# Patient Record
Sex: MALE | Race: WHITE | NOT HISPANIC OR LATINO
[De-identification: names, ages, dates, MRNs, and addresses within clinical notes are randomized per-mention and may not be internally consistent; named-entity substitution may affect disease eponyms.]

---

## 2017-01-31 ENCOUNTER — APPOINTMENT (OUTPATIENT)
Dept: PULMONOLOGY | Facility: CLINIC | Age: 57
End: 2017-01-31

## 2017-01-31 VITALS — BODY MASS INDEX: 53.24 KG/M2 | WEIGHT: 315 LBS

## 2017-01-31 VITALS
RESPIRATION RATE: 16 BRPM | SYSTOLIC BLOOD PRESSURE: 132 MMHG | DIASTOLIC BLOOD PRESSURE: 80 MMHG | OXYGEN SATURATION: 95 % | HEART RATE: 92 BPM

## 2017-03-22 ENCOUNTER — APPOINTMENT (OUTPATIENT)
Dept: ORTHOPEDIC SURGERY | Facility: CLINIC | Age: 57
End: 2017-03-22

## 2017-03-22 VITALS
BODY MASS INDEX: 49.44 KG/M2 | HEART RATE: 70 BPM | HEIGHT: 67 IN | TEMPERATURE: 97.9 F | WEIGHT: 315 LBS | SYSTOLIC BLOOD PRESSURE: 125 MMHG | DIASTOLIC BLOOD PRESSURE: 79 MMHG

## 2017-03-31 ENCOUNTER — APPOINTMENT (OUTPATIENT)
Dept: PULMONOLOGY | Facility: CLINIC | Age: 57
End: 2017-03-31

## 2017-04-25 ENCOUNTER — APPOINTMENT (OUTPATIENT)
Dept: PULMONOLOGY | Facility: CLINIC | Age: 57
End: 2017-04-25

## 2017-04-25 VITALS
OXYGEN SATURATION: 98 % | WEIGHT: 315 LBS | HEART RATE: 90 BPM | DIASTOLIC BLOOD PRESSURE: 70 MMHG | BODY MASS INDEX: 54.01 KG/M2 | SYSTOLIC BLOOD PRESSURE: 110 MMHG

## 2017-04-25 VITALS — RESPIRATION RATE: 16 BRPM

## 2017-04-25 RX ORDER — AZITHROMYCIN 500 MG/1
500 TABLET, FILM COATED ORAL
Qty: 5 | Refills: 0 | Status: DISCONTINUED | COMMUNITY
Start: 2017-02-13 | End: 2017-04-25

## 2017-06-21 ENCOUNTER — RX RENEWAL (OUTPATIENT)
Age: 57
End: 2017-06-21

## 2017-06-29 ENCOUNTER — APPOINTMENT (OUTPATIENT)
Dept: PULMONOLOGY | Facility: CLINIC | Age: 57
End: 2017-06-29

## 2017-06-29 VITALS — SYSTOLIC BLOOD PRESSURE: 120 MMHG | HEART RATE: 84 BPM | DIASTOLIC BLOOD PRESSURE: 90 MMHG | OXYGEN SATURATION: 95 %

## 2017-06-29 VITALS — RESPIRATION RATE: 16 BRPM

## 2017-06-29 RX ORDER — LEVOFLOXACIN 500 MG/1
500 TABLET, FILM COATED ORAL
Qty: 10 | Refills: 0 | Status: DISCONTINUED | COMMUNITY
Start: 2017-01-25

## 2017-07-28 ENCOUNTER — OUTPATIENT (OUTPATIENT)
Dept: OUTPATIENT SERVICES | Facility: HOSPITAL | Age: 57
LOS: 1 days | End: 2017-07-28

## 2017-07-28 DIAGNOSIS — G47.33 OBSTRUCTIVE SLEEP APNEA (ADULT) (PEDIATRIC): ICD-10-CM

## 2017-08-20 ENCOUNTER — OUTPATIENT (OUTPATIENT)
Dept: OUTPATIENT SERVICES | Facility: HOSPITAL | Age: 57
LOS: 1 days | End: 2017-08-20

## 2017-08-20 DIAGNOSIS — G47.33 OBSTRUCTIVE SLEEP APNEA (ADULT) (PEDIATRIC): ICD-10-CM

## 2017-08-30 ENCOUNTER — APPOINTMENT (OUTPATIENT)
Dept: PULMONOLOGY | Facility: CLINIC | Age: 57
End: 2017-08-30
Payer: COMMERCIAL

## 2017-08-30 VITALS — DIASTOLIC BLOOD PRESSURE: 60 MMHG | OXYGEN SATURATION: 97 % | SYSTOLIC BLOOD PRESSURE: 118 MMHG | HEART RATE: 81 BPM

## 2017-08-30 VITALS — RESPIRATION RATE: 16 BRPM

## 2017-08-30 VITALS — BODY MASS INDEX: 57.95 KG/M2 | WEIGHT: 315 LBS

## 2017-08-30 PROCEDURE — 94010 BREATHING CAPACITY TEST: CPT

## 2017-08-30 PROCEDURE — 99214 OFFICE O/P EST MOD 30 MIN: CPT | Mod: 25

## 2017-08-30 RX ORDER — AZITHROMYCIN 500 MG/1
500 TABLET, FILM COATED ORAL
Qty: 5 | Refills: 0 | Status: DISCONTINUED | COMMUNITY
Start: 2017-02-13 | End: 2017-08-30

## 2017-08-30 RX ORDER — FLUTICASONE FUROATE AND VILANTEROL TRIFENATATE 100; 25 UG/1; UG/1
100-25 POWDER RESPIRATORY (INHALATION)
Qty: 60 | Refills: 0 | Status: DISCONTINUED | COMMUNITY
Start: 2016-12-19 | End: 2017-08-30

## 2017-11-07 ENCOUNTER — OUTPATIENT (OUTPATIENT)
Dept: OUTPATIENT SERVICES | Facility: HOSPITAL | Age: 57
LOS: 1 days | End: 2017-11-07
Payer: COMMERCIAL

## 2017-11-07 DIAGNOSIS — G47.33 OBSTRUCTIVE SLEEP APNEA (ADULT) (PEDIATRIC): ICD-10-CM

## 2017-11-07 PROCEDURE — 95810 POLYSOM 6/> YRS 4/> PARAM: CPT

## 2017-11-07 PROCEDURE — 95810 POLYSOM 6/> YRS 4/> PARAM: CPT | Mod: 26

## 2017-12-21 ENCOUNTER — APPOINTMENT (OUTPATIENT)
Dept: PULMONOLOGY | Facility: CLINIC | Age: 57
End: 2017-12-21
Payer: COMMERCIAL

## 2017-12-21 VITALS
RESPIRATION RATE: 18 BRPM | SYSTOLIC BLOOD PRESSURE: 118 MMHG | OXYGEN SATURATION: 96 % | DIASTOLIC BLOOD PRESSURE: 78 MMHG | WEIGHT: 315 LBS | HEART RATE: 79 BPM | BODY MASS INDEX: 59.83 KG/M2

## 2017-12-21 PROCEDURE — 99214 OFFICE O/P EST MOD 30 MIN: CPT

## 2017-12-28 RX ADMIN — OXYCODONE AND ACETAMINOPHEN 2 TABLET(S): 5; 325 TABLET ORAL at 23:14

## 2018-01-11 ENCOUNTER — RX RENEWAL (OUTPATIENT)
Age: 58
End: 2018-01-11

## 2018-01-11 RX ORDER — FLUTICASONE FUROATE, UMECLIDINIUM BROMIDE AND VILANTEROL TRIFENATATE 100; 62.5; 25 UG/1; UG/1; UG/1
100-62.5-25 POWDER RESPIRATORY (INHALATION)
Qty: 1 | Refills: 5 | Status: DISCONTINUED | COMMUNITY
Start: 2017-12-21 | End: 2018-01-11

## 2018-01-26 ENCOUNTER — OTHER (OUTPATIENT)
Age: 58
End: 2018-01-26

## 2018-03-21 ENCOUNTER — APPOINTMENT (OUTPATIENT)
Dept: PULMONOLOGY | Facility: CLINIC | Age: 58
End: 2018-03-21
Payer: COMMERCIAL

## 2018-03-21 VITALS — SYSTOLIC BLOOD PRESSURE: 115 MMHG | DIASTOLIC BLOOD PRESSURE: 60 MMHG

## 2018-03-21 VITALS — WEIGHT: 315 LBS | BODY MASS INDEX: 60.46 KG/M2

## 2018-03-21 VITALS — RESPIRATION RATE: 18 BRPM

## 2018-03-21 VITALS — HEART RATE: 73 BPM | OXYGEN SATURATION: 96 %

## 2018-03-21 PROCEDURE — 94010 BREATHING CAPACITY TEST: CPT

## 2018-03-21 PROCEDURE — 99214 OFFICE O/P EST MOD 30 MIN: CPT | Mod: 25

## 2018-03-23 RX ORDER — UMECLIDINIUM 62.5 UG/1
62.5 AEROSOL, POWDER ORAL DAILY
Qty: 1 | Refills: 5 | Status: DISCONTINUED | COMMUNITY
Start: 2016-12-19 | End: 2018-03-23

## 2018-06-04 ENCOUNTER — APPOINTMENT (OUTPATIENT)
Dept: ORTHOPEDIC SURGERY | Facility: CLINIC | Age: 58
End: 2018-06-04
Payer: COMMERCIAL

## 2018-06-04 VITALS
SYSTOLIC BLOOD PRESSURE: 112 MMHG | WEIGHT: 315 LBS | HEIGHT: 67 IN | DIASTOLIC BLOOD PRESSURE: 76 MMHG | BODY MASS INDEX: 49.44 KG/M2 | HEART RATE: 53 BPM

## 2018-06-04 PROCEDURE — 20610 DRAIN/INJ JOINT/BURSA W/O US: CPT | Mod: LT

## 2018-06-04 PROCEDURE — 99214 OFFICE O/P EST MOD 30 MIN: CPT | Mod: 25

## 2018-08-06 ENCOUNTER — APPOINTMENT (OUTPATIENT)
Dept: ORTHOPEDIC SURGERY | Facility: CLINIC | Age: 58
End: 2018-08-06
Payer: COMMERCIAL

## 2018-08-06 VITALS
DIASTOLIC BLOOD PRESSURE: 70 MMHG | SYSTOLIC BLOOD PRESSURE: 118 MMHG | TEMPERATURE: 98.8 F | BODY MASS INDEX: 49.44 KG/M2 | WEIGHT: 315 LBS | HEART RATE: 78 BPM | HEIGHT: 67 IN

## 2018-08-06 PROCEDURE — 20610 DRAIN/INJ JOINT/BURSA W/O US: CPT | Mod: 50

## 2018-08-06 PROCEDURE — 73562 X-RAY EXAM OF KNEE 3: CPT | Mod: LT

## 2018-08-06 PROCEDURE — 99214 OFFICE O/P EST MOD 30 MIN: CPT | Mod: 25

## 2018-09-13 ENCOUNTER — APPOINTMENT (OUTPATIENT)
Dept: PULMONOLOGY | Facility: CLINIC | Age: 58
End: 2018-09-13
Payer: COMMERCIAL

## 2018-09-13 VITALS — WEIGHT: 315 LBS | BODY MASS INDEX: 58.89 KG/M2

## 2018-09-13 VITALS — HEART RATE: 85 BPM | DIASTOLIC BLOOD PRESSURE: 80 MMHG | SYSTOLIC BLOOD PRESSURE: 128 MMHG | OXYGEN SATURATION: 95 %

## 2018-09-13 VITALS — RESPIRATION RATE: 16 BRPM

## 2018-09-13 PROCEDURE — 99214 OFFICE O/P EST MOD 30 MIN: CPT | Mod: 25

## 2018-09-13 PROCEDURE — 94010 BREATHING CAPACITY TEST: CPT

## 2018-10-08 ENCOUNTER — RX RENEWAL (OUTPATIENT)
Age: 58
End: 2018-10-08

## 2018-11-12 ENCOUNTER — APPOINTMENT (OUTPATIENT)
Dept: ORTHOPEDIC SURGERY | Facility: CLINIC | Age: 58
End: 2018-11-12
Payer: COMMERCIAL

## 2018-11-12 VITALS
SYSTOLIC BLOOD PRESSURE: 112 MMHG | WEIGHT: 315 LBS | BODY MASS INDEX: 49.44 KG/M2 | HEART RATE: 46 BPM | TEMPERATURE: 98.1 F | DIASTOLIC BLOOD PRESSURE: 70 MMHG | HEIGHT: 67 IN

## 2018-11-12 DIAGNOSIS — M25.562 PAIN IN RIGHT KNEE: ICD-10-CM

## 2018-11-12 DIAGNOSIS — M25.561 PAIN IN RIGHT KNEE: ICD-10-CM

## 2018-11-12 PROCEDURE — 99214 OFFICE O/P EST MOD 30 MIN: CPT | Mod: 25

## 2018-11-12 PROCEDURE — 20610 DRAIN/INJ JOINT/BURSA W/O US: CPT | Mod: 50

## 2018-12-27 ENCOUNTER — INPATIENT (INPATIENT)
Facility: HOSPITAL | Age: 58
LOS: 5 days | Discharge: HOME | DRG: 291 | End: 2019-01-02
Attending: INTERNAL MEDICINE | Admitting: STUDENT IN AN ORGANIZED HEALTH CARE EDUCATION/TRAINING PROGRAM
Payer: COMMERCIAL

## 2018-12-27 VITALS
HEART RATE: 105 BPM | TEMPERATURE: 99 F | RESPIRATION RATE: 22 BRPM | WEIGHT: 315 LBS | OXYGEN SATURATION: 96 % | HEIGHT: 67 IN | DIASTOLIC BLOOD PRESSURE: 77 MMHG | SYSTOLIC BLOOD PRESSURE: 132 MMHG

## 2018-12-27 LAB
ALBUMIN SERPL ELPH-MCNC: 4.3 G/DL — SIGNIFICANT CHANGE UP (ref 3.3–5.2)
ALP SERPL-CCNC: 41 U/L — SIGNIFICANT CHANGE UP (ref 40–120)
ALT FLD-CCNC: 13 U/L — SIGNIFICANT CHANGE UP
ANION GAP SERPL CALC-SCNC: 14 MMOL/L — SIGNIFICANT CHANGE UP (ref 5–17)
APTT BLD: 33.9 SEC — SIGNIFICANT CHANGE UP (ref 27.5–36.3)
AST SERPL-CCNC: 17 U/L — SIGNIFICANT CHANGE UP
BASOPHILS # BLD AUTO: 0 K/UL — SIGNIFICANT CHANGE UP (ref 0–0.2)
BASOPHILS NFR BLD AUTO: 0.2 % — SIGNIFICANT CHANGE UP (ref 0–2)
BILIRUB SERPL-MCNC: 0.8 MG/DL — SIGNIFICANT CHANGE UP (ref 0.4–2)
BUN SERPL-MCNC: 17 MG/DL — SIGNIFICANT CHANGE UP (ref 8–20)
CALCIUM SERPL-MCNC: 8.6 MG/DL — SIGNIFICANT CHANGE UP (ref 8.6–10.2)
CHLORIDE SERPL-SCNC: 102 MMOL/L — SIGNIFICANT CHANGE UP (ref 98–107)
CO2 SERPL-SCNC: 24 MMOL/L — SIGNIFICANT CHANGE UP (ref 22–29)
CREAT SERPL-MCNC: 0.93 MG/DL — SIGNIFICANT CHANGE UP (ref 0.5–1.3)
EOSINOPHIL # BLD AUTO: 0.2 K/UL — SIGNIFICANT CHANGE UP (ref 0–0.5)
EOSINOPHIL NFR BLD AUTO: 1 % — SIGNIFICANT CHANGE UP (ref 0–5)
GLUCOSE SERPL-MCNC: 86 MG/DL — SIGNIFICANT CHANGE UP (ref 70–115)
HCT VFR BLD CALC: 45 % — SIGNIFICANT CHANGE UP (ref 42–52)
HGB BLD-MCNC: 15 G/DL — SIGNIFICANT CHANGE UP (ref 14–18)
INR BLD: 1.54 RATIO — HIGH (ref 0.88–1.16)
LYMPHOCYTES # BLD AUTO: 21.9 % — SIGNIFICANT CHANGE UP (ref 20–55)
LYMPHOCYTES # BLD AUTO: 3.5 K/UL — SIGNIFICANT CHANGE UP (ref 1–4.8)
MCHC RBC-ENTMCNC: 31.1 PG — HIGH (ref 27–31)
MCHC RBC-ENTMCNC: 33.3 G/DL — SIGNIFICANT CHANGE UP (ref 32–36)
MCV RBC AUTO: 93.4 FL — SIGNIFICANT CHANGE UP (ref 80–94)
MONOCYTES # BLD AUTO: 1.4 K/UL — HIGH (ref 0–0.8)
MONOCYTES NFR BLD AUTO: 8.5 % — SIGNIFICANT CHANGE UP (ref 3–10)
NEUTROPHILS # BLD AUTO: 10.8 K/UL — HIGH (ref 1.8–8)
NEUTROPHILS NFR BLD AUTO: 68 % — SIGNIFICANT CHANGE UP (ref 37–73)
NT-PROBNP SERPL-SCNC: 1863 PG/ML — HIGH (ref 0–300)
PLATELET # BLD AUTO: 209 K/UL — SIGNIFICANT CHANGE UP (ref 150–400)
POTASSIUM SERPL-MCNC: 4.2 MMOL/L — SIGNIFICANT CHANGE UP (ref 3.5–5.3)
POTASSIUM SERPL-SCNC: 4.2 MMOL/L — SIGNIFICANT CHANGE UP (ref 3.5–5.3)
PROT SERPL-MCNC: 7.4 G/DL — SIGNIFICANT CHANGE UP (ref 6.6–8.7)
PROTHROM AB SERPL-ACNC: 18 SEC — HIGH (ref 10–12.9)
RAPID RVP RESULT: SIGNIFICANT CHANGE UP
RBC # BLD: 4.82 M/UL — SIGNIFICANT CHANGE UP (ref 4.6–6.2)
RBC # FLD: 14.1 % — SIGNIFICANT CHANGE UP (ref 11–15.6)
SODIUM SERPL-SCNC: 140 MMOL/L — SIGNIFICANT CHANGE UP (ref 135–145)
TROPONIN T SERPL-MCNC: <0.01 NG/ML — SIGNIFICANT CHANGE UP (ref 0–0.06)
WBC # BLD: 15.8 K/UL — HIGH (ref 4.8–10.8)
WBC # FLD AUTO: 15.8 K/UL — HIGH (ref 4.8–10.8)

## 2018-12-27 PROCEDURE — 71046 X-RAY EXAM CHEST 2 VIEWS: CPT | Mod: 26

## 2018-12-27 PROCEDURE — 93010 ELECTROCARDIOGRAM REPORT: CPT

## 2018-12-27 PROCEDURE — 99406 BEHAV CHNG SMOKING 3-10 MIN: CPT

## 2018-12-27 PROCEDURE — 99223 1ST HOSP IP/OBS HIGH 75: CPT | Mod: 25

## 2018-12-27 PROCEDURE — 99285 EMERGENCY DEPT VISIT HI MDM: CPT

## 2018-12-27 RX ORDER — IPRATROPIUM/ALBUTEROL SULFATE 18-103MCG
3 AEROSOL WITH ADAPTER (GRAM) INHALATION ONCE
Qty: 0 | Refills: 0 | Status: COMPLETED | OUTPATIENT
Start: 2018-12-27 | End: 2018-12-27

## 2018-12-27 RX ORDER — MAGNESIUM SULFATE 500 MG/ML
2 VIAL (ML) INJECTION ONCE
Qty: 0 | Refills: 0 | Status: COMPLETED | OUTPATIENT
Start: 2018-12-27 | End: 2018-12-27

## 2018-12-27 RX ORDER — FUROSEMIDE 40 MG
40 TABLET ORAL EVERY 4 HOURS
Qty: 0 | Refills: 0 | Status: DISCONTINUED | OUTPATIENT
Start: 2018-12-27 | End: 2018-12-28

## 2018-12-27 RX ORDER — LEVALBUTEROL 1.25 MG/.5ML
1.25 SOLUTION, CONCENTRATE RESPIRATORY (INHALATION) THREE TIMES A DAY
Qty: 0 | Refills: 0 | Status: DISCONTINUED | OUTPATIENT
Start: 2018-12-27 | End: 2018-12-29

## 2018-12-27 RX ORDER — RIVAROXABAN 15 MG-20MG
20 KIT ORAL ONCE
Qty: 0 | Refills: 0 | Status: COMPLETED | OUTPATIENT
Start: 2018-12-27 | End: 2018-12-27

## 2018-12-27 RX ORDER — FUROSEMIDE 40 MG
40 TABLET ORAL ONCE
Qty: 0 | Refills: 0 | Status: COMPLETED | OUTPATIENT
Start: 2018-12-27 | End: 2018-12-27

## 2018-12-27 RX ORDER — CARVEDILOL PHOSPHATE 80 MG/1
12.5 CAPSULE, EXTENDED RELEASE ORAL ONCE
Qty: 0 | Refills: 0 | Status: COMPLETED | OUTPATIENT
Start: 2018-12-27 | End: 2018-12-27

## 2018-12-27 RX ORDER — RIVAROXABAN 15 MG-20MG
20 KIT ORAL EVERY 24 HOURS
Qty: 0 | Refills: 0 | Status: DISCONTINUED | OUTPATIENT
Start: 2018-12-27 | End: 2019-01-02

## 2018-12-27 RX ORDER — CARVEDILOL PHOSPHATE 80 MG/1
6.25 CAPSULE, EXTENDED RELEASE ORAL
Qty: 0 | Refills: 0 | Status: DISCONTINUED | OUTPATIENT
Start: 2018-12-27 | End: 2018-12-29

## 2018-12-27 RX ORDER — IPRATROPIUM/ALBUTEROL SULFATE 18-103MCG
3 AEROSOL WITH ADAPTER (GRAM) INHALATION EVERY 6 HOURS
Qty: 0 | Refills: 0 | Status: DISCONTINUED | OUTPATIENT
Start: 2018-12-27 | End: 2018-12-27

## 2018-12-27 RX ADMIN — Medication 125 MILLIGRAM(S): at 18:55

## 2018-12-27 RX ADMIN — Medication 40 MILLIGRAM(S): at 23:00

## 2018-12-27 RX ADMIN — Medication 3 MILLILITER(S): at 20:19

## 2018-12-27 RX ADMIN — Medication 50 GRAM(S): at 20:18

## 2018-12-27 RX ADMIN — CARVEDILOL PHOSPHATE 12.5 MILLIGRAM(S): 80 CAPSULE, EXTENDED RELEASE ORAL at 22:04

## 2018-12-27 RX ADMIN — RIVAROXABAN 20 MILLIGRAM(S): KIT at 22:04

## 2018-12-27 NOTE — ED STATDOCS - OBJECTIVE STATEMENT
58 year old male with pertinent hx of COPD and sleep apnea(on CPAP) presents with progressively worsening shortness of breath that is at its worst at night. Pt reports having a hard time catching his breath last night. Endorses some daytime MALIK and non-productive  cough, chills but denies any chest pain, diaphoresis, leg swelling or recent weight gain

## 2018-12-27 NOTE — H&P ADULT - ASSESSMENT
Pt is a 59 yo M presenting from home for 1 month hx of progressive SOB likely due to new onset CHF exac. PMH A fib on Xarelto, HTN, Obesity, ROCK on CPAP, COPD, tobacco dependence.

## 2018-12-27 NOTE — ED ADULT NURSE REASSESSMENT NOTE - NS ED NURSE REASSESS COMMENT FT1
assumed care of patient from ongoing RN, alert and oriented x4, charting as noted. pt sitting on stretcher, no s/s of respiratory distress noted at this time. sp02 97% on room air. Lung sounds diminished bilaterally, respirations even non labored. Awaiting orders and dispo, patient offers no complaints at this time. Saftey maintained. pt educated on plan of care, pt able to successfully teach back plan of care to RN, RN will continue to reeducate pt during hospital stay.

## 2018-12-27 NOTE — ED PROVIDER NOTE - OBJECTIVE STATEMENT
57 y/o M, with hx of Afib, ROCK, and COPD, presents to the ED c/o worsening SOB, onset last night.  Pt states that he has had intermittent SOB and difficulty breathing, onset 2 months ago, but notes that last night sx became more severe.  Pt states that he was seen by his PCP this morning and referred to the ED for further evaluation and a CXR.  Currently taking 10mg Prednisone due to persistent SOB.  Associated sx include productive cough, producing clear sputum.  Also currently on Carvedilol and Xarelto, however states that he has not yet taken his night time dose.  Denies fever, chills, chest pain, LE edema, N/V/D, abd pain, back pain, or HA.    Cardiologist: Dr. Deng  Pulmonologist: Dr. Tamayo  PCP: Dr. Melraa

## 2018-12-27 NOTE — ED PROVIDER NOTE - MUSCULOSKELETAL, MLM
Spine appears normal, range of motion is not limited, no muscle or joint tenderness, 1+ nonpitting edema to bilateral lower extremities

## 2018-12-27 NOTE — ED ADULT NURSE REASSESSMENT NOTE - NS ED NURSE REASSESS COMMENT FT1
pt comfortable. NO distress, medications given, awaiting disposition from MD. pt educated on plan of care, pt able to successfully teach back plan of care to RN, RN will continue to reeducate pt during hospital stay.

## 2018-12-27 NOTE — ED STATDOCS - MEDICAL DECISION MAKING DETAILS
58 year old male pt COPD here with MALIK- will send pt to the main for closer monitoring and evaluation

## 2018-12-27 NOTE — H&P ADULT - HISTORY OF PRESENT ILLNESS
Pt is a 57 yo M presenting from home for 1 month hx of progressive SOB. PMH A fib on Xarelto, HTN, Obesity, ROCK on CPAP, COPD, tobacco dependence.   Patient states that he has SOB w/ exertion at baseline due to his large body habitus, but over the last month it has been getting worse, he gets dyspnea on minimal exertion and had PND last night awaking him from sleep. He states he also had a 15 lb weight gain over the last 2 months, has minimal LE swelling he states, does not follow any particular diet. No fam hx of CHF in mother and father. He has seen his PMD who placed him on PO steroids for approximately 1 month now. Denies headaches, nausea, vomiting, chest pain, palpitations, abdominal pain, constipation, diarrhea, melena, hematochezia, dysuria.   He last saw his cardiologist Dr. Deng approx 3 months ago, and states he had an Echo does over 6 months ago and has no known hx of CHF. He has no other complaints today. Denies any pain anywhere.   In ED CXR shows some vasc congestion, he received IV lasix. Pt seen on 12/27/18 at 11:25 PM.   Pt is a 57 yo M presenting from home for 1 month hx of progressive SOB. PMH A fib on Xarelto, HTN, Obesity, ROCK on CPAP, COPD, tobacco dependence.   Patient states that he has SOB w/ exertion at baseline due to his large body habitus, but over the last month it has been getting worse, he gets dyspnea on minimal exertion and had PND last night awaking him from sleep. He states he also had a 15 lb weight gain over the last 2 months, has minimal LE swelling he states, does not follow any particular diet. No fam hx of CHF in mother and father. He has seen his PMD who placed him on PO steroids for approximately 1 month now. Denies headaches, nausea, vomiting, chest pain, palpitations, abdominal pain, constipation, diarrhea, melena, hematochezia, dysuria.   He last saw his cardiologist Dr. Deng approx 3 months ago, and states he had an Echo does over 6 months ago and has no known hx of CHF. He has no other complaints today. Denies any pain anywhere.   In ED CXR shows some vasc congestion, he received IV lasix.

## 2018-12-27 NOTE — H&P ADULT - PROBLEM SELECTOR PLAN 5
Patient was counselled on tobacco cessation. He was informed of the increased risk of lung cancer and cardiovascular disease, COPD among other health risks associated with smoking and tobacco use. Cessation was advised. 3 mins on counselling spent

## 2018-12-27 NOTE — ED STATDOCS - ATTENDING CONTRIBUTION TO CARE
59 yo male pmh copd , sleep apnea comes to ed progressively worsening sob; pe  chest diminished bs b/l; pt to be transferred to main for further evaluation of sob

## 2018-12-27 NOTE — H&P ADULT - NSHPPHYSICALEXAM_GEN_ALL_CORE
T(C): 37 (12-27-18 @ 19:22), Max: 37.2 (12-27-18 @ 16:09)  HR: 93 (12-27-18 @ 22:00) (93 - 105)  BP: 110/71 (12-27-18 @ 22:00) (110/71 - 160/73)  RR: 22 (12-27-18 @ 22:00) (20 - 22)  SpO2: 94% (12-27-18 @ 22:00) (94% - 97%)  Wt(kg): --    Physical Exam:   GENERAL: well-groomed, well-developed, NAD, obese  HEENT: head NC/AT; EOM intact, conjunctiva & sclera clear; hearing grossly intact, moist mucous membranes  NECK: supple, no JVD  RESPIRATORY: CTA B/L, no wheezing, rales, rhonchi or rubs  CARDIOVASCULAR: S1&S2, irreg irreg, no murmurs or gallops  ABDOMEN: soft, non-tender, non-distended, + Bowel sounds x4 quadrants, no guarding, rebound or rigidity  MUSCULOSKELETAL:  no clubbing, cyanosis of all 4 extremities, bn/l LE pitting edema 1+  LYMPH: no cervical lymphadenopathy  VASCULAR: Radial pulses 2+ bilaterally, no varicose veins   SKIN: warm and dry, color normal  NEUROLOGIC: AA&O X3, CN2-12 intact w/ no focal deficits, no sensory loss, motor Strength 5/5 in UE & LE B/L  Psych: Normal mood and affect, normal behavior

## 2018-12-27 NOTE — H&P ADULT - PROBLEM/PLAN-1
The patient's records were received in our office. Health Maintenance was updated today.  
DISPLAY PLAN FREE TEXT

## 2018-12-27 NOTE — ED ADULT TRIAGE NOTE - CHIEF COMPLAINT QUOTE
COPD, still smoke. Saw Primary Monday, received steroids, all for a couple of weeks. Has loop recorder, sleep apnea, CPAP nocturnal.

## 2018-12-27 NOTE — H&P ADULT - PROBLEM SELECTOR PLAN 3
Continue neb treatments, currently on 10mg steroids which is likely also causing water retention, consider tapering steroids if CHF confirmed.

## 2018-12-28 DIAGNOSIS — I50.9 HEART FAILURE, UNSPECIFIED: ICD-10-CM

## 2018-12-28 DIAGNOSIS — I48.91 UNSPECIFIED ATRIAL FIBRILLATION: ICD-10-CM

## 2018-12-28 DIAGNOSIS — Z29.9 ENCOUNTER FOR PROPHYLACTIC MEASURES, UNSPECIFIED: ICD-10-CM

## 2018-12-28 DIAGNOSIS — G47.33 OBSTRUCTIVE SLEEP APNEA (ADULT) (PEDIATRIC): ICD-10-CM

## 2018-12-28 DIAGNOSIS — J44.9 CHRONIC OBSTRUCTIVE PULMONARY DISEASE, UNSPECIFIED: ICD-10-CM

## 2018-12-28 DIAGNOSIS — F17.210 NICOTINE DEPENDENCE, CIGARETTES, UNCOMPLICATED: ICD-10-CM

## 2018-12-28 DIAGNOSIS — D72.829 ELEVATED WHITE BLOOD CELL COUNT, UNSPECIFIED: ICD-10-CM

## 2018-12-28 LAB
ANION GAP SERPL CALC-SCNC: 15 MMOL/L — SIGNIFICANT CHANGE UP (ref 5–17)
ANISOCYTOSIS BLD QL: SLIGHT — SIGNIFICANT CHANGE UP
BUN SERPL-MCNC: 18 MG/DL — SIGNIFICANT CHANGE UP (ref 8–20)
CALCIUM SERPL-MCNC: 8.5 MG/DL — LOW (ref 8.6–10.2)
CHLORIDE SERPL-SCNC: 99 MMOL/L — SIGNIFICANT CHANGE UP (ref 98–107)
CO2 SERPL-SCNC: 22 MMOL/L — SIGNIFICANT CHANGE UP (ref 22–29)
CREAT SERPL-MCNC: 0.85 MG/DL — SIGNIFICANT CHANGE UP (ref 0.5–1.3)
ELLIPTOCYTES BLD QL SMEAR: SLIGHT — SIGNIFICANT CHANGE UP
GLUCOSE SERPL-MCNC: 176 MG/DL — HIGH (ref 70–115)
HCT VFR BLD CALC: 43.6 % — SIGNIFICANT CHANGE UP (ref 42–52)
HGB BLD-MCNC: 14.8 G/DL — SIGNIFICANT CHANGE UP (ref 14–18)
HYPOCHROMIA BLD QL: SLIGHT — SIGNIFICANT CHANGE UP
LYMPHOCYTES # BLD AUTO: 9 % — LOW (ref 20–55)
MACROCYTES BLD QL: SLIGHT — SIGNIFICANT CHANGE UP
MCHC RBC-ENTMCNC: 31.2 PG — HIGH (ref 27–31)
MCHC RBC-ENTMCNC: 33.9 G/DL — SIGNIFICANT CHANGE UP (ref 32–36)
MCV RBC AUTO: 92 FL — SIGNIFICANT CHANGE UP (ref 80–94)
MICROCYTES BLD QL: SLIGHT — SIGNIFICANT CHANGE UP
MONOCYTES NFR BLD AUTO: 1 % — LOW (ref 3–10)
NEUTROPHILS NFR BLD AUTO: 90 % — HIGH (ref 37–73)
PLAT MORPH BLD: NORMAL — SIGNIFICANT CHANGE UP
PLATELET # BLD AUTO: 207 K/UL — SIGNIFICANT CHANGE UP (ref 150–400)
POIKILOCYTOSIS BLD QL AUTO: SLIGHT — SIGNIFICANT CHANGE UP
POTASSIUM SERPL-MCNC: 4.3 MMOL/L — SIGNIFICANT CHANGE UP (ref 3.5–5.3)
POTASSIUM SERPL-SCNC: 4.3 MMOL/L — SIGNIFICANT CHANGE UP (ref 3.5–5.3)
RBC # BLD: 4.74 M/UL — SIGNIFICANT CHANGE UP (ref 4.6–6.2)
RBC # FLD: 13.8 % — SIGNIFICANT CHANGE UP (ref 11–15.6)
RBC BLD AUTO: ABNORMAL
SODIUM SERPL-SCNC: 136 MMOL/L — SIGNIFICANT CHANGE UP (ref 135–145)
WBC # BLD: 14.2 K/UL — HIGH (ref 4.8–10.8)
WBC # FLD AUTO: 14.2 K/UL — HIGH (ref 4.8–10.8)

## 2018-12-28 PROCEDURE — 93306 TTE W/DOPPLER COMPLETE: CPT | Mod: 26

## 2018-12-28 PROCEDURE — 99232 SBSQ HOSP IP/OBS MODERATE 35: CPT

## 2018-12-28 PROCEDURE — 99231 SBSQ HOSP IP/OBS SF/LOW 25: CPT

## 2018-12-28 RX ORDER — LEVALBUTEROL 1.25 MG/.5ML
1.25 SOLUTION, CONCENTRATE RESPIRATORY (INHALATION) ONCE
Qty: 0 | Refills: 0 | Status: COMPLETED | OUTPATIENT
Start: 2018-12-28 | End: 2018-12-28

## 2018-12-28 RX ORDER — OXYCODONE AND ACETAMINOPHEN 5; 325 MG/1; MG/1
2 TABLET ORAL EVERY 4 HOURS
Qty: 0 | Refills: 0 | Status: DISCONTINUED | OUTPATIENT
Start: 2018-12-28 | End: 2018-12-29

## 2018-12-28 RX ORDER — FUROSEMIDE 40 MG
40 TABLET ORAL EVERY 12 HOURS
Qty: 0 | Refills: 0 | Status: DISCONTINUED | OUTPATIENT
Start: 2018-12-28 | End: 2018-12-31

## 2018-12-28 RX ORDER — SIMETHICONE 80 MG/1
80 TABLET, CHEWABLE ORAL
Qty: 0 | Refills: 0 | Status: DISCONTINUED | OUTPATIENT
Start: 2018-12-28 | End: 2018-12-28

## 2018-12-28 RX ORDER — OXYCODONE AND ACETAMINOPHEN 5; 325 MG/1; MG/1
1 TABLET ORAL EVERY 4 HOURS
Qty: 0 | Refills: 0 | Status: DISCONTINUED | OUTPATIENT
Start: 2018-12-28 | End: 2018-12-29

## 2018-12-28 RX ORDER — INFLUENZA VIRUS VACCINE 15; 15; 15; 15 UG/.5ML; UG/.5ML; UG/.5ML; UG/.5ML
0.5 SUSPENSION INTRAMUSCULAR ONCE
Qty: 0 | Refills: 0 | Status: COMPLETED | OUTPATIENT
Start: 2018-12-28 | End: 2018-12-28

## 2018-12-28 RX ADMIN — Medication 40 MILLIGRAM(S): at 06:24

## 2018-12-28 RX ADMIN — LEVALBUTEROL 1.25 MILLIGRAM(S): 1.25 SOLUTION, CONCENTRATE RESPIRATORY (INHALATION) at 20:47

## 2018-12-28 RX ADMIN — SIMETHICONE 80 MILLIGRAM(S): 80 TABLET, CHEWABLE ORAL at 20:15

## 2018-12-28 RX ADMIN — CARVEDILOL PHOSPHATE 6.25 MILLIGRAM(S): 80 CAPSULE, EXTENDED RELEASE ORAL at 06:24

## 2018-12-28 RX ADMIN — OXYCODONE AND ACETAMINOPHEN 2 TABLET(S): 5; 325 TABLET ORAL at 22:20

## 2018-12-28 RX ADMIN — Medication 1 TABLET(S): at 18:50

## 2018-12-28 RX ADMIN — Medication 40 MILLIGRAM(S): at 10:21

## 2018-12-28 RX ADMIN — LEVALBUTEROL 1.25 MILLIGRAM(S): 1.25 SOLUTION, CONCENTRATE RESPIRATORY (INHALATION) at 05:23

## 2018-12-28 RX ADMIN — Medication 10 MILLIGRAM(S): at 06:23

## 2018-12-28 RX ADMIN — Medication 40 MILLIGRAM(S): at 19:23

## 2018-12-28 RX ADMIN — LEVALBUTEROL 1.25 MILLIGRAM(S): 1.25 SOLUTION, CONCENTRATE RESPIRATORY (INHALATION) at 10:27

## 2018-12-28 RX ADMIN — RIVAROXABAN 20 MILLIGRAM(S): KIT at 18:50

## 2018-12-28 RX ADMIN — CARVEDILOL PHOSPHATE 6.25 MILLIGRAM(S): 80 CAPSULE, EXTENDED RELEASE ORAL at 19:23

## 2018-12-28 RX ADMIN — Medication 1 TABLET(S): at 06:24

## 2018-12-28 NOTE — PROGRESS NOTE ADULT - PROBLEM SELECTOR PLAN 3
Continue neb treatments, currently on 10mg steroids which is likely also causing water retention, consider tapering steroids if CHF confirmed. Continue neb treatments, currently on 10mg steroids which is likely also causing water retention, will leonardo down to 5mg, might need slow long leonardo, and need to have out patient pulmonary follow up.

## 2018-12-28 NOTE — CHART NOTE - NSCHARTNOTEFT_GEN_A_CORE
Called to see pt who is c/o intermittent cramping/spasm like flank pain.  Pain is 7/10 when occurs and provoked by movement with a twisting fashion.  It has occurred twice today, once while in bathroom and cleaning self after BM and a second episode during TTE when he was moving into requested position.  Pt states this has been recurrent in the past with no associated symptoms of N/V/diaphoresis/SOB.  Alleviated when he resumes neutral body posture.      Pt is admitted to facility for CHF  Pt is a 59 yo M presenting from home for 1 month hx of progressive SOB. PMH A fib on Xarelto, HTN, Obesity, ROCK on CPAP, COPD, tobacco dependence.   Patient states that he has SOB w/ exertion at baseline due to his large body habitus, but over the last month it has been getting worse, he gets dyspnea on minimal exertion and had PND last night awaking him from sleep. He states he also had a 15 lb weight gain over the last 2 months, has minimal LE swelling he states, does not follow any particular diet. No fam hx of CHF in mother and father. He has seen his PMD who placed him on PO steroids for approximately 1 month now. Denies headaches, nausea, vomiting, chest pain, palpitations, abdominal pain, constipation, diarrhea, melena, hematochezia, dysuria.   He last saw his cardiologist Dr. Deng approx 3 months ago, and states he had an Echo does over 6 months ago and has no known hx of CHF. He has no other complaints today. Denies any pain anywhere.   In ED CXR shows some vasc congestion, he received IV lasix. (27 Dec 2018 23:58)    ROS: except for noted in complaint and HPI; review of systems is negative  Social hx: +smoking at present; denies ETOH or illicit drugs  Family hx: denied knowledge of CAD, HTN, obesity  58yMaleREVIEW OF SYSTEMS:    PAST MEDICAL & SURGICAL HISTORY:  Afib  ROCK (obstructive sleep apnea)  COPD (chronic obstructive pulmonary disease)  No significant past surgical history    Current medications:   carvedilol Oral Tab/Cap - Peds 6.25 milliGRAM(s) Oral two times a day  furosemide   Injectable 40 milliGRAM(s) IV Push every 12 hours  influenza   Vaccine 0.5 milliLiter(s) IntraMuscular once  levalbuterol for Nebulization - Peds 1.25 milliGRAM(s) Nebulizer three times a day PRN  predniSONE   Tablet 5 milliGRAM(s) Oral daily  rivaroxaban 20 milliGRAM(s) Oral every 24 hours  simethicone 80 milliGRAM(s) Chew once  trimethoprim  160 mG/sulfamethoxazole 800 mG 1 Tablet(s) Oral two times a day    Allergies:  penicillins (Hives)  shellfish (Pruritus; Rash)    Intolerances none known    Vital Signs Last 24 Hrs  T(C): 36.6 (28 Dec 2018 18:58), Max: 36.8 (27 Dec 2018 22:00)  T(F): 97.9 (28 Dec 2018 18:58), Max: 98.2 (27 Dec 2018 22:00)  HR: 101 (28 Dec 2018 18:58) (79 - 112)  BP: 105/68 (28 Dec 2018 18:58) (91/55 - 119/71)  BP(mean): 74 (28 Dec 2018 01:36) (74 - 74)  RR: 18 (28 Dec 2018 18:58) (18 - 22)  SpO2: 94% (28 Dec 2018 18:58) (93% - 99%)    Height 5'7"  Weight 380 lbs  BMI 59.51  classifies as Obese class3 : Morbid obesity    On examination: Pt is A&Ox3 in NAD sitting comfortably in chair besides bed supplemental oxygen in use  Head: NCAT  EENT: Pharynx moist without lesions: Nares patent without discharge  Neck- no JVD  Lungs- clear breath sounds all fields without rales/rhonchi or wheezes  Thorax- without deformity; no tenderness on palpation or movement  Heart- s1s2 heard, irregularly irregular with no murmur  Abdomen- obese, protuberant pannus ,  nontender to palpation; no organomegaly appreciated; +bowel sounds all quadrants  Extremities- no cyanosis or edema noted    TTE 8/17 per cardiology consultation shows LVEF 40-45%  Impression:  1- muscular skeletal pain with motion, intermittent chronic in nature                     2- CHF  Acute decompensating systolic per cardiology consultation                     3- COPD exacerbation per documentation of hospitalist and cardiologist                     4- A.fib                     3- morbid obsesity Class 3  Plan: 1- no current therapy indicated for muscular skeletal pain          2-  therapy for CHF  including i.v. lasix, low sodium diet, fluid restrictions and  continue home medications and               repeat echo.          3- continue Xarelto and Coreg for treatment of a.fib          4- COPD treatment includes nebulizer treatments and taper of prednisone          5- d/w patient plan of care and all questions answered          6- d/w RN plan of care; all questions answered

## 2018-12-28 NOTE — ED ADULT NURSE REASSESSMENT NOTE - NS ED NURSE REASSESS COMMENT FT1
report given to accepting RN in CDU. Patient placed on CM and transferred to CDU bed 15. PT stable, hospital bed provided for comfort. Respiratory therapist at bedside for cpap placement.

## 2018-12-28 NOTE — PROGRESS NOTE ADULT - PROBLEM SELECTOR PLAN 2
Continue Xarelto and Coreg.   _EKG a fib rate controlled, no acute ST-t wave changes Continue Xarelto and Coreg, EKG a fib rate controlled, no acute ST-t wave changes

## 2018-12-28 NOTE — PROGRESS NOTE ADULT - PROBLEM SELECTOR PLAN 1
Suspect new onset CHF exac in setting of weight gain, LE edema, progresive SOB and vasc congestion seen on CXR, being diuresed w/ Lasix 40mg Q4h, will change to 40mg Q12, fluid restrict, low sodium diet, consult Bradly Echevarria (called by ED)  TTE pending, will follow, encouraged weight loss, Suspect new onset CHF exac in setting of weight gain, LE edema, progresive SOB and vasc congestion seen on CXR, being diuresed w/ Lasix 40mg Q4h IV, will change to 40mg Q12, fluid restrict, low sodium diet, consult Bradly Echevarria (called by ED), TTE pending, will follow, encouraged weight loss

## 2018-12-28 NOTE — CONSULT NOTE ADULT - SUBJECTIVE AND OBJECTIVE BOX
[place] [facility], THE HEART CENTER                                   [address] [place] [address]                                                      PHONE: [phone]                                                         FAX: [phone]  [email or web address]  ---------------------------------------------------------------------------------------------------------------------------------    HPI:  MAGGIE ELLIOTT is an 58y Male HTN, HLD, NICM (EF ~ 40-45%), AF On Xarelto, ROCK,  smoker,  COPD, who presented to [facility] ED complaining of sob.  Pt has had progressively worsening, sob/li over the past few days, associate with a mildly productive cough, mild fevers, no chills, sweats, chest pain, or palpitations.          PAST MEDICAL & SURGICAL HISTORY:  Afib  ROCK (obstructive sleep apnea)  COPD (chronic obstructive pulmonary disease)  No significant past surgical history      penicillins (Hives)      MEDICATIONS  (STANDING):  carvedilol Oral Tab/Cap - Peds 6.25 milliGRAM(s) Oral two times a day  furosemide   Injectable 40 milliGRAM(s) IV Push every 4 hours  influenza   Vaccine 0.5 milliLiter(s) IntraMuscular once  predniSONE   Tablet 10 milliGRAM(s) Oral daily  rivaroxaban 20 milliGRAM(s) Oral every 24 hours  trimethoprim  160 mG/sulfamethoxazole 800 mG 1 Tablet(s) Oral two times a day    MEDICATIONS  (PRN):  levalbuterol for Nebulization - Peds 1.25 milliGRAM(s) Nebulizer three times a day PRN SOB      Family History: Pt denies hx of early cad, SCD, or congenital heart disease.      Social History:  Cigarettes:   active                 Alchohol:  no               Illicit Drug Abuse:  no    ROS:  Constitutional: No fever, weight loss or fatigue  Eyes: No eye pain, visual disturbances, or discharge  ENMT:  No difficulty hearing, tinnitus, vertigo; No sinus or throat pain  Neck: No pain or stiffness  Respiratory: No cough, wheezing, chills or hemoptysis  Cardiovascular: No chest pain, palpitations, , dizziness or leg swelling  +shortness of breath  Gastrointestinal: No abdominal or epigastric pain. No nausea, vomiting or hematemesis; No diarrhea or constipation. No melena or hematochezia.  Genitourinary: No dysuria, frequency, hematuria or incontinence  Rectal: No pain, hemorrhoids or incontinence  Neurological: No headaches, memory loss, loss of strength, numbness or tremors  Skin: No itching, burning, rashes or lesions   Lymph Nodes: No enlarged glands  Endocrine: No heat or cold intolerance; No hair loss  Musculoskeletal: No joint pain or swelling; No muscle, back or extremity pain  Psychiatric: No depression, anxiety, mood swings or difficulty sleeping  Heme/Lymph: No easy bruising or bleeding gums  Allergy and Immunologic: No hives or eczema    Vital Signs Last 24 Hrs  T(C): 36.6 (28 Dec 2018 07:46), Max: 37.2 (27 Dec 2018 16:09)  T(F): 97.8 (28 Dec 2018 07:46), Max: 99 (27 Dec 2018 16:09)  HR: 79 (28 Dec 2018 07:46) (79 - 105)  BP: 94/57 (28 Dec 2018 07:46) (91/55 - 160/73)  BP(mean): 74 (28 Dec 2018 01:36) (74 - 74)  RR: 18 (28 Dec 2018 07:46) (18 - 22)  SpO2: 99% (28 Dec 2018 07:46) (94% - 99%)  ICU Vital Signs Last 24 Hrs  MAGGIE DONALDSONMEENA  I&O's Detail    I&O's Summary    Drug Dosing Weight  MAGGIE KESSLERELIANA      PHYSICAL EXAM:  General: Appears well developed, well nourished alert and cooperative.  HEENT: Head; normocephalic, atraumatic.  Eyes: Pupils reactive, cornea wnl.  Neck: Supple, no nodes adenopathy, no NVD or carotid bruit or thyromegaly.  CARDIOVASCULAR: Normal S1 and S2, No murmur, rub, gallop or lift.   LUNGS: No rales, rhonchi or wheeze. Normal breath sounds bilaterally.  ABDOMEN: Soft, nontender without mass or organomegaly. bowel sounds normoactive.  EXTREMITIES: No clubbing, cyanosis or edema. Distal pulses wnl.   SKIN: warm and dry with normal turgor.  NEURO: Alert/oriented x 3/normal motor exam. No pathologic reflexes.    PSYCH: normal affect.        LABS:                        15.0   15.8  )-----------( 209      ( 27 Dec 2018 18:50 )             45.0     12-27    140  |  102  |  17.0  ----------------------------<  86  4.2   |  24.0  |  0.93    Ca    8.6      27 Dec 2018 18:50    TPro  7.4  /  Alb  4.3  /  TBili  0.8  /  DBili  x   /  AST  17  /  ALT  13  /  AlkPhos  41  12-27    MAGGIE ELLIOTT  CARDIAC MARKERS ( 27 Dec 2018 18:50 )  x     / <0.01 ng/mL / x     / x     / x          PT/INR - ( 27 Dec 2018 18:50 )   PT: 18.0 sec;   INR: 1.54 ratio         PTT - ( 27 Dec 2018 18:50 )  PTT:33.9 sec      RADIOLOGY & ADDITIONAL STUDIES:    INTERPRETATION OF TELEMETRY (personally reviewed):    ECG:  AF, left axis, no st elevations/depressions     ECHO:  8/2017  EF 40-45%     CARDIAC CATHETERIZATION: 2015 -- Nml coronary arteries     Assessment and Plan:  In summary, MAGGIE ELLIOTT is an 58y Male with past medical history significant for       SOB:  mutlifactorial -- Acute Decompensated Systolic Heart Failure and COPD exacerbation  -continue lasix 40 mg IV BID  -continue pt home meds/dosages  -repeat echo ordered  -copd as per primary team              Thank you for allowing [facility] to participate in the care of this patient.  Please feel free to call with any questions or concerns. [place] [facility] - [facility], THE HEART CENTER                                   [address] [place] [address]                                                      PHONE: [phone]                                                         FAX: [phone]  [email or web address]  ---------------------------------------------------------------------------------------------------------------------------------    HPI:  MAGGIE ELLIOTT is an 58y Male HTN, HLD, NICM (EF ~ 40-45%), AF On Xarelto, ROCK, active smoker,  COPD, who presented to [facility] ED complaining of sob.  Pt has had progressively worsening, sob/li over the past few days, associate with a mildly productive cough, mild fevers, no chills, sweats, chest pain, or palpitations.  HE does mention dietary indiscretions and LE swelling.        PAST MEDICAL & SURGICAL HISTORY:  Afib  ROCK (obstructive sleep apnea)  COPD (chronic obstructive pulmonary disease)  No significant past surgical history      penicillins (Hives)      MEDICATIONS  (STANDING):  carvedilol Oral Tab/Cap - Peds 6.25 milliGRAM(s) Oral two times a day  furosemide   Injectable 40 milliGRAM(s) IV Push every 4 hours  influenza   Vaccine 0.5 milliLiter(s) IntraMuscular once  predniSONE   Tablet 10 milliGRAM(s) Oral daily  rivaroxaban 20 milliGRAM(s) Oral every 24 hours  trimethoprim  160 mG/sulfamethoxazole 800 mG 1 Tablet(s) Oral two times a day    MEDICATIONS  (PRN):  levalbuterol for Nebulization - Peds 1.25 milliGRAM(s) Nebulizer three times a day PRN SOB      Family History: Pt denies hx of early cad, SCD, or congenital heart disease.      Social History:  Cigarettes:   active                 Alchohol:  no               Illicit Drug Abuse:  no    ROS:  Constitutional: No fever, weight loss or fatigue  Eyes: No eye pain, visual disturbances, or discharge  ENMT:  No difficulty hearing, tinnitus, vertigo; No sinus or throat pain  Neck: No pain or stiffness  Respiratory: No cough, wheezing, chills or hemoptysis  Cardiovascular: No chest pain, palpitations, , dizziness or leg swelling  +shortness of breath  Gastrointestinal: No abdominal or epigastric pain. No nausea, vomiting or hematemesis; No diarrhea or constipation. No melena or hematochezia.  Genitourinary: No dysuria, frequency, hematuria or incontinence  Rectal: No pain, hemorrhoids or incontinence  Neurological: No headaches, memory loss, loss of strength, numbness or tremors  Skin: No itching, burning, rashes or lesions   Lymph Nodes: No enlarged glands  Endocrine: No heat or cold intolerance; No hair loss  Musculoskeletal: No joint pain or swelling; No muscle, back or extremity pain  Psychiatric: No depression, anxiety, mood swings  + difficulty sleeping  Heme/Lymph: No easy bruising or bleeding gums  Allergy and Immunologic: No hives or eczema    Vital Signs Last 24 Hrs  T(C): 36.6 (28 Dec 2018 07:46), Max: 37.2 (27 Dec 2018 16:09)  T(F): 97.8 (28 Dec 2018 07:46), Max: 99 (27 Dec 2018 16:09)  HR: 79 (28 Dec 2018 07:46) (79 - 105)  BP: 94/57 (28 Dec 2018 07:46) (91/55 - 160/73)  BP(mean): 74 (28 Dec 2018 01:36) (74 - 74)  RR: 18 (28 Dec 2018 07:46) (18 - 22)  SpO2: 99% (28 Dec 2018 07:46) (94% - 99%)  ICU Vital Signs Last 24 Hrs  MAGGIE ELLIOTT  I&O's Detail    I&O's Summary    Drug Dosing Weight  MAGGIE MENDOZAMEENA      PHYSICAL EXAM:  General: Appears well developed, well nourished alert and cooperative.  HEENT: Head; normocephalic, atraumatic.  Eyes: Pupils reactive, cornea wnl.  Neck: Supple, no nodes adenopathy, no NVD or carotid bruit or thyromegaly.  CARDIOVASCULAR: Normal S1 and S2, No murmur, rub, gallop or lift.   LUNGS: No rales, rhonchi or wheeze. Normal breath sounds bilaterally.  ABDOMEN: Soft, nontender without mass or organomegaly. bowel sounds normoactive.  EXTREMITIES: No clubbing, cyanosis or edema. Distal pulses wnl.   SKIN: warm and dry with normal turgor.  NEURO: Alert/oriented x 3/normal motor exam. No pathologic reflexes.    PSYCH: normal affect.        LABS:                        15.0   15.8  )-----------( 209      ( 27 Dec 2018 18:50 )             45.0     12-27    140  |  102  |  17.0  ----------------------------<  86  4.2   |  24.0  |  0.93    Ca    8.6      27 Dec 2018 18:50    TPro  7.4  /  Alb  4.3  /  TBili  0.8  /  DBili  x   /  AST  17  /  ALT  13  /  AlkPhos  41  12-27    MAGGIE ELLIOTT  CARDIAC MARKERS ( 27 Dec 2018 18:50 )  x     / <0.01 ng/mL / x     / x     / x          PT/INR - ( 27 Dec 2018 18:50 )   PT: 18.0 sec;   INR: 1.54 ratio         PTT - ( 27 Dec 2018 18:50 )  PTT:33.9 sec      RADIOLOGY & ADDITIONAL STUDIES:    INTERPRETATION OF TELEMETRY (personally reviewed):    ECG:  AF, left axis, no st elevations/depressions     ECHO:  8/2017  EF 40-45%     CARDIAC CATHETERIZATION: 2015 -- Nml coronary arteries     Assessment and Plan:  In summary, MAGGIE ELLIOTT is an 58y Male HTN, HLD, NICM (EF ~ 40-45%), AF On Xarelto, ROCK, active smoker,  COPD, who presented to [facility] ED complaining of sob.  Pt has had progressively worsening, sob/li over the past few days, associate with a mildly productive cough, mild fevers, no chills, sweats, chest pain, or palpitations.  HE does mention dietary indiscretions and LE swelling.    SOB:  mutlifactorial -- Acute Decompensated Systolic Heart Failure and COPD exacerbation  -continue lasix 40 mg IV BID  -continue pt home meds/dosages  -repeat echo ordered  -copd as per primary team  -will arrange for outpt 2nd opinion re AF ablation with Dr Ramos.     Thank you for allowing [facility] to participate in the care of this patient.  Please feel free to call with any questions or concerns.

## 2018-12-28 NOTE — PROGRESS NOTE ADULT - ASSESSMENT
Pt is a 57 yo M presenting from home for 1 month hx of progressive SOB likely due to new onset CHF exac. PMH A fib on Xarelto, HTN, Obesity, ROCK on CPAP, COPD, tobacco dependence.

## 2018-12-29 LAB
ANION GAP SERPL CALC-SCNC: 18 MMOL/L — HIGH (ref 5–17)
BUN SERPL-MCNC: 36 MG/DL — HIGH (ref 8–20)
CALCIUM SERPL-MCNC: 8.2 MG/DL — LOW (ref 8.6–10.2)
CHLORIDE SERPL-SCNC: 90 MMOL/L — LOW (ref 98–107)
CO2 SERPL-SCNC: 22 MMOL/L — SIGNIFICANT CHANGE UP (ref 22–29)
CREAT SERPL-MCNC: 1.52 MG/DL — HIGH (ref 0.5–1.3)
GLUCOSE SERPL-MCNC: 140 MG/DL — HIGH (ref 70–115)
HCT VFR BLD CALC: 43.7 % — SIGNIFICANT CHANGE UP (ref 42–52)
HGB BLD-MCNC: 15 G/DL — SIGNIFICANT CHANGE UP (ref 14–18)
MCHC RBC-ENTMCNC: 31.3 PG — HIGH (ref 27–31)
MCHC RBC-ENTMCNC: 34.3 G/DL — SIGNIFICANT CHANGE UP (ref 32–36)
MCV RBC AUTO: 91 FL — SIGNIFICANT CHANGE UP (ref 80–94)
PLATELET # BLD AUTO: 199 K/UL — SIGNIFICANT CHANGE UP (ref 150–400)
POTASSIUM SERPL-MCNC: 4 MMOL/L — SIGNIFICANT CHANGE UP (ref 3.5–5.3)
POTASSIUM SERPL-SCNC: 4 MMOL/L — SIGNIFICANT CHANGE UP (ref 3.5–5.3)
PROCALCITONIN SERPL-MCNC: 0.55 NG/ML — HIGH (ref 0–0.04)
RBC # BLD: 4.8 M/UL — SIGNIFICANT CHANGE UP (ref 4.6–6.2)
RBC # FLD: 13.9 % — SIGNIFICANT CHANGE UP (ref 11–15.6)
SODIUM SERPL-SCNC: 130 MMOL/L — LOW (ref 135–145)
WBC # BLD: 28.3 K/UL — HIGH (ref 4.8–10.8)
WBC # FLD AUTO: 28.3 K/UL — HIGH (ref 4.8–10.8)

## 2018-12-29 PROCEDURE — 99233 SBSQ HOSP IP/OBS HIGH 50: CPT

## 2018-12-29 RX ORDER — CELECOXIB 200 MG/1
100 CAPSULE ORAL EVERY 12 HOURS
Qty: 0 | Refills: 0 | Status: DISCONTINUED | OUTPATIENT
Start: 2018-12-29 | End: 2019-01-02

## 2018-12-29 RX ORDER — CARVEDILOL PHOSPHATE 80 MG/1
6.25 CAPSULE, EXTENDED RELEASE ORAL EVERY 12 HOURS
Qty: 0 | Refills: 0 | Status: DISCONTINUED | OUTPATIENT
Start: 2018-12-29 | End: 2019-01-02

## 2018-12-29 RX ORDER — NICOTINE POLACRILEX 2 MG
4 GUM BUCCAL
Qty: 0 | Refills: 0 | Status: DISCONTINUED | OUTPATIENT
Start: 2018-12-29 | End: 2019-01-02

## 2018-12-29 RX ORDER — BENZOCAINE AND MENTHOL 5; 1 G/100ML; G/100ML
1 LIQUID ORAL EVERY 8 HOURS
Qty: 0 | Refills: 0 | Status: DISCONTINUED | OUTPATIENT
Start: 2018-12-29 | End: 2019-01-02

## 2018-12-29 RX ORDER — ALPRAZOLAM 0.25 MG
1 TABLET ORAL ONCE
Qty: 0 | Refills: 0 | Status: DISCONTINUED | OUTPATIENT
Start: 2018-12-29 | End: 2018-12-29

## 2018-12-29 RX ORDER — ONDANSETRON 8 MG/1
4 TABLET, FILM COATED ORAL EVERY 8 HOURS
Qty: 0 | Refills: 0 | Status: DISCONTINUED | OUTPATIENT
Start: 2018-12-29 | End: 2019-01-02

## 2018-12-29 RX ORDER — IPRATROPIUM/ALBUTEROL SULFATE 18-103MCG
3 AEROSOL WITH ADAPTER (GRAM) INHALATION EVERY 6 HOURS
Qty: 0 | Refills: 0 | Status: DISCONTINUED | OUTPATIENT
Start: 2018-12-29 | End: 2019-01-02

## 2018-12-29 RX ORDER — ACETAMINOPHEN 500 MG
650 TABLET ORAL EVERY 6 HOURS
Qty: 0 | Refills: 0 | Status: DISCONTINUED | OUTPATIENT
Start: 2018-12-29 | End: 2019-01-02

## 2018-12-29 RX ORDER — VANCOMYCIN HCL 1 G
1000 VIAL (EA) INTRAVENOUS EVERY 8 HOURS
Qty: 0 | Refills: 0 | Status: DISCONTINUED | OUTPATIENT
Start: 2018-12-29 | End: 2019-01-01

## 2018-12-29 RX ADMIN — Medication 250 MILLIGRAM(S): at 21:20

## 2018-12-29 RX ADMIN — CELECOXIB 100 MILLIGRAM(S): 200 CAPSULE ORAL at 18:15

## 2018-12-29 RX ADMIN — Medication 250 MILLIGRAM(S): at 11:00

## 2018-12-29 RX ADMIN — Medication 650 MILLIGRAM(S): at 15:02

## 2018-12-29 RX ADMIN — Medication 3 MILLILITER(S): at 15:13

## 2018-12-29 RX ADMIN — OXYCODONE AND ACETAMINOPHEN 2 TABLET(S): 5; 325 TABLET ORAL at 09:05

## 2018-12-29 RX ADMIN — CARVEDILOL PHOSPHATE 6.25 MILLIGRAM(S): 80 CAPSULE, EXTENDED RELEASE ORAL at 17:48

## 2018-12-29 RX ADMIN — OXYCODONE AND ACETAMINOPHEN 2 TABLET(S): 5; 325 TABLET ORAL at 03:00

## 2018-12-29 RX ADMIN — Medication 3 MILLILITER(S): at 21:03

## 2018-12-29 RX ADMIN — RIVAROXABAN 20 MILLIGRAM(S): KIT at 17:40

## 2018-12-29 RX ADMIN — ONDANSETRON 4 MILLIGRAM(S): 8 TABLET, FILM COATED ORAL at 10:53

## 2018-12-29 RX ADMIN — Medication 1 TABLET(S): at 06:29

## 2018-12-29 RX ADMIN — Medication 1 MILLIGRAM(S): at 22:39

## 2018-12-29 RX ADMIN — Medication 40 MILLIGRAM(S): at 11:00

## 2018-12-29 RX ADMIN — Medication 40 MILLIGRAM(S): at 21:19

## 2018-12-29 RX ADMIN — Medication 5 MILLIGRAM(S): at 06:29

## 2018-12-29 RX ADMIN — Medication 40 MILLIGRAM(S): at 17:40

## 2018-12-29 RX ADMIN — CELECOXIB 100 MILLIGRAM(S): 200 CAPSULE ORAL at 17:40

## 2018-12-29 RX ADMIN — Medication 650 MILLIGRAM(S): at 15:23

## 2018-12-29 RX ADMIN — CARVEDILOL PHOSPHATE 6.25 MILLIGRAM(S): 80 CAPSULE, EXTENDED RELEASE ORAL at 06:29

## 2018-12-29 RX ADMIN — OXYCODONE AND ACETAMINOPHEN 2 TABLET(S): 5; 325 TABLET ORAL at 10:53

## 2018-12-29 RX ADMIN — OXYCODONE AND ACETAMINOPHEN 2 TABLET(S): 5; 325 TABLET ORAL at 04:11

## 2018-12-29 RX ADMIN — Medication 40 MILLIGRAM(S): at 06:29

## 2018-12-29 NOTE — PROGRESS NOTE ADULT - ASSESSMENT
57 yo M hx of A fib on Xarelto, HTN, Obesity, ROCK on CPAP, COPD on 2l nc qhs, tobacco dependence and NICM chronic systolic HFrEF:45%, being tx outpt recently for ongoing URI/ allergies intermittently on abx therapy/ steroids as well as recent dx of R groin cellulitis on bactrim, pt admitted with worsening sob/ noted exp wheezing on exam, elevated pro bnp, cxr with vascular congestion and admitted with acute on chronic systolic HF exacerbation with concomitant copd exacerbation. Slow clinical improvement.     SOB likely multifactorial 2/2 acute on chronic systolic HF exacerbation with concomitant copd exacerbation, underlying hx of ROCK/obesity   - See below for management of both     Acute on chronic systolic HFrEF exacerbation/ NICM   - pt remains hypervolemic  - elevated pro bnp, peripheral edema, bibasilar crackles   - cxr with congestion   - c/w lasix 40mg IV BID   - c/w strict I/O  -daily weight   - fluid restriction  - TTE noted and difficulty study   - cardio f/u noted and appreciated     COPD exacerbation   - pt with exp wheezing on exam, cough with whitish phlegm and at times clear  - c/w solumedrol 40mg IV q8hrs x 24 hours then will reassess  - PPI while on steroids  and if fs elevated will add HSS   - c/w duoneb q6hrs   - c/w robitussin prn  - c/w cepacol prn   - ROCK to c/w CPAP qhs and prn     R groin cellulitis  - pt was being tx outpt with bactrim with minimal response to therapy  - started vancomycin IVPB - vanco trough and dosing per pharmacy   - will monitor for improvement  - c/w warm compresses to the area     Acute on chronic lower back pain   - d/c percocet caused pt to feel nauseous and vomit   - c/w celbrex as needed     A fib- rate controlled  - c/w xarelto po qd   - c/w coreg po bid     Tobacco dependence  - refused nicotine patch   - pt counseled on smoking cessation     DVT ppx:  - Covered on xarelto     Dispo: Pt when medically stable to be discharged to home. 57 yo M hx of A fib on Xarelto, HTN, Obesity, ROCK on CPAP, COPD on 2l nc qhs, tobacco dependence and NICM chronic systolic HFrEF:45%, being tx outpt recently for ongoing URI/ allergies intermittently on abx therapy/ steroids as well as recent dx of R groin cellulitis on bactrim, pt admitted with worsening sob/ noted exp wheezing on exam, elevated pro bnp, cxr with vascular congestion and admitted with acute on chronic systolic HF exacerbation with concomitant copd exacerbation. Slow clinical improvement.     SOB likely multifactorial 2/2 acute on chronic systolic HF exacerbation with concomitant copd exacerbation, underlying hx of ROCK/obesity   - See below for management of both     Acute on chronic systolic HFrEF exacerbation/ NICM   - pt remains hypervolemic  - elevated pro bnp, peripheral edema, bibasilar crackles   - cxr with congestion   - c/w lasix 40mg IV BID   - will monitor renal fxn closely today with EVARISTO, but will allow degree azotemia to aide in improvement in SOB   - c/w strict I/O  -daily weight   - fluid restriction  - TTE noted and difficulty study   - f/u repeat cxr in the am   - cardio f/u noted and appreciated     COPD exacerbation   - pt with exp wheezing on exam, cough with whitish phlegm and at times clear  - c/w solumedrol 40mg IV q8hrs x 24 hours then will reassess  - PPI while on steroids  and if fs elevated will add HSS   - c/w duoneb q6hrs   - c/w robitussin prn  - c/w cepacol prn   - ROCK to c/w CPAP qhs and prn     EVARISTO likely ATN from medication induced  - will allow degree azotemia to aide in sob/ HF exac  - c/w monitoring renal fxn closely  - If continues to trend up will hold off on further diuretic therapy.     R groin cellulitis  - pt was being tx outpt with bactrim with minimal response to therapy  - started vancomycin IVPB - vanco trough and dosing per pharmacy   - will monitor for improvement  - c/w warm compresses to the area     Acute on chronic lower back pain   - d/c percocet caused pt to feel nauseous and vomit   - c/w celbrex as needed     A fib- rate controlled  - c/w xarelto po qd   - c/w coreg po bid     Tobacco dependence  - refused nicotine patch   - pt counseled on smoking cessation     DVT ppx:  - Covered on xarelto     Dispo: Pt when medically stable to be discharged to home.

## 2018-12-30 LAB
ANION GAP SERPL CALC-SCNC: 15 MMOL/L — SIGNIFICANT CHANGE UP (ref 5–17)
BASOPHILS # BLD AUTO: 0 K/UL — SIGNIFICANT CHANGE UP (ref 0–0.2)
BUN SERPL-MCNC: 35 MG/DL — HIGH (ref 8–20)
CALCIUM SERPL-MCNC: 8.6 MG/DL — SIGNIFICANT CHANGE UP (ref 8.6–10.2)
CHLORIDE SERPL-SCNC: 90 MMOL/L — LOW (ref 98–107)
CO2 SERPL-SCNC: 27 MMOL/L — SIGNIFICANT CHANGE UP (ref 22–29)
CREAT SERPL-MCNC: 1.16 MG/DL — SIGNIFICANT CHANGE UP (ref 0.5–1.3)
EOSINOPHIL # BLD AUTO: 0 K/UL — SIGNIFICANT CHANGE UP (ref 0–0.5)
EOSINOPHIL NFR BLD AUTO: 0 % — SIGNIFICANT CHANGE UP (ref 0–5)
GLUCOSE SERPL-MCNC: 152 MG/DL — HIGH (ref 70–115)
HCT VFR BLD CALC: 44.1 % — SIGNIFICANT CHANGE UP (ref 42–52)
HGB BLD-MCNC: 15.1 G/DL — SIGNIFICANT CHANGE UP (ref 14–18)
LYMPHOCYTES # BLD AUTO: 0.9 K/UL — LOW (ref 1–4.8)
LYMPHOCYTES # BLD AUTO: 4.5 % — LOW (ref 20–55)
MCHC RBC-ENTMCNC: 31.2 PG — HIGH (ref 27–31)
MCHC RBC-ENTMCNC: 34.2 G/DL — SIGNIFICANT CHANGE UP (ref 32–36)
MCV RBC AUTO: 91.1 FL — SIGNIFICANT CHANGE UP (ref 80–94)
MONOCYTES # BLD AUTO: 1.2 K/UL — HIGH (ref 0–0.8)
MONOCYTES NFR BLD AUTO: 5.6 % — SIGNIFICANT CHANGE UP (ref 3–10)
NEUTROPHILS # BLD AUTO: 18.2 K/UL — HIGH (ref 1.8–8)
NEUTROPHILS NFR BLD AUTO: 89.6 % — HIGH (ref 37–73)
PLATELET # BLD AUTO: 182 K/UL — SIGNIFICANT CHANGE UP (ref 150–400)
POTASSIUM SERPL-MCNC: 4.1 MMOL/L — SIGNIFICANT CHANGE UP (ref 3.5–5.3)
POTASSIUM SERPL-SCNC: 4.1 MMOL/L — SIGNIFICANT CHANGE UP (ref 3.5–5.3)
RBC # BLD: 4.84 M/UL — SIGNIFICANT CHANGE UP (ref 4.6–6.2)
RBC # FLD: 13.7 % — SIGNIFICANT CHANGE UP (ref 11–15.6)
SODIUM SERPL-SCNC: 132 MMOL/L — LOW (ref 135–145)
VANCOMYCIN TROUGH SERPL-MCNC: 12.7 UG/ML — SIGNIFICANT CHANGE UP (ref 10–20)
WBC # BLD: 20.4 K/UL — HIGH (ref 4.8–10.8)
WBC # FLD AUTO: 20.4 K/UL — HIGH (ref 4.8–10.8)

## 2018-12-30 PROCEDURE — 71045 X-RAY EXAM CHEST 1 VIEW: CPT | Mod: 26

## 2018-12-30 PROCEDURE — 76882 US LMTD JT/FCL EVL NVASC XTR: CPT | Mod: 26,RT,59

## 2018-12-30 PROCEDURE — 93970 EXTREMITY STUDY: CPT | Mod: 26

## 2018-12-30 PROCEDURE — 99233 SBSQ HOSP IP/OBS HIGH 50: CPT

## 2018-12-30 RX ORDER — ALPRAZOLAM 0.25 MG
0.25 TABLET ORAL EVERY 12 HOURS
Qty: 0 | Refills: 0 | Status: DISCONTINUED | OUTPATIENT
Start: 2018-12-30 | End: 2019-01-02

## 2018-12-30 RX ORDER — ERTAPENEM SODIUM 1 G/1
1000 INJECTION, POWDER, LYOPHILIZED, FOR SOLUTION INTRAMUSCULAR; INTRAVENOUS ONCE
Qty: 0 | Refills: 0 | Status: COMPLETED | OUTPATIENT
Start: 2018-12-30 | End: 2018-12-30

## 2018-12-30 RX ORDER — ALPRAZOLAM 0.25 MG
1 TABLET ORAL ONCE
Qty: 0 | Refills: 0 | Status: DISCONTINUED | OUTPATIENT
Start: 2018-12-30 | End: 2018-12-30

## 2018-12-30 RX ORDER — LIDOCAINE 4 G/100G
1 CREAM TOPICAL DAILY
Qty: 0 | Refills: 0 | Status: DISCONTINUED | OUTPATIENT
Start: 2018-12-30 | End: 2019-01-02

## 2018-12-30 RX ORDER — ERTAPENEM SODIUM 1 G/1
1000 INJECTION, POWDER, LYOPHILIZED, FOR SOLUTION INTRAMUSCULAR; INTRAVENOUS EVERY 24 HOURS
Qty: 0 | Refills: 0 | Status: DISCONTINUED | OUTPATIENT
Start: 2018-12-31 | End: 2019-01-01

## 2018-12-30 RX ORDER — DIPHENHYDRAMINE HCL 50 MG
25 CAPSULE ORAL ONCE
Qty: 0 | Refills: 0 | Status: COMPLETED | OUTPATIENT
Start: 2018-12-30 | End: 2018-12-30

## 2018-12-30 RX ORDER — ERTAPENEM SODIUM 1 G/1
INJECTION, POWDER, LYOPHILIZED, FOR SOLUTION INTRAMUSCULAR; INTRAVENOUS
Qty: 0 | Refills: 0 | Status: DISCONTINUED | OUTPATIENT
Start: 2018-12-30 | End: 2019-01-01

## 2018-12-30 RX ADMIN — CARVEDILOL PHOSPHATE 6.25 MILLIGRAM(S): 80 CAPSULE, EXTENDED RELEASE ORAL at 17:46

## 2018-12-30 RX ADMIN — Medication 1 MILLIGRAM(S): at 22:16

## 2018-12-30 RX ADMIN — Medication 40 MILLIGRAM(S): at 17:46

## 2018-12-30 RX ADMIN — Medication 3 MILLILITER(S): at 15:18

## 2018-12-30 RX ADMIN — Medication 4 MILLIGRAM(S): at 14:47

## 2018-12-30 RX ADMIN — Medication 250 MILLIGRAM(S): at 13:29

## 2018-12-30 RX ADMIN — ERTAPENEM SODIUM 120 MILLIGRAM(S): 1 INJECTION, POWDER, LYOPHILIZED, FOR SOLUTION INTRAMUSCULAR; INTRAVENOUS at 16:07

## 2018-12-30 RX ADMIN — Medication 40 MILLIGRAM(S): at 05:39

## 2018-12-30 RX ADMIN — Medication 250 MILLIGRAM(S): at 22:16

## 2018-12-30 RX ADMIN — CELECOXIB 100 MILLIGRAM(S): 200 CAPSULE ORAL at 15:04

## 2018-12-30 RX ADMIN — Medication 0.25 MILLIGRAM(S): at 13:29

## 2018-12-30 RX ADMIN — LIDOCAINE 1 PATCH: 4 CREAM TOPICAL at 16:07

## 2018-12-30 RX ADMIN — CELECOXIB 100 MILLIGRAM(S): 200 CAPSULE ORAL at 15:34

## 2018-12-30 RX ADMIN — Medication 40 MILLIGRAM(S): at 05:38

## 2018-12-30 RX ADMIN — CARVEDILOL PHOSPHATE 6.25 MILLIGRAM(S): 80 CAPSULE, EXTENDED RELEASE ORAL at 05:39

## 2018-12-30 RX ADMIN — Medication 3 MILLILITER(S): at 04:12

## 2018-12-30 RX ADMIN — Medication 250 MILLIGRAM(S): at 05:40

## 2018-12-30 RX ADMIN — RIVAROXABAN 20 MILLIGRAM(S): KIT at 17:46

## 2018-12-30 RX ADMIN — Medication 3 MILLILITER(S): at 20:23

## 2018-12-30 RX ADMIN — Medication 25 MILLIGRAM(S): at 16:06

## 2018-12-30 NOTE — CONSULT NOTE ADULT - ASSESSMENT
58 year old patient currently on IV ABx (vanc and invanz) with right groin swelling consistent with abscess with imaging findings demonstrating a mildly complex fluid collection measuring 5.3x1.1x3cm christin 2-3cm deep to the skin requiring drainage. Patient's medical comorbidities pulmonary status) may be prohibitive for operative debridement (intubation concerns) Plan for ACS/Trauma team to perform bedside I&D today (12/30) obtain Cxs and tightly pack the wound. ACS/Trauma following.

## 2018-12-30 NOTE — CONSULT NOTE ADULT - SUBJECTIVE AND OBJECTIVE BOX
58 year old male presenting with SOB and admitted to medicine service for COPD vs CHF exacerbation. ACS/Trauma consulted to evaluate patient's right groin swelling. Patient seen and examined at bedside with on call surgery attending. Reports that he has been having continued right groin pain x 1 week and went to see his PCP who started him on a course of Bactrim. Was unable to complete entire course as he was hospitalized in the interim. States that he has never had similar symptoms before. Swelling started as what he thought to be a small pimple that got progressively worse. ROS negative for fever and chills at this time.     PMHx: A fib, HTN, COPD, ROCK on home CPAP  PSHx: None   Allergies: PCN (hives)   Meds: xarelto, coreg, albuterol, prednisone   FamHx: Non contributory   SocHx: Daily smoker, social EtOH use     Vital Signs Last 24 Hrs  T(C): 36.7 (30 Dec 2018 16:35), Max: 36.8 (30 Dec 2018 11:03)  T(F): 98 (30 Dec 2018 16:35), Max: 98.2 (30 Dec 2018 11:03)  HR: 74 (30 Dec 2018 20:25) (70 - 117)  BP: 94/57 (30 Dec 2018 16:35) (94/57 - 118/84)  BP(mean): --  RR: 18 (30 Dec 2018 11:03) (18 - 18)  SpO2: 94% (30 Dec 2018 20:25) (94% - 99%)    I&O's Detail    29 Dec 2018 07:01  -  30 Dec 2018 07:00  --------------------------------------------------------  IN:    IV PiggyBack: 250 mL  Total IN: 250 mL    OUT:    Voided: 1000 mL  Total OUT: 1000 mL    Total NET: -750 mL      30 Dec 2018 07:01  -  30 Dec 2018 21:14  --------------------------------------------------------  IN:    Oral Fluid: 704 mL    Solution: 150 mL  Total IN: 854 mL    OUT:    Voided: 472 mL  Total OUT: 472 mL    Total NET: 382 mL    Constitutional: Morbidly obese patient (BMI 59.5) resting comfortably in bed, in no acute distress  HEENT: EOMI / PERRL b/l   Neck: No JVD, full ROM without pain  Respiratory: CTAB with unlabored respirations with no accessory muscle use and no conversational dyspnea  Cardiovascular: Normal S1 and S2   Gastrointestinal: Abdomen soft, non-tender, non-distended, no rebound tenderness / guarding   Neurological: GCS: 15 (4/5/6). A&O x 3; no gross sensory / motor / coordination deficits  Psychiatric: Normal mood, normal affect  Musculoskeletal: Right groin with overlying skin changes/cellulitis present 10cm x 9cm area of involvement (erythema and edema) Area warm to touch with no drainage present    LABS:                        15.1   20.4  )-----------( 182      ( 30 Dec 2018 08:28 )             44.1     12-30    132<L>  |  90<L>  |  35.0<H>  ----------------------------<  152<H>  4.1   |  27.0  |  1.16    Ca    8.6      30 Dec 2018 08:28    MEDICATIONS  (STANDING):  ALBUTerol/ipratropium for Nebulization 3 milliLiter(s) Nebulizer every 6 hours  ALPRAZolam 1 milliGRAM(s) Oral once  carvedilol 6.25 milliGRAM(s) Oral every 12 hours  ertapenem  IVPB      furosemide   Injectable 40 milliGRAM(s) IV Push every 12 hours  influenza   Vaccine 0.5 milliLiter(s) IntraMuscular once  lidocaine   Patch 1 Patch Transdermal daily  methylPREDNISolone sodium succinate Injectable 40 milliGRAM(s) IV Push every 12 hours  rivaroxaban 20 milliGRAM(s) Oral every 24 hours  vancomycin  IVPB 1000 milliGRAM(s) IV Intermittent every 8 hours    MEDICATIONS  (PRN):  acetaminophen   Tablet .. 650 milliGRAM(s) Oral every 6 hours PRN Temp greater or equal to 38C (100.4F), Mild Pain (1 - 3)  ALPRAZolam 0.25 milliGRAM(s) Oral every 12 hours PRN anxiety  benzocaine 15 mG/menthol 3.6 mG Lozenge 1 Lozenge Oral every 8 hours PRN Sore Throat  celecoxib 100 milliGRAM(s) Oral every 12 hours PRN Moderate Pain (4 - 6)  guaiFENesin    Syrup 100 milliGRAM(s) Oral every 6 hours PRN Cough  nicotine  Polacrilex Gum 4 milliGRAM(s) Oral every 3 hours PRN smoking cessation  ondansetron Injectable 4 milliGRAM(s) IV Push every 8 hours PRN Nausea and/or Vomiting    MICRO:     STUDIES:

## 2018-12-30 NOTE — PROGRESS NOTE ADULT - ASSESSMENT
57 yo M hx of A fib on Xarelto, HTN, Obesity, ROCK on CPAP, COPD on 2l nc qhs, tobacco dependence and NICM chronic systolic HFrEF:45%, being tx outpt recently for ongoing URI/ allergies intermittently on abx therapy/ steroids as well as recent dx of R groin cellulitis on bactrim, pt admitted with worsening sob/ noted exp wheezing on exam, elevated pro bnp, cxr with vascular congestion and admitted with acute on chronic systolic HF exacerbation with concomitant copd exacerbation. Pt actively being diuresed with IV lasix as well as tx for COPD with IV steroids (being de escalated)/nebz. Hospital course further complicated by failed outpt R groing cellulitis, empirically started on vanco; clinical concern for R groin abscess confirmed on US today. Surgery consulted. Slow clinical improvement.     SOB likely multifactorial 2/2 acute on chronic systolic HF exacerbation with concomitant copd exacerbation, underlying hx of ROCK/obesity   - See below for management of both     Acute on chronic systolic HFrEF exacerbation/ NICM   - pt remains hypervolemic, slow improvement   - elevated pro bnp, peripheral edema, bibasilar crackles   - cxr with congestion   - c/w lasix 40mg IV BID   - will monitor renal fxn closely today with EVARISTO, but will allow degree azotemia to aide in improvement in SOB   - c/w strict I/O  -daily weight   - fluid restriction  - TTE noted and difficulty study   - f/u repeat cxr in the am   - cardio f/u noted and appreciated     COPD exacerbation   - pt with exp wheezing on exam, cough with whitish phlegm and at times clear  - c/w solumedrol 40mg IV q8hrs will de escalate to q12hrs   - PPI while on steroids  and if fs elevated will add HSS   - c/w duoneb q6hrs   - c/w robitussin prn  - c/w cepacol prn   - ROCK to c/w CPAP qhs and prn     EVARISTO likely ATN from medication induced - resolved   - will allow degree azotemia to aide in sob/ HF exac  - c/w monitoring renal fxn closely  - If continues to trend up will hold off on further diuretic therapy. For now will continue with aggressive tx.     R groin abscess with cellulitis   - pt was being tx outpt with bactrim with minimal response to therapy  - pt afebrile - leukocytosis reactive to steroids   - c/w vancomycin IVPB - vanco trough and dosing per pharmacy   - started on aztreonam   - US shows 5.3 cm mildly complex fluid collection in the subcutaneous fat. Infection/abscess is not excluded.   - will monitor for improvement  - c/w warm compresses to the area   - Surgery consulted for possible I&D     Acute on chronic lower back pain   - c/w celbrex as needed   - c/w lidocaine patch     A fib- rate controlled  - c/w xarelto po qd   - c/w coreg po bid     Tobacco dependence  - refused nicotine patch   - pt counseled on smoking cessation     DVT ppx:  - Covered on xarelto     Dispo: Pt when medically stable to be discharged to home. 57 yo M hx of A fib on Xarelto, HTN, Obesity, ROCK on CPAP, COPD on 2l nc qhs, tobacco dependence and NICM chronic systolic HFrEF:45%, being tx outpt recently for ongoing URI/ allergies intermittently on abx therapy/ steroids as well as recent dx of R groin cellulitis on bactrim, pt admitted with worsening sob/ noted exp wheezing on exam, elevated pro bnp, cxr with vascular congestion and admitted with acute on chronic systolic HF exacerbation with concomitant copd exacerbation. Pt actively being diuresed with IV lasix as well as tx for COPD with IV steroids (being de escalated)/nebz. Hospital course further complicated by failed outpt R groing cellulitis, empirically started on vanco; clinical concern for R groin abscess confirmed on US today. Surgery consulted. Slow clinical improvement.     SOB likely multifactorial 2/2 acute on chronic systolic HF exacerbation with concomitant copd exacerbation, underlying hx of ROCK/obesity   - See below for management of both     Acute on chronic systolic HFrEF exacerbation/ NICM   - pt remains hypervolemic, slow improvement   - elevated pro bnp, peripheral edema, bibasilar crackles   - cxr with congestion   - c/w lasix 40mg IV BID   - will monitor renal fxn closely today with EVARISTO, but will allow degree azotemia to aide in improvement in SOB   - c/w strict I/O  -daily weight   - fluid restriction  - TTE noted and difficulty study   - f/u repeat cxr in the am   - cardio f/u noted and appreciated     COPD exacerbation   - pt with exp wheezing on exam, cough with whitish phlegm and at times clear  - c/w solumedrol 40mg IV q8hrs will de escalate to q12hrs   - PPI while on steroids  and if fs elevated will add HSS   - c/w duoneb q6hrs   - c/w robitussin prn  - c/w cepacol prn   - ROCK to c/w CPAP qhs and prn     EVARISTO likely ATN from medication induced - resolved   - will allow degree azotemia to aide in sob/ HF exac  - c/w monitoring renal fxn closely  - If continues to trend up will hold off on further diuretic therapy. For now will continue with aggressive tx.     R groin abscess with cellulitis   - pt was being tx outpt with bactrim with minimal response to therapy  - pt afebrile - leukocytosis reactive to steroids   - c/w vancomycin IVPB - vanco trough and dosing per pharmacy   - started on Invanz - benadryl prior to infusion - pt has allergy of hives to PCN   - US shows 5.3 cm mildly complex fluid collection in the subcutaneous fat. Infection/abscess is not excluded.   - will monitor for improvement  - c/w warm compresses to the area   - Surgery consulted for possible I&D     Acute on chronic lower back pain   - c/w celbrex as needed   - c/w lidocaine patch     A fib- rate controlled  - c/w xarelto po qd   - c/w coreg po bid     Tobacco dependence  - refused nicotine patch   - pt counseled on smoking cessation     DVT ppx:  - Covered on xarelto     Dispo: Pt when medically stable to be discharged to home.

## 2018-12-30 NOTE — CHART NOTE - NSCHARTNOTEFT_GEN_A_CORE
Procedure Note    Bedside I&D of a complex infected collection (U/S) of the right groin without areas of drainable purulence on US or clinical exam.   5 cm long, 6 cm deep incision made. No purulence expressed only copious amounts of blood. Patient anticoagulated. Pressure held for thirty minutes. EBL 60 mL. Wound tightly packed. Large pressure dressing applied with a three additional person assist. Total time spent 1.5 hours.

## 2018-12-31 LAB
ANION GAP SERPL CALC-SCNC: 14 MMOL/L — SIGNIFICANT CHANGE UP (ref 5–17)
BASOPHILS # BLD AUTO: 0 K/UL — SIGNIFICANT CHANGE UP (ref 0–0.2)
BUN SERPL-MCNC: 33 MG/DL — HIGH (ref 8–20)
CALCIUM SERPL-MCNC: 8 MG/DL — LOW (ref 8.6–10.2)
CHLORIDE SERPL-SCNC: 91 MMOL/L — LOW (ref 98–107)
CO2 SERPL-SCNC: 24 MMOL/L — SIGNIFICANT CHANGE UP (ref 22–29)
CREAT SERPL-MCNC: 1.06 MG/DL — SIGNIFICANT CHANGE UP (ref 0.5–1.3)
EOSINOPHIL # BLD AUTO: 0 K/UL — SIGNIFICANT CHANGE UP (ref 0–0.5)
EOSINOPHIL NFR BLD AUTO: 0 % — SIGNIFICANT CHANGE UP (ref 0–5)
GLUCOSE SERPL-MCNC: 172 MG/DL — HIGH (ref 70–115)
HCT VFR BLD CALC: 41.4 % — LOW (ref 42–52)
HGB BLD-MCNC: 14.1 G/DL — SIGNIFICANT CHANGE UP (ref 14–18)
LYMPHOCYTES # BLD AUTO: 1 K/UL — SIGNIFICANT CHANGE UP (ref 1–4.8)
LYMPHOCYTES # BLD AUTO: 4.4 % — LOW (ref 20–55)
MCHC RBC-ENTMCNC: 30.7 PG — SIGNIFICANT CHANGE UP (ref 27–31)
MCHC RBC-ENTMCNC: 34.1 G/DL — SIGNIFICANT CHANGE UP (ref 32–36)
MCV RBC AUTO: 90.2 FL — SIGNIFICANT CHANGE UP (ref 80–94)
MONOCYTES # BLD AUTO: 1.7 K/UL — HIGH (ref 0–0.8)
MONOCYTES NFR BLD AUTO: 7.3 % — SIGNIFICANT CHANGE UP (ref 3–10)
NEUTROPHILS # BLD AUTO: 20 K/UL — HIGH (ref 1.8–8)
NEUTROPHILS NFR BLD AUTO: 87.9 % — HIGH (ref 37–73)
PLATELET # BLD AUTO: 181 K/UL — SIGNIFICANT CHANGE UP (ref 150–400)
POTASSIUM SERPL-MCNC: 4 MMOL/L — SIGNIFICANT CHANGE UP (ref 3.5–5.3)
POTASSIUM SERPL-SCNC: 4 MMOL/L — SIGNIFICANT CHANGE UP (ref 3.5–5.3)
RBC # BLD: 4.59 M/UL — LOW (ref 4.6–6.2)
RBC # FLD: 13.5 % — SIGNIFICANT CHANGE UP (ref 11–15.6)
SODIUM SERPL-SCNC: 129 MMOL/L — LOW (ref 135–145)
VANCOMYCIN TROUGH SERPL-MCNC: 12.5 UG/ML — SIGNIFICANT CHANGE UP (ref 10–20)
WBC # BLD: 22.7 K/UL — HIGH (ref 4.8–10.8)
WBC # FLD AUTO: 22.7 K/UL — HIGH (ref 4.8–10.8)

## 2018-12-31 PROCEDURE — 99223 1ST HOSP IP/OBS HIGH 75: CPT

## 2018-12-31 PROCEDURE — 99232 SBSQ HOSP IP/OBS MODERATE 35: CPT

## 2018-12-31 RX ORDER — FUROSEMIDE 40 MG
40 TABLET ORAL DAILY
Qty: 0 | Refills: 0 | Status: DISCONTINUED | OUTPATIENT
Start: 2019-01-01 | End: 2019-01-02

## 2018-12-31 RX ADMIN — Medication 40 MILLIGRAM(S): at 17:04

## 2018-12-31 RX ADMIN — LIDOCAINE 1 PATCH: 4 CREAM TOPICAL at 05:53

## 2018-12-31 RX ADMIN — Medication 3 MILLILITER(S): at 03:51

## 2018-12-31 RX ADMIN — Medication 650 MILLIGRAM(S): at 13:27

## 2018-12-31 RX ADMIN — Medication 250 MILLIGRAM(S): at 05:50

## 2018-12-31 RX ADMIN — Medication 650 MILLIGRAM(S): at 14:10

## 2018-12-31 RX ADMIN — Medication 250 MILLIGRAM(S): at 22:04

## 2018-12-31 RX ADMIN — Medication 4 MILLIGRAM(S): at 12:34

## 2018-12-31 RX ADMIN — Medication 3 MILLILITER(S): at 21:00

## 2018-12-31 RX ADMIN — Medication 250 MILLIGRAM(S): at 15:22

## 2018-12-31 RX ADMIN — Medication 650 MILLIGRAM(S): at 06:22

## 2018-12-31 RX ADMIN — ERTAPENEM SODIUM 120 MILLIGRAM(S): 1 INJECTION, POWDER, LYOPHILIZED, FOR SOLUTION INTRAMUSCULAR; INTRAVENOUS at 12:27

## 2018-12-31 RX ADMIN — Medication 3 MILLILITER(S): at 08:42

## 2018-12-31 RX ADMIN — Medication 40 MILLIGRAM(S): at 05:50

## 2018-12-31 RX ADMIN — CARVEDILOL PHOSPHATE 6.25 MILLIGRAM(S): 80 CAPSULE, EXTENDED RELEASE ORAL at 05:50

## 2018-12-31 RX ADMIN — Medication 650 MILLIGRAM(S): at 05:52

## 2018-12-31 RX ADMIN — Medication 3 MILLILITER(S): at 14:56

## 2018-12-31 RX ADMIN — CARVEDILOL PHOSPHATE 6.25 MILLIGRAM(S): 80 CAPSULE, EXTENDED RELEASE ORAL at 17:04

## 2018-12-31 RX ADMIN — RIVAROXABAN 20 MILLIGRAM(S): KIT at 17:03

## 2018-12-31 RX ADMIN — Medication 0.25 MILLIGRAM(S): at 22:03

## 2018-12-31 NOTE — PROGRESS NOTE ADULT - ASSESSMENT
58 year old patient currently on IV ABx (vanc and invanz) with right groin area of warmth, swelling, and erythema w imaging findings demonstrating a mildly complex fluid collection measuring 5.3x1.1x3cm christin 2-3cm deep to the skin now s/p I&D p/o blood.     Patient's medical comorbidities (pulmonary status) may be prohibitive for operative debridement (intubation concerns)  Plan for ACS/Trauma team to change dressing today w/ tight packing and pressure dressing overlying  Will f/u Cxs  ACS/Trauma following; will discuss plans for possible operative drainage if needed

## 2018-12-31 NOTE — CONSULT NOTE ADULT - SUBJECTIVE AND OBJECTIVE BOX
[facility]  INFECTIOUS DISEASES AND INTERNAL MEDICINE at [place]  =======================================================  Bello García MD  Diplomates American Board of Internal Medicine and Infectious Diseases  =======================================================    MRN-[id number]  MAGGIE ELLIOTT     CC:  Dyspnea     HPI:  57y/o man with HTN, Afib, ROCK, COPD and morbid obesity was admitted on 12/27 with dyspnea for COPD exacerbation. Also had swelling and pain in right upper thigh for one week for which was taking Abx po with no improvement.    USG showed 5cm complex collection in the area so surgery I&D'd yesterday with no purulent discharge (just blood came out). He has been started on vancomycin and later ertapenem was added due to PCN allergy and now ID was called for recommendation.   he has no complaint except for mild pain in area, Dyspnea is significantly better.       PAST MEDICAL & SURGICAL HISTORY:  Afib  ROCK (obstructive sleep apnea)  COPD (chronic obstructive pulmonary disease)  No significant past surgical history    Allergies  penicillins (Hives)  shellfish (Pruritus; Rash)    Antibiotics:  ertapenem  IVPB 1000 milliGRAM(s) IV Intermittent every 24 hours  vancomycin  IVPB 1000 milliGRAM(s) IV Intermittent every 8 hours    REVIEW OF SYSTEMS:  CONSTITUTIONAL:  No Fever or chills  HEENT:  No diplopia or blurred vision.  No sore throat or runny nose.  CARDIOVASCULAR:  No chest pain or SOB.  RESPIRATORY:  No cough, shortness of breath, PND or orthopnea.  GASTROINTESTINAL:  No nausea, vomiting or diarrhea.  GENITOURINARY:  No dysuria, frequency or urgency. No Blood in urine  MUSCULOSKELETAL:  no joint aches, no muscle pain  SKIN:  No change in skin, hair or nails.  NEUROLOGIC:  No paresthesias, fasciculations, seizures or weakness.  PSYCHIATRIC:  No disorder of thought or mood.  ENDOCRINE:  No heat or cold intolerance, polyuria or polydipsia.  HEMATOLOGICAL:  No easy bruising or bleeding.     Physical Exam:  Vital Signs Last 24 Hrs  T(C): 36.8 (31 Dec 2018 12:08), Max: 37.3 (31 Dec 2018 04:40)  T(F): 98.2 (31 Dec 2018 12:08), Max: 99.1 (31 Dec 2018 04:40)  HR: 102 (31 Dec 2018 12:08) (68 - 114)  BP: 106/61 (31 Dec 2018 12:08) (90/57 - 108/68)  RR: 18 (31 Dec 2018 12:08) (18 - 18)  SpO2: 96% (31 Dec 2018 12:08) (94% - 99%)  GEN: on O2 with mild respiratory distress , morbidly obese   HEENT: normocephalic and atraumatic. EOMI. PERRL.    NECK: Supple.  No lymphadenopathy   LUNGS: poor air movement. no wheezing or crackles   HEART: Regular rate and rhythm   ABDOMEN: Soft, nontender, and nondistended.  Positive bowel sounds.    : No CVA tenderness  EXTREMITIES: Right upper thigh with an open wound 5cm with bloody gauze on it, no active discharge, packed with gauze. 5cm induration around the wound   NEUROLOGIC: grossly intact.  PSYCHIATRIC: Appropriate affect .  SKIN: No ulceration or induration present.    Labs:  12-31    129<L>  |  91<L>  |  33.0<H>  ----------------------------<  172<H>  4.0   |  24.0  |  1.06    Ca    8.0<L>      31 Dec 2018 08:15                     14.1   22.7  )-----------( 181      ( 31 Dec 2018 08:15 )             41.4     RECENT CULTURES:  12-27 @ 18:50      NotDetec    All imaging and other data have been reviewed.  FINDINGS:    There is normal compressibility of the bilateral common femoral, femoral   and popliteal veins. No calf vein thrombosis is detected.    Doppler examination shows normal spontaneous and phasic flow.     Additional sonography in the area of concern in the right groin   demonstrates subcutaneous edema and heterogeneity of the subcutaneous fat   . Approximately 2-3 cm deep to the skin there is a mildly complex fluid   collection measuring 5.3 x 1.1 x 3.0 cm in this region.    IMPRESSION:     No evidence of bilateral lower extremity deep venous thrombosis.    In the area of concern in the right groin, there is a 5.3 cm mildly   complex fluid collection in the subcutaneous fat. Infection/abscess is   not excluded.

## 2018-12-31 NOTE — PROGRESS NOTE ADULT - ASSESSMENT
59 yo M hx of A fib on Xarelto, HTN, Obesity, ROCK on CPAP, COPD on 2l nc qhs, tobacco dependence and NICM chronic systolic HFrEF:45%, being tx outpt recently for ongoing URI/ allergies intermittently on abx therapy/ steroids as well as recent dx of R groin cellulitis on bactrim, pt admitted with worsening sob/ noted exp wheezing on exam, elevated pro bnp, cxr with vascular congestion and admitted with acute on chronic systolic HF exacerbation with concomitant copd exacerbation. Pt actively being diuresed with IV lasix as well as tx for COPD with IV steroids (being de escalated)/nebz. De escalated therapy to po steroids/ lasix. Clinically SOB has improved. Hospital course further complicated by failed outpt R groin cellulitis, empirically started on vanco/Invanz; clinical concern for R groin abscess confirmed on US s/p I&D by surgery 12/30/18, ID consulted today for further recommendations. Slow clinical improvement.       R groin abscess with cellulitis   - s/p I & D 12/30/18 no purulent drainage just blood   - clinically with slight improvement in induration/ warmth   - pt afebrile - leukocytosis reactive to steroids   - c/w vancomycin IVPB - vanco trough and dosing per pharmacy   - c/w Invanz 1G IVPB QD   - US shows 5.3 cm mildly complex fluid collection in the subcutaneous fat. Infection/abscess is not excluded.   - will monitor for improvement  - c/w warm compresses to the area   - Surgery f/u noted and appreciated  - ID consult noted and appreciated and likely be able to de escalate to po abx soon     SOB likely multifactorial 2/2 acute on chronic systolic HF exacerbation with concomitant copd exacerbation, underlying hx of ROCK/obesity - resolved   - See below for management of both     Acute on chronic systolic HFrEF exacerbation/ NICM - improved   - pt with improvement, sob at baseline   - elevated pro bnp, peripheral edema, bibasilar crackles   - cxr with congestion   -D/c Lasix IV, transitioned to lasix 40mg po qd   - will monitor renal fxn closely/ will monitor sodium closely slightly hyponatremia maybe due to over diuresis   - c/w strict I/O  -daily weight   - fluid restriction  - TTE noted and difficulty study   - repeat cxr with improvement in congestion   - cardio f/u noted and appreciated and d/w Dr. Baldwin current plan of care and lasix dosing.     COPD exacerbation - improved   - pt with sob at baseline, dry cough no wheezing on exam   - d/c solumedrol and started prednisone 40mg po qd will do long taper   - PPI while on steroids  and if fs elevated will add HSS   - c/w duoneb q6hrs   - c/w robitussin prn  - c/w cepacol prn   - ROCK to c/w CPAP qhs and prn     EVARISTO likely ATN from medication induced - resolved   - will allow degree azotemia to aide in sob/ HF exac  - c/w monitoring renal fxn closely    Acute on chronic lower back pain   - c/w celbrex as needed   - c/w lidocaine patch     A fib- rate controlled  - c/w xarelto po qd   - c/w coreg po bid     Tobacco dependence  - refused nicotine patch   - pt counseled on smoking cessation     DVT ppx:  - Covered on xarelto     Dispo: Pt when medically stable to be discharged to home. Anticipated in 24-48 hours.

## 2018-12-31 NOTE — CONSULT NOTE ADULT - ASSESSMENT
57y/o man with HTN, Afib, ROCK, COPD and morbid obesity was admitted on 12/27 with dyspnea for COPD exacerbation. Also had swelling and pain in right upper thigh for one week for which was taking Abx po with no improvement.    USG showed 5cm complex collection in the area so surgery I&D'd yesterday with no purulent discharge (just blood came out). He has been started on vancomycin and later ertapenem was added due to PCN allergy.    R upper thigh cellulitis  COPD  PCN allergy     - Blood culture pending from 12/30  - No wound culture available   - WBC is most likely secondary to steroid use.   - Afebrile   - Continue vancomycin 1gm q8h   - Continue Ertapenem 1gm daily  - These 2 ABx cover broadly   - Looks like responding can be switched to po ABx soon.     Will follow.

## 2019-01-01 LAB
ANION GAP SERPL CALC-SCNC: 13 MMOL/L — SIGNIFICANT CHANGE UP (ref 5–17)
BLD GP AB SCN SERPL QL: SIGNIFICANT CHANGE UP
BUN SERPL-MCNC: 24 MG/DL — HIGH (ref 8–20)
CALCIUM SERPL-MCNC: 8.2 MG/DL — LOW (ref 8.6–10.2)
CHLORIDE SERPL-SCNC: 93 MMOL/L — LOW (ref 98–107)
CO2 SERPL-SCNC: 26 MMOL/L — SIGNIFICANT CHANGE UP (ref 22–29)
CREAT SERPL-MCNC: 0.77 MG/DL — SIGNIFICANT CHANGE UP (ref 0.5–1.3)
GLUCOSE SERPL-MCNC: 148 MG/DL — HIGH (ref 70–115)
HCT VFR BLD CALC: 41.8 % — LOW (ref 42–52)
HGB BLD-MCNC: 14 G/DL — SIGNIFICANT CHANGE UP (ref 14–18)
MCHC RBC-ENTMCNC: 30.4 PG — SIGNIFICANT CHANGE UP (ref 27–31)
MCHC RBC-ENTMCNC: 33.5 G/DL — SIGNIFICANT CHANGE UP (ref 32–36)
MCV RBC AUTO: 90.9 FL — SIGNIFICANT CHANGE UP (ref 80–94)
PLATELET # BLD AUTO: 197 K/UL — SIGNIFICANT CHANGE UP (ref 150–400)
POTASSIUM SERPL-MCNC: 4.3 MMOL/L — SIGNIFICANT CHANGE UP (ref 3.5–5.3)
POTASSIUM SERPL-SCNC: 4.3 MMOL/L — SIGNIFICANT CHANGE UP (ref 3.5–5.3)
RBC # BLD: 4.6 M/UL — SIGNIFICANT CHANGE UP (ref 4.6–6.2)
RBC # FLD: 13.5 % — SIGNIFICANT CHANGE UP (ref 11–15.6)
SODIUM SERPL-SCNC: 132 MMOL/L — LOW (ref 135–145)
TYPE + AB SCN PNL BLD: SIGNIFICANT CHANGE UP
WBC # BLD: 20.7 K/UL — HIGH (ref 4.8–10.8)
WBC # FLD AUTO: 20.7 K/UL — HIGH (ref 4.8–10.8)

## 2019-01-01 PROCEDURE — 99232 SBSQ HOSP IP/OBS MODERATE 35: CPT

## 2019-01-01 RX ORDER — VANCOMYCIN HCL 1 G
VIAL (EA) INTRAVENOUS
Qty: 0 | Refills: 0 | Status: DISCONTINUED | OUTPATIENT
Start: 2019-01-01 | End: 2019-01-02

## 2019-01-01 RX ORDER — VANCOMYCIN HCL 1 G
1250 VIAL (EA) INTRAVENOUS EVERY 8 HOURS
Qty: 0 | Refills: 0 | Status: DISCONTINUED | OUTPATIENT
Start: 2019-01-01 | End: 2019-01-01

## 2019-01-01 RX ORDER — VANCOMYCIN HCL 1 G
1250 VIAL (EA) INTRAVENOUS EVERY 8 HOURS
Qty: 0 | Refills: 0 | Status: DISCONTINUED | OUTPATIENT
Start: 2019-01-02 | End: 2019-01-02

## 2019-01-01 RX ORDER — VANCOMYCIN HCL 1 G
1250 VIAL (EA) INTRAVENOUS ONCE
Qty: 0 | Refills: 0 | Status: COMPLETED | OUTPATIENT
Start: 2019-01-01 | End: 2019-01-01

## 2019-01-01 RX ORDER — ERTAPENEM SODIUM 1 G/1
1000 INJECTION, POWDER, LYOPHILIZED, FOR SOLUTION INTRAMUSCULAR; INTRAVENOUS EVERY 24 HOURS
Qty: 0 | Refills: 0 | Status: DISCONTINUED | OUTPATIENT
Start: 2019-01-01 | End: 2019-01-02

## 2019-01-01 RX ADMIN — Medication 3 MILLILITER(S): at 21:10

## 2019-01-01 RX ADMIN — ERTAPENEM SODIUM 120 MILLIGRAM(S): 1 INJECTION, POWDER, LYOPHILIZED, FOR SOLUTION INTRAMUSCULAR; INTRAVENOUS at 18:47

## 2019-01-01 RX ADMIN — Medication 250 MILLIGRAM(S): at 05:24

## 2019-01-01 RX ADMIN — LIDOCAINE 1 PATCH: 4 CREAM TOPICAL at 22:13

## 2019-01-01 RX ADMIN — LIDOCAINE 1 PATCH: 4 CREAM TOPICAL at 12:50

## 2019-01-01 RX ADMIN — Medication 166.67 MILLIGRAM(S): at 22:23

## 2019-01-01 RX ADMIN — Medication 3 MILLILITER(S): at 09:28

## 2019-01-01 RX ADMIN — Medication 3 MILLILITER(S): at 15:21

## 2019-01-01 RX ADMIN — Medication 0.25 MILLIGRAM(S): at 20:04

## 2019-01-01 RX ADMIN — CARVEDILOL PHOSPHATE 6.25 MILLIGRAM(S): 80 CAPSULE, EXTENDED RELEASE ORAL at 17:35

## 2019-01-01 RX ADMIN — Medication 40 MILLIGRAM(S): at 06:34

## 2019-01-01 RX ADMIN — Medication 40 MILLIGRAM(S): at 14:54

## 2019-01-01 RX ADMIN — CARVEDILOL PHOSPHATE 6.25 MILLIGRAM(S): 80 CAPSULE, EXTENDED RELEASE ORAL at 05:23

## 2019-01-01 RX ADMIN — Medication 3 MILLILITER(S): at 01:45

## 2019-01-01 RX ADMIN — Medication 40 MILLIGRAM(S): at 05:24

## 2019-01-01 RX ADMIN — Medication 1 TABLET(S): at 17:34

## 2019-01-01 RX ADMIN — Medication 4 MILLIGRAM(S): at 08:54

## 2019-01-01 NOTE — PHARMACOTHERAPY INTERVENTION NOTE - COMMENTS
Continue vanco 1g every 8 hours for now. Pt not yet at steady state while renal function trending down.  Next trough level ordered for tonight 12/31 @9pm (draw 1 hour before giving 10pm dose). If level is <15, will adjust dose for goal trough: 15-20
trough ordered
dose adjusted based on trough

## 2019-01-01 NOTE — PHARMACOTHERAPY INTERVENTION NOTE - NSPHARMCOMMASP
ASP - Lab/ test recommended
ASP - Lab/ test recommended
ASP - Dose optimization/Non-Renal dose adjustment

## 2019-01-01 NOTE — CHART NOTE - NSCHARTNOTEFT_GEN_A_CORE
Right thigh packing and dressing were changed this morning and seen to continue bleeding when removed. Patient will be prepared preoperatively for OR tomorrow 1/2/19 if during the AM dressing changes there continues to be active bleeding.

## 2019-01-01 NOTE — PROGRESS NOTE ADULT - ASSESSMENT
57 yo M hx of A fib on Xarelto, HTN, Obesity, ROCK on CPAP, COPD on 2l nc qhs, tobacco dependence and NICM chronic systolic HFrEF:45%, being tx outpt recently for ongoing URI/ allergies intermittently on abx therapy/ steroids as well as recent dx of R groin cellulitis on bactrim, pt admitted with worsening sob/ noted exp wheezing on exam, elevated pro bnp, cxr with vascular congestion and admitted with acute on chronic systolic HF exacerbation with concomitant copd exacerbation. Pt actively being diuresed with IV lasix as well as tx for COPD with IV steroids (being de escalated)/nebz. De escalated therapy to po steroids/ lasix. Clinically SOB has improved. Hospital course further complicated by failed outpt R groin cellulitis, empirically started on vanco/Invanz; clinical concern for R groin abscess confirmed on US s/p I&D by surgery 12/30/18, Sx and ID continues to follow the pt. Patient with excess bleeding from site this am. Slow clinical improvement.     R groin abscess with cellulitis   - s/p I & D 12/30/18 no purulent drainage just blood   - clinically with slight improvement in induration/ warmth but had excess bleeding from site this am.   - pt afebrile - leukocytosis reactive to steroids    - h/h stable   - d/c vanco/ invanz as per ID and pt started on bactrim/ levaquin   - US shows 5.3 cm mildly complex fluid collection in the subcutaneous fat. Infection/abscess is not excluded.   - will monitor for improvement/ bleeding   - c/w warm compresses to the area   - Surgery f/u noted and appreciated and d/w surgical resident will monitor pt additional 24 hours due to bleeding this am. If bleeding continues then may need to take the pt to the OR in the am. NPO at midnight   - Cardio clearance requested   - Patient is medically optimized for planned procedure   - ID f/u noted and appreciated and recommended on discharge levaquin/ bactrim x 2 weeks.     SOB likely multifactorial 2/2 acute on chronic systolic HF exacerbation with concomitant copd exacerbation, underlying hx of ROCK/obesity - resolved   - See below for management of both     Acute on chronic systolic HFrEF exacerbation/ NICM - improved   - pt with improvement, sob at baseline   - elevated pro bnp, peripheral edema, bibasilar crackles   - cxr with congestion   -c/w lasix 40mg po qd   - will monitor renal fxn closely/ will monitor sodium closely slightly hyponatremia maybe due to over diuresis   - c/w strict I/O  -daily weight   - fluid restriction  - TTE noted and difficulty study   - repeat cxr with improvement in congestion   - cardio f/u noted and appreciated and d/w Dr. Baldwin current plan of care and lasix dosing.     COPD exacerbation - improved   - pt with sob at baseline, dry cough no wheezing on exam   - c/w prednisone 40mg po qd will do long taper   - PPI while on steroids  and if fs elevated will add HSS   - c/w duoneb q6hrs   - c/w robitussin prn  - c/w cepacol prn   - ROCK to c/w CPAP qhs and prn     EVARISTO likely ATN from medication induced - resolved   - will allow degree azotemia to aide in sob/ HF exac  - c/w monitoring renal fxn closely    Acute on chronic lower back pain   - c/w celbrex as needed   - c/w lidocaine patch     A fib- rate controlled  - c/w xarelto po qd   - c/w coreg po bid     Tobacco dependence  - refused nicotine patch   - pt counseled on smoking cessation     DVT ppx:  - Covered on xarelto     Dispo: Pt when medically stable to be discharged to home. Anticipated in 24-48 hours.

## 2019-01-01 NOTE — PROGRESS NOTE ADULT - ASSESSMENT
58 year old patient currently on IV ABx (vanc and invanz) with right groin area of warmth, swelling, and erythema w imaging findings demonstrating a mildly complex fluid collection measuring 5.3x1.1x3cm christin 2-3cm deep to the skin now s/p I&D p/o blood. Patient's medical comorbidities (pulmonary status) may be prohibitive for operative debridement (intubation concerns)    -Plan for ACS/Trauma team to change dressing today

## 2019-01-01 NOTE — PROGRESS NOTE ADULT - ASSESSMENT
59y/o man with HTN, Afib, ROCK, COPD and morbid obesity was admitted on 12/27 with dyspnea for COPD exacerbation. Also had swelling and pain in right upper thigh for one week for which was taking Abx po with no improvement.    USG showed 5cm complex collection in the area so surgery I&D'd yesterday with no purulent discharge (just blood came out). He has been started on vancomycin and later ertapenem was added due to PCN allergy.    R upper thigh cellulitis  COPD  PCN allergy   Obesity     - Blood culture pending from 12/30  - No wound culture available   - WBC is most likely secondary to steroid use.   - Afebrile   - Can stop IV antibiotics and start po  - Bactrim DS 1tab BID and Levaquin 500mg daily, due to PCN allergy. Total 2 weeks.   - Advised to come back if any fever or worsening of swelling or discharge.   - High risk for cellulitis due to morbid obesity.      If stays inpt will follow up otherwise can follow in ID office in one week.

## 2019-01-02 ENCOUNTER — TRANSCRIPTION ENCOUNTER (OUTPATIENT)
Age: 59
End: 2019-01-02

## 2019-01-02 ENCOUNTER — INBOUND DOCUMENT (OUTPATIENT)
Age: 59
End: 2019-01-02

## 2019-01-02 VITALS
TEMPERATURE: 98 F | RESPIRATION RATE: 20 BRPM | HEART RATE: 111 BPM | SYSTOLIC BLOOD PRESSURE: 113 MMHG | DIASTOLIC BLOOD PRESSURE: 74 MMHG

## 2019-01-02 PROBLEM — G47.33 OBSTRUCTIVE SLEEP APNEA (ADULT) (PEDIATRIC): Chronic | Status: ACTIVE | Noted: 2018-12-27

## 2019-01-02 PROBLEM — J44.9 CHRONIC OBSTRUCTIVE PULMONARY DISEASE, UNSPECIFIED: Chronic | Status: ACTIVE | Noted: 2018-12-27

## 2019-01-02 PROBLEM — I48.91 UNSPECIFIED ATRIAL FIBRILLATION: Chronic | Status: ACTIVE | Noted: 2018-12-27

## 2019-01-02 LAB
ANION GAP SERPL CALC-SCNC: 12 MMOL/L — SIGNIFICANT CHANGE UP (ref 5–17)
BASOPHILS # BLD AUTO: 0 K/UL — SIGNIFICANT CHANGE UP (ref 0–0.2)
BUN SERPL-MCNC: 26 MG/DL — HIGH (ref 8–20)
CALCIUM SERPL-MCNC: 8.2 MG/DL — LOW (ref 8.6–10.2)
CHLORIDE SERPL-SCNC: 96 MMOL/L — LOW (ref 98–107)
CO2 SERPL-SCNC: 26 MMOL/L — SIGNIFICANT CHANGE UP (ref 22–29)
CREAT SERPL-MCNC: 0.83 MG/DL — SIGNIFICANT CHANGE UP (ref 0.5–1.3)
EOSINOPHIL # BLD AUTO: 0 K/UL — SIGNIFICANT CHANGE UP (ref 0–0.5)
EOSINOPHIL NFR BLD AUTO: 0 % — SIGNIFICANT CHANGE UP (ref 0–5)
GLUCOSE SERPL-MCNC: 149 MG/DL — HIGH (ref 70–115)
HCT VFR BLD CALC: 41.8 % — LOW (ref 42–52)
HGB BLD-MCNC: 13.9 G/DL — LOW (ref 14–18)
LYMPHOCYTES # BLD AUTO: 1.7 K/UL — SIGNIFICANT CHANGE UP (ref 1–4.8)
LYMPHOCYTES # BLD AUTO: 7.7 % — LOW (ref 20–55)
MAGNESIUM SERPL-MCNC: 2.5 MG/DL — SIGNIFICANT CHANGE UP (ref 1.6–2.6)
MCHC RBC-ENTMCNC: 30.4 PG — SIGNIFICANT CHANGE UP (ref 27–31)
MCHC RBC-ENTMCNC: 33.3 G/DL — SIGNIFICANT CHANGE UP (ref 32–36)
MCV RBC AUTO: 91.5 FL — SIGNIFICANT CHANGE UP (ref 80–94)
MONOCYTES # BLD AUTO: 1.8 K/UL — HIGH (ref 0–0.8)
MONOCYTES NFR BLD AUTO: 8.4 % — SIGNIFICANT CHANGE UP (ref 3–10)
NEUTROPHILS # BLD AUTO: 18 K/UL — HIGH (ref 1.8–8)
NEUTROPHILS NFR BLD AUTO: 82.8 % — HIGH (ref 37–73)
PLATELET # BLD AUTO: 206 K/UL — SIGNIFICANT CHANGE UP (ref 150–400)
POTASSIUM SERPL-MCNC: 4.2 MMOL/L — SIGNIFICANT CHANGE UP (ref 3.5–5.3)
POTASSIUM SERPL-SCNC: 4.2 MMOL/L — SIGNIFICANT CHANGE UP (ref 3.5–5.3)
RBC # BLD: 4.57 M/UL — LOW (ref 4.6–6.2)
RBC # FLD: 13.6 % — SIGNIFICANT CHANGE UP (ref 11–15.6)
SODIUM SERPL-SCNC: 134 MMOL/L — LOW (ref 135–145)
WBC # BLD: 21.8 K/UL — HIGH (ref 4.8–10.8)
WBC # FLD AUTO: 21.8 K/UL — HIGH (ref 4.8–10.8)

## 2019-01-02 PROCEDURE — 94640 AIRWAY INHALATION TREATMENT: CPT

## 2019-01-02 PROCEDURE — 96375 TX/PRO/DX INJ NEW DRUG ADDON: CPT

## 2019-01-02 PROCEDURE — 85730 THROMBOPLASTIN TIME PARTIAL: CPT

## 2019-01-02 PROCEDURE — 83735 ASSAY OF MAGNESIUM: CPT

## 2019-01-02 PROCEDURE — 71045 X-RAY EXAM CHEST 1 VIEW: CPT

## 2019-01-02 PROCEDURE — 84145 PROCALCITONIN (PCT): CPT

## 2019-01-02 PROCEDURE — 85610 PROTHROMBIN TIME: CPT

## 2019-01-02 PROCEDURE — 93970 EXTREMITY STUDY: CPT

## 2019-01-02 PROCEDURE — 83880 ASSAY OF NATRIURETIC PEPTIDE: CPT

## 2019-01-02 PROCEDURE — 36415 COLL VENOUS BLD VENIPUNCTURE: CPT

## 2019-01-02 PROCEDURE — 87486 CHLMYD PNEUM DNA AMP PROBE: CPT

## 2019-01-02 PROCEDURE — 99285 EMERGENCY DEPT VISIT HI MDM: CPT | Mod: 25

## 2019-01-02 PROCEDURE — 99232 SBSQ HOSP IP/OBS MODERATE 35: CPT

## 2019-01-02 PROCEDURE — 93005 ELECTROCARDIOGRAM TRACING: CPT

## 2019-01-02 PROCEDURE — 80053 COMPREHEN METABOLIC PANEL: CPT

## 2019-01-02 PROCEDURE — 96374 THER/PROPH/DIAG INJ IV PUSH: CPT

## 2019-01-02 PROCEDURE — 76882 US LMTD JT/FCL EVL NVASC XTR: CPT

## 2019-01-02 PROCEDURE — 80202 ASSAY OF VANCOMYCIN: CPT

## 2019-01-02 PROCEDURE — 71046 X-RAY EXAM CHEST 2 VIEWS: CPT

## 2019-01-02 PROCEDURE — 86901 BLOOD TYPING SEROLOGIC RH(D): CPT

## 2019-01-02 PROCEDURE — 80048 BASIC METABOLIC PNL TOTAL CA: CPT

## 2019-01-02 PROCEDURE — 85027 COMPLETE CBC AUTOMATED: CPT

## 2019-01-02 PROCEDURE — 87040 BLOOD CULTURE FOR BACTERIA: CPT

## 2019-01-02 PROCEDURE — 87798 DETECT AGENT NOS DNA AMP: CPT

## 2019-01-02 PROCEDURE — 86850 RBC ANTIBODY SCREEN: CPT

## 2019-01-02 PROCEDURE — 87581 M.PNEUMON DNA AMP PROBE: CPT

## 2019-01-02 PROCEDURE — 99239 HOSP IP/OBS DSCHRG MGMT >30: CPT

## 2019-01-02 PROCEDURE — 87633 RESP VIRUS 12-25 TARGETS: CPT

## 2019-01-02 PROCEDURE — 93306 TTE W/DOPPLER COMPLETE: CPT

## 2019-01-02 PROCEDURE — 86900 BLOOD TYPING SEROLOGIC ABO: CPT

## 2019-01-02 PROCEDURE — 94660 CPAP INITIATION&MGMT: CPT

## 2019-01-02 PROCEDURE — 84484 ASSAY OF TROPONIN QUANT: CPT

## 2019-01-02 RX ORDER — CIPROFLOXACIN LACTATE 400MG/40ML
1 VIAL (ML) INTRAVENOUS
Qty: 14 | Refills: 0 | OUTPATIENT
Start: 2019-01-02 | End: 2019-01-15

## 2019-01-02 RX ORDER — CARVEDILOL PHOSPHATE 80 MG/1
1 CAPSULE, EXTENDED RELEASE ORAL
Qty: 0 | Refills: 0 | COMMUNITY
Start: 2019-01-02

## 2019-01-02 RX ORDER — FUROSEMIDE 40 MG
1 TABLET ORAL
Qty: 0 | Refills: 0 | DISCHARGE
Start: 2019-01-02 | End: 2019-01-31

## 2019-01-02 RX ORDER — ACETAMINOPHEN 500 MG
2 TABLET ORAL
Qty: 0 | Refills: 0 | COMMUNITY
Start: 2019-01-02

## 2019-01-02 RX ORDER — IPRATROPIUM/ALBUTEROL SULFATE 18-103MCG
3 AEROSOL WITH ADAPTER (GRAM) INHALATION
Qty: 320 | Refills: 0
Start: 2019-01-02 | End: 2019-01-31

## 2019-01-02 RX ORDER — NICOTINE POLACRILEX 2 MG
1 GUM BUCCAL
Qty: 90 | Refills: 0 | OUTPATIENT
Start: 2019-01-02 | End: 2019-01-16

## 2019-01-02 RX ORDER — AZTREONAM 2 G
1 VIAL (EA) INJECTION
Qty: 0 | Refills: 0 | COMMUNITY
End: 2018-12-30

## 2019-01-02 RX ORDER — AZTREONAM 2 G
1 VIAL (EA) INJECTION
Qty: 28 | Refills: 0 | OUTPATIENT
Start: 2019-01-02 | End: 2019-01-15

## 2019-01-02 RX ORDER — FUROSEMIDE 40 MG
1 TABLET ORAL
Qty: 30 | Refills: 0 | OUTPATIENT
Start: 2019-01-02 | End: 2019-01-31

## 2019-01-02 RX ORDER — LIDOCAINE 4 G/100G
1 CREAM TOPICAL
Qty: 10 | Refills: 0 | OUTPATIENT
Start: 2019-01-02 | End: 2019-01-11

## 2019-01-02 RX ORDER — RIVAROXABAN 15 MG-20MG
1 KIT ORAL
Qty: 0 | Refills: 0 | COMMUNITY

## 2019-01-02 RX ORDER — ALPRAZOLAM 0.25 MG
1 TABLET ORAL
Qty: 0 | Refills: 0 | COMMUNITY
Start: 2019-01-02

## 2019-01-02 RX ORDER — ALPRAZOLAM 0.25 MG
1 TABLET ORAL
Qty: 15 | Refills: 0 | OUTPATIENT
Start: 2019-01-02 | End: 2019-01-06

## 2019-01-02 RX ORDER — CELECOXIB 200 MG/1
1 CAPSULE ORAL
Qty: 0 | Refills: 0 | COMMUNITY
Start: 2019-01-02

## 2019-01-02 RX ORDER — CARVEDILOL PHOSPHATE 80 MG/1
1 CAPSULE, EXTENDED RELEASE ORAL
Qty: 0 | Refills: 0 | COMMUNITY

## 2019-01-02 RX ADMIN — Medication 4 MILLIGRAM(S): at 11:02

## 2019-01-02 RX ADMIN — CARVEDILOL PHOSPHATE 6.25 MILLIGRAM(S): 80 CAPSULE, EXTENDED RELEASE ORAL at 05:49

## 2019-01-02 RX ADMIN — Medication 40 MILLIGRAM(S): at 05:49

## 2019-01-02 RX ADMIN — Medication 4 MILLIGRAM(S): at 07:21

## 2019-01-02 RX ADMIN — Medication 40 MILLIGRAM(S): at 11:15

## 2019-01-02 RX ADMIN — LIDOCAINE 1 PATCH: 4 CREAM TOPICAL at 02:01

## 2019-01-02 RX ADMIN — Medication 3 MILLILITER(S): at 03:15

## 2019-01-02 RX ADMIN — Medication 3 MILLILITER(S): at 08:50

## 2019-01-02 RX ADMIN — Medication 3 MILLILITER(S): at 15:10

## 2019-01-02 RX ADMIN — Medication 166.67 MILLIGRAM(S): at 05:49

## 2019-01-02 NOTE — DISCHARGE NOTE ADULT - CARE PROVIDERS DIRECT ADDRESSES
,DirectAddress_Unknown,[email or web address] ,DirectAddress_Unknown,[email or web address],DirectAddress_Unknown

## 2019-01-02 NOTE — DIETITIAN INITIAL EVALUATION ADULT. - NS AS NUTRI INTERV ED CONTENT
Priority modifications/Survival information/Low Na+/Weight management/Nutrition relationship to health/disease/Recommended modifications/Purpose of the nutrition education

## 2019-01-02 NOTE — DISCHARGE NOTE ADULT - PLAN OF CARE
prevent future episodes Please continue with medications as prescribed. Continue with fluid restriction. Continue with Continue with antibiotics as prescribed. Please follow up with Dr. García infectious disease in 1 week. Continue with medications as prescribed. Please hold xarelto until 1/4/18 restart at that date. Continue with CPAP at night Continue with nicotine gum.

## 2019-01-02 NOTE — DIETITIAN INITIAL EVALUATION ADULT. - PROBLEM SELECTOR PLAN 1
Suspect new onset CHF exac in setting of weight gain, LE edema, progresive SOB and vasc congestion seen on CXR  -diurese w/ Lasix  -fluid restrict, low sodium diet, [facility]  -check TTE  -encouraged weight loss.

## 2019-01-02 NOTE — DISCHARGE NOTE ADULT - MEDICATION SUMMARY - MEDICATIONS TO TAKE
I will START or STAY ON the medications listed below when I get home from the hospital:    predniSONE 10 mg oral tablet  -- 3 tab(s) by mouth once a day  x 3 days   2 tabs by mouth daily x 3 days   1 tab by mouth daily x 3 days     -- It is very important that you take or use this exactly as directed.  Do not skip doses or discontinue unless directed by your doctor.  Obtain medical advice before taking any non-prescription drugs as some may affect the action of this medication.  Take with food or milk.    -- Indication: For COPD (chronic obstructive pulmonary disease)    acetaminophen 325 mg oral tablet  -- 2 tab(s) by mouth every 6 hours, As needed, Temp greater or equal to 38C (100.4F), Mild Pain (1 - 3)  -- Indication: For Pain     celecoxib 100 mg oral capsule  -- 1 cap(s) by mouth every 12 hours, As needed, Moderate Pain (4 - 6)  -- Indication: For mod pain     Xarelto 20 mg oral tablet  -- 1 tab(s) by mouth once a day (in the evening)  please hold today, tomorrow and restart on 1/4/18   -- Indication: For Atrial fibrillation - hold for two days restart 1/4/18     ALPRAZolam 0.25 mg oral tablet  -- 1 tab(s) by mouth every 12 hours, As needed, anxiety  -- Indication: For Anxiety     carvedilol 6.25 mg oral tablet  -- 1 tab(s) by mouth every 12 hours  -- Indication: For Atrial fibrillation     ipratropium-albuterol 0.5 mg-2.5 mg/3 mLinhalation solution  -- 3 milliliter(s) inhaled every 6 hours  -- Indication: For COPD (chronic obstructive pulmonary disease)    levalbuterol 1.25 mg/0.5 mL inhalation solution  -- 0.5 milliliter(s) inhaled 3 times a day, As Needed  -- Indication: For COPD (chronic obstructive pulmonary disease)    Trelegy Ellipta inhalation powder  -- 1 puff(s) inhaled once a day  -- Indication: For COPD (chronic obstructive pulmonary disease)    lidocaine 5% topical film  -- Apply on skin to affected area once a day   -- Indication: For back pain     furosemide 40 mg oral tablet  -- 1 tab(s) by mouth once a day  -- Indication: For Heart failure    Levaquin 500 mg oral tablet  -- 1 tab(s) by mouth once a day   -- Avoid prolonged or excessive exposure to direct and/or artificial sunlight while taking this medication.  Do not take dairy products, antacids, or iron preparations within one hour of this medication.  Finish all this medication unless otherwise directed by prescriber.  May cause drowsiness or dizziness.  Medication should be taken with plenty of water.    -- Indication: For right groin abscess     nicotine 4 mg oral transmucosal gum  -- 1 gum chewed every 4 hours   -- Do not take this drug if you are pregnant.  It is very important that you take or use this exactly as directed.  Do not skip doses or discontinue unless directed by your doctor.    -- Indication: For smoker     Bactrim  mg-160 mg oral tablet  -- 1 tab(s) by mouth 2 times a day   -- Avoid prolonged or excessive exposure to direct and/or artificial sunlight while taking this medication.  Finish all this medication unless otherwise directed by prescriber.  Medication should be taken with plenty of water.    -- Indication: For groin abscess I will START or STAY ON the medications listed below when I get home from the hospital:    predniSONE 10 mg oral tablet  -- 3 tab(s) by mouth once a day  x 3 days   2 tabs by mouth daily x 3 days   1 tab by mouth daily x 3 days     -- It is very important that you take or use this exactly as directed.  Do not skip doses or discontinue unless directed by your doctor.  Obtain medical advice before taking any non-prescription drugs as some may affect the action of this medication.  Take with food or milk.    -- Indication: For COPD (chronic obstructive pulmonary disease)    acetaminophen 325 mg oral tablet  -- 2 tab(s) by mouth every 6 hours, As needed, Temp greater or equal to 38C (100.4F), Mild Pain (1 - 3)  -- Indication: For Pain     celecoxib 100 mg oral capsule  -- 1 cap(s) by mouth every 12 hours, As needed, Moderate Pain (4 - 6)  -- Indication: For mod pain     Xarelto 20 mg oral tablet  -- 1 tab(s) by mouth once a day (in the evening)  please hold today, tomorrow and restart on 1/4/18   -- Indication: For Atrial fibrillation - hold for two days restart 1/4/18     ALPRAZolam 0.25 mg oral tablet  -- 1 tab(s) by mouth every 12 hours, As needed, anxiety  -- Indication: For Anxiety     carvedilol 6.25 mg oral tablet  -- 1 tab(s) by mouth every 12 hours  -- Indication: For Atrial fibrillation     ipratropium-albuterol 0.5 mg-2.5 mg/3 mLinhalation solution  -- 3 milliliter(s) inhaled every 6 hours  -- Indication: For COPD (chronic obstructive pulmonary disease)    levalbuterol 1.25 mg/0.5 mL inhalation solution  -- 0.5 milliliter(s) inhaled 3 times a day, As Needed  -- Indication: For COPD (chronic obstructive pulmonary disease)    Trelegy Ellipta inhalation powder  -- 1 puff(s) inhaled once a day  -- Indication: For COPD (chronic obstructive pulmonary disease)    furosemide 40 mg oral tablet  -- 1 tab(s) by mouth once a day  -- Indication: For Heart failure    Levaquin 500 mg oral tablet  -- 1 tab(s) by mouth once a day   -- Avoid prolonged or excessive exposure to direct and/or artificial sunlight while taking this medication.  Do not take dairy products, antacids, or iron preparations within one hour of this medication.  Finish all this medication unless otherwise directed by prescriber.  May cause drowsiness or dizziness.  Medication should be taken with plenty of water.    -- Indication: For right groin abscess     nicotine 4 mg oral transmucosal gum  -- 1 gum chewed every 4 hours   -- Do not take this drug if you are pregnant.  It is very important that you take or use this exactly as directed.  Do not skip doses or discontinue unless directed by your doctor.    -- Indication: For smoker     Bactrim  mg-160 mg oral tablet  -- 1 tab(s) by mouth 2 times a day   -- Avoid prolonged or excessive exposure to direct and/or artificial sunlight while taking this medication.  Finish all this medication unless otherwise directed by prescriber.  Medication should be taken with plenty of water.    -- Indication: For groin abscess

## 2019-01-02 NOTE — PROGRESS NOTE ADULT - ASSESSMENT
58 year old patient currently on oral abx per ID with right groin area of warmth, swelling, and erythema w imaging findings demonstrating a mildly complex fluid collection measuring 5.3x1.1x3cm christin 2-3cm deep to the skin now s/p I&D p/o blood.     -Plan for ACS/Trauma team to change dressing in AM, if continues to bleed, plan for OR today  -OR procedure will consist of a wound exploration  -Cardiology on board - cleared for surgery today

## 2019-01-02 NOTE — DISCHARGE NOTE ADULT - HOSPITAL COURSE
59 yo M hx of A fib on Xarelto, HTN, Obesity, ROCK on CPAP, COPD on 2l nc qhs, tobacco dependence and NICM chronic systolic HFrEF:45%, being tx outpt recently for ongoing URI/ allergies intermittently on abx therapy/ steroids as well as recent dx of R groin cellulitis on bactrim, pt admitted with worsening sob/ noted exp wheezing on exam, elevated pro bnp, cxr with vascular congestion and admitted with acute on chronic systolic HF exacerbation with concomitant copd exacerbation. Pt actively being diuresed with IV lasix as well as tx for COPD with IV steroids (being de escalated)/nebz. De escalated therapy to po steroids/ lasix. Clinically SOB has improved. Hospital course further complicated by failed outpt R groin cellulitis, empirically started on vanco/Invanz; clinical concern for R groin abscess confirmed on US s/p I&D by surgery 12/30/18, Sx and ID continues to follow the pt. Patient with excess bleeding from site this am. Slow clinical improvement. 59 yo M hx of A fib on Xarelto, HTN, Obesity, ROCK on CPAP, COPD on 2l nc qhs, tobacco dependence and NICM chronic systolic HFrEF:45%, being tx outpt recently for ongoing URI/ allergies intermittently on abx therapy/ steroids as well as recent dx of R groin cellulitis on bactrim, pt admitted with worsening sob/ noted exp wheezing on exam, elevated pro bnp, cxr with vascular congestion and admitted with acute on chronic systolic HF exacerbation with concomitant copd exacerbation. Pt was actively being diuresed with IV lasix as well as tx for COPD with IV steroids/ nebz. De escalated therapy to po steroids/ lasix. Clinically SOB has improved. Hospital course further complicated by failed outpt R groin cellulitis, empirically started on vanco/Invanz; clinical concern for R groin abscess confirmed on US s/p I&D by surgery 12/30/18, Sx and ID continued to follow the pt. Patient with excess bleeding from site and was packed multiple times by Sx. Xarelto on hold until 1/4/18 and pt to c/w 2 weeks bactrim/ levaquin as an outpt. Pt cleared by ID, sx, cardio for disposition. If continues to bleed pt to f/u with sx as an outpt, also pt to f/u with ID/ Pulm and Cardio within one week. Pt given steroid taper on discharge.       Discharge planning took 45 minutes       Progress Note   Patient seen and examined at bedside. No acute events overnight. Patient states he is feeling well, wants to go home. Understands the plan of care. Requested o2 at home, d/w him that he does not qualify. Pt understands and will take meds as prescribed and f/u outpt with pulm/ cardio/ ID. Patient denies chest pain, SOB, abd pain, N/V, fever, chills, dysuria or any other complaints. All remainder ROS negative.         Vital Signs Last 24 Hrs  T(C): 36.6 (02 Jan 2019 11:14), Max: 36.7 (01 Jan 2019 20:14)  T(F): 97.8 (02 Jan 2019 11:14), Max: 98 (01 Jan 2019 20:14)  HR: 120 (02 Jan 2019 11:14) (97 - 120)  BP: 112/60 (02 Jan 2019 11:14) (104/59 - 129/89)  BP(mean): --  RR: 18 (02 Jan 2019 11:14) (18 - 18)  SpO2: 94% (02 Jan 2019 08:52) (94% - 99%)      CONSTITUTIONAL: Obese well appearing, well nourished, awake, alert and in no distress   CARDIAC: Normal rate, regular rhythm.  Heart sounds S1, S2.  No murmurs, rubs or gallops   RESPIRATORY: Fair air entry, no WRR   GASTROENTEROLOGY: Soft nt nd bs + normoactive   EXTREMITIES: No edema, cyanosis or deformity   NEUROLOGICAL: Alert and oriented, no focal deficits, no motor or sensory deficits.  SKIN: R groin with erythema/ warmth and induration around dressing C/D/I minimal improvement, skin turgor wnl       A/p: As above

## 2019-01-02 NOTE — DISCHARGE NOTE ADULT - ADDITIONAL INSTRUCTIONS
Please follow up with primary care physician in 3-5 days, please follow up with cardiolgoist and pulmonologist in 1-2 weeks. Please follow up with surgeon Dr. Thakur Please follow up with primary care physician in 3-5 days, please follow up with cardiolgoist and pulmonologist in 1-2 weeks. Please follow up with surgeon Dr. Thakur if continues to bleed. Follow up with infectious disease in one week.

## 2019-01-02 NOTE — PROGRESS NOTE ADULT - REASON FOR ADMISSION
Dyspnea

## 2019-01-02 NOTE — DISCHARGE NOTE ADULT - MEDICATION SUMMARY - MEDICATIONS TO CHANGE
I will SWITCH the dose or number of times a day I take the medications listed below when I get home from the hospital:    Coreg 6.25 mg oral tablet  -- 1 tab(s) by mouth 2 times a day    predniSONE 10 mg oral tablet  -- 1 tab(s) by mouth once a day    Xarelto 20 mg oral tablet  -- 1 tab(s) by mouth once a day (in the evening)    Bactrim  mg-160 mg oral tablet  -- 1 tab(s) by mouth 2 times a day

## 2019-01-02 NOTE — DISCHARGE NOTE ADULT - CARE PLAN
Principal Discharge DX:	Acute diastolic heart failure  Goal:	prevent future episodes  Assessment and plan of treatment:	Please continue with medications as prescribed. Continue with fluid restriction.  Secondary Diagnosis:	COPD (chronic obstructive pulmonary disease)  Assessment and plan of treatment:	Continue with  Secondary Diagnosis:	Groin abscess  Secondary Diagnosis:	Afib  Secondary Diagnosis:	ROCK (obstructive sleep apnea)  Secondary Diagnosis:	Tobacco dependence due to cigarettes Principal Discharge DX:	Acute diastolic heart failure  Goal:	prevent future episodes  Assessment and plan of treatment:	Please continue with medications as prescribed. Continue with fluid restriction.  Secondary Diagnosis:	COPD (chronic obstructive pulmonary disease)  Assessment and plan of treatment:	Continue with  Secondary Diagnosis:	Groin abscess  Assessment and plan of treatment:	Continue with antibiotics as prescribed. Please follow up with Dr. García infectious disease in 1 week.  Secondary Diagnosis:	Afib  Assessment and plan of treatment:	Continue with medications as prescribed. Please hold xarelto until 1/4/18 restart at that date.  Secondary Diagnosis:	ROCK (obstructive sleep apnea)  Assessment and plan of treatment:	Continue with CPAP at night  Secondary Diagnosis:	Tobacco dependence due to cigarettes  Assessment and plan of treatment:	Continue with nicotine gum.

## 2019-01-02 NOTE — PROGRESS NOTE ADULT - ASSESSMENT
57y/o man with HTN, Afib, ROCK, COPD and morbid obesity was admitted on 12/27 with dyspnea for COPD exacerbation. Also had swelling and pain in right upper thigh for one week for which was taking Abx po with no improvement.    USG showed 5cm complex collection in the area so surgery I&D'd yesterday with no purulent discharge (just blood came out). He has been started on vancomycin and later ertapenem was added due to PCN allergy.     R upper thigh cellulitis  COPD  PCN allergy   Obesity     - Blood culture x2 negative from 12/30  - Leukocytosis is most likely secondary to steroid use.   - Afebrile   - Surgery follow up for wound check, if no bleeding will be discharged.   - Last night restarted on IV Abx since he was kept in the hospital.   - If stays will continue IV otherwise can be discharged on:     Bactrim DS 1tab BID and Levaquin 500mg daily, due to PCN allergy. Total 2 weeks.   - If any fever or worsening of swelling or discharge.   - High risk for cellulitis due to morbid obesity.    - Surgery and ID/PCP follow up after discharge for wound check.

## 2019-01-02 NOTE — DISCHARGE NOTE ADULT - CARE PROVIDER_API CALL
Mckay Berman (), Cardiovascular Disease; Internal Medicine  [address] [place] [address]  Phone: [phone]  Fax: [phone]    Willow Tamayo), Internal Medicine; Pulmonary Disease; Sleep Medicine  [address] [facility] [address]  [place] [address]  Phone: [phone]  Fax: [phone]    Bassem Thakur), Surgery; Surgical Critical Care  [address] [place] [address]  Phone: [phone]  Fax: [phone] Mckay Berman (), Cardiovascular Disease; Internal Medicine  [address] [place] [address]  Phone: [phone]  Fax: [phone]    Willow Tamayo), Internal Medicine; Pulmonary Disease; Sleep Medicine  [address] [facility] [address]  [place] [address]  Phone: [phone]  Fax: [phone]    Bassem Thakur), Surgery; Surgical Critical Care  [address] [facility]  [address] [place] [address]  Phone: [phone]  Fax: [phone]    Prabhu García), Infectious Disease; Internal Medicine  [address] [facility]  [address]  [place] [address]  Phone: [phone]  Fax: [phone]

## 2019-01-02 NOTE — DISCHARGE NOTE ADULT - SECONDARY DIAGNOSIS.
COPD (chronic obstructive pulmonary disease) Groin abscess Afib ROCK (obstructive sleep apnea) Tobacco dependence due to cigarettes

## 2019-01-02 NOTE — DIETITIAN INITIAL EVALUATION ADULT. - OTHER INFO
Pt admitted with SOB/Chronic sytolic HF/COPD exacerbation, +R thigh/groin cellulitis, possible wound exploration pending noted. Pt reports not following any diets at home but knows he needs to now. provided LOW Na+ and weight management diet education.

## 2019-01-02 NOTE — DIETITIAN INITIAL EVALUATION ADULT. - PERTINENT LABORATORY DATA
01-02 Na134 mmol/L<L> Glu 149 mg/dL<H> K+ 4.2 mmol/L Cr  0.83 mg/dL BUN 26.0 mg/dL<H> Phos n/a   Alb n/a   PAB n/a

## 2019-01-02 NOTE — PROGRESS NOTE ADULT - PROVIDER SPECIALTY LIST ADULT
Cardiology
Hospitalist
Infectious Disease
Infectious Disease
Internal Medicine
Surgery
Surgery
Cardiology
Surgery

## 2019-01-02 NOTE — PROGRESS NOTE ADULT - SUBJECTIVE AND OBJECTIVE BOX
[facility], THE [facility]ART [facility]ENTER                                   [address] [place] [address]                                                      PHONE: [phone]                                                         FAX: [phone]  ----------------------------------------------------------------------------------------------------  Pt seen and examined. FU for  shortness of breath    Overnight events/Complaints: Pt without complains. s/p bedside I&D of right groin    Vital Signs Last 24 Hrs  T(C): 37.3 (31 Dec 2018 04:40), Max: 37.3 (31 Dec 2018 04:40)  T(F): 99.1 (31 Dec 2018 04:40), Max: 99.1 (31 Dec 2018 04:40)  HR: 100 (31 Dec 2018 04:40) (68 - 117)  BP: 90/57 (31 Dec 2018 04:40) (90/57 - 114/69)  BP(mean): --  RR: 18 (31 Dec 2018 04:40) (18 - 18)  SpO2: 96% (31 Dec 2018 07:49) (94% - 99%)  I&O's Summary    30 Dec 2018 07:01  -  31 Dec 2018 07:00  --------------------------------------------------------  IN: 854 mL / OUT: 472 mL / NET: 382 mL        PHYSICAL EXAM:  Constitutional: Obese male in bed in no distress; alert and co-operative  HEENT:     Head: Normocephalic and atraumatic  Eyes: Conjunctiva normal, No scleral icterus  Neck: supple. No JVD  Mouth/Throat: Mucous membranes are normal. Mucosa are not pale or dry.  Cardiovascular: Normal S1 S2, No murmurs  Respiratory: Lungs clear to auscultation; no crepitations, no wheeze  Gastrointestinal:  Soft, Non-tender, + BS	  Musculoskeletal: Normal range of motion. No edema  Skin: Warm and dry. No cyanosis . No diaphoresis.  Neurologic: Alert oriented to time place and person. Normal strength and no tremor.  Psychiatric: Normal mood and affect, Speech is normal and behavior is normal.        LABS:                        14.1   22.7  )-----------( 181      ( 31 Dec 2018 08:15 )             41.4     12-31    129<L>  |  91<L>  |  33.0<H>  ----------------------------<  172<H>  4.0   |  24.0  |  1.06    Ca    8.0<L>      31 Dec 2018 08:15    RADIOLOGY & ADDITIONAL STUDIES: (reviewed)    CARDIOLOGY TESTING:(reviewed)     ECHOCARDIOGRAM:  < from: TTE Echo Complete w/Doppler (12.28.18 @ 17:40) >  Summary:   1. Technically very difficult study.   2. LV was not well seen despite use of contrast. LV function appears to   be grossly moderate-severely reduced.   3. The mitral in-flow pattern reveals no discernable A-wave, therefore   no comment on diastolic function can be made.   4. Mitral valve not well seen. No evidence of significant regurgitation.   5. Aortic valve was not well seen. No evidence of aortic stenosis on   doppler analysis.   6. There is no evidence of pericardial effusion.   7. Endocardial visualization was enhanced with intravenous echo contrast.    [id number] Stevie Portillo MD, Electronically signed on 12/29/2018 at   11:46:29 AM    < end of copied text >    MEDICATIONS:(reviewed)  MEDICATIONS  (STANDING):  ALBUTerol/ipratropium for Nebulization 3 milliLiter(s) Nebulizer every 6 hours  carvedilol 6.25 milliGRAM(s) Oral every 12 hours  ertapenem  IVPB 1000 milliGRAM(s) IV Intermittent every 24 hours  ertapenem  IVPB      furosemide   Injectable 40 milliGRAM(s) IV Push every 12 hours  influenza   Vaccine 0.5 milliLiter(s) IntraMuscular once  lidocaine   Patch 1 Patch Transdermal daily  methylPREDNISolone sodium succinate Injectable 40 milliGRAM(s) IV Push every 12 hours  rivaroxaban 20 milliGRAM(s) Oral every 24 hours  vancomycin  IVPB 1000 milliGRAM(s) IV Intermittent every 8 hours    MEDICATIONS  (PRN):  acetaminophen   Tablet .. 650 milliGRAM(s) Oral every 6 hours PRN Temp greater or equal to 38C (100.4F), Mild Pain (1 - 3)  ALPRAZolam 0.25 milliGRAM(s) Oral every 12 hours PRN anxiety  benzocaine 15 mG/menthol 3.6 mG Lozenge 1 Lozenge Oral every 8 hours PRN Sore Throat  celecoxib 100 milliGRAM(s) Oral every 12 hours PRN Moderate Pain (4 - 6)  guaiFENesin    Syrup 100 milliGRAM(s) Oral every 6 hours PRN Cough  nicotine  Polacrilex Gum 4 milliGRAM(s) Oral every 3 hours PRN smoking cessation  ondansetron Injectable 4 milliGRAM(s) IV Push every 8 hours PRN Nausea and/or Vomiting    ASSESSMENT AND PLAN:    58y Male HTN, HLD, NICM (EF ~ 40-45%), AF On Xarelto, ROCK, active smoker,  COPD, who presented to [facility] ED complaining of sob.     - Change to po lasix today  - Continue pt home meds/dosages  - copd as per primary team  - Will arrange for outpt 2nd opinion re AF ablation with Dr Ramos.
[place] [facility] - [facility], THE [facility]                                   [address] [place] [address]                                                      PHONE: [phone]                                                         FAX: [phone]  [email or web address]  ---------------------------------------------------------------------------------------------------------------------------------    Overnight events/patient complaints:    Breathing better.  No cp.  PAST MEDICAL & SURGICAL HISTORY:  Afib  ROCK (obstructive sleep apnea)  COPD (chronic obstructive pulmonary disease)  No significant past surgical history      penicillins (Hives)  shellfish (Pruritus; Rash)    MEDICATIONS  (STANDING):  ALBUTerol/ipratropium for Nebulization 3 milliLiter(s) Nebulizer every 6 hours  carvedilol 6.25 milliGRAM(s) Oral every 12 hours  furosemide   Injectable 40 milliGRAM(s) IV Push every 12 hours  influenza   Vaccine 0.5 milliLiter(s) IntraMuscular once  methylPREDNISolone sodium succinate Injectable 40 milliGRAM(s) IV Push every 8 hours  rivaroxaban 20 milliGRAM(s) Oral every 24 hours  vancomycin  IVPB 1000 milliGRAM(s) IV Intermittent every 8 hours    MEDICATIONS  (PRN):  acetaminophen   Tablet .. 650 milliGRAM(s) Oral every 6 hours PRN Temp greater or equal to 38C (100.4F), Mild Pain (1 - 3)  benzocaine 15 mG/menthol 3.6 mG Lozenge 1 Lozenge Oral every 8 hours PRN Sore Throat  celecoxib 100 milliGRAM(s) Oral every 12 hours PRN Moderate Pain (4 - 6)  guaiFENesin    Syrup 100 milliGRAM(s) Oral every 6 hours PRN Cough  nicotine  Polacrilex Gum 4 milliGRAM(s) Oral every 3 hours PRN smoking cessation  ondansetron Injectable 4 milliGRAM(s) IV Push every 8 hours PRN Nausea and/or Vomiting      Vital Signs Last 24 Hrs  T(C): 36.7 (30 Dec 2018 05:06), Max: 37.1 (29 Dec 2018 11:15)  T(F): 98 (30 Dec 2018 05:06), Max: 98.8 (29 Dec 2018 11:15)  HR: 93 (30 Dec 2018 05:42) (70 - 123)  BP: 118/84 (30 Dec 2018 05:06) (109/73 - 118/84)  BP(mean): --  RR: 18 (29 Dec 2018 20:19) (18 - 84)  SpO2: 97% (30 Dec 2018 05:42) (97% - 99%)  ICU Vital Signs Last 24 Hrs  MAGGIE ELLIOTT  I&O's Detail    29 Dec 2018 07:01  -  30 Dec 2018 07:00  --------------------------------------------------------  IN:    IV PiggyBack: 250 mL  Total IN: 250 mL    OUT:    Voided: 1000 mL  Total OUT: 1000 mL    Total NET: -750 mL        I&O's Summary    29 Dec 2018 07:01  -  30 Dec 2018 07:00  --------------------------------------------------------  IN: 250 mL / OUT: 1000 mL / NET: -750 mL      Drug Dosing Weight  MAGGIE DONALDSONMEENA      PHYSICAL EXAM:  General: Appears well developed, well nourished alert and cooperative.  HEENT: Head; normocephalic, atraumatic.  Eyes: Pupils reactive, cornea wnl.  Neck: Supple, no nodes adenopathy, no NVD or carotid bruit or thyromegaly.  CARDIOVASCULAR: Normal S1 and S2, No murmur, rub, gallop or lift.   LUNGS: No rales, rhonchi or wheeze. Normal breath sounds bilaterally.  ABDOMEN: Soft, nontender without mass or organomegaly. bowel sounds normoactive.  EXTREMITIES: No clubbing, cyanosis or edema. Distal pulses wnl.   SKIN: warm and dry with normal turgor.  NEURO: Alert/oriented x 3/normal motor exam. No pathologic reflexes.    PSYCH: normal affect.        LABS:                        15.1   20.4  )-----------( 182      ( 30 Dec 2018 08:28 )             44.1     12-30    132<L>  |  90<L>  |  35.0<H>  ----------------------------<  152<H>  4.1   |  27.0  |  1.16    Ca    8.6      30 Dec 2018 08:28      MAGGIE ELLIOTT            RADIOLOGY & ADDITIONAL STUDIES:    INTERPRETATION OF TELEMETRY (personally reviewed):    ECG:    ECHO:    STRESS TEST:    CARDIAC CATHETERIZATION:    ASSESSMENT AND PLAN:  In summary, MAGGIE ELLIOTT is an 58y Male with past medical history significant for AF on a/c w/ COPD/smoker and worsening LV fxn adm w sob and improving w diuretic/steroid tx.  No MI/VT.  WBC coming down and renal fxn stable.      Thank you for allowing [facility] to participate in the care of this patient.  Please feel free to call with any questions or concerns.
[place] [facility] - [facility], [facility]                                   [address] [place] [address]                                                      PHONE: [phone]                                                         FAX: [phone]  [email or web address]  ---------------------------------------------------------------------------------------------------------------------------------    Overnight events/patient complaints:    Less sob  PAST MEDICAL & SURGICAL HISTORY:  Afib  ROCK (obstructive sleep apnea)  COPD (chronic obstructive pulmonary disease)  No significant past surgical history      penicillins (Hives)  shellfish (Pruritus; Rash)    MEDICATIONS  (STANDING):  ALBUTerol/ipratropium for Nebulization 3 milliLiter(s) Nebulizer every 6 hours  carvedilol 6.25 milliGRAM(s) Oral every 12 hours  furosemide   Injectable 40 milliGRAM(s) IV Push every 12 hours  influenza   Vaccine 0.5 milliLiter(s) IntraMuscular once  methylPREDNISolone sodium succinate Injectable 40 milliGRAM(s) IV Push every 8 hours  rivaroxaban 20 milliGRAM(s) Oral every 24 hours  vancomycin  IVPB 1000 milliGRAM(s) IV Intermittent every 8 hours    MEDICATIONS  (PRN):  acetaminophen   Tablet .. 650 milliGRAM(s) Oral every 6 hours PRN Temp greater or equal to 38C (100.4F), Mild Pain (1 - 3)  benzocaine 15 mG/menthol 3.6 mG Lozenge 1 Lozenge Oral every 8 hours PRN Sore Throat  celecoxib 100 milliGRAM(s) Oral every 12 hours PRN Moderate Pain (4 - 6)  guaiFENesin    Syrup 100 milliGRAM(s) Oral every 6 hours PRN Cough  ondansetron Injectable 4 milliGRAM(s) IV Push every 8 hours PRN Nausea and/or Vomiting      Vital Signs Last 24 Hrs  T(C): 37.1 (29 Dec 2018 20:19), Max: 37.6 (29 Dec 2018 06:03)  T(F): 98.8 (29 Dec 2018 20:19), Max: 99.6 (29 Dec 2018 06:03)  HR: 110 (29 Dec 2018 20:19) (64 - 123)  BP: 118/74 (29 Dec 2018 20:19) (109/73 - 122/80)  BP(mean): --  RR: 18 (29 Dec 2018 20:19) (18 - 84)  SpO2: 97% (29 Dec 2018 20:19) (93% - 98%)  ICU Vital Signs Last 24 Hrs  MAGGIE ELLIOTT  I&O's Detail    28 Dec 2018 07:01  -  29 Dec 2018 07:00  --------------------------------------------------------  IN:  Total IN: 0 mL    OUT:    Voided: 625 mL  Total OUT: 625 mL    Total NET: -625 mL      29 Dec 2018 07:01  -  29 Dec 2018 20:21  --------------------------------------------------------  IN:    IV PiggyBack: 250 mL  Total IN: 250 mL    OUT:  Total OUT: 0 mL    Total NET: 250 mL        I&O's Summary    28 Dec 2018 07:01  -  29 Dec 2018 07:00  --------------------------------------------------------  IN: 0 mL / OUT: 625 mL / NET: -625 mL    29 Dec 2018 07:01  -  29 Dec 2018 20:21  --------------------------------------------------------  IN: 250 mL / OUT: 0 mL / NET: 250 mL      Drug Dosing Weight  MAGGIE ELLIOTT      PHYSICAL EXAM:  General: Appears well developed, well nourished alert and cooperative.  HEENT: Head; normocephalic, atraumatic.  Eyes: Pupils reactive, cornea wnl.  Neck: Supple, no nodes adenopathy, no NVD or carotid bruit or thyromegaly.  CARDIOVASCULAR: Normal S1 and S2, No murmur, rub, gallop or lift.   LUNGS: No rales, rhonchi or wheeze. Normal breath sounds bilaterally.  ABDOMEN: Soft, nontender without mass or organomegaly. bowel sounds normoactive.  EXTREMITIES: No clubbing, cyanosis or edema. Distal pulses wnl.   SKIN: warm and dry with normal turgor.  NEURO: Alert/oriented x 3/normal motor exam. No pathologic reflexes.    PSYCH: normal affect.        LABS:                        15.0   28.3  )-----------( 199      ( 29 Dec 2018 14:21 )             43.7     12-29    130<L>  |  90<L>  |  36.0<H>  ----------------------------<  140<H>  4.0   |  22.0  |  1.52<H>    Ca    8.2<L>      29 Dec 2018 14:21      MAGGIE ELLIOTT            RADIOLOGY & ADDITIONAL STUDIES:    INTERPRETATION OF TELEMETRY (personally reviewed):    ECG:    ECHO:    STRESS TEST:    CARDIAC CATHETERIZATION:    ASSESSMENT AND PLAN:  In summary, MAGGIE ELLIOTT is an 58y Male with past medical history significant for AF/COPD/smoker presents w sob-trop neg and high BNP, re-echo reveals worsening LV fxn.  Cont diuretics/follow BUN/Cr.  On steroids/WBC inc.    Thank you for allowing [facility] to participate in the care of this patient.  Please feel free to call with any questions or concerns.
[place] [facility] - [facility], THE [facility]                                   [address] [place] [address]                                                      PHONE: [phone]                                                         FAX: [phone]  [email or web address]  ---------------------------------------------------------------------------------------------------------------------------------    Overnight events/patient complaints:  Pt feels well and wants to go home    penicillins (Hives)  shellfish (Pruritus; Rash)    MEDICATIONS  (STANDING):  ALBUTerol/ipratropium for Nebulization 3 milliLiter(s) Nebulizer every 6 hours  carvedilol 6.25 milliGRAM(s) Oral every 12 hours  ertapenem  IVPB 1000 milliGRAM(s) IV Intermittent every 24 hours  ertapenem  IVPB      furosemide    Tablet 40 milliGRAM(s) Oral daily  influenza   Vaccine 0.5 milliLiter(s) IntraMuscular once  lidocaine   Patch 1 Patch Transdermal daily  rivaroxaban 20 milliGRAM(s) Oral every 24 hours  vancomycin  IVPB 1000 milliGRAM(s) IV Intermittent every 8 hours    MEDICATIONS  (PRN):  acetaminophen   Tablet .. 650 milliGRAM(s) Oral every 6 hours PRN Temp greater or equal to 38C (100.4F), Mild Pain (1 - 3)  ALPRAZolam 0.25 milliGRAM(s) Oral every 12 hours PRN anxiety  benzocaine 15 mG/menthol 3.6 mG Lozenge 1 Lozenge Oral every 8 hours PRN Sore Throat  celecoxib 100 milliGRAM(s) Oral every 12 hours PRN Moderate Pain (4 - 6)  guaiFENesin    Syrup 100 milliGRAM(s) Oral every 6 hours PRN Cough  nicotine  Polacrilex Gum 4 milliGRAM(s) Oral every 3 hours PRN smoking cessation  ondansetron Injectable 4 milliGRAM(s) IV Push every 8 hours PRN Nausea and/or Vomiting      Vital Signs Last 24 Hrs  T(C): 36.7 (01 Jan 2019 05:00), Max: 36.8 (31 Dec 2018 12:08)  T(F): 98 (01 Jan 2019 05:00), Max: 98.3 (31 Dec 2018 19:46)  HR: 73 (01 Jan 2019 05:00) (54 - 105)  BP: 120/64 (01 Jan 2019 05:00) (100/59 - 121/75)  BP(mean): --  RR: 18 (31 Dec 2018 19:46) (18 - 18)  SpO2: 94% (01 Jan 2019 05:00) (94% - 100%)  ICU Vital Signs Last 24 Hrs  MAGGIE ELLIOTT  I&O's Detail    31 Dec 2018 07:01  -  01 Jan 2019 07:00  --------------------------------------------------------  IN:    Oral Fluid: 480 mL  Total IN: 480 mL    OUT:  Total OUT: 0 mL    Total NET: 480 mL        Drug Dosing Weight  MAGGIE ELLIOTT      PHYSICAL EXAM:  General: Appears well developed, well nourished alert and cooperative.  HEENT: Head; normocephalic, atraumatic.  Eyes: Pupils reactive, cornea wnl.  Neck: Supple, no nodes adenopathy, no NVD or carotid bruit or thyromegaly.  CARDIOVASCULAR: Normal S1 and S2, No murmur, rub, gallop or lift.   LUNGS: No rales, rhonchi or wheeze. Normal breath sounds bilaterally.  ABDOMEN: Soft, nontender without mass or organomegaly. bowel sounds normoactive.  EXTREMITIES: No clubbing, cyanosis or edema. Distal pulses wnl.   SKIN: warm and dry with normal turgor.  NEURO: Alert/oriented x 3/normal motor exam. No pathologic reflexes.    PSYCH: normal affect.        LABS:                        14.1   22.7  )-----------( 181      ( 31 Dec 2018 08:15 )             41.4     12-31    129<L>  |  91<L>  |  33.0<H>  ----------------------------<  172<H>  4.0   |  24.0  |  1.06    Ca    8.0<L>      31 Dec 2018 08:15      MAGGIE ELLIOTT            RADIOLOGY & ADDITIONAL STUDIES:    INTERPRETATION OF TELEMETRY (personally reviewed): no events       ECHO: ECHOCARDIOGRAM:  < from: TTE Echo Complete w/Doppler (12.28.18 @ 17:40) >  Summary:   1. Technically very difficult study.   2. LV was not well seen despite use of contrast. LV function appears to   be grossly moderate-severely reduced.   3. The mitral in-flow pattern reveals no discernable A-wave, therefore   no comment on diastolic function can be made.   4. Mitral valve not well seen. No evidence of significant regurgitation.   5. Aortic valve was not well seen. No evidence of aortic stenosis on   doppler analysis.   6. There is no evidence of pericardial effusion.   7. Endocardial visualization was enhanced with intravenous echo contrast.    [id number] Stevie Portillo MD, Electronically signed on 12/29/2018 at   11:46:29 AM         ASSESSMENT AND PLAN:  In summary, MAGGIE ELLIOTT is an 58y Male with past medical history significant for HTN, HLD, NICM (EF ~ 40-45%), AF On Xarelto, ROCK, active smoker,  COPD, who presented to [facility] ED complaining of sob.     1) Cw coreg  2) start altace 1.25mg daily  3) Outpatient EP eval for afib ablation
Cardiac optimization if needed for groin surgery.    1) Preoperative Optimization       -No Cardiovascular Contraindication to surgery       -Patient is elevated risk for an elevated risk surgery       -Continue cardiac medications as scheduled can hold Xarelto if needed 2 days prior and restart the following day       -Please recall with any questions or concerns
INTERVAL HPI/OVERNIGHT EVENTS: Patient's dressing was changed during AM rounds, still consistent oozing. Overnight surgical team also called to bedside to evaluate saturated pressure dressing, where dressing was taken down, packing was not removed and new pressure dressing placed. Surgicell is at bedside.     SUBJECTIVE: Feeling well this AM. No fevers/chills, no N/V. States feeling better       MEDICATIONS  (STANDING):  ALBUTerol/ipratropium for Nebulization 3 milliLiter(s) Nebulizer every 6 hours  carvedilol 6.25 milliGRAM(s) Oral every 12 hours  ertapenem  IVPB 1000 milliGRAM(s) IV Intermittent every 24 hours  ertapenem  IVPB      furosemide    Tablet 40 milliGRAM(s) Oral daily  influenza   Vaccine 0.5 milliLiter(s) IntraMuscular once  lidocaine   Patch 1 Patch Transdermal daily  rivaroxaban 20 milliGRAM(s) Oral every 24 hours  vancomycin  IVPB 1000 milliGRAM(s) IV Intermittent every 8 hours    MEDICATIONS  (PRN):  acetaminophen   Tablet .. 650 milliGRAM(s) Oral every 6 hours PRN Temp greater or equal to 38C (100.4F), Mild Pain (1 - 3)  ALPRAZolam 0.25 milliGRAM(s) Oral every 12 hours PRN anxiety  benzocaine 15 mG/menthol 3.6 mG Lozenge 1 Lozenge Oral every 8 hours PRN Sore Throat  celecoxib 100 milliGRAM(s) Oral every 12 hours PRN Moderate Pain (4 - 6)  guaiFENesin    Syrup 100 milliGRAM(s) Oral every 6 hours PRN Cough  nicotine  Polacrilex Gum 4 milliGRAM(s) Oral every 3 hours PRN smoking cessation  ondansetron Injectable 4 milliGRAM(s) IV Push every 8 hours PRN Nausea and/or Vomiting      Vital Signs Last 24 Hrs  T(C): 36.7 (01 Jan 2019 05:00), Max: 36.8 (31 Dec 2018 12:08)  T(F): 98 (01 Jan 2019 05:00), Max: 98.3 (31 Dec 2018 19:46)  HR: 73 (01 Jan 2019 05:00) (54 - 110)  BP: 120/64 (01 Jan 2019 05:00) (100/59 - 121/75)  BP(mean): --  RR: 18 (31 Dec 2018 19:46) (18 - 18)  SpO2: 94% (01 Jan 2019 05:00) (94% - 100%)    PE  Constitutional: Morbidly obese patient (BMI 59.5) resting comfortably in bed, in no acute distress  HEENT: EOMI / PERRL b/l   Neck: No JVD, full ROM without pain  Respiratory: CTAB with unlabored respirations with no accessory muscle use and no conversational dyspnea  Cardiovascular: Normal S1 and S2   Gastrointestinal: Abdomen soft, non-tender, non-distended, no rebound tenderness / guarding   Neurological: GCS: 15 (4/5/6). A&O x 3; no gross sensory / motor / coordination deficits  Psychiatric: Normal mood, normal affect  Musculoskeletal: Right groin with warmth and erythema but no fluctuance - covered now w c/d/i pressure dressing      I&O's Detail    30 Dec 2018 07:01  -  31 Dec 2018 07:00  --------------------------------------------------------  IN:    Oral Fluid: 704 mL    Solution: 150 mL  Total IN: 854 mL    OUT:    Voided: 472 mL  Total OUT: 472 mL    Total NET: 382 mL      31 Dec 2018 07:01  -  01 Jan 2019 05:40  --------------------------------------------------------  IN:    Oral Fluid: 480 mL  Total IN: 480 mL    OUT:  Total OUT: 0 mL    Total NET: 480 mL          LABS:                        14.1   22.7  )-----------( 181      ( 31 Dec 2018 08:15 )             41.4     12-31    129<L>  |  91<L>  |  33.0<H>  ----------------------------<  172<H>  4.0   |  24.0  |  1.06    Ca    8.0<L>      31 Dec 2018 08:15            RADIOLOGY & ADDITIONAL STUDIES:
MAGGIE KESSLERELIANA    [id number]    58y      Male    Patient is a 58y old  Male who presents with a chief complaint of Dyspnea (28 Dec 2018 08:13)      INTERVAL HPI/OVERNIGHT EVENTS:    Patient is feeling better compare to yesterday, he has been urinating alot, he has no chest pain, dizziness, fever, chills, chest pain, nausea, vomiting.     REVIEW OF SYSTEMS:    CONSTITUTIONAL: No fever, some fatigue  RESPIRATORY: No cough, improving shortness of breath  CARDIOVASCULAR: No chest pain, palpitations  GASTROINTESTINAL: No abdominal, No nausea, vomiting  NEUROLOGICAL: No headaches,  loss of strength.  MISCELLANEOUS: No joint swelling or pain       Vital Signs Last 24 Hrs  T(C): 36.6 (28 Dec 2018 07:46), Max: 37.2 (27 Dec 2018 16:09)  T(F): 97.8 (28 Dec 2018 07:46), Max: 99 (27 Dec 2018 16:09)  HR: 100 (28 Dec 2018 11:05) (79 - 109)  BP: 119/71 (28 Dec 2018 11:05) (91/55 - 160/73)  BP(mean): 74 (28 Dec 2018 01:36) (74 - 74)  RR: 18 (28 Dec 2018 07:46) (18 - 22)  SpO2: 95% (28 Dec 2018 10:28) (94% - 99%)    PHYSICAL EXAM:    GENERAL: Middle age morbidly Obese male looking comfortable   HEENT: PERRL, +EOMI  NECK: soft, Supple, No JVD,   CHEST/LUNG: Decrease air entry bilaterally; No wheezing  HEART: S1S2+, Regular rate and rhythm; No murmurs  ABDOMEN: Soft, Nontender, Nondistended; Bowel sounds present  EXTREMITIES:  2+ Peripheral Pulses, some edema  SKIN: No rashes or lesions  NEURO: AAOX3, no focal deficits, no motor r sensory loss  PSYCH: normal mood      LABS:                        14.8   14.2  )-----------( 207      ( 28 Dec 2018 07:58 )             43.6     12-28    136  |  99  |  18.0  ----------------------------<  176<H>  4.3   |  22.0  |  0.85    Ca    8.5<L>      28 Dec 2018 07:58    TPro  7.4  /  Alb  4.3  /  TBili  0.8  /  DBili  x   /  AST  17  /  ALT  13  /  AlkPhos  41  12-27    PT/INR - ( 27 Dec 2018 18:50 )   PT: 18.0 sec;   INR: 1.54 ratio         PTT - ( 27 Dec 2018 18:50 )  PTT:33.9 sec        I&O's Summary      MEDICATIONS  (STANDING):  carvedilol Oral Tab/Cap - Peds 6.25 milliGRAM(s) Oral two times a day  furosemide   Injectable 40 milliGRAM(s) IV Push every 12 hours  influenza   Vaccine 0.5 milliLiter(s) IntraMuscular once  predniSONE   Tablet 5 milliGRAM(s) Oral daily  rivaroxaban 20 milliGRAM(s) Oral every 24 hours  trimethoprim  160 mG/sulfamethoxazole 800 mG 1 Tablet(s) Oral two times a day    MEDICATIONS  (PRN):  levalbuterol for Nebulization - Peds 1.25 milliGRAM(s) Nebulizer three times a day PRN SOB
Patient is a 58y old  Male who presents with a chief complaint of Dyspnea (01 Jan 2019 12:44) sob improved but R groin continues to bleed       HEALTH ISSUES - PROBLEM Dx:  Prophylactic measure: Prophylactic measure  Leukocytosis: Leukocytosis  Tobacco dependence due to cigarettes: Tobacco dependence due to cigarettes  ROCK (obstructive sleep apnea): ROCK (obstructive sleep apnea)  COPD (chronic obstructive pulmonary disease): COPD (chronic obstructive pulmonary disease)  Afib: Afib  Acute congestive heart failure, unspecified heart failure type: Acute congestive heart failure, unspecified heart failure type      INTERVAL HPI/OVERNIGHT EVENTS:  Patient seen and examined at bedside. No acute events overnight. Patient states he is feeling much better, sob at baseline for the patient. Patient also reports he would like to go home. D/w him that will d/w ID and SX bc of the groin abscess that was I& D would need clearance from both for discharge. After d/w SX team would prefer to monitor patient for additional 24 hours bc the patient was bleeding from the site this am. Possible pt may have to go to the or. Patient understands and agrees with plan of care. Patient denies chest pain, SOB, abd pain, N/V, fever, chills, dysuria or any other complaints. All remainder ROS negative.       Vital Signs Last 24 Hrs  T(C): 36.7 (01 Jan 2019 05:00), Max: 36.8 (31 Dec 2018 16:09)  T(F): 98 (01 Jan 2019 05:00), Max: 98.3 (31 Dec 2018 19:46)  HR: 90 (01 Jan 2019 09:46) (54 - 105)  BP: 120/64 (01 Jan 2019 05:00) (100/59 - 121/75)  BP(mean): --  RR: 18 (31 Dec 2018 19:46) (18 - 18)  SpO2: 94% (01 Jan 2019 09:46) (94% - 100%)      I&O's Summary    31 Dec 2018 07:01  -  01 Jan 2019 07:00  --------------------------------------------------------  IN: 480 mL / OUT: 0 mL / NET: 480 mL      CONSTITUTIONAL: Obese well appearing, well nourished, awake, alert and in no distress   CARDIAC: Normal rate, regular rhythm.  Heart sounds S1, S2.  No murmurs, rubs or gallops   RESPIRATORY: Fair air entry, no WRR   GASTROENTEROLOGY: Soft nt nd bs + normoactive   EXTREMITIES: No edema, cyanosis or deformity   NEUROLOGICAL: Alert and oriented, no focal deficits, no motor or sensory deficits.  SKIN: R groin with erythema/ warmth and induration around dressing C/D/I minimal improvement, skin turgor wnl       MEDICATIONS  (STANDING):  ALBUTerol/ipratropium for Nebulization 3 milliLiter(s) Nebulizer every 6 hours  carvedilol 6.25 milliGRAM(s) Oral every 12 hours  furosemide    Tablet 40 milliGRAM(s) Oral daily  levoFLOXacin  Tablet 500 milliGRAM(s) Oral every 24 hours  lidocaine   Patch 1 Patch Transdermal daily  rivaroxaban 20 milliGRAM(s) Oral every 24 hours  trimethoprim  160 mG/sulfamethoxazole 800 mG 1 Tablet(s) Oral two times a day    MEDICATIONS  (PRN):  acetaminophen   Tablet .. 650 milliGRAM(s) Oral every 6 hours PRN Temp greater or equal to 38C (100.4F), Mild Pain (1 - 3)  ALPRAZolam 0.25 milliGRAM(s) Oral every 12 hours PRN anxiety  benzocaine 15 mG/menthol 3.6 mG Lozenge 1 Lozenge Oral every 8 hours PRN Sore Throat  celecoxib 100 milliGRAM(s) Oral every 12 hours PRN Moderate Pain (4 - 6)  guaiFENesin    Syrup 100 milliGRAM(s) Oral every 6 hours PRN Cough  nicotine  Polacrilex Gum 4 milliGRAM(s) Oral every 3 hours PRN smoking cessation  ondansetron Injectable 4 milliGRAM(s) IV Push every 8 hours PRN Nausea and/or Vomiting      LABS:                        14.1   22.7  )-----------( 181      ( 31 Dec 2018 08:15 )             41.4     01-01    132<L>  |  93<L>  |  24.0<H>  ----------------------------<  148<H>  4.3   |  26.0  |  0.77    Ca    8.2<L>      01 Jan 2019 09:11        RADIOLOGY & ADDITIONAL TESTS:  No new imaging studies
Patient is a 58y old  Male who presents with a chief complaint of Dyspnea (29 Dec 2018 20:21) continues with worsening sob       HEALTH ISSUES - PROBLEM Dx:  Prophylactic measure: Prophylactic measure  Leukocytosis: Leukocytosis  Tobacco dependence due to cigarettes: Tobacco dependence due to cigarettes  ROCK (obstructive sleep apnea): ROCK (obstructive sleep apnea)  COPD (chronic obstructive pulmonary disease): COPD (chronic obstructive pulmonary disease)  Afib: Afib  Acute congestive heart failure, unspecified heart failure type: Acute congestive heart failure, unspecified heart failure type      INTERVAL HPI/OVERNIGHT EVENTS:  Patient seen and examined at bedside. No acute events overnight. Patient states his sob progressed today, he also feels acute on chronic worsening of lower back pain, for which he was given percocet yest and now he vomitted x 2 NB NB. D/w him to avoid percocet since he is having an adverse reaction to it. Understands this and is in agreement with taking celebrex as he does at home. Pt has been having cough with whitish phlegm. Wife at bedside and reports she feels her  continues to not do well. also concerned about R groin swelling which has persisted and not gotten better on po abx. D.w them both will adjust medications to IV abx for infection. Also discussed that pt need IV steroids to help with his breathing. Both in agreement with the plan of care. Patient denies chest pain, abd pain, N/V, fever, chills, dysuria or any other complaints. All remainder ROS negative.     Vital Signs Last 24 Hrs  T(C): 37.1 (29 Dec 2018 20:19), Max: 37.6 (29 Dec 2018 06:03)  T(F): 98.8 (29 Dec 2018 20:19), Max: 99.6 (29 Dec 2018 06:03)  HR: 110 (29 Dec 2018 20:19) (66 - 123)  BP: 118/74 (29 Dec 2018 20:19) (109/73 - 122/80)  BP(mean): --  RR: 18 (29 Dec 2018 20:19) (18 - 84)  SpO2: 97% (29 Dec 2018 20:19) (93% - 98%)      I&O's Summary    28 Dec 2018 07:01  -  29 Dec 2018 07:00  --------------------------------------------------------  IN: 0 mL / OUT: 625 mL / NET: -625 mL    29 Dec 2018 07:01  -  29 Dec 2018 21:31  --------------------------------------------------------  IN: 250 mL / OUT: 0 mL / NET: 250 mL        CONSTITUTIONAL: Obese ill appearing, well nourished, awake, alert and in mild resp distress   CARDIAC: Normal rate, regular rhythm.  Heart sounds S1, S2.  No murmurs, rubs or gallops   RESPIRATORY: Coarse bs b/l exp wheezing   GASTROENTEROLOGY: Soft nt nd bs + normoactive   EXTREMITIES: No edema, cyanosis or deformity   NEUROLOGICAL: Alert and oriented, no focal deficits, no motor or sensory deficits.  SKIN: R groin with erythema/ warmth and induration, skin turgor wnl     MEDICATIONS  (STANDING):  ALBUTerol/ipratropium for Nebulization 3 milliLiter(s) Nebulizer every 6 hours  carvedilol 6.25 milliGRAM(s) Oral every 12 hours  furosemide   Injectable 40 milliGRAM(s) IV Push every 12 hours  influenza   Vaccine 0.5 milliLiter(s) IntraMuscular once  methylPREDNISolone sodium succinate Injectable 40 milliGRAM(s) IV Push every 8 hours  rivaroxaban 20 milliGRAM(s) Oral every 24 hours  vancomycin  IVPB 1000 milliGRAM(s) IV Intermittent every 8 hours    MEDICATIONS  (PRN):  acetaminophen   Tablet .. 650 milliGRAM(s) Oral every 6 hours PRN Temp greater or equal to 38C (100.4F), Mild Pain (1 - 3)  benzocaine 15 mG/menthol 3.6 mG Lozenge 1 Lozenge Oral every 8 hours PRN Sore Throat  celecoxib 100 milliGRAM(s) Oral every 12 hours PRN Moderate Pain (4 - 6)  guaiFENesin    Syrup 100 milliGRAM(s) Oral every 6 hours PRN Cough  ondansetron Injectable 4 milliGRAM(s) IV Push every 8 hours PRN Nausea and/or Vomiting      LABS:                        15.0   28.3  )-----------( 199      ( 29 Dec 2018 14:21 )             43.7     12-29    130<L>  |  90<L>  |  36.0<H>  ----------------------------<  140<H>  4.0   |  22.0  |  1.52<H>    Ca    8.2<L>      29 Dec 2018 14:21        RADIOLOGY & ADDITIONAL TESTS:  No new imaging studies
Patient is a 58y old  Male who presents with a chief complaint of Dyspnea (30 Dec 2018 09:46) sob improving/ cellulitis remains unchanged       HEALTH ISSUES - PROBLEM Dx:  Prophylactic measure: Prophylactic measure  Leukocytosis: Leukocytosis  Tobacco dependence due to cigarettes: Tobacco dependence due to cigarettes  ROCK (obstructive sleep apnea): ROCK (obstructive sleep apnea)  COPD (chronic obstructive pulmonary disease): COPD (chronic obstructive pulmonary disease)  Afib: Afib  Acute congestive heart failure, unspecified heart failure type: Acute congestive heart failure, unspecified heart failure type      INTERVAL HPI/OVERNIGHT EVENTS:  Patient seen and examined at bedside. No acute events overnight. Patient states he is feeling better, sob has signficantly improved. States he has been urinating a lot. Wife at bedside and reports pt has been doing much better since yest. Still continues to report that R thigh cellulitis remains unchanged. Reports sgnficant pain due to swelling. D/w them both will perform US to ensure no collection or abscess present. They both understand the plan of care. Patient denies chest pain, SOB, abd pain, N/V, fever, chills, dysuria or any other complaints. All remainder ROS negative.     Vital Signs Last 24 Hrs  T(C): 36.7 (30 Dec 2018 05:06), Max: 37.1 (29 Dec 2018 20:19)  T(F): 98 (30 Dec 2018 05:06), Max: 98.8 (29 Dec 2018 20:19)  HR: 93 (30 Dec 2018 05:42) (70 - 123)  BP: 118/84 (30 Dec 2018 05:06) (109/73 - 118/84)  BP(mean): --  RR: 18 (29 Dec 2018 20:19) (18 - 18)  SpO2: 97% (30 Dec 2018 05:42) (97% - 99%)    I&O's Summary    29 Dec 2018 07:01  -  30 Dec 2018 07:00  --------------------------------------------------------  IN: 250 mL / OUT: 1000 mL / NET: -750 mL      CONSTITUTIONAL: Obese well appearing, well nourished, awake, alert and in no distress   CARDIAC: Normal rate, regular rhythm.  Heart sounds S1, S2.  No murmurs, rubs or gallops   RESPIRATORY: Coarse bs b/l exp wheezing   GASTROENTEROLOGY: Soft nt nd bs + normoactive   EXTREMITIES: No edema, cyanosis or deformity   NEUROLOGICAL: Alert and oriented, no focal deficits, no motor or sensory deficits.  SKIN: R groin with erythema/ warmth and induration minimal improvement, skin turgor wnl     MEDICATIONS  (STANDING):  ALBUTerol/ipratropium for Nebulization 3 milliLiter(s) Nebulizer every 6 hours  carvedilol 6.25 milliGRAM(s) Oral every 12 hours  furosemide   Injectable 40 milliGRAM(s) IV Push every 12 hours  influenza   Vaccine 0.5 milliLiter(s) IntraMuscular once  methylPREDNISolone sodium succinate Injectable 40 milliGRAM(s) IV Push every 8 hours  rivaroxaban 20 milliGRAM(s) Oral every 24 hours  vancomycin  IVPB 1000 milliGRAM(s) IV Intermittent every 8 hours    MEDICATIONS  (PRN):  acetaminophen   Tablet .. 650 milliGRAM(s) Oral every 6 hours PRN Temp greater or equal to 38C (100.4F), Mild Pain (1 - 3)  ALPRAZolam 0.25 milliGRAM(s) Oral every 12 hours PRN anxiety  benzocaine 15 mG/menthol 3.6 mG Lozenge 1 Lozenge Oral every 8 hours PRN Sore Throat  celecoxib 100 milliGRAM(s) Oral every 12 hours PRN Moderate Pain (4 - 6)  guaiFENesin    Syrup 100 milliGRAM(s) Oral every 6 hours PRN Cough  nicotine  Polacrilex Gum 4 milliGRAM(s) Oral every 3 hours PRN smoking cessation  ondansetron Injectable 4 milliGRAM(s) IV Push every 8 hours PRN Nausea and/or Vomiting      LABS:                        15.1   20.4  )-----------( 182      ( 30 Dec 2018 08:28 )             44.1     12-30    132<L>  |  90<L>  |  35.0<H>  ----------------------------<  152<H>  4.1   |  27.0  |  1.16    Ca    8.6      30 Dec 2018 08:28        RADIOLOGY & ADDITIONAL TESTS:  No new imaging studies
Patient is a 58y old  Male who presents with a chief complaint of Dyspnea (31 Dec 2018 15:30)      HEALTH ISSUES - PROBLEM Dx:  Prophylactic measure: Prophylactic measure  Leukocytosis: Leukocytosis  Tobacco dependence due to cigarettes: Tobacco dependence due to cigarettes  ROCK (obstructive sleep apnea): ROCK (obstructive sleep apnea)  COPD (chronic obstructive pulmonary disease): COPD (chronic obstructive pulmonary disease)  Afib: Afib  Acute congestive heart failure, unspecified heart failure type: Acute congestive heart failure, unspecified heart failure type      INTERVAL HPI/OVERNIGHT EVENTS:  Patient seen and examined at bedside. No acute events overnight. Patient states he is feeling much better, sob and breathing is at baseline. Pt wants to go home but d/w him that his cellulitis is mildly improving would want to see further progress prior to discharge. He understands this and is in agreement with the plan of care. Patient denies chest pain, SOB, abd pain, N/V, fever, chills, dysuria or any other complaints. All remainder ROS negative.     Vital Signs Last 24 Hrs  T(C): 36.8 (31 Dec 2018 16:09), Max: 37.3 (31 Dec 2018 04:40)  T(F): 98.2 (31 Dec 2018 16:09), Max: 99.1 (31 Dec 2018 04:40)  HR: 105 (31 Dec 2018 16:09) (68 - 110)  BP: 121/75 (31 Dec 2018 16:09) (90/57 - 121/75)  BP(mean): --  RR: 18 (31 Dec 2018 12:08) (18 - 18)  SpO2: 96% (31 Dec 2018 12:08) (94% - 99%)      I&O's Summary    30 Dec 2018 07:01  -  31 Dec 2018 07:00  --------------------------------------------------------  IN: 854 mL / OUT: 472 mL / NET: 382 mL    31 Dec 2018 07:01  -  31 Dec 2018 16:39  --------------------------------------------------------  IN: 480 mL / OUT: 0 mL / NET: 480 mL        CONSTITUTIONAL: Obese well appearing, well nourished, awake, alert and in no distress   CARDIAC: Normal rate, regular rhythm.  Heart sounds S1, S2.  No murmurs, rubs or gallops   RESPIRATORY: Fair air entry, no WRR   GASTROENTEROLOGY: Soft nt nd bs + normoactive   EXTREMITIES: No edema, cyanosis or deformity   NEUROLOGICAL: Alert and oriented, no focal deficits, no motor or sensory deficits.  SKIN: R groin with erythema/ warmth and induration around dressing C/D/I minimal improvement, skin turgor wnl     MEDICATIONS  (STANDING):  ALBUTerol/ipratropium for Nebulization 3 milliLiter(s) Nebulizer every 6 hours  carvedilol 6.25 milliGRAM(s) Oral every 12 hours  ertapenem  IVPB 1000 milliGRAM(s) IV Intermittent every 24 hours  ertapenem  IVPB      influenza   Vaccine 0.5 milliLiter(s) IntraMuscular once  lidocaine   Patch 1 Patch Transdermal daily  methylPREDNISolone sodium succinate Injectable 40 milliGRAM(s) IV Push every 12 hours  rivaroxaban 20 milliGRAM(s) Oral every 24 hours  vancomycin  IVPB 1000 milliGRAM(s) IV Intermittent every 8 hours    MEDICATIONS  (PRN):  acetaminophen   Tablet .. 650 milliGRAM(s) Oral every 6 hours PRN Temp greater or equal to 38C (100.4F), Mild Pain (1 - 3)  ALPRAZolam 0.25 milliGRAM(s) Oral every 12 hours PRN anxiety  benzocaine 15 mG/menthol 3.6 mG Lozenge 1 Lozenge Oral every 8 hours PRN Sore Throat  celecoxib 100 milliGRAM(s) Oral every 12 hours PRN Moderate Pain (4 - 6)  guaiFENesin    Syrup 100 milliGRAM(s) Oral every 6 hours PRN Cough  nicotine  Polacrilex Gum 4 milliGRAM(s) Oral every 3 hours PRN smoking cessation  ondansetron Injectable 4 milliGRAM(s) IV Push every 8 hours PRN Nausea and/or Vomiting      LABS:                        14.1   22.7  )-----------( 181      ( 31 Dec 2018 08:15 )             41.4     12-31    129<L>  |  91<L>  |  33.0<H>  ----------------------------<  172<H>  4.0   |  24.0  |  1.06    Ca    8.0<L>      31 Dec 2018 08:15        RADIOLOGY & ADDITIONAL TESTS:  No new imaging studies
SUBJECTIVE: MALIKA overnight. Currently NPO, denies nausea/vomiting. Pain is well controlled. States he didn't bleed from his groin wound overnight.       MEDICATIONS  (STANDING):  ALBUTerol/ipratropium for Nebulization 3 milliLiter(s) Nebulizer every 6 hours  carvedilol 6.25 milliGRAM(s) Oral every 12 hours  ertapenem  IVPB 1000 milliGRAM(s) IV Intermittent every 24 hours  furosemide    Tablet 40 milliGRAM(s) Oral daily  lidocaine   Patch 1 Patch Transdermal daily  predniSONE   Tablet 40 milliGRAM(s) Oral daily  rivaroxaban 20 milliGRAM(s) Oral every 24 hours  vancomycin  IVPB      vancomycin  IVPB 1250 milliGRAM(s) IV Intermittent every 8 hours    MEDICATIONS  (PRN):  acetaminophen   Tablet .. 650 milliGRAM(s) Oral every 6 hours PRN Temp greater or equal to 38C (100.4F), Mild Pain (1 - 3)  ALPRAZolam 0.25 milliGRAM(s) Oral every 12 hours PRN anxiety  benzocaine 15 mG/menthol 3.6 mG Lozenge 1 Lozenge Oral every 8 hours PRN Sore Throat  celecoxib 100 milliGRAM(s) Oral every 12 hours PRN Moderate Pain (4 - 6)  guaiFENesin    Syrup 100 milliGRAM(s) Oral every 6 hours PRN Cough  nicotine  Polacrilex Gum 4 milliGRAM(s) Oral every 3 hours PRN smoking cessation  ondansetron Injectable 4 milliGRAM(s) IV Push every 8 hours PRN Nausea and/or Vomiting      Vital Signs Last 24 Hrs  T(C): 36.7 (01 Jan 2019 20:14), Max: 36.9 (01 Jan 2019 12:02)  T(F): 98 (01 Jan 2019 20:14), Max: 98.5 (01 Jan 2019 12:02)  HR: 112 (01 Jan 2019 21:13) (71 - 113)  BP: 123/76 (01 Jan 2019 20:14) (123/76 - 124/63)  BP(mean): --  RR: 18 (01 Jan 2019 20:14) (18 - 18)  SpO2: 97% (02 Jan 2019 04:46) (94% - 99%)    PE  Constitutional: Morbidly obese patient (BMI 59.5) resting comfortably in bed, in no acute distress  HEENT: EOMI / PERRL b/l   Neck: No JVD, full ROM without pain  Respiratory: CTAB with unlabored respirations with no accessory muscle use and no conversational dyspnea  Cardiovascular: Normal S1 and S2   Gastrointestinal: Abdomen soft, non-tender, non-distended, no rebound tenderness / guarding   Neurological: GCS: 15 (4/5/6). A&O x 3; no gross sensory / motor / coordination deficits  Psychiatric: Normal mood, normal affect  Musculoskeletal: Right groin with warmth and erythema but no fluctuance - covered now w minimally saturated pressure dressing      I&O's Detail    31 Dec 2018 07:01  -  01 Jan 2019 07:00  --------------------------------------------------------  IN:    Oral Fluid: 480 mL  Total IN: 480 mL    OUT:  Total OUT: 0 mL    Total NET: 480 mL          LABS:                        14.0   20.7  )-----------( 197      ( 01 Jan 2019 14:53 )             41.8     01-01    132<L>  |  93<L>  |  24.0<H>  ----------------------------<  148<H>  4.3   |  26.0  |  0.77    Ca    8.2<L>      01 Jan 2019 09:11            RADIOLOGY & ADDITIONAL STUDIES:
[facility]  INFECTIOUS DISEASES AND INTERNAL MEDICINE at [place]  =======================================================  Bello García MD  Diplomates American Board of Internal Medicine and Infectious Diseases  =======================================================    [id number]  MAGGIE ELLIOTT     Follow up:   Right upper thigh cellulitis, s/o I&D   No complaint today, afebrile and nontoxic. Breathing has improved significantly.     PAST MEDICAL & SURGICAL HISTORY:  Afib  ROCK (obstructive sleep apnea)  COPD (chronic obstructive pulmonary disease)  No significant past surgical history    Allergies  penicillins (Hives)  shellfish (Pruritus; Rash)    Antibiotics:  ertapenem  IVPB 1000 milliGRAM(s) IV Intermittent every 24 hours  vancomycin  IVPB 1000 milliGRAM(s) IV Intermittent every 8 hours    REVIEW OF SYSTEMS:  CONSTITUTIONAL:  No Fever or chills  HEENT:  No diplopia or blurred vision.  No sore throat or runny nose.  CARDIOVASCULAR:  No chest pain or SOB.  RESPIRATORY:  No cough, shortness of breath, PND or orthopnea.  GASTROINTESTINAL:  No nausea, vomiting or diarrhea.  GENITOURINARY:  No dysuria, frequency or urgency. No Blood in urine  MUSCULOSKELETAL:  no joint aches, no muscle pain  SKIN:  No change in skin, hair or nails.  NEUROLOGIC:  No paresthesias, fasciculations, seizures or weakness.  PSYCHIATRIC:  No disorder of thought or mood.  ENDOCRINE:  No heat or cold intolerance, polyuria or polydipsia.  HEMATOLOGICAL:  No easy bruising or bleeding.     Physical Exam:  Vital Signs Last 24 Hrs  T(C): 36.7 (01 Jan 2019 05:00), Max: 36.8 (31 Dec 2018 16:09)  T(F): 98 (01 Jan 2019 05:00), Max: 98.3 (31 Dec 2018 19:46)  HR: 90 (01 Jan 2019 09:46) (54 - 105)  BP: 120/64 (01 Jan 2019 05:00) (100/59 - 121/75)  RR: 18 (31 Dec 2018 19:46) (18 - 18)  SpO2: 94% (01 Jan 2019 09:46) (94% - 100%)  GEN: on O2 with mild respiratory distress , morbidly obese   HEENT: normocephalic and atraumatic. EOMI. PERRL.    NECK: Supple.  No lymphadenopathy   LUNGS: poor air movement. no wheezing or crackles   HEART: Regular rate and rhythm   ABDOMEN: Soft, nontender, and nondistended.  Positive bowel sounds.    : No CVA tenderness  EXTREMITIES: Right upper thigh with an open wound 5cm with bloody gauze on it, no active discharge, packed with gauze. 5cm induration around the wound   NEUROLOGIC: grossly intact.  PSYCHIATRIC: Appropriate affect .  SKIN: No ulceration or induration present.    Labs:  01-01    132<L>  |  93<L>  |  24.0<H>  ----------------------------<  148<H>  4.3   |  26.0  |  0.77    Ca    8.2<L>      01 Jan 2019 09:11                        14.1   22.7  )-----------( 181      ( 31 Dec 2018 08:15 )             41.4     RECENT CULTURES:  12-27 @ 18:50        NotDetec    All imaging and other data have been reviewed.  FINDINGS:  There is normal compressibility of the bilateral common femoral, femoral   and popliteal veins. No calf vein thrombosis is detected.  Doppler examination shows normal spontaneous and phasic flow.   Additional sonography in the area of concern in the right groin   demonstrates subcutaneous edema and heterogeneity of the subcutaneous fat   . Approximately 2-3 cm deep to the skin there is a mildly complex fluid   collection measuring 5.3 x 1.1 x 3.0 cm in this region.  IMPRESSION:   No evidence of bilateral lower extremity deep venous thrombosis.  In the area of concern in the right groin, there is a 5.3 cm mildly   complex fluid collection in the subcutaneous fat. Infection/abscess is   not excluded.
[facility]  INFECTIOUS DISEASES AND INTERNAL MEDICINE at [place]  =======================================================  Bello García MD  Diplomates American Board of Internal Medicine and Infectious Diseases  =======================================================    [id number]  MAGGIE ELLIOTT     Follow up:   Right upper thigh cellulitis, s/o I&D   No complaint today, afebrile and nontoxic.   Due to bleeding from wound, he was kept in the hospital. Will be checked today again, if no bleeding then will be discharged.     PAST MEDICAL & SURGICAL HISTORY:  Afib  ROCK (obstructive sleep apnea)  COPD (chronic obstructive pulmonary disease)  No significant past surgical history    Allergies  penicillins (Hives)  shellfish (Pruritus; Rash)    Antibiotics:  ertapenem  IVPB 1000 milliGRAM(s) IV Intermittent every 24 hours  vancomycin  IVPB 1000 milliGRAM(s) IV Intermittent every 8 hours    REVIEW OF SYSTEMS:  CONSTITUTIONAL:  No Fever or chills  HEENT:  No diplopia or blurred vision.  No sore throat or runny nose.  CARDIOVASCULAR:  No chest pain or SOB.  RESPIRATORY:  No cough, shortness of breath, PND or orthopnea.  GASTROINTESTINAL:  No nausea, vomiting or diarrhea.  GENITOURINARY:  No dysuria, frequency or urgency. No Blood in urine  MUSCULOSKELETAL:  no joint aches, no muscle pain  SKIN:  No change in skin, hair or nails.  NEUROLOGIC:  No paresthesias, fasciculations, seizures or weakness.  PSYCHIATRIC:  No disorder of thought or mood.  ENDOCRINE:  No heat or cold intolerance, polyuria or polydipsia.  HEMATOLOGICAL:  No easy bruising or bleeding.     Physical Exam:  Vital Signs Last 24 Hrs  T(C): 36.4 (02 Jan 2019 08:30), Max: 36.9 (01 Jan 2019 12:02)  T(F): 97.6 (02 Jan 2019 08:30), Max: 98.5 (01 Jan 2019 12:02)  HR: 100 (02 Jan 2019 08:52) (71 - 115)  BP: 104/59 (02 Jan 2019 08:30) (104/59 - 129/89)  RR: 18 (02 Jan 2019 08:30) (18 - 18)  SpO2: 94% (02 Jan 2019 08:52) (94% - 99%)  GEN: on O2 with mild respiratory distress , morbidly obese   HEENT: normocephalic and atraumatic. EOMI. PERRL.    NECK: Supple.  No lymphadenopathy   LUNGS: poor air movement. no wheezing or crackles   HEART: Regular rate and rhythm   ABDOMEN: Soft, nontender, and nondistended.  Positive bowel sounds.    : No CVA tenderness  EXTREMITIES: Right upper thigh with an open wound 5cm with bloody gauze on it, no active discharge, packed with gauze. 5cm induration around the wound   NEUROLOGIC: grossly intact.  PSYCHIATRIC: Appropriate affect .  SKIN: No ulceration or induration present.    Labs:  01-02    134<L>  |  96<L>  |  26.0<H>  ----------------------------<  149<H>  4.2   |  26.0  |  0.83    Ca    8.2<L>      02 Jan 2019 08:05  Mg     2.5     01-02                        13.9   21.8  )-----------( 206      ( 02 Jan 2019 08:05 )             41.8     RECENT CULTURES:  12-30 @ 15:17 .Blood     No growth at 48 hours      12-30 @ 15:15 .Blood     No growth at 48 hours    12-27 @ 18:50      NotDete    All imaging and other data have been reviewed.  FINDINGS:  There is normal compressibility of the bilateral common femoral, femoral   and popliteal veins. No calf vein thrombosis is detected.  Doppler examination shows normal spontaneous and phasic flow.   Additional sonography in the area of concern in the right groin   demonstrates subcutaneous edema and heterogeneity of the subcutaneous fat   . Approximately 2-3 cm deep to the skin there is a mildly complex fluid   collection measuring 5.3 x 1.1 x 3.0 cm in this region.  IMPRESSION:   No evidence of bilateral lower extremity deep venous thrombosis.  In the area of concern in the right groin, there is a 5.3 cm mildly   complex fluid collection in the subcutaneous fat. Infection/abscess is   not excluded.
INTERVAL HPI/OVERNIGHT EVENTS:    Surgery performed bedside I&D of right groin - no pus expressed, cultures taken, bled heavily. Did not bleed further after wound tightly packed and heavy pressure dressing applied.    SUBJECTIVE:    Not in any pain. Afebrile.    MEDICATIONS  (STANDING):  ALBUTerol/ipratropium for Nebulization 3 milliLiter(s) Nebulizer every 6 hours  carvedilol 6.25 milliGRAM(s) Oral every 12 hours  ertapenem  IVPB 1000 milliGRAM(s) IV Intermittent every 24 hours  ertapenem  IVPB      furosemide   Injectable 40 milliGRAM(s) IV Push every 12 hours  influenza   Vaccine 0.5 milliLiter(s) IntraMuscular once  lidocaine   Patch 1 Patch Transdermal daily  methylPREDNISolone sodium succinate Injectable 40 milliGRAM(s) IV Push every 12 hours  rivaroxaban 20 milliGRAM(s) Oral every 24 hours  vancomycin  IVPB 1000 milliGRAM(s) IV Intermittent every 8 hours    MEDICATIONS  (PRN):  acetaminophen   Tablet .. 650 milliGRAM(s) Oral every 6 hours PRN Temp greater or equal to 38C (100.4F), Mild Pain (1 - 3)  ALPRAZolam 0.25 milliGRAM(s) Oral every 12 hours PRN anxiety  benzocaine 15 mG/menthol 3.6 mG Lozenge 1 Lozenge Oral every 8 hours PRN Sore Throat  celecoxib 100 milliGRAM(s) Oral every 12 hours PRN Moderate Pain (4 - 6)  guaiFENesin    Syrup 100 milliGRAM(s) Oral every 6 hours PRN Cough  nicotine  Polacrilex Gum 4 milliGRAM(s) Oral every 3 hours PRN smoking cessation  ondansetron Injectable 4 milliGRAM(s) IV Push every 8 hours PRN Nausea and/or Vomiting      Vital Signs Last 24 Hrs  T(C): 37.3 (31 Dec 2018 04:40), Max: 37.3 (31 Dec 2018 04:40)  T(F): 99.1 (31 Dec 2018 04:40), Max: 99.1 (31 Dec 2018 04:40)  HR: 100 (31 Dec 2018 04:40) (68 - 117)  BP: 90/57 (31 Dec 2018 04:40) (90/57 - 114/69)  BP(mean): --  RR: 18 (31 Dec 2018 04:40) (18 - 18)  SpO2: 95% (31 Dec 2018 04:40) (94% - 99%)    PE  Constitutional: Morbidly obese patient (BMI 59.5) resting comfortably in bed, in no acute distress  HEENT: EOMI / PERRL b/l   Neck: No JVD, full ROM without pain  Respiratory: CTAB with unlabored respirations with no accessory muscle use and no conversational dyspnea  Cardiovascular: Normal S1 and S2   Gastrointestinal: Abdomen soft, non-tender, non-distended, no rebound tenderness / guarding   Neurological: GCS: 15 (4/5/6). A&O x 3; no gross sensory / motor / coordination deficits  Psychiatric: Normal mood, normal affect  Musculoskeletal: Right groin with warmth and erythema but no fluctuance - covered now w c/d/i pressure dressing    I&O's Detail    30 Dec 2018 07:01  -  31 Dec 2018 07:00  --------------------------------------------------------  IN:    Oral Fluid: 704 mL    Solution: 150 mL  Total IN: 854 mL    OUT:    Voided: 472 mL  Total OUT: 472 mL    Total NET: 382 mL          LABS:                        15.1   20.4  )-----------( 182      ( 30 Dec 2018 08:28 )             44.1     12-30    132<L>  |  90<L>  |  35.0<H>  ----------------------------<  152<H>  4.1   |  27.0  |  1.16    Ca    8.6      30 Dec 2018 08:28

## 2019-01-02 NOTE — DISCHARGE NOTE ADULT - PROVIDER TOKENS
TOKEN:'[id number]:'[id number]:'[id number]' TOKEN:'[id number]:'[id number]'

## 2019-01-02 NOTE — DISCHARGE NOTE ADULT - PATIENT PORTAL LINK FT
You can access the [facility] Patient Portal, offered by [facility], by registering with the following website: [email or web address]

## 2019-01-04 LAB
CULTURE RESULTS: SIGNIFICANT CHANGE UP
CULTURE RESULTS: SIGNIFICANT CHANGE UP
SPECIMEN SOURCE: SIGNIFICANT CHANGE UP
SPECIMEN SOURCE: SIGNIFICANT CHANGE UP

## 2019-01-07 ENCOUNTER — INPATIENT (INPATIENT)
Facility: HOSPITAL | Age: 59
LOS: 7 days | Discharge: HOME | DRG: 603 | End: 2019-01-15
Attending: HOSPITALIST | Admitting: STUDENT IN AN ORGANIZED HEALTH CARE EDUCATION/TRAINING PROGRAM
Payer: COMMERCIAL

## 2019-01-07 VITALS
SYSTOLIC BLOOD PRESSURE: 129 MMHG | WEIGHT: 315 LBS | DIASTOLIC BLOOD PRESSURE: 83 MMHG | OXYGEN SATURATION: 96 % | HEIGHT: 66 IN | HEART RATE: 120 BPM | RESPIRATION RATE: 20 BRPM | TEMPERATURE: 98 F

## 2019-01-07 LAB
ALBUMIN SERPL ELPH-MCNC: 3.2 G/DL — LOW (ref 3.3–5.2)
ALP SERPL-CCNC: 44 U/L — SIGNIFICANT CHANGE UP (ref 40–120)
ALT FLD-CCNC: 33 U/L — SIGNIFICANT CHANGE UP
ANION GAP SERPL CALC-SCNC: 15 MMOL/L — SIGNIFICANT CHANGE UP (ref 5–17)
ANISOCYTOSIS BLD QL: SLIGHT — SIGNIFICANT CHANGE UP
AST SERPL-CCNC: 21 U/L — SIGNIFICANT CHANGE UP
BILIRUB SERPL-MCNC: 0.4 MG/DL — SIGNIFICANT CHANGE UP (ref 0.4–2)
BUN SERPL-MCNC: 22 MG/DL — HIGH (ref 8–20)
CALCIUM SERPL-MCNC: 8.4 MG/DL — LOW (ref 8.6–10.2)
CHLORIDE SERPL-SCNC: 101 MMOL/L — SIGNIFICANT CHANGE UP (ref 98–107)
CO2 SERPL-SCNC: 25 MMOL/L — SIGNIFICANT CHANGE UP (ref 22–29)
CREAT SERPL-MCNC: 0.8 MG/DL — SIGNIFICANT CHANGE UP (ref 0.5–1.3)
GLUCOSE SERPL-MCNC: 111 MG/DL — SIGNIFICANT CHANGE UP (ref 70–115)
HCT VFR BLD CALC: 40.9 % — LOW (ref 42–52)
HGB BLD-MCNC: 13.5 G/DL — LOW (ref 14–18)
LACTATE BLDV-MCNC: 1.3 MMOL/L — SIGNIFICANT CHANGE UP (ref 0.5–2)
LYMPHOCYTES # BLD AUTO: 19 % — LOW (ref 20–55)
MACROCYTES BLD QL: SLIGHT — SIGNIFICANT CHANGE UP
MCHC RBC-ENTMCNC: 30.8 PG — SIGNIFICANT CHANGE UP (ref 27–31)
MCHC RBC-ENTMCNC: 33 G/DL — SIGNIFICANT CHANGE UP (ref 32–36)
MCV RBC AUTO: 93.4 FL — SIGNIFICANT CHANGE UP (ref 80–94)
MICROCYTES BLD QL: SLIGHT — SIGNIFICANT CHANGE UP
MONOCYTES NFR BLD AUTO: 8 % — SIGNIFICANT CHANGE UP (ref 3–10)
NEUTROPHILS NFR BLD AUTO: 73 % — SIGNIFICANT CHANGE UP (ref 37–73)
NT-PROBNP SERPL-SCNC: 2430 PG/ML — HIGH (ref 0–300)
OVALOCYTES BLD QL SMEAR: SLIGHT — SIGNIFICANT CHANGE UP
PLAT MORPH BLD: NORMAL — SIGNIFICANT CHANGE UP
PLATELET # BLD AUTO: 279 K/UL — SIGNIFICANT CHANGE UP (ref 150–400)
POIKILOCYTOSIS BLD QL AUTO: SLIGHT — SIGNIFICANT CHANGE UP
POTASSIUM SERPL-MCNC: 4.1 MMOL/L — SIGNIFICANT CHANGE UP (ref 3.5–5.3)
POTASSIUM SERPL-SCNC: 4.1 MMOL/L — SIGNIFICANT CHANGE UP (ref 3.5–5.3)
PROT SERPL-MCNC: 6.4 G/DL — LOW (ref 6.6–8.7)
RBC # BLD: 4.38 M/UL — LOW (ref 4.6–6.2)
RBC # FLD: 14 % — SIGNIFICANT CHANGE UP (ref 11–15.6)
RBC BLD AUTO: ABNORMAL
SODIUM SERPL-SCNC: 141 MMOL/L — SIGNIFICANT CHANGE UP (ref 135–145)
WBC # BLD: 18.8 K/UL — HIGH (ref 4.8–10.8)
WBC # FLD AUTO: 18.8 K/UL — HIGH (ref 4.8–10.8)

## 2019-01-07 PROCEDURE — 93010 ELECTROCARDIOGRAM REPORT: CPT

## 2019-01-07 PROCEDURE — 71045 X-RAY EXAM CHEST 1 VIEW: CPT | Mod: 26

## 2019-01-07 PROCEDURE — 93971 EXTREMITY STUDY: CPT | Mod: 26,RT

## 2019-01-07 PROCEDURE — 99285 EMERGENCY DEPT VISIT HI MDM: CPT

## 2019-01-07 RX ORDER — ALBUTEROL 90 UG/1
2.5 AEROSOL, METERED ORAL ONCE
Qty: 0 | Refills: 0 | Status: COMPLETED | OUTPATIENT
Start: 2019-01-07 | End: 2019-01-07

## 2019-01-07 RX ORDER — RIVAROXABAN 15 MG-20MG
20 KIT ORAL ONCE
Qty: 0 | Refills: 0 | Status: COMPLETED | OUTPATIENT
Start: 2019-01-07 | End: 2019-01-07

## 2019-01-07 RX ORDER — CARVEDILOL PHOSPHATE 80 MG/1
6.25 CAPSULE, EXTENDED RELEASE ORAL ONCE
Qty: 0 | Refills: 0 | Status: COMPLETED | OUTPATIENT
Start: 2019-01-07 | End: 2019-01-07

## 2019-01-07 RX ORDER — VANCOMYCIN HCL 1 G
2000 VIAL (EA) INTRAVENOUS ONCE
Qty: 0 | Refills: 0 | Status: COMPLETED | OUTPATIENT
Start: 2019-01-07 | End: 2019-01-07

## 2019-01-07 RX ORDER — ACETAMINOPHEN 500 MG
975 TABLET ORAL ONCE
Qty: 0 | Refills: 0 | Status: COMPLETED | OUTPATIENT
Start: 2019-01-07 | End: 2019-01-07

## 2019-01-07 RX ORDER — MORPHINE SULFATE 50 MG/1
4 CAPSULE, EXTENDED RELEASE ORAL ONCE
Qty: 0 | Refills: 0 | Status: DISCONTINUED | OUTPATIENT
Start: 2019-01-07 | End: 2019-01-07

## 2019-01-07 RX ORDER — SODIUM CHLORIDE 9 MG/ML
3 INJECTION INTRAMUSCULAR; INTRAVENOUS; SUBCUTANEOUS ONCE
Qty: 0 | Refills: 0 | Status: COMPLETED | OUTPATIENT
Start: 2019-01-07 | End: 2019-01-07

## 2019-01-07 RX ADMIN — SODIUM CHLORIDE 3 MILLILITER(S): 9 INJECTION INTRAMUSCULAR; INTRAVENOUS; SUBCUTANEOUS at 19:23

## 2019-01-07 RX ADMIN — ALBUTEROL 2.5 MILLIGRAM(S): 90 AEROSOL, METERED ORAL at 23:25

## 2019-01-07 RX ADMIN — MORPHINE SULFATE 4 MILLIGRAM(S): 50 CAPSULE, EXTENDED RELEASE ORAL at 19:58

## 2019-01-07 RX ADMIN — MORPHINE SULFATE 4 MILLIGRAM(S): 50 CAPSULE, EXTENDED RELEASE ORAL at 21:05

## 2019-01-07 RX ADMIN — MORPHINE SULFATE 4 MILLIGRAM(S): 50 CAPSULE, EXTENDED RELEASE ORAL at 23:25

## 2019-01-07 RX ADMIN — MORPHINE SULFATE 4 MILLIGRAM(S): 50 CAPSULE, EXTENDED RELEASE ORAL at 19:23

## 2019-01-07 RX ADMIN — Medication 250 MILLIGRAM(S): at 21:06

## 2019-01-07 RX ADMIN — Medication 975 MILLIGRAM(S): at 19:47

## 2019-01-07 RX ADMIN — CARVEDILOL PHOSPHATE 6.25 MILLIGRAM(S): 80 CAPSULE, EXTENDED RELEASE ORAL at 19:23

## 2019-01-07 RX ADMIN — Medication 975 MILLIGRAM(S): at 19:22

## 2019-01-07 RX ADMIN — RIVAROXABAN 20 MILLIGRAM(S): KIT at 19:23

## 2019-01-07 NOTE — ED ADULT NURSE REASSESSMENT NOTE - NS ED NURSE REASSESS COMMENT FT1
Pt given 2nd dose of morphine for pain. Pt was yelling and screaming in Ultrasound and in the hallway, ultrasound tech wheeled patient back to his room. Spoke with patient about yelling and screaming. Dr Rivera made aware, awaiting admission orders.

## 2019-01-07 NOTE — ED ADULT NURSE NOTE - NSIMPLEMENTINTERV_GEN_ALL_ED
Implemented All Universal Safety Interventions:  Preemption to call system. Call bell, personal items and telephone within reach. Instruct patient to call for assistance. Room bathroom lighting operational. Non-slip footwear when patient is off stretcher. Physically safe environment: no spills, clutter or unnecessary equipment. Stretcher in lowest position, wheels locked, appropriate side rails in place.

## 2019-01-07 NOTE — ED STATDOCS - PROGRESS NOTE DETAILS
59 y/o M pt with PMHx of COPD and CHF (1 week ago), presents to ED c/o worsening infection to right upper leg that onset 2 week ago. Associated sx of purulent drainage, redness, pain, and swelling to entire RLE. Pt notes he was admitted to [facility] 1 week ago, dx with CHF. He notes the infection persisted while he was in the hospital, and has worsened since. Pt's wife has been cleaning and dressing the infection, however, no improvement. Pt was prescribed Bactrim and Levaquin (500mg) 4 days ago, with no provided relief. Pt contacted Dr. Vargas, infectious disease doctor, and was referred to ED for further evaluation. Allergic to Penicillin. Denies fevers, chills.  Pt likely has wound infection.  Will be sent to Main ED - EKG @ 15:22 - shows atrial fib (pt known to have Afib)

## 2019-01-07 NOTE — ED PROVIDER NOTE - MEDICAL DECISION MAKING DETAILS
Pt with R leg cellulitis worsening on oral abx.  will check labs, doppler, and admit to medicine for further management.

## 2019-01-07 NOTE — ED PROVIDER NOTE - NS ED ROS FT
No fever/chills, No photophobia/eye pain/changes in vision, No ear pain/sore throat/dysphagia, No chest pain/palpitations, no SOB/cough/wheeze/stridor, No abdominal pain, No N/V/D, no dysuria/frequency/discharge, No neck/back pain, no rash, no changes in neurological status/function.  B/L LE edema. R groin pain.

## 2019-01-07 NOTE — ED ADULT TRIAGE NOTE - CHIEF COMPLAINT QUOTE
Patient arrived to ED today with c/o worsening infection to his right upper leg.  Patient states he was placed on Levaquin and Bactrim 4 days ago and the infection is worse.  Patient recently admitted to hospital for CHF.  Patient is tachycardic.

## 2019-01-07 NOTE — ED PROVIDER NOTE - OBJECTIVE STATEMENT
Pt is a 59 yo M co R upper leg swelling/erythema.  PMHx significant for htn, copd, chf. Pt states that he was admitted to the hospital last week for chf. While here he had a wound to his R upper leg that was red painful and swollen. Pt states that he was treated with abx and discharged on abx but the area has only become worse and is now having swelling down his leg.  Pt states that the area is very painful. no fever/chills. no cough. no sob. no other complaints.

## 2019-01-07 NOTE — ED PROVIDER NOTE - PHYSICAL EXAMINATION
Constitutional - well-developed; well nourished. Head - NCAT. Airway patent. Eyes - PERRL. CV - irregularly irregular. no murmur. 2+ LE edema. Pulm - CTAB. Abd - soft, nt. no rebound. no guarding. Neuro - A&Ox3. strength 5/5 x4. sensation intact x4. normal gait. Skin - No rash. MSK - normal ROM.  R groin with indurated erythematous area that is ttp and weeping.

## 2019-01-07 NOTE — ED ADULT NURSE NOTE - OBJECTIVE STATEMENT
Pt c/o severe pain  and swelling ot Right leg. Pt was recently hospitalized for cellulitis in that leg and was discharged home on abx. Pt with area of discharge on upper thigh.

## 2019-01-07 NOTE — ED ADULT NURSE REASSESSMENT NOTE - NS ED NURSE REASSESS COMMENT FT1
received pt at this time from ongoing shift. pt c/o increasing pain to RLE and SOB. dr diego aware, will order meds. pt has +diminished lungs with slight wheeze to ausc, +3 BLE edema, + sensation, cap refil < 3, no numbness/tingling. pending admission. updated on plan of care, verbalize understanding. call bell in recach

## 2019-01-08 DIAGNOSIS — L03.90 CELLULITIS, UNSPECIFIED: ICD-10-CM

## 2019-01-08 DIAGNOSIS — J44.9 CHRONIC OBSTRUCTIVE PULMONARY DISEASE, UNSPECIFIED: ICD-10-CM

## 2019-01-08 DIAGNOSIS — I48.2 CHRONIC ATRIAL FIBRILLATION: ICD-10-CM

## 2019-01-08 DIAGNOSIS — G47.33 OBSTRUCTIVE SLEEP APNEA (ADULT) (PEDIATRIC): ICD-10-CM

## 2019-01-08 DIAGNOSIS — E66.01 MORBID (SEVERE) OBESITY DUE TO EXCESS CALORIES: ICD-10-CM

## 2019-01-08 DIAGNOSIS — L03.115 CELLULITIS OF RIGHT LOWER LIMB: ICD-10-CM

## 2019-01-08 LAB
ANION GAP SERPL CALC-SCNC: 15 MMOL/L — SIGNIFICANT CHANGE UP (ref 5–17)
BASOPHILS # BLD AUTO: 0 K/UL — SIGNIFICANT CHANGE UP (ref 0–0.2)
BASOPHILS NFR BLD AUTO: 0.1 % — SIGNIFICANT CHANGE UP (ref 0–2)
BUN SERPL-MCNC: 22 MG/DL — HIGH (ref 8–20)
CALCIUM SERPL-MCNC: 8.1 MG/DL — LOW (ref 8.6–10.2)
CHLORIDE SERPL-SCNC: 97 MMOL/L — LOW (ref 98–107)
CO2 SERPL-SCNC: 23 MMOL/L — SIGNIFICANT CHANGE UP (ref 22–29)
CREAT SERPL-MCNC: 0.76 MG/DL — SIGNIFICANT CHANGE UP (ref 0.5–1.3)
EOSINOPHIL # BLD AUTO: 0.1 K/UL — SIGNIFICANT CHANGE UP (ref 0–0.5)
EOSINOPHIL NFR BLD AUTO: 0.7 % — SIGNIFICANT CHANGE UP (ref 0–5)
GLUCOSE SERPL-MCNC: 132 MG/DL — HIGH (ref 70–115)
HCT VFR BLD CALC: 39.5 % — LOW (ref 42–52)
HGB BLD-MCNC: 12.9 G/DL — LOW (ref 14–18)
LYMPHOCYTES # BLD AUTO: 16.1 % — LOW (ref 20–55)
LYMPHOCYTES # BLD AUTO: 2.6 K/UL — SIGNIFICANT CHANGE UP (ref 1–4.8)
MCHC RBC-ENTMCNC: 30.5 PG — SIGNIFICANT CHANGE UP (ref 27–31)
MCHC RBC-ENTMCNC: 32.7 G/DL — SIGNIFICANT CHANGE UP (ref 32–36)
MCV RBC AUTO: 93.4 FL — SIGNIFICANT CHANGE UP (ref 80–94)
MONOCYTES # BLD AUTO: 1.3 K/UL — HIGH (ref 0–0.8)
MONOCYTES NFR BLD AUTO: 8.1 % — SIGNIFICANT CHANGE UP (ref 3–10)
NEUTROPHILS # BLD AUTO: 11.6 K/UL — HIGH (ref 1.8–8)
NEUTROPHILS NFR BLD AUTO: 72.6 % — SIGNIFICANT CHANGE UP (ref 37–73)
PLATELET # BLD AUTO: 273 K/UL — SIGNIFICANT CHANGE UP (ref 150–400)
POTASSIUM SERPL-MCNC: 4 MMOL/L — SIGNIFICANT CHANGE UP (ref 3.5–5.3)
POTASSIUM SERPL-SCNC: 4 MMOL/L — SIGNIFICANT CHANGE UP (ref 3.5–5.3)
RBC # BLD: 4.23 M/UL — LOW (ref 4.6–6.2)
RBC # FLD: 14.3 % — SIGNIFICANT CHANGE UP (ref 11–15.6)
SODIUM SERPL-SCNC: 135 MMOL/L — SIGNIFICANT CHANGE UP (ref 135–145)
WBC # BLD: 16.1 K/UL — HIGH (ref 4.8–10.8)
WBC # FLD AUTO: 16.1 K/UL — HIGH (ref 4.8–10.8)

## 2019-01-08 PROCEDURE — 76882 US LMTD JT/FCL EVL NVASC XTR: CPT | Mod: 26,RT

## 2019-01-08 PROCEDURE — 99223 1ST HOSP IP/OBS HIGH 75: CPT

## 2019-01-08 PROCEDURE — 99232 SBSQ HOSP IP/OBS MODERATE 35: CPT

## 2019-01-08 RX ORDER — CARVEDILOL PHOSPHATE 80 MG/1
6.25 CAPSULE, EXTENDED RELEASE ORAL EVERY 12 HOURS
Qty: 0 | Refills: 0 | Status: DISCONTINUED | OUTPATIENT
Start: 2019-01-08 | End: 2019-01-08

## 2019-01-08 RX ORDER — RIVAROXABAN 15 MG-20MG
20 KIT ORAL EVERY 24 HOURS
Qty: 0 | Refills: 0 | Status: DISCONTINUED | OUTPATIENT
Start: 2019-01-08 | End: 2019-01-09

## 2019-01-08 RX ORDER — VANCOMYCIN HCL 1 G
1250 VIAL (EA) INTRAVENOUS EVERY 8 HOURS
Qty: 0 | Refills: 0 | Status: DISCONTINUED | OUTPATIENT
Start: 2019-01-08 | End: 2019-01-08

## 2019-01-08 RX ORDER — TIOTROPIUM BROMIDE 18 UG/1
1 CAPSULE ORAL; RESPIRATORY (INHALATION) DAILY
Qty: 0 | Refills: 0 | Status: DISCONTINUED | OUTPATIENT
Start: 2019-01-08 | End: 2019-01-15

## 2019-01-08 RX ORDER — DIGOXIN 250 MCG
0.12 TABLET ORAL DAILY
Qty: 0 | Refills: 0 | Status: DISCONTINUED | OUTPATIENT
Start: 2019-01-08 | End: 2019-01-15

## 2019-01-08 RX ORDER — LACTOBACILLUS ACIDOPHILUS 100MM CELL
1 CAPSULE ORAL
Qty: 0 | Refills: 0 | Status: DISCONTINUED | OUTPATIENT
Start: 2019-01-08 | End: 2019-01-15

## 2019-01-08 RX ORDER — ACETAMINOPHEN 500 MG
650 TABLET ORAL EVERY 6 HOURS
Qty: 0 | Refills: 0 | Status: DISCONTINUED | OUTPATIENT
Start: 2019-01-08 | End: 2019-01-15

## 2019-01-08 RX ORDER — ALBUTEROL 90 UG/1
1 AEROSOL, METERED ORAL EVERY 4 HOURS
Qty: 0 | Refills: 0 | Status: DISCONTINUED | OUTPATIENT
Start: 2019-01-08 | End: 2019-01-15

## 2019-01-08 RX ORDER — INFLUENZA VIRUS VACCINE 15; 15; 15; 15 UG/.5ML; UG/.5ML; UG/.5ML; UG/.5ML
0.5 SUSPENSION INTRAMUSCULAR ONCE
Qty: 0 | Refills: 0 | Status: COMPLETED | OUTPATIENT
Start: 2019-01-08 | End: 2019-01-14

## 2019-01-08 RX ORDER — OXYCODONE HYDROCHLORIDE 5 MG/1
5 TABLET ORAL EVERY 6 HOURS
Qty: 0 | Refills: 0 | Status: DISCONTINUED | OUTPATIENT
Start: 2019-01-08 | End: 2019-01-14

## 2019-01-08 RX ORDER — VANCOMYCIN HCL 1 G
1250 VIAL (EA) INTRAVENOUS EVERY 12 HOURS
Qty: 0 | Refills: 0 | Status: DISCONTINUED | OUTPATIENT
Start: 2019-01-08 | End: 2019-01-10

## 2019-01-08 RX ORDER — FUROSEMIDE 40 MG
40 TABLET ORAL DAILY
Qty: 0 | Refills: 0 | Status: DISCONTINUED | OUTPATIENT
Start: 2019-01-08 | End: 2019-01-15

## 2019-01-08 RX ORDER — MORPHINE SULFATE 50 MG/1
4 CAPSULE, EXTENDED RELEASE ORAL
Qty: 0 | Refills: 0 | Status: DISCONTINUED | OUTPATIENT
Start: 2019-01-08 | End: 2019-01-15

## 2019-01-08 RX ORDER — IPRATROPIUM/ALBUTEROL SULFATE 18-103MCG
3 AEROSOL WITH ADAPTER (GRAM) INHALATION EVERY 6 HOURS
Qty: 0 | Refills: 0 | Status: DISCONTINUED | OUTPATIENT
Start: 2019-01-08 | End: 2019-01-15

## 2019-01-08 RX ORDER — ALBUTEROL 90 UG/1
2.5 AEROSOL, METERED ORAL
Qty: 0 | Refills: 0 | Status: DISCONTINUED | OUTPATIENT
Start: 2019-01-08 | End: 2019-01-15

## 2019-01-08 RX ORDER — METOPROLOL TARTRATE 50 MG
5 TABLET ORAL EVERY 6 HOURS
Qty: 0 | Refills: 0 | Status: DISCONTINUED | OUTPATIENT
Start: 2019-01-08 | End: 2019-01-15

## 2019-01-08 RX ORDER — ONDANSETRON 8 MG/1
4 TABLET, FILM COATED ORAL EVERY 6 HOURS
Qty: 0 | Refills: 0 | Status: DISCONTINUED | OUTPATIENT
Start: 2019-01-08 | End: 2019-01-15

## 2019-01-08 RX ORDER — METOPROLOL TARTRATE 50 MG
25 TABLET ORAL THREE TIMES A DAY
Qty: 0 | Refills: 0 | Status: DISCONTINUED | OUTPATIENT
Start: 2019-01-08 | End: 2019-01-10

## 2019-01-08 RX ORDER — MORPHINE SULFATE 50 MG/1
3 CAPSULE, EXTENDED RELEASE ORAL ONCE
Qty: 0 | Refills: 0 | Status: DISCONTINUED | OUTPATIENT
Start: 2019-01-08 | End: 2019-01-08

## 2019-01-08 RX ORDER — NICOTINE POLACRILEX 2 MG
4 GUM BUCCAL EVERY 6 HOURS
Qty: 0 | Refills: 0 | Status: DISCONTINUED | OUTPATIENT
Start: 2019-01-08 | End: 2019-01-15

## 2019-01-08 RX ORDER — ERTAPENEM SODIUM 1 G/1
1000 INJECTION, POWDER, LYOPHILIZED, FOR SOLUTION INTRAMUSCULAR; INTRAVENOUS EVERY 24 HOURS
Qty: 0 | Refills: 0 | Status: DISCONTINUED | OUTPATIENT
Start: 2019-01-08 | End: 2019-01-15

## 2019-01-08 RX ADMIN — Medication 20 MILLIGRAM(S): at 05:35

## 2019-01-08 RX ADMIN — Medication 3 MILLILITER(S): at 16:20

## 2019-01-08 RX ADMIN — Medication 3 MILLILITER(S): at 20:26

## 2019-01-08 RX ADMIN — Medication 0.12 MILLIGRAM(S): at 15:57

## 2019-01-08 RX ADMIN — MORPHINE SULFATE 4 MILLIGRAM(S): 50 CAPSULE, EXTENDED RELEASE ORAL at 10:44

## 2019-01-08 RX ADMIN — MORPHINE SULFATE 4 MILLIGRAM(S): 50 CAPSULE, EXTENDED RELEASE ORAL at 14:14

## 2019-01-08 RX ADMIN — MORPHINE SULFATE 3 MILLIGRAM(S): 50 CAPSULE, EXTENDED RELEASE ORAL at 06:10

## 2019-01-08 RX ADMIN — Medication 5 MILLIGRAM(S): at 23:34

## 2019-01-08 RX ADMIN — OXYCODONE HYDROCHLORIDE 5 MILLIGRAM(S): 5 TABLET ORAL at 17:52

## 2019-01-08 RX ADMIN — Medication 1 TABLET(S): at 12:22

## 2019-01-08 RX ADMIN — Medication 166.67 MILLIGRAM(S): at 05:34

## 2019-01-08 RX ADMIN — MORPHINE SULFATE 4 MILLIGRAM(S): 50 CAPSULE, EXTENDED RELEASE ORAL at 10:26

## 2019-01-08 RX ADMIN — MORPHINE SULFATE 3 MILLIGRAM(S): 50 CAPSULE, EXTENDED RELEASE ORAL at 05:39

## 2019-01-08 RX ADMIN — Medication 5 MILLIGRAM(S): at 10:21

## 2019-01-08 RX ADMIN — Medication 3 MILLILITER(S): at 02:59

## 2019-01-08 RX ADMIN — MORPHINE SULFATE 4 MILLIGRAM(S): 50 CAPSULE, EXTENDED RELEASE ORAL at 18:55

## 2019-01-08 RX ADMIN — RIVAROXABAN 20 MILLIGRAM(S): KIT at 17:52

## 2019-01-08 RX ADMIN — Medication 166.67 MILLIGRAM(S): at 17:52

## 2019-01-08 RX ADMIN — MORPHINE SULFATE 4 MILLIGRAM(S): 50 CAPSULE, EXTENDED RELEASE ORAL at 18:47

## 2019-01-08 RX ADMIN — Medication 25 MILLIGRAM(S): at 14:14

## 2019-01-08 RX ADMIN — MORPHINE SULFATE 4 MILLIGRAM(S): 50 CAPSULE, EXTENDED RELEASE ORAL at 14:28

## 2019-01-08 RX ADMIN — Medication 25 MILLIGRAM(S): at 07:53

## 2019-01-08 RX ADMIN — OXYCODONE HYDROCHLORIDE 5 MILLIGRAM(S): 5 TABLET ORAL at 11:28

## 2019-01-08 RX ADMIN — Medication 25 MILLIGRAM(S): at 21:35

## 2019-01-08 RX ADMIN — Medication 1 TABLET(S): at 07:53

## 2019-01-08 RX ADMIN — Medication 3 MILLILITER(S): at 08:27

## 2019-01-08 RX ADMIN — CARVEDILOL PHOSPHATE 6.25 MILLIGRAM(S): 80 CAPSULE, EXTENDED RELEASE ORAL at 05:34

## 2019-01-08 RX ADMIN — Medication 40 MILLIGRAM(S): at 11:28

## 2019-01-08 RX ADMIN — Medication 1 TABLET(S): at 17:53

## 2019-01-08 RX ADMIN — OXYCODONE HYDROCHLORIDE 5 MILLIGRAM(S): 5 TABLET ORAL at 18:44

## 2019-01-08 RX ADMIN — MORPHINE SULFATE 4 MILLIGRAM(S): 50 CAPSULE, EXTENDED RELEASE ORAL at 22:05

## 2019-01-08 RX ADMIN — ERTAPENEM SODIUM 120 MILLIGRAM(S): 1 INJECTION, POWDER, LYOPHILIZED, FOR SOLUTION INTRAMUSCULAR; INTRAVENOUS at 15:57

## 2019-01-08 RX ADMIN — OXYCODONE HYDROCHLORIDE 5 MILLIGRAM(S): 5 TABLET ORAL at 12:28

## 2019-01-08 NOTE — CONSULT NOTE ADULT - ASSESSMENT
57 y/o M h/o atrial fibrillation, ROCK, COPD with right medial thigh cellulitis, and likely incompletely drained abscess. Leukocytosis to 18.8k. Tachycardia to 120s.    Plan:  We will follow up right medial thigh soft tissue ultrasound  IV antibiotics  Plan for repeat I&D if remaining collections present, possibly in OR  Surgery will continue to follow

## 2019-01-08 NOTE — H&P ADULT - PROBLEM SELECTOR PLAN 1
rt. groin area cellulitis , concern for abscess, surgery consult requested, will get rt. groin area ultrasound to r/o abscess, discussed with surgery team, IV antibiotics and ID consult. rt. groin area cellulitis , concern for abscess, surgery consult requested, will get rt. groin area ultrasound to r/o abscess, requested dr. moyer to follow, discussed with surgery team, IV antibiotics and ID consult.

## 2019-01-08 NOTE — PROGRESS NOTE ADULT - SUBJECTIVE AND OBJECTIVE BOX
MAGGIE ELLIOTT    [id number]    58y      Male    INTERVAL HPI/OVERNIGHT EVENTS:    patient being seen for  rt. groin area cellulitis, afib and med management. patient seen at bedside and complains of leg pain which is shooting down the side of his leg.       REVIEW OF SYSTEMS:    CONSTITUTIONAL: pain   RESPIRATORY: No cough, wheezing, hemoptysis; No shortness of breath  CARDIOVASCULAR: No chest pain, palpitations  GASTROINTESTINAL: No abdominal or epigastric pain. No nausea, vomiting  NEUROLOGICAL: No headaches, memory loss, loss of strength.  MISCELLANEOUS:      Vital Signs Last 24 Hrs  T(C): 37.2 (08 Jan 2019 11:16), Max: 37.2 (08 Jan 2019 11:16)  T(F): 98.9 (08 Jan 2019 11:16), Max: 98.9 (08 Jan 2019 11:16)  HR: 100 (08 Jan 2019 11:16) (68 - 124)  BP: 93/62 (08 Jan 2019 11:16) (93/62 - 129/83)  BP(mean): --  RR: 18 (08 Jan 2019 11:16) (18 - 20)  SpO2: 93% (08 Jan 2019 11:16) (93% - 97%)    PHYSICAL EXAM:    Physical Exam: General: morbidly obese male lying in bed in NAD.   HEENT: AT, NC. PERRL.  Neck: supple. no JVD.   Chest: CTA bilaterally  Heart: S1,S2. Regular,  no heart murmur. 1 + edema of lower ext.   Abdomen: soft. non-tender. morbidly obese,  + BS.   Ext: no calf tenderness on either side. moves all ext without difficulty.   Neuro: AAO x3. no focal weakness. no speech disorder. CN II to XII intact.   vascular : 2 + DP B/L.   Skin: rt. groin area in the medial aspect has about 3.5 inch long and about 2 inch wide oval shaped area with erythema and induration,  Psych : normal affect.	        LABS:                        12.9   16.1  )-----------( 273      ( 08 Jan 2019 09:19 )             39.5     01-08    135  |  97<L>  |  22.0<H>  ----------------------------<  132<H>  4.0   |  23.0  |  0.76    Ca    8.1<L>      08 Jan 2019 09:19    TPro  6.4<L>  /  Alb  3.2<L>  /  TBili  0.4  /  DBili  x   /  AST  21  /  ALT  33  /  AlkPhos  44  01-07            MEDICATIONS  (STANDING):  ALBUTerol    90 MICROgram(s) HFA Inhaler 1 Puff(s) Inhalation every 4 hours  ALBUTerol/ipratropium for Nebulization 3 milliLiter(s) Nebulizer every 6 hours  ertapenem  IVPB 1000 milliGRAM(s) IV Intermittent every 24 hours  furosemide    Tablet 40 milliGRAM(s) Oral daily  influenza   Vaccine 0.5 milliLiter(s) IntraMuscular once  lactobacillus acidophilus 1 Tablet(s) Oral three times a day with meals  metoprolol tartrate 25 milliGRAM(s) Oral three times a day  predniSONE   Tablet 20 milliGRAM(s) Oral daily  rivaroxaban 20 milliGRAM(s) Oral every 24 hours  tiotropium 18 MICROgram(s) Capsule 1 Capsule(s) Inhalation daily  vancomycin  IVPB 1250 milliGRAM(s) IV Intermittent every 12 hours    MEDICATIONS  (PRN):  acetaminophen   Tablet .. 650 milliGRAM(s) Oral every 6 hours PRN Temp greater or equal to 38C (100.4F), Mild Pain (1 - 3)  ALBUTerol    0.083% 2.5 milliGRAM(s) Nebulizer every 2 hours PRN Shortness of Breath and/or Wheezing  metoprolol tartrate Injectable 5 milliGRAM(s) IV Push every 6 hours PRN for heart rate above 110 bpm  morphine  - Injectable 4 milliGRAM(s) IV Push every 3 hours PRN Severe Pain (7 - 10)  ondansetron Injectable 4 milliGRAM(s) IV Push every 6 hours PRN Nausea and/or Vomiting  oxyCODONE    IR 5 milliGRAM(s) Oral every 6 hours PRN Moderate Pain (4 - 6)      RADIOLOGY & ADDITIONAL TESTS:

## 2019-01-08 NOTE — H&P ADULT - HISTORY OF PRESENT ILLNESS
57 y/o morbidly obese male discharged from [facility] on 1/2/19 after being admitted for dyspnea, copd, chf and also had rt. groin area cellulitis, was seen by surgery team and rt. groin area I & d was done , had packing which was removed before diacharge, pt. was followed by ID group and was sent home on bactrim and levaquin. On the day of ER visit pt. noted rt. upper thigh area pain more over lateral aspect , moderate, on and  off , also noted that Pus which was red colored and had an odor was coming from his rt. groin area where he had packing. Pt. called ID office and was instructed to come to ER. pt. reports no fever. no cp. no sob. no abd. pain. no n/v/d. 59 y/o morbidly obese male discharged from [facility] on 1/2/19 after being admitted for dyspnea, copd,  and also had rt. groin area cellulitis, was seen by surgery team and rt. groin area I & d was done , had packing which was removed before diacharge, pt. was followed by ID group and was sent home on bactrim and levaquin. On the day of ER visit pt. noted rt. upper thigh area pain more over lateral aspect , moderate, on and  off , also noted that Pus which was red colored and had an odor was coming from his rt. groin area where he had packing. Pt. called ID office and was instructed to come to ER. pt. reports no fever. no cp. no sob. no abd. pain. no n/v/d.

## 2019-01-08 NOTE — CONSULT NOTE ADULT - ASSESSMENT
59y/o man with HTN, Afib, ROCK, COPD and morbid obesity was admitted on 12/27 with dyspnea for COPD exacerbation. Also had swelling and pain in right upper thigh for one week for which was taking Abx po with no improvement.    USG showed 5cm complex collection in the area so surgery I&D'd with no purulent discharge (just blood came out), now after about one week, it has heavy pus pouring out. He was on vancomycin and later ertapenem was added due to PCN allergy and discharged on bactrim and Levaquin.      R upper thigh cellulitis  COPD  PCN allergy   Obesity     - Blood culture x2   - Leukocytosis is most likely secondary to steroid use.   - I just send culture from the discharge will follow the result.   - USG or CT to rule out collection   - Surgery follow up   - Stop levaquin and start vancomycin 1gm q12h and ertapenem 1gm daily.   - If stays on vancomycin will send trough before 4tyh dose.   - High risk for cellulitis due to morbid obesity.    Will follow. 59y/o man with HTN, Afib, ROCK, COPD and morbid obesity was admitted on 12/27 with dyspnea for COPD exacerbation. Also had swelling and pain in right upper thigh for one week for which was taking Abx po with no improvement.    USG showed 5cm complex collection in the area so surgery I&D'd with no purulent discharge (just blood came out), now after about one week, it has heavy pus pouring out. He was on vancomycin and later ertapenem was added due to PCN allergy and discharged on bactrim and Levaquin.      R upper thigh cellulitis  COPD  PCN allergy   Obesity     - Blood culture x2   - Leukocytosis is most likely secondary to steroid use.   - I just send culture from the discharge will follow the result.   - USG or CT to rule out collection   - Surgery follow up   - Continue Vancomycin 1250gm q12h   - Start ertapenem 1gm daily.   - If stays on vancomycin will send trough before 4tyh dose.   - High risk for cellulitis due to morbid obesity.    Will follow.

## 2019-01-08 NOTE — H&P ADULT - NSHPPHYSICALEXAM_GEN_ALL_CORE
General: morbidly obese male lying in bed in NAD.   HEENT: AT, NC. PERRL. intact EOM. no throat erythema or exudate.   Neck: supple. no JVD.   Chest: CTA bilaterally  Heart: S1,S2. Regular,  no heart murmur. 1 + edema of lower ext.   Abdomen: soft. non-tender. morbidly obese,  + BS.   Ext: no calf tenderness on either side. moves all ext without difficulty.   Neuro: AAO x3. no focal weakness. no speech disorder. CN II to XII intact.   vascular : 2 + DP B/L.   Skin: rt. groin area in the medial aspect has about 3.5 inch long and about 2 inch wide oval shaped area with erythema and induration, small opening in the middle noted, no active discharge noted , + warmth, no sig. tenderness over involved area. sensory intact.   Psych : normal affect.

## 2019-01-08 NOTE — PROGRESS NOTE ADULT - ASSESSMENT
Problem/Plan - 1:  ·  Problem: rt. thigh and groin area cellulitis , concern for abscess,   --> f.u us   --> surgeyr and ID following  --> agree with iv vanco and ertapenem  --> c.w lactobacillus     Problem/Plan - 2:  ·  Problem: Chronic atrial fibrillation.  Plan: will continue with xarelto.   --> coreg discontinued and started on tid metoprolol      Problem/Plan - 3:  ·  Problem: Chronic obstructive pulmonary disease, unspecified COPD type.  Plan: not in exacerbation, will keep on duoneb.   --> taper off po prednisone      Problem/Plan - 4:  ·  Problem: ROCK (obstructive sleep apnea).  Plan: bipap qhs     Problem/Plan - 5:  ·  Problem: Morbid obesity.  advised to lose weight     Problem 6 - pain --> iv morphine prn

## 2019-01-08 NOTE — PHARMACOTHERAPY INTERVENTION NOTE - COMMENTS
Weight: 179.2 kg; Adjusted body weight: 109.96 kg  SCr: 0.8, CrCL: ~103 ml/min  Vancomycin dose adjusted to 1250mg every 12 hours  Trough ordered for 1/9 @5am (draw 1 hour before giving 6am dose)

## 2019-01-09 LAB
ALBUMIN SERPL ELPH-MCNC: 2.9 G/DL — LOW (ref 3.3–5.2)
ALP SERPL-CCNC: 42 U/L — SIGNIFICANT CHANGE UP (ref 40–120)
ALT FLD-CCNC: 33 U/L — SIGNIFICANT CHANGE UP
ANION GAP SERPL CALC-SCNC: 15 MMOL/L — SIGNIFICANT CHANGE UP (ref 5–17)
AST SERPL-CCNC: 24 U/L — SIGNIFICANT CHANGE UP
BILIRUB SERPL-MCNC: 0.7 MG/DL — SIGNIFICANT CHANGE UP (ref 0.4–2)
BUN SERPL-MCNC: 24 MG/DL — HIGH (ref 8–20)
CALCIUM SERPL-MCNC: 8.1 MG/DL — LOW (ref 8.6–10.2)
CHLORIDE SERPL-SCNC: 96 MMOL/L — LOW (ref 98–107)
CO2 SERPL-SCNC: 23 MMOL/L — SIGNIFICANT CHANGE UP (ref 22–29)
CREAT SERPL-MCNC: 0.96 MG/DL — SIGNIFICANT CHANGE UP (ref 0.5–1.3)
GLUCOSE SERPL-MCNC: 104 MG/DL — SIGNIFICANT CHANGE UP (ref 70–115)
HCT VFR BLD CALC: 39.3 % — LOW (ref 42–52)
HGB BLD-MCNC: 12.6 G/DL — LOW (ref 14–18)
MAGNESIUM SERPL-MCNC: 2.1 MG/DL — SIGNIFICANT CHANGE UP (ref 1.6–2.6)
MCHC RBC-ENTMCNC: 29.6 PG — SIGNIFICANT CHANGE UP (ref 27–31)
MCHC RBC-ENTMCNC: 32.1 G/DL — SIGNIFICANT CHANGE UP (ref 32–36)
MCV RBC AUTO: 92.5 FL — SIGNIFICANT CHANGE UP (ref 80–94)
PLATELET # BLD AUTO: 281 K/UL — SIGNIFICANT CHANGE UP (ref 150–400)
POTASSIUM SERPL-MCNC: 3.7 MMOL/L — SIGNIFICANT CHANGE UP (ref 3.5–5.3)
POTASSIUM SERPL-SCNC: 3.7 MMOL/L — SIGNIFICANT CHANGE UP (ref 3.5–5.3)
PROT SERPL-MCNC: 6.4 G/DL — LOW (ref 6.6–8.7)
RBC # BLD: 4.25 M/UL — LOW (ref 4.6–6.2)
RBC # FLD: 14.3 % — SIGNIFICANT CHANGE UP (ref 11–15.6)
SODIUM SERPL-SCNC: 134 MMOL/L — LOW (ref 135–145)
VANCOMYCIN TROUGH SERPL-MCNC: 12 UG/ML — SIGNIFICANT CHANGE UP (ref 10–20)
WBC # BLD: 18.8 K/UL — HIGH (ref 4.8–10.8)
WBC # FLD AUTO: 18.8 K/UL — HIGH (ref 4.8–10.8)

## 2019-01-09 PROCEDURE — 99232 SBSQ HOSP IP/OBS MODERATE 35: CPT

## 2019-01-09 RX ORDER — GABAPENTIN 400 MG/1
300 CAPSULE ORAL AT BEDTIME
Qty: 0 | Refills: 0 | Status: DISCONTINUED | OUTPATIENT
Start: 2019-01-09 | End: 2019-01-10

## 2019-01-09 RX ORDER — BUDESONIDE, MICRONIZED 100 %
0.5 POWDER (GRAM) MISCELLANEOUS EVERY 12 HOURS
Qty: 0 | Refills: 0 | Status: DISCONTINUED | OUTPATIENT
Start: 2019-01-09 | End: 2019-01-15

## 2019-01-09 RX ADMIN — OXYCODONE HYDROCHLORIDE 5 MILLIGRAM(S): 5 TABLET ORAL at 11:45

## 2019-01-09 RX ADMIN — Medication 5 MILLIGRAM(S): at 13:36

## 2019-01-09 RX ADMIN — Medication 3 MILLILITER(S): at 20:57

## 2019-01-09 RX ADMIN — Medication 3 MILLILITER(S): at 16:14

## 2019-01-09 RX ADMIN — Medication 650 MILLIGRAM(S): at 16:30

## 2019-01-09 RX ADMIN — GABAPENTIN 300 MILLIGRAM(S): 400 CAPSULE ORAL at 21:44

## 2019-01-09 RX ADMIN — MORPHINE SULFATE 4 MILLIGRAM(S): 50 CAPSULE, EXTENDED RELEASE ORAL at 01:54

## 2019-01-09 RX ADMIN — Medication 0.12 MILLIGRAM(S): at 05:36

## 2019-01-09 RX ADMIN — Medication 166.67 MILLIGRAM(S): at 07:52

## 2019-01-09 RX ADMIN — Medication 166.67 MILLIGRAM(S): at 17:41

## 2019-01-09 RX ADMIN — ALBUTEROL 2.5 MILLIGRAM(S): 90 AEROSOL, METERED ORAL at 01:35

## 2019-01-09 RX ADMIN — Medication 1 TABLET(S): at 08:31

## 2019-01-09 RX ADMIN — Medication 25 MILLIGRAM(S): at 05:36

## 2019-01-09 RX ADMIN — ALBUTEROL 2.5 MILLIGRAM(S): 90 AEROSOL, METERED ORAL at 06:00

## 2019-01-09 RX ADMIN — OXYCODONE HYDROCHLORIDE 5 MILLIGRAM(S): 5 TABLET ORAL at 12:15

## 2019-01-09 RX ADMIN — Medication 1 TABLET(S): at 17:41

## 2019-01-09 RX ADMIN — Medication 40 MILLIGRAM(S): at 11:47

## 2019-01-09 RX ADMIN — ERTAPENEM SODIUM 120 MILLIGRAM(S): 1 INJECTION, POWDER, LYOPHILIZED, FOR SOLUTION INTRAMUSCULAR; INTRAVENOUS at 16:30

## 2019-01-09 RX ADMIN — Medication 25 MILLIGRAM(S): at 15:36

## 2019-01-09 RX ADMIN — Medication 650 MILLIGRAM(S): at 15:28

## 2019-01-09 RX ADMIN — Medication 0.5 MILLIGRAM(S): at 20:57

## 2019-01-09 RX ADMIN — Medication 3 MILLILITER(S): at 09:14

## 2019-01-09 RX ADMIN — MORPHINE SULFATE 4 MILLIGRAM(S): 50 CAPSULE, EXTENDED RELEASE ORAL at 01:39

## 2019-01-09 RX ADMIN — Medication 5 MILLIGRAM(S): at 21:44

## 2019-01-09 RX ADMIN — Medication 20 MILLIGRAM(S): at 05:36

## 2019-01-09 NOTE — PROGRESS NOTE ADULT - SUBJECTIVE AND OBJECTIVE BOX
INTERVAL HPI/OVERNIGHT EVENTS: No acute events overnight. U/S Showing minimal change from prior in collection    SUBJECTIVE: Mild pain this AM. Afebrile. Drainage from right proximal thigh. Tolerating diet. Ambulating without assistance. +Urination. + Flatus. +BM. Denies F/C/N/V/CP/SOB.       MEDICATIONS  (STANDING):  ALBUTerol    90 MICROgram(s) HFA Inhaler 1 Puff(s) Inhalation every 4 hours  ALBUTerol/ipratropium for Nebulization 3 milliLiter(s) Nebulizer every 6 hours  digoxin     Tablet 0.125 milliGRAM(s) Oral daily  ertapenem  IVPB 1000 milliGRAM(s) IV Intermittent every 24 hours  furosemide    Tablet 40 milliGRAM(s) Oral daily  influenza   Vaccine 0.5 milliLiter(s) IntraMuscular once  lactobacillus acidophilus 1 Tablet(s) Oral three times a day with meals  metoprolol tartrate 25 milliGRAM(s) Oral three times a day  predniSONE   Tablet 20 milliGRAM(s) Oral daily  rivaroxaban 20 milliGRAM(s) Oral every 24 hours  tiotropium 18 MICROgram(s) Capsule 1 Capsule(s) Inhalation daily  vancomycin  IVPB 1250 milliGRAM(s) IV Intermittent every 12 hours    MEDICATIONS  (PRN):  acetaminophen   Tablet .. 650 milliGRAM(s) Oral every 6 hours PRN Temp greater or equal to 38C (100.4F), Mild Pain (1 - 3)  ALBUTerol    0.083% 2.5 milliGRAM(s) Nebulizer every 2 hours PRN Shortness of Breath and/or Wheezing  metoprolol tartrate Injectable 5 milliGRAM(s) IV Push every 6 hours PRN for heart rate above 110 bpm  morphine  - Injectable 4 milliGRAM(s) IV Push every 3 hours PRN Severe Pain (7 - 10)  nicotine  Polacrilex Gum 4 milliGRAM(s) Oral every 6 hours PRN nicotine withdrawl  ondansetron Injectable 4 milliGRAM(s) IV Push every 6 hours PRN Nausea and/or Vomiting  oxyCODONE    IR 5 milliGRAM(s) Oral every 6 hours PRN Moderate Pain (4 - 6)      Vital Signs Last 24 Hrs  T(C): 36.8 (09 Jan 2019 04:31), Max: 37.6 (09 Jan 2019 00:23)  T(F): 98.2 (09 Jan 2019 04:31), Max: 99.7 (09 Jan 2019 00:23)  HR: 108 (09 Jan 2019 04:31) (93 - 120)  BP: 125/76 (09 Jan 2019 04:31) (93/62 - 126/82)  BP(mean): --  RR: 20 (09 Jan 2019 04:31) (18 - 20)  SpO2: 94% (09 Jan 2019 04:31) (91% - 96%)    PE  Gen: AAO x3, NAD  Pulm: CTAB, Symmetrical chest rise  CV: RRR  Abd: Soft, NT, ND, -R/-G  Ext: Upper RLE incision site healing, but with surrounding cellulitis and TTP. Bilateral distal trace edema of the LE's  Vasc: 2+ Radial, DP pulses  Neuro: No focal neurological deficits      I&O's Detail      LABS:                        12.6   18.8  )-----------( 281      ( 09 Jan 2019 05:38 )             39.3     01-09    134<L>  |  96<L>  |  24.0<H>  ----------------------------<  104  3.7   |  23.0  |  0.96    Ca    8.1<L>      09 Jan 2019 05:38  Mg     2.1     01-09    TPro  6.4<L>  /  Alb  2.9<L>  /  TBili  0.7  /  DBili  x   /  AST  24  /  ALT  33  /  AlkPhos  42  01-09

## 2019-01-09 NOTE — PROGRESS NOTE ADULT - ASSESSMENT
59 y/o M h/o atrial fibrillation, ROCK, COPD with right medial thigh cellulitis, and likely incompletely drained abscess. Ultrasound showing persistent loculated collection  -Recommend placement of IR drain. If does not resolve after drain placement, will need surgical drainage  -Recommend holding Eliquis due to high risk of bleeding with surgical drainage  -Continue IV Abx for cellulitis  -Recommend gabapentin for complaints of neuropathic pain  -Continued care per primary 57 y/o M h/o atrial fibrillation, ROCK, COPD with right medial thigh cellulitis, and likely incompletely drained abscess. Ultrasound showing persistent loculated collection  -Recommend placement of IR drain. If does not resolve after drain placement, will need surgical drainage in the OR  -Recommend holding Eliquis due to high risk of bleeding with surgical drainage  -Continue IV Abx for cellulitis  -Recommend gabapentin for complaints of neuropathic pain  -Continued care per primary

## 2019-01-09 NOTE — PROGRESS NOTE ADULT - ASSESSMENT
57y/o man with HTN, Afib, ROCK, COPD and morbid obesity was admitted on 12/27 with dyspnea for COPD exacerbation. Also had swelling and pain in right upper thigh for one week for which was taking Abx po with no improvement.    USG showed 5cm complex collection in the area so surgery I&D'd with no purulent discharge (just blood came out), now after about one week, it has heavy pus pouring out. He was on vancomycin and later ertapenem was added due to PCN allergy and discharged on bactrim and Levaquin.      R upper thigh cellulitis  COPD  PCN allergy   Obesity     - Blood culture x2 pending   - Leukocytosis is most likely secondary to steroid use.   - Wound culture with coag neg staph for now.   - USG with 5cm collection   - Surgery follow up   - Continue Vancomycin 1250gm q12h   - Continue ertapenem 1gm daily.   - send trough before tonight dose.  - High risk for cellulitis due to morbid obesity.    Will follow.

## 2019-01-09 NOTE — PROGRESS NOTE ADULT - SUBJECTIVE AND OBJECTIVE BOX
[facility]  INFECTIOUS DISEASES AND INTERNAL MEDICINE at [place]  =======================================================  Bello García MD  Diplomates American Board of Internal Medicine and Infectious Diseases  =======================================================    [id number]  MAGGIE ELLIOTT       Follow up: Right upper thigh cellulitis  Afebrile, pain in right leg, swelling worse.     PAST MEDICAL & SURGICAL HISTORY:  Afib  ROCK (obstructive sleep apnea)  COPD (chronic obstructive pulmonary disease)  No significant past surgical history    Allergies  penicillins (Hives)  shellfish (Pruritus; Rash)    Antibiotics:  levaquin    REVIEW OF SYSTEMS:  CONSTITUTIONAL:  No Fever or chills  HEENT:  No diplopia or blurred vision.  No sore throat or runny nose.  CARDIOVASCULAR:  No chest pain or SOB.  RESPIRATORY:  No cough, shortness of breath, PND or orthopnea.  GASTROINTESTINAL:  No nausea, vomiting or diarrhea.  GENITOURINARY:  No dysuria, frequency or urgency. No Blood in urine  MUSCULOSKELETAL:  right leg edema  SKIN:  right upper thigh swelling and discharge  NEUROLOGIC:  No paresthesias, fasciculations, seizures or weakness.  PSYCHIATRIC:  No disorder of thought or mood.  ENDOCRINE:  No heat or cold intolerance, polyuria or polydipsia.  HEMATOLOGICAL:  No easy bruising or bleeding.     Physical Exam:  Vital Signs Last 24 Hrs  T(C): 36.8 (09 Jan 2019 04:31), Max: 37.6 (09 Jan 2019 00:23)  T(F): 98.2 (09 Jan 2019 04:31), Max: 99.7 (09 Jan 2019 00:23)  HR: 59 (09 Jan 2019 09:14) (59 - 116)  BP: 125/76 (09 Jan 2019 04:31) (93/62 - 126/82)  RR: 20 (09 Jan 2019 04:31) (18 - 20)  SpO2: 94% (09 Jan 2019 09:14) (91% - 96%)  GEN: on O2 with mild respiratory distress , morbidly obese   HEENT: normocephalic and atraumatic. EOMI. PERRL.    NECK: Supple.  No lymphadenopathy   LUNGS: poor air movement. no wheezing or crackles   HEART: Regular rate and rhythm   ABDOMEN: Soft, nontender, and nondistended.  Positive bowel sounds.    : No CVA tenderness  EXTREMITIES: Right upper thigh with an open wound 4-5cm with less pus coming out, induration around it. 2+ leg and foot edema  NEUROLOGIC: grossly intact.  PSYCHIATRIC: Appropriate affect .  SKIN: No ulceration or induration present.    Labs:  01-09    134<L>  |  96<L>  |  24.0<H>  ----------------------------<  104  3.7   |  23.0  |  0.96    Ca    8.1<L>      09 Jan 2019 05:38  Mg     2.1     01-09    TPro  6.4<L>  /  Alb  2.9<L>  /  TBili  0.7  /  DBili  x   /  AST  24  /  ALT  33  /  AlkPhos  42  01-09                        12.6   18.8  )-----------( 281      ( 09 Jan 2019 05:38 )             39.3       LIVER FUNCTIONS - ( 09 Jan 2019 05:38 )  Alb: 2.9 g/dL / Pro: 6.4 g/dL / ALK PHOS: 42 U/L / ALT: 33 U/L / AST: 24 U/L / GGT: x             RECENT CULTURES:  01-08 @ 10:11 Skin     Few Coag Negative Staphylococcus  Culture in progress    12-30 @ 15:17 .Blood     No growth at 5 days.    12-30 @ 15:15 .Blood     No growth at 5 days.    12-27 @ 18:50      NotDete    All imaging and other data have been reviewed.  FINDINGS:  There is normal compressibility of the bilateral common femoral, femoral   and popliteal veins. No calf vein thrombosis is detected.  Doppler examination shows normal spontaneous and phasic flow.   Additional sonography in the area of concern in the right groin   demonstrates subcutaneous edema and heterogeneity of the subcutaneous fat   . Approximately 2-3 cm deep to the skin there is a mildly complex fluid   collection measuring 5.3 x 1.1 x 3.0 cm in this region.  IMPRESSION:   No evidence of bilateral lower extremity deep venous thrombosis.  In the area of concern in the right groin, there is a 5.3 cm mildly   complex fluid collection in the subcutaneous fat. Infection/abscess is   not excluded.    USG 1/8:   INTERPRETATION:   soft tissue ultrasound evaluation of the right groin     CPT 43792  CLINICAL INFORMATION:   Abscess.  Soft tissue mass.  TECHNIQUE:   Transcutaneous ultrasound was performed.  Evaluation was   directed at the right groin  FINDINGS:   No prior examinations are available for review.  Poorly defined fluid collection is seen in the subcutaneous fat measuring   approximately 5.3 x 1.2 x 4.5 cm.  IMPRESSION:  Poorly defined fluid collection in the right groin.    Doppler: negative for DVT

## 2019-01-09 NOTE — PROGRESS NOTE ADULT - ASSESSMENT
Problem/Plan - 1:  ·  Problem: rt. thigh and groin area cellulitis , concern for abscess,   --> f.u us   --> surgeyr and ID following  --> agree with iv vanco and ertapenem  --> c.w lactobacillus     Problem/Plan - 2:  ·  Problem: Chronic atrial fibrillation.  Plan: will continue with xarelto.   --> coreg discontinued and started on tid metoprolol      Problem/Plan - 3:  ·  Problem: Chronic obstructive pulmonary disease, unspecified COPD type.  Plan: not in exacerbation, will keep on duoneb.   --> taper off po prednisone      Problem/Plan - 4:  ·  Problem: ROCK (obstructive sleep apnea).  Plan: bipap qhs     Problem/Plan - 5:  ·  Problem: Morbid obesity.  advised to lose weight     Problem 6 - pain --> iv morphine prn Problem/Plan - 1:  ·  Problem: rt. thigh and groin area cellulitis , concern for abscess,   --> collection still present on US of the groin  --> surgeyr and ID following; surgery recommending patient to go for IR drainage; ordered for tomorrow; discussed with IR today  --> agree with iv vanco and ertapenem  --> c.w lactobacillus     Problem/Plan - 2:  ·  Problem: Chronic atrial fibrillation.  Plan: will continue with xarelto.   --> coreg discontinued and started on tid metoprolol  ---> patient still requiring pushes of lopressor; will increase the dose of metoprolol but likely due to patient being more mobile (going to bathroom due to being on lasix)     Problem/Plan - 3:  ·  Problem: Chronic obstructive pulmonary disease, unspecified COPD type.  Plan: not in exacerbation, will keep on duoneb.   --> taper off po prednisone      Problem/Plan - 4:  ·  Problem: ROCK (obstructive sleep apnea).  Plan: bipap qhs     Problem/Plan - 5:  ·  Problem: Morbid obesity.  advised to lose weight      Problem/Plan - 6  ·  Problem: Meralgia paresthetica--> discussed with him how IV morphine may not be adequate for pain; will trial gabapentin for pain in the lateral cutaneous nerve distribution; patient notes that when places weight on the right side pain occurs (buring in sensation) and when sitting at bedside improves; discussed with him to try and sit as often as possible on edge of bed.

## 2019-01-09 NOTE — PROGRESS NOTE ADULT - SUBJECTIVE AND OBJECTIVE BOX
CC: right sided lateral leg pain    INTERVAL HPI/OVERNIGHT EVENTS:  patient being seen for  rt. groin area cellulitis, afib and med management. patient seen at bedside and complains of leg pain which is shooting down the side of his leg likely meralgia paresthetica. Patient with questions on the management of his infection as he is not clear.    ICU Vital Signs Last 24 Hrs  T(C): 36.7 (09 Jan 2019 11:35), Max: 37.6 (09 Jan 2019 00:23)  T(F): 98.1 (09 Jan 2019 11:35), Max: 99.7 (09 Jan 2019 00:23)  HR: 84 (09 Jan 2019 11:35) (59 - 116)  BP: 106/66 (09 Jan 2019 11:35) (96/60 - 126/82)  BP(mean): --  ABP: --  ABP(mean): --  RR: 20 (09 Jan 2019 11:35) (18 - 20)  SpO2: 92% (09 Jan 2019 11:35) (91% - 96%)    PHYSICAL EXAM:    Physical Exam: General: morbidly obese male lying in bed in NAD.   HEENT: AT, NC. PERRL.  Neck: supple. no JVD.   Chest: CTA bilaterally  Heart: S1,S2. Regular,  no heart murmur. 1 + edema of lower ext.   Abdomen: soft. non-tender. morbidly obese,  + BS.   Ext: no calf tenderness on either side. moves all ext without difficulty.   Neuro: AAO x3. no focal weakness. no speech disorder. CN II to XII intact.   vascular : 2 + DP B/L.   Skin: rt. groin area in the medial aspect has about 3.5 inch long and about 2 inch wide oval shaped area with erythema and induration,  Psych : normal affect.	      LABS:                                   12.6   18.8  )-----------( 281      ( 09 Jan 2019 05:38 )             39.3     01-09    134<L>  |  96<L>  |  24.0<H>  ----------------------------<  104  3.7   |  23.0  |  0.96    Ca    8.1<L>      09 Jan 2019 05:38  Mg     2.1     01-09    TPro  6.4<L>  /  Alb  2.9<L>  /  TBili  0.7  /  DBili  x   /  AST  24  /  ALT  33  /  AlkPhos  42  01-09      MEDICATIONS  (STANDING):  ALBUTerol    90 MICROgram(s) HFA Inhaler 1 Puff(s) Inhalation every 4 hours  ALBUTerol/ipratropium for Nebulization 3 milliLiter(s) Nebulizer every 6 hours  ertapenem  IVPB 1000 milliGRAM(s) IV Intermittent every 24 hours  furosemide    Tablet 40 milliGRAM(s) Oral daily  influenza   Vaccine 0.5 milliLiter(s) IntraMuscular once  lactobacillus acidophilus 1 Tablet(s) Oral three times a day with meals  metoprolol tartrate 25 milliGRAM(s) Oral three times a day  predniSONE   Tablet 20 milliGRAM(s) Oral daily  rivaroxaban 20 milliGRAM(s) Oral every 24 hours  tiotropium 18 MICROgram(s) Capsule 1 Capsule(s) Inhalation daily  vancomycin  IVPB 1250 milliGRAM(s) IV Intermittent every 12 hours    MEDICATIONS  (PRN):  acetaminophen   Tablet .. 650 milliGRAM(s) Oral every 6 hours PRN Temp greater or equal to 38C (100.4F), Mild Pain (1 - 3)  ALBUTerol    0.083% 2.5 milliGRAM(s) Nebulizer every 2 hours PRN Shortness of Breath and/or Wheezing  metoprolol tartrate Injectable 5 milliGRAM(s) IV Push every 6 hours PRN for heart rate above 110 bpm  morphine  - Injectable 4 milliGRAM(s) IV Push every 3 hours PRN Severe Pain (7 - 10)  ondansetron Injectable 4 milliGRAM(s) IV Push every 6 hours PRN Nausea and/or Vomiting  oxyCODONE    IR 5 milliGRAM(s) Oral every 6 hours PRN Moderate Pain (4 - 6)      RADIOLOGY & ADDITIONAL TESTS: CC: right sided lateral leg pain    INTERVAL HPI/OVERNIGHT EVENTS:  patient being seen for  rt. groin area cellulitis, afib and med management. patient seen at bedside and complains of leg pain which is shooting down the side of his leg likely meralgia paresthetica. Patient with questions on the management of his infection as he is not clear. Discussed with patient in the afternoon that he will need IR guided drainage if possible per surgical recommendation; if unsuccessful however will discuss with surgery need for further cut down; Patient agrees. Xarelto held and patient made NPO after midnight today. Last xarelto yesterday evening at 5PM.    ICU Vital Signs Last 24 Hrs  T(C): 36.7 (09 Jan 2019 11:35), Max: 37.6 (09 Jan 2019 00:23)  T(F): 98.1 (09 Jan 2019 11:35), Max: 99.7 (09 Jan 2019 00:23)  HR: 84 (09 Jan 2019 11:35) (59 - 116)  BP: 106/66 (09 Jan 2019 11:35) (96/60 - 126/82)  BP(mean): --  ABP: --  ABP(mean): --  RR: 20 (09 Jan 2019 11:35) (18 - 20)  SpO2: 92% (09 Jan 2019 11:35) (91% - 96%)    PHYSICAL EXAM:    Physical Exam: General: morbidly obese male lying in bed in NAD.   HEENT: AT, NC. PERRL.  Neck: supple. no JVD.   Chest: CTA bilaterally  Heart: S1,S2. Regular,  no heart murmur. 1 + edema of lower ext.   Abdomen: soft. non-tender. morbidly obese,  + BS.   Ext: no calf tenderness on either side. moves all ext without difficulty.   Neuro: AAO x3. no focal weakness. no speech disorder. CN II to XII intact.   vascular : 2 + DP B/L.   Skin: rt. groin area in the medial aspect has about 3.5 inch long and about 2 inch wide oval shaped area with erythema and induration, some purulent appearing drinage inside Psych : normal affect.	      LABS:                                   12.6   18.8  )-----------( 281      ( 09 Jan 2019 05:38 )             39.3     01-09    134<L>  |  96<L>  |  24.0<H>  ----------------------------<  104  3.7   |  23.0  |  0.96    Ca    8.1<L>      09 Jan 2019 05:38  Mg     2.1     01-09    TPro  6.4<L>  /  Alb  2.9<L>  /  TBili  0.7  /  DBili  x   /  AST  24  /  ALT  33  /  AlkPhos  42  01-09      MEDICATIONS  (STANDING):  ALBUTerol    90 MICROgram(s) HFA Inhaler 1 Puff(s) Inhalation every 4 hours  ALBUTerol/ipratropium for Nebulization 3 milliLiter(s) Nebulizer every 6 hours  ertapenem  IVPB 1000 milliGRAM(s) IV Intermittent every 24 hours  furosemide    Tablet 40 milliGRAM(s) Oral daily  influenza   Vaccine 0.5 milliLiter(s) IntraMuscular once  lactobacillus acidophilus 1 Tablet(s) Oral three times a day with meals  metoprolol tartrate 25 milliGRAM(s) Oral three times a day  predniSONE   Tablet 20 milliGRAM(s) Oral daily  rivaroxaban 20 milliGRAM(s) Oral every 24 hours  tiotropium 18 MICROgram(s) Capsule 1 Capsule(s) Inhalation daily  vancomycin  IVPB 1250 milliGRAM(s) IV Intermittent every 12 hours    MEDICATIONS  (PRN):  acetaminophen   Tablet .. 650 milliGRAM(s) Oral every 6 hours PRN Temp greater or equal to 38C (100.4F), Mild Pain (1 - 3)  ALBUTerol    0.083% 2.5 milliGRAM(s) Nebulizer every 2 hours PRN Shortness of Breath and/or Wheezing  metoprolol tartrate Injectable 5 milliGRAM(s) IV Push every 6 hours PRN for heart rate above 110 bpm  morphine  - Injectable 4 milliGRAM(s) IV Push every 3 hours PRN Severe Pain (7 - 10)  ondansetron Injectable 4 milliGRAM(s) IV Push every 6 hours PRN Nausea and/or Vomiting  oxyCODONE    IR 5 milliGRAM(s) Oral every 6 hours PRN Moderate Pain (4 - 6)      RADIOLOGY & ADDITIONAL TESTS:  < from: US Extremity Nonvasc Limited, Right (01.08.19 @ 17:42) >  FINDINGS:   No prior examinations are available for review.    Poorly defined fluid collection is seen in the subcutaneous fat measuring   approximately 5.3 x 1.2 x 4.5 cm.      IMPRESSION:  Poorly defined fluid collection in the right groin.    < end of copied text >

## 2019-01-10 LAB
ANION GAP SERPL CALC-SCNC: 11 MMOL/L — SIGNIFICANT CHANGE UP (ref 5–17)
BUN SERPL-MCNC: 20 MG/DL — SIGNIFICANT CHANGE UP (ref 8–20)
CALCIUM SERPL-MCNC: 8.1 MG/DL — LOW (ref 8.6–10.2)
CHLORIDE SERPL-SCNC: 97 MMOL/L — LOW (ref 98–107)
CO2 SERPL-SCNC: 28 MMOL/L — SIGNIFICANT CHANGE UP (ref 22–29)
CREAT SERPL-MCNC: 0.84 MG/DL — SIGNIFICANT CHANGE UP (ref 0.5–1.3)
CULTURE RESULTS: SIGNIFICANT CHANGE UP
GLUCOSE SERPL-MCNC: 99 MG/DL — SIGNIFICANT CHANGE UP (ref 70–115)
HCT VFR BLD CALC: 39.5 % — LOW (ref 42–52)
HGB BLD-MCNC: 12.6 G/DL — LOW (ref 14–18)
INR BLD: 1.47 RATIO — HIGH (ref 0.88–1.16)
MCHC RBC-ENTMCNC: 29.5 PG — SIGNIFICANT CHANGE UP (ref 27–31)
MCHC RBC-ENTMCNC: 31.9 G/DL — LOW (ref 32–36)
MCV RBC AUTO: 92.5 FL — SIGNIFICANT CHANGE UP (ref 80–94)
PLATELET # BLD AUTO: 266 K/UL — SIGNIFICANT CHANGE UP (ref 150–400)
POTASSIUM SERPL-MCNC: 3.6 MMOL/L — SIGNIFICANT CHANGE UP (ref 3.5–5.3)
POTASSIUM SERPL-SCNC: 3.6 MMOL/L — SIGNIFICANT CHANGE UP (ref 3.5–5.3)
PROTHROM AB SERPL-ACNC: 17.1 SEC — HIGH (ref 10–12.9)
RBC # BLD: 4.27 M/UL — LOW (ref 4.6–6.2)
RBC # FLD: 14 % — SIGNIFICANT CHANGE UP (ref 11–15.6)
SODIUM SERPL-SCNC: 136 MMOL/L — SIGNIFICANT CHANGE UP (ref 135–145)
SPECIMEN SOURCE: SIGNIFICANT CHANGE UP
VANCOMYCIN TROUGH SERPL-MCNC: 10.7 UG/ML — SIGNIFICANT CHANGE UP (ref 10–20)
WBC # BLD: 13.5 K/UL — HIGH (ref 4.8–10.8)
WBC # FLD AUTO: 13.5 K/UL — HIGH (ref 4.8–10.8)

## 2019-01-10 PROCEDURE — 99232 SBSQ HOSP IP/OBS MODERATE 35: CPT

## 2019-01-10 PROCEDURE — 73701 CT LOWER EXTREMITY W/DYE: CPT | Mod: 26,RT

## 2019-01-10 RX ORDER — GABAPENTIN 400 MG/1
300 CAPSULE ORAL THREE TIMES A DAY
Qty: 0 | Refills: 0 | Status: DISCONTINUED | OUTPATIENT
Start: 2019-01-10 | End: 2019-01-15

## 2019-01-10 RX ORDER — CARVEDILOL PHOSPHATE 80 MG/1
12.5 CAPSULE, EXTENDED RELEASE ORAL EVERY 12 HOURS
Qty: 0 | Refills: 0 | Status: DISCONTINUED | OUTPATIENT
Start: 2019-01-10 | End: 2019-01-10

## 2019-01-10 RX ORDER — RIVAROXABAN 15 MG-20MG
20 KIT ORAL ONCE
Qty: 0 | Refills: 0 | Status: COMPLETED | OUTPATIENT
Start: 2019-01-10 | End: 2019-01-10

## 2019-01-10 RX ORDER — VANCOMYCIN HCL 1 G
1000 VIAL (EA) INTRAVENOUS EVERY 8 HOURS
Qty: 0 | Refills: 0 | Status: DISCONTINUED | OUTPATIENT
Start: 2019-01-10 | End: 2019-01-11

## 2019-01-10 RX ORDER — NICOTINE POLACRILEX 2 MG
4 GUM BUCCAL
Qty: 0 | Refills: 0 | Status: DISCONTINUED | OUTPATIENT
Start: 2019-01-10 | End: 2019-01-15

## 2019-01-10 RX ORDER — CARVEDILOL PHOSPHATE 80 MG/1
25 CAPSULE, EXTENDED RELEASE ORAL EVERY 12 HOURS
Qty: 0 | Refills: 0 | Status: DISCONTINUED | OUTPATIENT
Start: 2019-01-10 | End: 2019-01-14

## 2019-01-10 RX ORDER — CARVEDILOL PHOSPHATE 80 MG/1
12.5 CAPSULE, EXTENDED RELEASE ORAL ONCE
Qty: 0 | Refills: 0 | Status: COMPLETED | OUTPATIENT
Start: 2019-01-10 | End: 2019-01-10

## 2019-01-10 RX ORDER — NYSTATIN CREAM 100000 [USP'U]/G
1 CREAM TOPICAL THREE TIMES A DAY
Qty: 0 | Refills: 0 | Status: DISCONTINUED | OUTPATIENT
Start: 2019-01-10 | End: 2019-01-15

## 2019-01-10 RX ADMIN — Medication 20 MILLIGRAM(S): at 16:00

## 2019-01-10 RX ADMIN — Medication 4 MILLIGRAM(S): at 18:40

## 2019-01-10 RX ADMIN — NYSTATIN CREAM 1 APPLICATION(S): 100000 CREAM TOPICAL at 22:10

## 2019-01-10 RX ADMIN — MORPHINE SULFATE 4 MILLIGRAM(S): 50 CAPSULE, EXTENDED RELEASE ORAL at 00:38

## 2019-01-10 RX ADMIN — Medication 25 MILLIGRAM(S): at 05:44

## 2019-01-10 RX ADMIN — Medication 166.67 MILLIGRAM(S): at 06:57

## 2019-01-10 RX ADMIN — RIVAROXABAN 20 MILLIGRAM(S): KIT at 22:08

## 2019-01-10 RX ADMIN — Medication 3 MILLILITER(S): at 20:36

## 2019-01-10 RX ADMIN — Medication 250 MILLIGRAM(S): at 23:16

## 2019-01-10 RX ADMIN — ERTAPENEM SODIUM 120 MILLIGRAM(S): 1 INJECTION, POWDER, LYOPHILIZED, FOR SOLUTION INTRAMUSCULAR; INTRAVENOUS at 18:30

## 2019-01-10 RX ADMIN — Medication 1 TABLET(S): at 16:01

## 2019-01-10 RX ADMIN — MORPHINE SULFATE 4 MILLIGRAM(S): 50 CAPSULE, EXTENDED RELEASE ORAL at 22:18

## 2019-01-10 RX ADMIN — MORPHINE SULFATE 4 MILLIGRAM(S): 50 CAPSULE, EXTENDED RELEASE ORAL at 00:53

## 2019-01-10 RX ADMIN — Medication 0.12 MILLIGRAM(S): at 05:44

## 2019-01-10 RX ADMIN — Medication 650 MILLIGRAM(S): at 15:56

## 2019-01-10 RX ADMIN — Medication 250 MILLIGRAM(S): at 16:17

## 2019-01-10 RX ADMIN — Medication 3 MILLILITER(S): at 15:53

## 2019-01-10 RX ADMIN — GABAPENTIN 300 MILLIGRAM(S): 400 CAPSULE ORAL at 22:09

## 2019-01-10 RX ADMIN — MORPHINE SULFATE 4 MILLIGRAM(S): 50 CAPSULE, EXTENDED RELEASE ORAL at 22:33

## 2019-01-10 RX ADMIN — Medication 0.5 MILLIGRAM(S): at 20:36

## 2019-01-10 RX ADMIN — GABAPENTIN 300 MILLIGRAM(S): 400 CAPSULE ORAL at 16:05

## 2019-01-10 RX ADMIN — Medication 650 MILLIGRAM(S): at 17:00

## 2019-01-10 RX ADMIN — CARVEDILOL PHOSPHATE 12.5 MILLIGRAM(S): 80 CAPSULE, EXTENDED RELEASE ORAL at 09:51

## 2019-01-10 RX ADMIN — CARVEDILOL PHOSPHATE 25 MILLIGRAM(S): 80 CAPSULE, EXTENDED RELEASE ORAL at 18:38

## 2019-01-10 RX ADMIN — NYSTATIN CREAM 1 APPLICATION(S): 100000 CREAM TOPICAL at 16:23

## 2019-01-10 NOTE — PROGRESS NOTE ADULT - SUBJECTIVE AND OBJECTIVE BOX
[facility]  INFECTIOUS DISEASES AND INTERNAL MEDICINE at [place]  =======================================================  Bello García MD  Diplomates American Board of Internal Medicine and Infectious Diseases  =======================================================    [id number]  MAGGIE ELLIOTT       Follow up: Right upper thigh cellulitis  Afebrile, pain in right leg, swelling worse.   Still has heavy discharge. CT showed collection so going for IR drainage.     PAST MEDICAL & SURGICAL HISTORY:  Afib  ROCK (obstructive sleep apnea)  COPD (chronic obstructive pulmonary disease)  No significant past surgical history    Allergies  penicillins (Hives)  shellfish (Pruritus; Rash)    Antibiotics:  Vancomycin  Ertapenem     REVIEW OF SYSTEMS:  CONSTITUTIONAL:  No Fever or chills  HEENT:  No diplopia or blurred vision.  No sore throat or runny nose.  CARDIOVASCULAR:  No chest pain or SOB.  RESPIRATORY:  No cough, shortness of breath, PND or orthopnea.  GASTROINTESTINAL:  No nausea, vomiting or diarrhea.  GENITOURINARY:  No dysuria, frequency or urgency. No Blood in urine  MUSCULOSKELETAL:  right leg edema  SKIN:  right upper thigh swelling and discharge  NEUROLOGIC:  No paresthesias, fasciculations, seizures or weakness.  PSYCHIATRIC:  No disorder of thought or mood.  ENDOCRINE:  No heat or cold intolerance, polyuria or polydipsia.  HEMATOLOGICAL:  No easy bruising or bleeding.     Physical Exam:  Vital Signs Last 24 Hrs  T(C): 36.8 (10 Durga 2019 08:36), Max: 37.6 (09 Jan 2019 21:38)  T(F): 98.3 (10 Durga 2019 08:36), Max: 99.6 (09 Jan 2019 21:38)  HR: 110 (10 Durga 2019 08:36) (65 - 130)  BP: 115/63 (10 Durga 2019 08:36) (100/68 - 126/80)  RR: 18 (10 Durga 2019 08:36) (18 - 20)  SpO2: 95% (10 Durga 2019 08:36) (92% - 97%)  GEN: on O2 with mild respiratory distress , morbidly obese   HEENT: normocephalic and atraumatic. EOMI. PERRL.    NECK: Supple.  No lymphadenopathy   LUNGS: poor air movement. no wheezing or crackles   HEART: Regular rate and rhythm   ABDOMEN: Soft, nontender, and nondistended.  Positive bowel sounds.    : No CVA tenderness  EXTREMITIES: Right upper thigh with an open wound 4-5cm with less pus coming out, induration around it. 2+ leg and foot edema  NEUROLOGIC: grossly intact.  PSYCHIATRIC: Appropriate affect .  SKIN: No ulceration or induration present.    Labs:  01-10    136  |  97<L>  |  20.0  ----------------------------<  99  3.6   |  28.0  |  0.84    Ca    8.1<L>      10 Durga 2019 05:46  Mg     2.1     01-09    TPro  6.4<L>  /  Alb  2.9<L>  /  TBili  0.7  /  DBili  x   /  AST  24  /  ALT  33  /  AlkPhos  42  01-09                        12.6   13.5  )-----------( 266      ( 10 Durga 2019 05:46 )             39.5     PT/INR - ( 10 Durga 2019 05:46 )   PT: 17.1 sec;   INR: 1.47 ratio      LIVER FUNCTIONS - ( 09 Jan 2019 05:38 )  Alb: 2.9 g/dL / Pro: 6.4 g/dL / ALK PHOS: 42 U/L / ALT: 33 U/L / AST: 24 U/L / GGT: x           RECENT CULTURES:  01-08 @ 10:11 Skin     Few Coag Negative Staphylococcus  Few Corynebacterium species    12-30 @ 15:17 .Blood     No growth at 5 days.    12-30 @ 15:15 .Blood     No growth at 5 days.    12-27 @ 18:50      NotDetec    All imaging and other data have been reviewed.  FINDINGS:  There is normal compressibility of the bilateral common femoral, femoral   and popliteal veins. No calf vein thrombosis is detected.  Doppler examination shows normal spontaneous and phasic flow.   Additional sonography in the area of concern in the right groin   demonstrates subcutaneous edema and heterogeneity of the subcutaneous fat   . Approximately 2-3 cm deep to the skin there is a mildly complex fluid   collection measuring 5.3 x 1.1 x 3.0 cm in this region.  IMPRESSION:   No evidence of bilateral lower extremity deep venous thrombosis.  In the area of concern in the right groin, there is a 5.3 cm mildly   complex fluid collection in the subcutaneous fat. Infection/abscess is   not excluded.    USG 1/8:   INTERPRETATION:   soft tissue ultrasound evaluation of the right groin     CPT 72029  CLINICAL INFORMATION:   Abscess.  Soft tissue mass.  TECHNIQUE:   Transcutaneous ultrasound was performed.  Evaluation was   directed at the right groin  FINDINGS:   No prior examinations are available for review.  Poorly defined fluid collection is seen in the subcutaneous fat measuring   approximately 5.3 x 1.2 x 4.5 cm.  IMPRESSION:  Poorly defined fluid collection in the right groin.    Doppler: negative for DVT

## 2019-01-10 NOTE — PROGRESS NOTE ADULT - SUBJECTIVE AND OBJECTIVE BOX
Pt referred to IR for Imaging guided drainage of a right groin/thigh collection noted on ultrasound.  Due to poorly defined collection on US, a contrast enhanced CT was obtained which showed edematous changes in the right proximal thigh medially and right groin, with miniscule fluid.  I also took a look with ultrasound after the CT which also showed a tiny sliver of fluid in the subcutaneous fat, but no drainable collection present.  Therefore the procedure was cancelled.  This was discussed with Dr. Qureshi.

## 2019-01-10 NOTE — PROGRESS NOTE ADULT - ASSESSMENT
Problem/Plan - 1:  ·  Problem: rt. thigh and groin area cellulitis , concern for abscess,   --> collection still present on US of the groin  --> surgeyr and ID following; surgery recommending patient to go for IR drainage; ordered for tomorrow; discussed with IR today  --> agree with iv vanco and ertapenem  --> c.w lactobacillus     Problem/Plan - 2:  ·  Problem: Chronic atrial fibrillation.  Plan: will continue with xarelto.   --> coreg discontinued and started on tid metoprolol  ---> patient still requiring pushes of lopressor; will increase the dose of metoprolol but likely due to patient being more mobile (going to bathroom due to being on lasix)     Problem/Plan - 3:  ·  Problem: Chronic obstructive pulmonary disease, unspecified COPD type.  Plan: not in exacerbation, will keep on duoneb.   --> taper off po prednisone      Problem/Plan - 4:  ·  Problem: ROCK (obstructive sleep apnea).  Plan: bipap qhs     Problem/Plan - 5:  ·  Problem: Morbid obesity.  advised to lose weight      Problem/Plan - 6  ·  Problem: Meralgia paresthetica--> discussed with him how IV morphine may not be adequate for pain; will trial gabapentin for pain in the lateral cutaneous nerve distribution; patient notes that when places weight on the right side pain occurs (buring in sensation) and when sitting at bedside improves; discussed with him to try and sit as often as possible on edge of bed. Problem/Plan - 1:  ·  Problem: rt. thigh and groin area cellulitis   --> collection still present on US of the groin - CT shows fluid but no area of collection able to drain; confirmed with IR who took him for US today to see if an area for aspiration was present but they did not find any.  --> surgery and ID following; asked surgery to come back and comment  --> agree with iv vanco and ertapenem; gas in CT? ertapenem with anaerobic coverage per ID.  --> c.w lactobacillus     Problem/Plan - 2:  ·  Problem: Chronic atrial fibrillation.  Plan: will continue with xarelto.   --> coreg discontinued and started on tid metoprolol  ---> changed to coreg today 12.5 BID  --> cardiology to see patient.   Problem/Plan - 3:  ·  Problem: Chronic obstructive pulmonary disease, unspecified COPD type.  Plan: not in exacerbation, will keep on duoneb.   --> taper off po prednisone      Problem/Plan - 4:  ·  Problem: ROCK (obstructive sleep apnea).  Plan: bipap qhs     Problem/Plan - 5:  ·  Problem: Morbid obesity.  advised to lose weight      Problem/Plan - 6  ·  Problem: Meralgia paresthetica--> gabapentin TID; patient in small chair that is impinging on area that is affected which will exacerbate it. Counsled patient; discussed with NM about getting larger chair. Problem/Plan - 1:  ·  Problem: rt. thigh and groin area cellulitis   --> collection still present on US of the groin - CT shows fluid but no area of collection able to drain; confirmed with IR who took him for US today to see if an area for aspiration was present but they did not find any.  --> surgery and ID following; asked surgery to come back and comment  --> agree with iv vanco and ertapenem; gas in CT? ertapenem with anaerobic coverage per ID.  --> c.w lactobacillus     Problem/Plan - 2:  ·  Problem: Chronic atrial fibrillation.  Plan: will continue with xarelto.   --> coreg discontinued and started on tid metoprolol  ---> changed to coreg today 12.5 BID  --> cardiology to see patient.  --> surgery without any further recommendations can restart xarelto tomorrow     Problem/Plan - 3:  ·  Problem: Chronic obstructive pulmonary disease, unspecified COPD type.  Plan: not in exacerbation, will keep on duoneb.   --> taper off po prednisone      Problem/Plan - 4:  ·  Problem: ROCK (obstructive sleep apnea).  Plan: bipap qhs     Problem/Plan - 5:  ·  Problem: Morbid obesity.  advised to lose weight      Problem/Plan - 6  ·  Problem: Meralgia paresthetica--> gabapentin TID; patient in small chair that is impinging on area that is affected which will exacerbate it. Counsled patient; discussed with NM about getting larger chair.

## 2019-01-10 NOTE — PROGRESS NOTE ADULT - ASSESSMENT
59y/o man with HTN, Afib, ROCK, COPD and morbid obesity was admitted on 12/27 with dyspnea for COPD exacerbation. Also had swelling and pain in right upper thigh for one week for which was taking Abx po with no improvement.    USG showed 5cm complex collection in the area so surgery I&D'd with no purulent discharge (just blood came out), now after about one week, it has heavy pus pouring out. He was on vancomycin and later ertapenem was added due to PCN allergy and discharged on bactrim and Levaquin.      R upper thigh cellulitis  COPD  PCN allergy   Obesity     - Blood culture pending   - Leukocytosis is most likely secondary to steroid use.   - Wound culture with coag neg staph and corynebacterium possibly skin lyle   - USG with 5cm collection   - IR drainage today, will follow cultures   - Will increase Vancomycin 1gm q8h due to low trough   - Continue ertapenem 1gm daily.   - send trough before tomorrow   - High risk for cellulitis due to morbid obesity.    Will follow.

## 2019-01-10 NOTE — PROGRESS NOTE ADULT - SUBJECTIVE AND OBJECTIVE BOX
INTERVAL HPI/OVERNIGHT EVENTS: No acute events overnight. Pain controlled. No fever, chills, chest pain, dyspnea.    MEDICATIONS  (STANDING):  ALBUTerol    90 MICROgram(s) HFA Inhaler 1 Puff(s) Inhalation every 4 hours  ALBUTerol/ipratropium for Nebulization 3 milliLiter(s) Nebulizer every 6 hours  buDESOnide   0.5 milliGRAM(s) Respule 0.5 milliGRAM(s) Inhalation every 12 hours  carvedilol 25 milliGRAM(s) Oral every 12 hours  digoxin     Tablet 0.125 milliGRAM(s) Oral daily  ertapenem  IVPB 1000 milliGRAM(s) IV Intermittent every 24 hours  furosemide    Tablet 40 milliGRAM(s) Oral daily  gabapentin 300 milliGRAM(s) Oral three times a day  influenza   Vaccine 0.5 milliLiter(s) IntraMuscular once  lactobacillus acidophilus 1 Tablet(s) Oral three times a day with meals  nicotine  Polacrilex Gum 4 milliGRAM(s) Oral three times a day with meals  nystatin Powder 1 Application(s) Topical three times a day  tiotropium 18 MICROgram(s) Capsule 1 Capsule(s) Inhalation daily  vancomycin  IVPB 1000 milliGRAM(s) IV Intermittent every 8 hours    MEDICATIONS  (PRN):  acetaminophen   Tablet .. 650 milliGRAM(s) Oral every 6 hours PRN Temp greater or equal to 38C (100.4F), Mild Pain (1 - 3)  ALBUTerol    0.083% 2.5 milliGRAM(s) Nebulizer every 2 hours PRN Shortness of Breath and/or Wheezing  metoprolol tartrate Injectable 5 milliGRAM(s) IV Push every 6 hours PRN for heart rate above 110 bpm  morphine  - Injectable 4 milliGRAM(s) IV Push every 3 hours PRN Severe Pain (7 - 10)  nicotine  Polacrilex Gum 4 milliGRAM(s) Oral every 6 hours PRN nicotine withdrawl  ondansetron Injectable 4 milliGRAM(s) IV Push every 6 hours PRN Nausea and/or Vomiting  oxyCODONE    IR 5 milliGRAM(s) Oral every 6 hours PRN Moderate Pain (4 - 6)      Vital Signs Last 24 Hrs  T(C): 37.2 (10 Durga 2019 15:16), Max: 37.6 (09 Jan 2019 21:38)  T(F): 98.9 (10 Durga 2019 15:16), Max: 99.6 (09 Jan 2019 21:38)  HR: 101 (10 Durga 2019 15:53) (65 - 110)  BP: 104/65 (10 Durga 2019 15:16) (100/68 - 115/63)  BP(mean): --  RR: 18 (10 Durga 2019 15:16) (18 - 20)  SpO2: 98% (10 Durga 2019 15:53) (92% - 98%)    Physical exam:  General: NAD, AOx3, resting comfortably in bed  HEENT: PERRLA, EOMI  Neck: supple, nontender  Respiratory: no respiratory distress, lungs CTAB  Heart: regular rate and rhythm, no murmurs  Abdomen: soft, nontender, nondistended. Normal bowel sounds. No guarding or rebound.  Extremities: no peripheral edema. Normal ROM. Continued erythema and induration right groin.      I&O's Detail    09 Jan 2019 07:01  -  10 Durga 2019 07:00  --------------------------------------------------------  IN:    IV PiggyBack: 300 mL    Oral Fluid: 360 mL  Total IN: 660 mL    OUT:    Voided: 1 mL  Total OUT: 1 mL    Total NET: 659 mL          LABS:                        12.6   13.5  )-----------( 266      ( 10 Durga 2019 05:46 )             39.5     01-10    136  |  97<L>  |  20.0  ----------------------------<  99  3.6   |  28.0  |  0.84    Ca    8.1<L>      10 Durga 2019 05:46  Mg     2.1     01-09    TPro  6.4<L>  /  Alb  2.9<L>  /  TBili  0.7  /  DBili  x   /  AST  24  /  ALT  33  /  AlkPhos  42  01-09    PT/INR - ( 10 Durga 2019 05:46 )   PT: 17.1 sec;   INR: 1.47 ratio

## 2019-01-10 NOTE — PROGRESS NOTE ADULT - ASSESSMENT
57 y/o M h/o atrial fibrillation, ROCK, COPD with right medial thigh cellulitis. No drainable collection during seen on CT during IR procedure. Patient is hemodynamically stable, afebrile.    Plan:  Imaging shows no drainable fluid collection  No surgical intervention at this time  Continue IV antibiotics  Warm compresses to right groin  Surgery will continue to monitor

## 2019-01-10 NOTE — CONSULT NOTE ADULT - SUBJECTIVE AND OBJECTIVE BOX
[facility]                              [address] [address]                                                 PHONE: [phone]                                                 FAX: [phone]  ------------------------------------------------------------------------------------------------    58y Male with past medical history as under with recent admission for shortness of breath and R groin area cellulitis. Pt. noted rt. upper thigh area pain more over lateral aspect , moderate, on and  off , also noted that Pus which was red colored and had an odor was coming from his rt. groin area where he had packing. He was advised to come to ED. He was noted to have AF with moderate RVR. At the time of evaluation, pt reports no palpitations/chest pain. He does report R groin pain.    PAST MEDICAL & SURGICAL HISTORY:  Afib  ROCK (obstructive sleep apnea)  COPD (chronic obstructive pulmonary disease)  No significant past surgical history      penicillins (Hives)  shellfish (Pruritus; Rash)      Review of Systems:  Positive for groin pain  Rest of the systems were reviewed and was negative.     Family history:   No pertinent family history in first degree relative of early CAD, sudden cardiac death or  congenital heart disease    Social History:  No smoking   No alcohol  No other drug use    Vital Signs Last 24 Hrs  T(C): 36.8 (10 Durga 2019 08:36), Max: 37.6 (09 Jan 2019 21:38)  T(F): 98.3 (10 Durga 2019 08:36), Max: 99.6 (09 Jan 2019 21:38)  HR: 110 (10 Durga 2019 08:36) (65 - 115)  BP: 115/63 (10 Durga 2019 08:36) (100/68 - 115/79)  BP(mean): --  RR: 18 (10 Durga 2019 08:36) (18 - 20)  SpO2: 95% (10 Durga 2019 08:36) (92% - 97%)    PHYSICAL EXAM:  Constitutional: Appears well developed, well nourished; alert and co-operative  HEENT:     Head: Normocephalic and atraumatic  Eyes: Conjunctiva normal, No scleral icterus  Neck: Supple, No JVD  Mouth/Throat: Mucous membranes are normal. Mucosa are not pale or dry.  Cardiovascular: S1, s2 irregular  Respiratory: Lungs clear to auscultation; no crepitations, no wheeze  Gastrointestinal:  Soft, Non-tender, + BS	  Musculoskeletal: Normal range of motion. No edema  Skin: R groin induration  Neurologic: Alert oriented to time place and person. Normal strength and no tremor.  Psychiatric: Normal mood and affect, Speech is normal and behavior is normal.        LABS:                        12.6   13.5  )-----------( 266      ( 10 Durga 2019 05:46 )             39.5     01-10    136  |  97<L>  |  20.0  ----------------------------<  99  3.6   |  28.0  |  0.84    Ca    8.1<L>      10 Durga 2019 05:46  Mg     2.1     01-09    TPro  6.4<L>  /  Alb  2.9<L>  /  TBili  0.7  /  DBili  x   /  AST  24  /  ALT  33  /  AlkPhos  42  01-09        PT/INR - ( 10 Durga 2019 05:46 )   PT: 17.1 sec;   INR: 1.47 ratio             RADIOLOGY & ADDITIONAL STUDIES: (reviewed)    CARDIOLOGY TESTING:(reviewed)     ECG (Independent visualization): AF with RVR and WCT likely AF with aberrancy    ECHOCARDIOGRAM:  < from: TTE Echo Complete w/Doppler (12.28.18 @ 17:40) >  Summary:   1. Technically very difficult study.   2. LV was not well seen despite use of contrast. LV function appears to   be grossly moderate-severely reduced.   3. The mitral in-flow pattern reveals no discernable A-wave, therefore   no comment on diastolic function can be made.   4. Mitral valve not well seen. No evidence of significant regurgitation.   5. Aortic valve was not well seen. No evidence of aortic stenosis on   doppler analysis.   6. There is no evidence of pericardial effusion.   7. Endocardial visualization was enhanced with intravenous echo contrast.    [id number] Stevie Portillo MD, Electronically signed on 12/29/2018 at   11:46:29 AM    < end of copied text >    MEDICATIONS:(reviewed)  Home Medications:  Home Medications:  acetaminophen 325 mg oral tablet: 2 tab(s) orally every 6 hours, As needed, Temp greater or equal to 38C (100.4F), Mild Pain (1 - 3) (02 Jan 2019 11:34)  ALPRAZolam 0.25 mg oral tablet: 1 tab(s) orally every 12 hours, As needed, anxiety (02 Jan 2019 11:34)  carvedilol 6.25 mg oral tablet: 1 tab(s) orally every 12 hours (02 Jan 2019 11:34)  celecoxib 100 mg oral capsule: 1 cap(s) orally every 12 hours, As needed, Moderate Pain (4 - 6) (02 Jan 2019 11:34)  levalbuterol 1.25 mg/0.5 mL inhalation solution: 0.5 milliliter(s) inhaled 3 times a day, As Needed (28 Dec 2018 00:09)  Trelegy Ellipta inhalation powder: 1 puff(s) inhaled once a day (28 Dec 2018 00:09)  Xarelto 20 mg oral tablet: 1 tab(s) orally once a day (in the evening)  please hold today, tomorrow and restart on 1/4/18  (02 Jan 2019 11:34)      MEDICATIONS  (STANDING):  ALBUTerol    90 MICROgram(s) HFA Inhaler 1 Puff(s) Inhalation every 4 hours  ALBUTerol/ipratropium for Nebulization 3 milliLiter(s) Nebulizer every 6 hours  buDESOnide   0.5 milliGRAM(s) Respule 0.5 milliGRAM(s) Inhalation every 12 hours  carvedilol 12.5 milliGRAM(s) Oral every 12 hours  digoxin     Tablet 0.125 milliGRAM(s) Oral daily  ertapenem  IVPB 1000 milliGRAM(s) IV Intermittent every 24 hours  furosemide    Tablet 40 milliGRAM(s) Oral daily  gabapentin 300 milliGRAM(s) Oral three times a day  influenza   Vaccine 0.5 milliLiter(s) IntraMuscular once  lactobacillus acidophilus 1 Tablet(s) Oral three times a day with meals  nicotine  Polacrilex Gum 4 milliGRAM(s) Oral three times a day with meals  nystatin Powder 1 Application(s) Topical three times a day  predniSONE   Tablet 20 milliGRAM(s) Oral daily  tiotropium 18 MICROgram(s) Capsule 1 Capsule(s) Inhalation daily  vancomycin  IVPB 1000 milliGRAM(s) IV Intermittent every 8 hours    MEDICATIONS  (PRN):  acetaminophen   Tablet .. 650 milliGRAM(s) Oral every 6 hours PRN Temp greater or equal to 38C (100.4F), Mild Pain (1 - 3)  ALBUTerol    0.083% 2.5 milliGRAM(s) Nebulizer every 2 hours PRN Shortness of Breath and/or Wheezing  metoprolol tartrate Injectable 5 milliGRAM(s) IV Push every 6 hours PRN for heart rate above 110 bpm  morphine  - Injectable 4 milliGRAM(s) IV Push every 3 hours PRN Severe Pain (7 - 10)  nicotine  Polacrilex Gum 4 milliGRAM(s) Oral every 6 hours PRN nicotine withdrawl  ondansetron Injectable 4 milliGRAM(s) IV Push every 6 hours PRN Nausea and/or Vomiting  oxyCODONE    IR 5 milliGRAM(s) Oral every 6 hours PRN Moderate Pain (4 - 6)      ASSESSMENT AND PLAN:    58y Male HTN, HLD, NICM (EF ~ 40-45%), AF On Xarelto, ROCK, active smoker,  COPD, with recent admission for shortness of breath and R groin area cellulitis now readmitted for Right upper thigh cellulitis. CT of groin with minimal fluid. Telemetry shows AF with RVR and WCT likely AF with aberrancy    -  Continue rate control for now.   -  Episodes of rapid AF likely in the setting of underlying infection. Will increase Coreg to 25 mg BID  -  Had been on Xarelto for AF  -  Add low dose ACE-I/ARB for CMP prior to d/c  -  Antibiotics as per ID    Plan discussed with Med physician
ACUTE CARE SURGERY CONSULT    Consulting surgical team: [facility] - Acute Care Surgery  Consulting attending: Dr. Matthews  Patient seen and examined: 01-08-19 @ 02:01    HPI: 57 y/o M h/o atrial fibrillation, ROCK, COPD, recently admitted for right groin abscess and cellulitis s/p I&D presents to ED with drainage from I&D site and continued erythema. No fever, chills, chest pain, dyspnea at home.     PAST MEDICAL HISTORY:  Afib  ROCK (obstructive sleep apnea)  COPD (chronic obstructive pulmonary disease)    PAST SURGICAL HISTORY:  No significant past surgical history    ALLERGIES:  penicillins (Hives)  shellfish (Pruritus; Rash)    MEDICATIONS  (STANDING):  ALBUTerol    90 MICROgram(s) HFA Inhaler 1 Puff(s) Inhalation every 4 hours  ALBUTerol/ipratropium for Nebulization 3 milliLiter(s) Nebulizer every 6 hours  carvedilol 6.25 milliGRAM(s) Oral every 12 hours  furosemide    Tablet 40 milliGRAM(s) Oral daily  influenza   Vaccine 0.5 milliLiter(s) IntraMuscular once  lactobacillus acidophilus 1 Tablet(s) Oral three times a day with meals  levoFLOXacin IVPB 750 milliGRAM(s) IV Intermittent every 24 hours  predniSONE   Tablet 20 milliGRAM(s) Oral daily  rivaroxaban 20 milliGRAM(s) Oral every 24 hours  tiotropium 18 MICROgram(s) Capsule 1 Capsule(s) Inhalation daily  vancomycin  IVPB 1250 milliGRAM(s) IV Intermittent every 8 hours    MEDICATIONS  (PRN):  ALBUTerol    0.083% 2.5 milliGRAM(s) Nebulizer every 2 hours PRN Shortness of Breath and/or Wheezing      VITALS & I/Os:  Vital Signs Last 24 Hrs  T(C): 36.6 (08 Jan 2019 05:53), Max: 37 (08 Jan 2019 02:03)  T(F): 97.8 (08 Jan 2019 05:53), Max: 98.6 (08 Jan 2019 02:03)  HR: 124 (08 Jan 2019 05:53) (68 - 124)  BP: 110/76 (08 Jan 2019 05:53) (110/76 - 129/83)  BP(mean): --  RR: 18 (08 Jan 2019 05:53) (18 - 20)  SpO2: 94% (08 Jan 2019 05:53) (94% - 97%)  CAPILLARY BLOOD GLUCOSE      General: NAD, AOx3, comfortable on examination  HEENT: PERRLA, EOMI, moist mucous membranes  Neck: supple, nontender  Cardiovascular: tachycardia, irregularly irregular rhythm, no murmurs  Respiratory: no respiratory distress, CTAB  Abdomen: Morbidly obese. Soft, nontender, nondistended. No guarding or rebound. Normal bowel sounds.  Extremities: 10 x 10 cm area of induration, tenderness, and erythema to right medial thigh. Prior I&D site noted, with no evidence of drainage from site currently. No fluctuance noted.  Integumentary: warm  Neuro: no motor or sensory deficits    LABS:                        13.5   18.8  )-----------( 279      ( 07 Jan 2019 19:56 )             40.9     01-07    141  |  101  |  22.0<H>  ----------------------------<  111  4.1   |  25.0  |  0.80    Ca    8.4<L>      07 Jan 2019 19:56    TPro  6.4<L>  /  Alb  3.2<L>  /  TBili  0.4  /  DBili  x   /  AST  21  /  ALT  33  /  AlkPhos  44  01-07 01-07 @ 20:01  1.3
[facility]  INFECTIOUS DISEASES AND INTERNAL MEDICINE at [place]  =======================================================  Bello García MD  Diplomates American Board of Internal Medicine and Infectious Diseases  =======================================================    [id number]  MAGGIE MENDOZAALEX       CC;   Right upper leg oozing pus    HPI:  59 y/o morbidly obese male discharged from [facility] on 1/2/19 after being admitted for dyspnea and COPD exacerbation, during admission he also had right groin cellulitis, was seen by surgery team and area was I&D'd with heavy bleeding, no pus was removed and culture was not sent. He had packing which was removed before discharge and was started on po bactrim and Levaquin due to PCN allergy. He and his wife states that since discharge wound started oozing blood and later pus with foul small. no fever.      PAST MEDICAL & SURGICAL HISTORY:  Afib  ROCK (obstructive sleep apnea)  COPD (chronic obstructive pulmonary disease)  No significant past surgical history    Allergies  penicillins (Hives)  shellfish (Pruritus; Rash)    Antibiotics:  levaquin    REVIEW OF SYSTEMS:  CONSTITUTIONAL:  No Fever or chills  HEENT:  No diplopia or blurred vision.  No sore throat or runny nose.  CARDIOVASCULAR:  No chest pain or SOB.  RESPIRATORY:  No cough, shortness of breath, PND or orthopnea.  GASTROINTESTINAL:  No nausea, vomiting or diarrhea.  GENITOURINARY:  No dysuria, frequency or urgency. No Blood in urine  MUSCULOSKELETAL:  right leg edema  SKIN:  right upper thigh swelling and discharge  NEUROLOGIC:  No paresthesias, fasciculations, seizures or weakness.  PSYCHIATRIC:  No disorder of thought or mood.  ENDOCRINE:  No heat or cold intolerance, polyuria or polydipsia.  HEMATOLOGICAL:  No easy bruising or bleeding.     Physical Exam:  Vital Signs Last 24 Hrs  T(C): 36.8 (08 Jan 2019 07:48), Max: 37 (08 Jan 2019 02:03)  T(F): 98.3 (08 Jan 2019 07:48), Max: 98.6 (08 Jan 2019 02:03)  HR: 112 (08 Jan 2019 08:31) (68 - 124)  BP: 116/69 (08 Jan 2019 07:48) (110/76 - 129/83)  RR: 18 (08 Jan 2019 07:48) (18 - 20)  SpO2: 93% (08 Jan 2019 08:31) (93% - 97%)  GEN: on O2 with mild respiratory distress , morbidly obese   HEENT: normocephalic and atraumatic. EOMI. PERRL.    NECK: Supple.  No lymphadenopathy   LUNGS: poor air movement. no wheezing or crackles   HEART: Regular rate and rhythm   ABDOMEN: Soft, nontender, and nondistended.  Positive bowel sounds.    : No CVA tenderness  EXTREMITIES: Right upper thigh with an open wound 5cm with profuse pusy discharge with induration around it. 2+ leg and foot edema  NEUROLOGIC: grossly intact.  PSYCHIATRIC: Appropriate affect .  SKIN: No ulceration or induration present.    Labs:  01-08    135  |  97<L>  |  22.0<H>  ----------------------------<  132<H>  4.0   |  23.0  |  0.76    Ca    8.1<L>      08 Jan 2019 09:19    TPro  6.4<L>  /  Alb  3.2<L>  /  TBili  0.4  /  DBili  x   /  AST  21  /  ALT  33  /  AlkPhos  44  01-07                      12.9   16.1  )-----------( 273      ( 08 Jan 2019 09:19 )             39.5     LIVER FUNCTIONS - ( 07 Jan 2019 19:56 )  Alb: 3.2 g/dL / Pro: 6.4 g/dL / ALK PHOS: 44 U/L / ALT: 33 U/L / AST: 21 U/L / GGT: x           RECENT CULTURES:  12-30 @ 15:17 .Blood     No growth at 5 days.    12-30 @ 15:15 .Blood     No growth at 5 days.    12-27 @ 18:50      [facility]    All imaging and other data have been reviewed.  FINDINGS:  There is normal compressibility of the bilateral common femoral, femoral   and popliteal veins. No calf vein thrombosis is detected.  Doppler examination shows normal spontaneous and phasic flow.   Additional sonography in the area of concern in the right groin   demonstrates subcutaneous edema and heterogeneity of the subcutaneous fat   . Approximately 2-3 cm deep to the skin there is a mildly complex fluid   collection measuring 5.3 x 1.1 x 3.0 cm in this region.  IMPRESSION:   No evidence of bilateral lower extremity deep venous thrombosis.  In the area of concern in the right groin, there is a 5.3 cm mildly   complex fluid collection in the subcutaneous fat. Infection/abscess is   not excluded.    Doppler: negative for DVT

## 2019-01-10 NOTE — PROGRESS NOTE ADULT - SUBJECTIVE AND OBJECTIVE BOX
CC: right sided lateral leg pain    INTERVAL HPI/OVERNIGHT EVENTS:      ICU Vital Signs Last 24 Hrs  T(C): 36.8 (10 Durga 2019 08:36), Max: 37.6 (09 Jan 2019 21:38)  T(F): 98.3 (10 Durga 2019 08:36), Max: 99.6 (09 Jan 2019 21:38)  HR: 110 (10 Durga 2019 08:36) (65 - 130)  BP: 115/63 (10 Durga 2019 08:36) (100/68 - 126/80)  BP(mean): --  ABP: --  ABP(mean): --  RR: 18 (10 Durga 2019 08:36) (18 - 20)  SpO2: 95% (10 Durga 2019 08:36) (92% - 97%)    PHYSICAL EXAM:    Physical Exam: General: morbidly obese male lying in bed in NAD.   HEENT: AT, NC. PERRL.  Neck: supple. no JVD.   Chest: CTA bilaterally  Heart: S1,S2. Regular,  no heart murmur. 1 + edema of lower ext.   Abdomen: soft. non-tender. morbidly obese,  + BS.   Ext: no calf tenderness on either side. moves all ext without difficulty.   Neuro: AAO x3. no focal weakness. no speech disorder. CN II to XII intact.   vascular : 2 + DP B/L.   Skin: rt. groin area in the medial aspect has about 3.5 inch long and about 2 inch wide oval shaped area with erythema and induration, some purulent appearing drinage inside Psych : normal affect.	      LABS:                                   12.6   18.8  )-----------( 281      ( 09 Jan 2019 05:38 )             39.3     01-09    134<L>  |  96<L>  |  24.0<H>  ----------------------------<  104  3.7   |  23.0  |  0.96    Ca    8.1<L>      09 Jan 2019 05:38  Mg     2.1     01-09    TPro  6.4<L>  /  Alb  2.9<L>  /  TBili  0.7  /  DBili  x   /  AST  24  /  ALT  33  /  AlkPhos  42  01-09      MEDICATIONS  (STANDING):  ALBUTerol    90 MICROgram(s) HFA Inhaler 1 Puff(s) Inhalation every 4 hours  ALBUTerol/ipratropium for Nebulization 3 milliLiter(s) Nebulizer every 6 hours  ertapenem  IVPB 1000 milliGRAM(s) IV Intermittent every 24 hours  furosemide    Tablet 40 milliGRAM(s) Oral daily  influenza   Vaccine 0.5 milliLiter(s) IntraMuscular once  lactobacillus acidophilus 1 Tablet(s) Oral three times a day with meals  metoprolol tartrate 25 milliGRAM(s) Oral three times a day  predniSONE   Tablet 20 milliGRAM(s) Oral daily  rivaroxaban 20 milliGRAM(s) Oral every 24 hours  tiotropium 18 MICROgram(s) Capsule 1 Capsule(s) Inhalation daily  vancomycin  IVPB 1250 milliGRAM(s) IV Intermittent every 12 hours    MEDICATIONS  (PRN):  acetaminophen   Tablet .. 650 milliGRAM(s) Oral every 6 hours PRN Temp greater or equal to 38C (100.4F), Mild Pain (1 - 3)  ALBUTerol    0.083% 2.5 milliGRAM(s) Nebulizer every 2 hours PRN Shortness of Breath and/or Wheezing  metoprolol tartrate Injectable 5 milliGRAM(s) IV Push every 6 hours PRN for heart rate above 110 bpm  morphine  - Injectable 4 milliGRAM(s) IV Push every 3 hours PRN Severe Pain (7 - 10)  ondansetron Injectable 4 milliGRAM(s) IV Push every 6 hours PRN Nausea and/or Vomiting  oxyCODONE    IR 5 milliGRAM(s) Oral every 6 hours PRN Moderate Pain (4 - 6)      RADIOLOGY & ADDITIONAL TESTS:  < from: US Extremity Nonvasc Limited, Right (01.08.19 @ 17:42) >  FINDINGS:   No prior examinations are available for review.    Poorly defined fluid collection is seen in the subcutaneous fat measuring   approximately 5.3 x 1.2 x 4.5 cm.      IMPRESSION:  Poorly defined fluid collection in the right groin.    < end of copied text > CC: right sided lateral leg pain    INTERVAL HPI/OVERNIGHT EVENTS:  Patient with complaints of lateral leg pain; discussed with patient about getting a new chair. Increased gabapentin to 300mg TID. Continuing on morphine. Called for cardiology based on patient request. Currently in and out of a-fib with RVR; switched patient to coreg BID.    ICU Vital Signs Last 24 Hrs  T(C): 36.8 (10 Durga 2019 08:36), Max: 37.6 (09 Jan 2019 21:38)  T(F): 98.3 (10 Durga 2019 08:36), Max: 99.6 (09 Jan 2019 21:38)  HR: 110 (10 Durga 2019 08:36) (65 - 130)  BP: 115/63 (10 Durga 2019 08:36) (100/68 - 126/80)  BP(mean): --  ABP: --  ABP(mean): --  RR: 18 (10 Durga 2019 08:36) (18 - 20)  SpO2: 95% (10 Durga 2019 08:36) (92% - 97%)    PHYSICAL EXAM:    Physical Exam: General: morbidly obese male lying in bed in NAD.   HEENT: AT, NC. PERRL.  Neck: supple. no JVD.   Chest: CTA bilaterally  Heart: S1,S2. Regular,  no heart murmur. 1 + edema of lower ext.   Abdomen: soft. non-tender. morbidly obese,  + BS.   Ext: no calf tenderness on either side. moves all ext without difficulty.   Neuro: AAO x3. no focal weakness. no speech disorder. CN II to XII intact.   vascular : 2 + DP B/L.   Skin: rt. groin area in the medial aspect has about 3.5 inch long and about 2 inch wide oval shaped area with erythema and induration, some purulent appearing drinage inside Psych : normal affect.	      LABS:                                   12.6   18.8  )-----------( 281      ( 09 Jan 2019 05:38 )             39.3     01-09    134<L>  |  96<L>  |  24.0<H>  ----------------------------<  104  3.7   |  23.0  |  0.96    Ca    8.1<L>      09 Jan 2019 05:38  Mg     2.1     01-09    TPro  6.4<L>  /  Alb  2.9<L>  /  TBili  0.7  /  DBili  x   /  AST  24  /  ALT  33  /  AlkPhos  42  01-09      MEDICATIONS  (STANDING):  ALBUTerol    90 MICROgram(s) HFA Inhaler 1 Puff(s) Inhalation every 4 hours  ALBUTerol/ipratropium for Nebulization 3 milliLiter(s) Nebulizer every 6 hours  ertapenem  IVPB 1000 milliGRAM(s) IV Intermittent every 24 hours  furosemide    Tablet 40 milliGRAM(s) Oral daily  influenza   Vaccine 0.5 milliLiter(s) IntraMuscular once  lactobacillus acidophilus 1 Tablet(s) Oral three times a day with meals  metoprolol tartrate 25 milliGRAM(s) Oral three times a day  predniSONE   Tablet 20 milliGRAM(s) Oral daily  rivaroxaban 20 milliGRAM(s) Oral every 24 hours  tiotropium 18 MICROgram(s) Capsule 1 Capsule(s) Inhalation daily  vancomycin  IVPB 1250 milliGRAM(s) IV Intermittent every 12 hours    MEDICATIONS  (PRN):  acetaminophen   Tablet .. 650 milliGRAM(s) Oral every 6 hours PRN Temp greater or equal to 38C (100.4F), Mild Pain (1 - 3)  ALBUTerol    0.083% 2.5 milliGRAM(s) Nebulizer every 2 hours PRN Shortness of Breath and/or Wheezing  metoprolol tartrate Injectable 5 milliGRAM(s) IV Push every 6 hours PRN for heart rate above 110 bpm  morphine  - Injectable 4 milliGRAM(s) IV Push every 3 hours PRN Severe Pain (7 - 10)  ondansetron Injectable 4 milliGRAM(s) IV Push every 6 hours PRN Nausea and/or Vomiting  oxyCODONE    IR 5 milliGRAM(s) Oral every 6 hours PRN Moderate Pain (4 - 6)      RADIOLOGY & ADDITIONAL TESTS:  < from: US Extremity Nonvasc Limited, Right (01.08.19 @ 17:42) >  FINDINGS:   No prior examinations are available for review.    Poorly defined fluid collection is seen in the subcutaneous fat measuring   approximately 5.3 x 1.2 x 4.5 cm.      IMPRESSION:  Poorly defined fluid collection in the right groin.    < end of copied text >

## 2019-01-10 NOTE — CONSULT NOTE ADULT - REASON FOR ADMISSION
rt. upper leg pain.
H/O hernia repair    H/O knee surgery    History of tonsillectomy

## 2019-01-11 LAB
ANION GAP SERPL CALC-SCNC: 14 MMOL/L — SIGNIFICANT CHANGE UP (ref 5–17)
BASOPHILS # BLD AUTO: 0 K/UL — SIGNIFICANT CHANGE UP (ref 0–0.2)
BASOPHILS NFR BLD AUTO: 0.1 % — SIGNIFICANT CHANGE UP (ref 0–2)
BUN SERPL-MCNC: 16 MG/DL — SIGNIFICANT CHANGE UP (ref 8–20)
CALCIUM SERPL-MCNC: 8.2 MG/DL — LOW (ref 8.6–10.2)
CHLORIDE SERPL-SCNC: 98 MMOL/L — SIGNIFICANT CHANGE UP (ref 98–107)
CO2 SERPL-SCNC: 26 MMOL/L — SIGNIFICANT CHANGE UP (ref 22–29)
CREAT SERPL-MCNC: 0.72 MG/DL — SIGNIFICANT CHANGE UP (ref 0.5–1.3)
EOSINOPHIL # BLD AUTO: 0.1 K/UL — SIGNIFICANT CHANGE UP (ref 0–0.5)
EOSINOPHIL NFR BLD AUTO: 0.4 % — SIGNIFICANT CHANGE UP (ref 0–5)
GLUCOSE SERPL-MCNC: 87 MG/DL — SIGNIFICANT CHANGE UP (ref 70–115)
HCT VFR BLD CALC: 41.1 % — LOW (ref 42–52)
HGB BLD-MCNC: 13.3 G/DL — LOW (ref 14–18)
LYMPHOCYTES # BLD AUTO: 11.1 % — LOW (ref 20–55)
LYMPHOCYTES # BLD AUTO: 2 K/UL — SIGNIFICANT CHANGE UP (ref 1–4.8)
MCHC RBC-ENTMCNC: 30.5 PG — SIGNIFICANT CHANGE UP (ref 27–31)
MCHC RBC-ENTMCNC: 32.4 G/DL — SIGNIFICANT CHANGE UP (ref 32–36)
MCV RBC AUTO: 94.3 FL — HIGH (ref 80–94)
MONOCYTES # BLD AUTO: 1.4 K/UL — HIGH (ref 0–0.8)
MONOCYTES NFR BLD AUTO: 7.5 % — SIGNIFICANT CHANGE UP (ref 3–10)
NEUTROPHILS # BLD AUTO: 14.6 K/UL — HIGH (ref 1.8–8)
NEUTROPHILS NFR BLD AUTO: 80.3 % — HIGH (ref 37–73)
NT-PROBNP SERPL-SCNC: 1882 PG/ML — HIGH (ref 0–300)
PLATELET # BLD AUTO: 250 K/UL — SIGNIFICANT CHANGE UP (ref 150–400)
POTASSIUM SERPL-MCNC: 4.2 MMOL/L — SIGNIFICANT CHANGE UP (ref 3.5–5.3)
POTASSIUM SERPL-SCNC: 4.2 MMOL/L — SIGNIFICANT CHANGE UP (ref 3.5–5.3)
RBC # BLD: 4.36 M/UL — LOW (ref 4.6–6.2)
RBC # FLD: 14 % — SIGNIFICANT CHANGE UP (ref 11–15.6)
SODIUM SERPL-SCNC: 138 MMOL/L — SIGNIFICANT CHANGE UP (ref 135–145)
VANCOMYCIN TROUGH SERPL-MCNC: 17.1 UG/ML — SIGNIFICANT CHANGE UP (ref 10–20)
WBC # BLD: 18.2 K/UL — HIGH (ref 4.8–10.8)
WBC # FLD AUTO: 18.2 K/UL — HIGH (ref 4.8–10.8)

## 2019-01-11 PROCEDURE — 99232 SBSQ HOSP IP/OBS MODERATE 35: CPT

## 2019-01-11 RX ORDER — SPIRONOLACTONE 25 MG/1
12.5 TABLET, FILM COATED ORAL DAILY
Qty: 0 | Refills: 0 | Status: DISCONTINUED | OUTPATIENT
Start: 2019-01-11 | End: 2019-01-15

## 2019-01-11 RX ORDER — DAPTOMYCIN 500 MG/10ML
700 INJECTION, POWDER, LYOPHILIZED, FOR SOLUTION INTRAVENOUS EVERY 24 HOURS
Qty: 0 | Refills: 0 | Status: DISCONTINUED | OUTPATIENT
Start: 2019-01-11 | End: 2019-01-15

## 2019-01-11 RX ORDER — RIVAROXABAN 15 MG-20MG
20 KIT ORAL EVERY 24 HOURS
Qty: 0 | Refills: 0 | Status: DISCONTINUED | OUTPATIENT
Start: 2019-01-11 | End: 2019-01-15

## 2019-01-11 RX ORDER — SIMETHICONE 80 MG/1
80 TABLET, CHEWABLE ORAL ONCE
Qty: 0 | Refills: 0 | Status: COMPLETED | OUTPATIENT
Start: 2019-01-11 | End: 2019-01-11

## 2019-01-11 RX ADMIN — NYSTATIN CREAM 1 APPLICATION(S): 100000 CREAM TOPICAL at 22:03

## 2019-01-11 RX ADMIN — Medication 650 MILLIGRAM(S): at 19:05

## 2019-01-11 RX ADMIN — Medication 1 TABLET(S): at 09:33

## 2019-01-11 RX ADMIN — Medication 0.12 MILLIGRAM(S): at 05:52

## 2019-01-11 RX ADMIN — MORPHINE SULFATE 4 MILLIGRAM(S): 50 CAPSULE, EXTENDED RELEASE ORAL at 22:15

## 2019-01-11 RX ADMIN — GABAPENTIN 300 MILLIGRAM(S): 400 CAPSULE ORAL at 05:53

## 2019-01-11 RX ADMIN — NYSTATIN CREAM 1 APPLICATION(S): 100000 CREAM TOPICAL at 13:38

## 2019-01-11 RX ADMIN — NYSTATIN CREAM 1 APPLICATION(S): 100000 CREAM TOPICAL at 05:52

## 2019-01-11 RX ADMIN — OXYCODONE HYDROCHLORIDE 5 MILLIGRAM(S): 5 TABLET ORAL at 14:40

## 2019-01-11 RX ADMIN — ERTAPENEM SODIUM 120 MILLIGRAM(S): 1 INJECTION, POWDER, LYOPHILIZED, FOR SOLUTION INTRAMUSCULAR; INTRAVENOUS at 19:10

## 2019-01-11 RX ADMIN — Medication 1 TABLET(S): at 13:39

## 2019-01-11 RX ADMIN — MORPHINE SULFATE 4 MILLIGRAM(S): 50 CAPSULE, EXTENDED RELEASE ORAL at 04:34

## 2019-01-11 RX ADMIN — CARVEDILOL PHOSPHATE 25 MILLIGRAM(S): 80 CAPSULE, EXTENDED RELEASE ORAL at 18:59

## 2019-01-11 RX ADMIN — GABAPENTIN 300 MILLIGRAM(S): 400 CAPSULE ORAL at 22:02

## 2019-01-11 RX ADMIN — Medication 650 MILLIGRAM(S): at 09:30

## 2019-01-11 RX ADMIN — Medication 0.5 MILLIGRAM(S): at 08:21

## 2019-01-11 RX ADMIN — MORPHINE SULFATE 4 MILLIGRAM(S): 50 CAPSULE, EXTENDED RELEASE ORAL at 04:29

## 2019-01-11 RX ADMIN — RIVAROXABAN 20 MILLIGRAM(S): KIT at 22:01

## 2019-01-11 RX ADMIN — Medication 40 MILLIGRAM(S): at 13:38

## 2019-01-11 RX ADMIN — Medication 1 TABLET(S): at 19:04

## 2019-01-11 RX ADMIN — Medication 10 MILLIGRAM(S): at 05:52

## 2019-01-11 RX ADMIN — DAPTOMYCIN 128 MILLIGRAM(S): 500 INJECTION, POWDER, LYOPHILIZED, FOR SOLUTION INTRAVENOUS at 20:10

## 2019-01-11 RX ADMIN — SIMETHICONE 80 MILLIGRAM(S): 80 TABLET, CHEWABLE ORAL at 22:15

## 2019-01-11 RX ADMIN — Medication 3 MILLILITER(S): at 20:47

## 2019-01-11 RX ADMIN — OXYCODONE HYDROCHLORIDE 5 MILLIGRAM(S): 5 TABLET ORAL at 13:47

## 2019-01-11 RX ADMIN — Medication 4 MILLIGRAM(S): at 04:35

## 2019-01-11 RX ADMIN — Medication 0.5 MILLIGRAM(S): at 20:47

## 2019-01-11 RX ADMIN — Medication 4 MILLIGRAM(S): at 19:11

## 2019-01-11 RX ADMIN — GABAPENTIN 300 MILLIGRAM(S): 400 CAPSULE ORAL at 13:39

## 2019-01-11 RX ADMIN — Medication 4 MILLIGRAM(S): at 12:21

## 2019-01-11 RX ADMIN — Medication 250 MILLIGRAM(S): at 09:29

## 2019-01-11 RX ADMIN — Medication 3 MILLILITER(S): at 14:42

## 2019-01-11 RX ADMIN — Medication 3 MILLILITER(S): at 08:21

## 2019-01-11 RX ADMIN — CARVEDILOL PHOSPHATE 25 MILLIGRAM(S): 80 CAPSULE, EXTENDED RELEASE ORAL at 05:52

## 2019-01-11 RX ADMIN — MORPHINE SULFATE 4 MILLIGRAM(S): 50 CAPSULE, EXTENDED RELEASE ORAL at 22:30

## 2019-01-11 NOTE — PROGRESS NOTE ADULT - ASSESSMENT
Problem/Plan - 1:  ·  Problem: rt. thigh and groin area cellulitis   --> CT shows fluid but no area of collection able to drain; confirmed with IR who took him for US today to see if an area for aspiration was present but they did not find any.  --> surgery and ID following; ID says 2 weeks IV abx to see if improvement; wound care at home.  --> on ertapenem and will switch vanco to dapto  --> c.w lactobacillus     Problem/Plan - 2:  ·  Problem: Chronic atrial fibrillation.  Plan: will continue with xarelto.   ---> changed to coreg 25 BID yesterday  --> cardiology recs appreciated  --> xarelto restarted yesterday     Problem/Plan - 3:  ·  Problem: Chronic obstructive pulmonary disease, unspecified COPD type.  Plan: not in exacerbation, will keep on duoneb.   --> tapering off po prednisone; currently on 10mg daily for 3 days     Problem/Plan - 4:  ·  Problem: ROCK (obstructive sleep apnea).  Plan: bipap qhs     Problem/Plan - 5:  ·  Problem: Morbid obesity.  advised to lose weight      Problem/Plan - 6  ·  Problem: Meralgia paresthetica--> gabapentin TID; patient in small chair that is impinging on area that is affected which will exacerbate it. Counseled patient; discussed with NM about getting larger chair.    dispo: home with IV antibiotics once PICC line in.

## 2019-01-11 NOTE — PROGRESS NOTE ADULT - SUBJECTIVE AND OBJECTIVE BOX
[facility]                                   [address] [place] [address]                                                      PHONE: [phone]                                                         FAX: [phone]  [email or web address]  ---------------------------------------------------------------------------------------------------------------------------------    Overnight events/patient complaints: no dyspnea, edema persisits      penicillins (Hives)  shellfish (Pruritus; Rash)    MEDICATIONS  (STANDING):  ALBUTerol    90 MICROgram(s) HFA Inhaler 1 Puff(s) Inhalation every 4 hours  ALBUTerol/ipratropium for Nebulization 3 milliLiter(s) Nebulizer every 6 hours  buDESOnide   0.5 milliGRAM(s) Respule 0.5 milliGRAM(s) Inhalation every 12 hours  carvedilol 25 milliGRAM(s) Oral every 12 hours  DAPTOmycin IVPB 700 milliGRAM(s) IV Intermittent every 24 hours  digoxin     Tablet 0.125 milliGRAM(s) Oral daily  ertapenem  IVPB 1000 milliGRAM(s) IV Intermittent every 24 hours  furosemide    Tablet 40 milliGRAM(s) Oral daily  gabapentin 300 milliGRAM(s) Oral three times a day  influenza   Vaccine 0.5 milliLiter(s) IntraMuscular once  lactobacillus acidophilus 1 Tablet(s) Oral three times a day with meals  nicotine  Polacrilex Gum 4 milliGRAM(s) Oral three times a day with meals  nystatin Powder 1 Application(s) Topical three times a day  predniSONE   Tablet 10 milliGRAM(s) Oral daily  tiotropium 18 MICROgram(s) Capsule 1 Capsule(s) Inhalation daily    MEDICATIONS  (PRN):  acetaminophen   Tablet .. 650 milliGRAM(s) Oral every 6 hours PRN Temp greater or equal to 38C (100.4F), Mild Pain (1 - 3)  ALBUTerol    0.083% 2.5 milliGRAM(s) Nebulizer every 2 hours PRN Shortness of Breath and/or Wheezing  metoprolol tartrate Injectable 5 milliGRAM(s) IV Push every 6 hours PRN for heart rate above 110 bpm  morphine  - Injectable 4 milliGRAM(s) IV Push every 3 hours PRN Severe Pain (7 - 10)  nicotine  Polacrilex Gum 4 milliGRAM(s) Oral every 6 hours PRN nicotine withdrawl  ondansetron Injectable 4 milliGRAM(s) IV Push every 6 hours PRN Nausea and/or Vomiting  oxyCODONE    IR 5 milliGRAM(s) Oral every 6 hours PRN Moderate Pain (4 - 6)      Vital Signs Last 24 Hrs  T(C): 36.7 (11 Jan 2019 04:33), Max: 37.2 (10 Durga 2019 15:16)  T(F): 98 (11 Jan 2019 04:33), Max: 98.9 (10 Durga 2019 15:16)  HR: 74 (11 Jan 2019 08:21) (74 - 107)  BP: 118/83 (11 Jan 2019 04:33) (104/65 - 118/83)  BP(mean): --  RR: 20 (11 Jan 2019 04:33) (18 - 20)  SpO2: 94% (11 Jan 2019 08:21) (94% - 98%)  Daily     Daily   ICU Vital Signs Last 24 Hrs  MAGGIE ELLIOTT  I&O's Detail    10 Durga 2019 07:01  -  11 Jan 2019 07:00  --------------------------------------------------------  IN:    IV PiggyBack: 300 mL  Total IN: 300 mL    OUT:  Total OUT: 0 mL    Total NET: 300 mL        I&O's Summary    10 Durga 2019 07:01  -  11 Jan 2019 07:00  --------------------------------------------------------  IN: 300 mL / OUT: 0 mL / NET: 300 mL      Drug Dosing Weight  MAGGIE DONALDSONMEENA      PHYSICAL EXAM:  General: Appears well developed, well nourished alert and cooperative.  HEENT: Head; normocephalic, atraumatic.  Eyes: Pupils reactive, cornea wnl.  Neck: Supple, no nodes adenopathy, no NVD or carotid bruit or thyromegaly.  CARDIOVASCULAR: Normal S1 and S2, No murmur, rub, gallop or lift.   LUNGS: No rales, rhonchi or wheeze. Normal breath sounds bilaterally.  ABDOMEN: Soft, nontender without mass or organomegaly. bowel sounds normoactive.  EXTREMITIES: No clubbing, cyanosis 2+ edema. Distal pulses wnl.   SKIN: warm and dry with normal turgor.  NEURO: Alert/oriented x 3/normal motor exam. No pathologic reflexes.    PSYCH: normal affect.        LABS:                        12.6   13.5  )-----------( 266      ( 10 Durga 2019 05:46 )             39.5     01-10    136  |  97<L>  |  20.0  ----------------------------<  99  3.6   |  28.0  |  0.84    Ca    8.1<L>      10 Durga 2019 05:46      MAGGIE DONALDSONMEENA      PT/INR - ( 10 Durga 2019 05:46 )   PT: 17.1 sec;   INR: 1.47 ratio               RADIOLOGY & ADDITIONAL STUDIES:    INTERPRETATION OF TELEMETRY (personally reviewed):    ECG:< from: 12 Lead ECG (01.07.19 @ 15:22) >    Diagnosis Line *** Poor data quality, interpretation may be adversely affected  Atrial fibrillation with rapid ventricular response with premature ventricular or aberrantly conducted complexes  Left anterior fascicular block  Anterolateral infarct , age undetermined  Abnormal ECG    Confirmed by KAREN BEASLEY ([id number]) on 1/8/2019 8:09:58 AM    < end of copied text >      ECHO:< from: TTE Echo Complete w/Doppler (12.28.18 @ 17:40) >  Summary:   1. Technically very difficult study.   2. LV was not well seen despite use of contrast. LV function appears to   be grossly moderate-severely reduced.   3. The mitral in-flow pattern reveals no discernable A-wave, therefore   no comment on diastolic function can be made.   4. Mitral valve not well seen. No evidence of significant regurgitation.   5. Aortic valve was not well seen. No evidence of aortic stenosis on   doppler analysis.   6. There is no evidence of pericardial effusion.   7. Endocardial visualization was enhanced with intravenous echo contrast.    [id number] Stevie Portillo MD, Electronically signed on 12/29/2018 at   11:46:29 AM              *** Final ***                < end of copied text >

## 2019-01-11 NOTE — PROGRESS NOTE ADULT - SUBJECTIVE AND OBJECTIVE BOX
[facility]  INFECTIOUS DISEASES AND INTERNAL MEDICINE [facility] [place]  =======================================================  Bello García MD  Diplomates American Board of Internal Medicine and Infectious Diseases  =======================================================    [id number]  MAGGIE ELLIOTT       Follow up: Right upper thigh cellulitis  Afebrile, pain in right leg, swelling +   IR couldn't drain any fluid from leg, looks like it is just cellulitis.     PAST MEDICAL & SURGICAL HISTORY:  Afib  ROCK (obstructive sleep apnea)  COPD (chronic obstructive pulmonary disease)  No significant past surgical history    Allergies  penicillins (Hives)  shellfish (Pruritus; Rash)    Antibiotics:  Vancomycin  Ertapenem     REVIEW OF SYSTEMS:  CONSTITUTIONAL:  No Fever or chills  HEENT:  No diplopia or blurred vision.  No sore throat or runny nose.  CARDIOVASCULAR:  No chest pain or SOB.  RESPIRATORY:  No cough, shortness of breath, PND or orthopnea.  GASTROINTESTINAL:  No nausea, vomiting or diarrhea.  GENITOURINARY:  No dysuria, frequency or urgency. No Blood in urine  MUSCULOSKELETAL:  right leg edema  SKIN:  right upper thigh swelling and discharge  NEUROLOGIC:  No paresthesias, fasciculations, seizures or weakness.  PSYCHIATRIC:  No disorder of thought or mood.  ENDOCRINE:  No heat or cold intolerance, polyuria or polydipsia.  HEMATOLOGICAL:  No easy bruising or bleeding.     Physical Exam:  Vital Signs Last 24 Hrs  T(C): 36.7 (11 Jan 2019 04:33), Max: 36.7 (10 Durga 2019 22:01)  T(F): 98 (11 Jan 2019 04:33), Max: 98.1 (10 Durga 2019 22:01)  HR: 86 (11 Jan 2019 14:43) (74 - 101)  BP: 106/76 (11 Jan 2019 13:32) (106/76 - 118/83)  RR: 20 (11 Jan 2019 04:33) (20 - 20)  SpO2: 94% (11 Jan 2019 14:43) (94% - 96%)  GEN: on O2 with mild respiratory distress , morbidly obese   HEENT: normocephalic and atraumatic. EOMI. PERRL.    NECK: Supple.  No lymphadenopathy   LUNGS: poor air movement. no wheezing or crackles   HEART: Regular rate and rhythm   ABDOMEN: Soft, nontender, and nondistended.  Positive bowel sounds.    : No CVA tenderness  EXTREMITIES: Right upper thigh with an open wound 4-5cm with less pus coming out, induration around it. 2+ leg and foot edema  NEUROLOGIC: grossly intact.  PSYCHIATRIC: Appropriate affect .  SKIN: No ulceration or induration present.    Labs:  01-11    138  |  98  |  16.0  ----------------------------<  87  4.2   |  26.0  |  0.72    Ca    8.2<L>      11 Jan 2019 13:15                    13.3   18.2  )-----------( 250      ( 11 Jan 2019 13:15 )             41.1     PT/INR - ( 10 Durga 2019 05:46 )   PT: 17.1 sec;   INR: 1.47 ratio     RECENT CULTURES:  01-08 @ 17:53 .Blood     No growth at 48 hours    01-08 @ 11:51 .Blood     No growth at 48 hours    01-08 @ 10:11 Skin     Few Coag Negative Staphylococcus  Few Corynebacterium species    12-30 @ 15:17 .Blood     No growth at 5 days.    12-30 @ 15:15 .Blood     No growth at 5 days.    All imaging and other data have been reviewed.  FINDINGS:  There is normal compressibility of the bilateral common femoral, femoral   and popliteal veins. No calf vein thrombosis is detected.  Doppler examination shows normal spontaneous and phasic flow.   Additional sonography in the area of concern in the right groin   demonstrates subcutaneous edema and heterogeneity of the subcutaneous fat   . Approximately 2-3 cm deep to the skin there is a mildly complex fluid   collection measuring 5.3 x 1.1 x 3.0 cm in this region.  IMPRESSION:   No evidence of bilateral lower extremity deep venous thrombosis.  In the area of concern in the right groin, there is a 5.3 cm mildly   complex fluid collection in the subcutaneous fat. Infection/abscess is   not excluded.    USG 1/8:   INTERPRETATION:   soft tissue ultrasound evaluation of the right groin     CPT 97576  CLINICAL INFORMATION:   Abscess.  Soft tissue mass.  TECHNIQUE:   Transcutaneous ultrasound was performed.  Evaluation was   directed at the right groin  FINDINGS:   No prior examinations are available for review.  Poorly defined fluid collection is seen in the subcutaneous fat measuring   approximately 5.3 x 1.2 x 4.5 cm.  IMPRESSION:  Poorly defined fluid collection in the right groin.    Doppler: negative for DVT

## 2019-01-11 NOTE — PROGRESS NOTE ADULT - ASSESSMENT
59y/o man with HTN, Afib, ROCK, COPD and morbid obesity was admitted on 12/27 with dyspnea for COPD exacerbation. Also had swelling and pain in right upper thigh for one week for which was taking Abx po with no improvement.    USG showed 5cm complex collection in the area so surgery I&D'd with no purulent discharge (just blood came out), now after about one week, it has heavy pus pouring out. He was on vancomycin and later ertapenem was added due to PCN allergy and discharged on bactrim and Levaquin.  readmitted for worsening, IR failed to drain any fluid. Will treat him with IV Abx for cellulitis.     R upper thigh cellulitis  COPD  PCN allergy   Obesity     - Blood culture neg  - Leukocytosis is most likely secondary to steroid use.   - Wound culture with coag neg staph and corynebacterium possibly skin lyle   - USG with 5cm collection but unable to drain with surgical I&D and IR   - Will switch him from vancomycin to daptomycin 700mg daily due to difficulty getting therapeutic level and also will be discharged home on ABx  - Continue ertapenem 1gm daily.   - PICC line is ordered.   - 2 weeks of daptomycin and ertapenem with ID follow up.  - Discussed with SW about script and insurance approval  - Will need daily wound care and packing.   - High risk for cellulitis due to morbid obesity.    Will follow.

## 2019-01-11 NOTE — PROGRESS NOTE ADULT - ASSESSMENT
Assessmnet  NICM EF 35-40%  chronic systolic HF compensated  chronic AF rate controlled on xarelto  ROCK on CPAP  former tob user  morbid onesity  groin abcess      Rec  ? IV AB at home  resume xarelto  cont coreg dig and lasix  add low dose aldactone 12.5 mg day  repeat BNP if sig elevated increase lasix BID  will FU in office ? trial of entresto

## 2019-01-11 NOTE — PROGRESS NOTE ADULT - SUBJECTIVE AND OBJECTIVE BOX
Acute Care Surgery/Trauma Surgery Progress Note:  No collection amenable to drainage by IR when imaged yesterday.  Patient afebrile, VSS. Pain well controlled. Erythema improved. Denies n/v/f/c/cp/sob.     Medications:   ALBUTerol    90 MICROgram(s) HFA Inhaler 1 Puff(s) Inhalation every 4 hours  ALBUTerol/ipratropium for Nebulization 3 milliLiter(s) Nebulizer every 6 hours  buDESOnide   0.5 milliGRAM(s) Respule 0.5 milliGRAM(s) Inhalation every 12 hours  carvedilol 25 milliGRAM(s) Oral every 12 hours  DAPTOmycin IVPB 700 milliGRAM(s) IV Intermittent every 24 hours  digoxin     Tablet 0.125 milliGRAM(s) Oral daily  ertapenem  IVPB 1000 milliGRAM(s) IV Intermittent every 24 hours  furosemide    Tablet 40 milliGRAM(s) Oral daily  gabapentin 300 milliGRAM(s) Oral three times a day  influenza   Vaccine 0.5 milliLiter(s) IntraMuscular once  lactobacillus acidophilus 1 Tablet(s) Oral three times a day with meals  nicotine  Polacrilex Gum 4 milliGRAM(s) Oral three times a day with meals  nystatin Powder 1 Application(s) Topical three times a day  predniSONE   Tablet 10 milliGRAM(s) Oral daily  rivaroxaban 20 milliGRAM(s) Oral every 24 hours  spironolactone 12.5 milliGRAM(s) Oral daily  tiotropium 18 MICROgram(s) Capsule 1 Capsule(s) Inhalation daily    acetaminophen   Tablet .. 650 milliGRAM(s) Oral every 6 hours PRN Temp greater or equal to 38C (100.4F), Mild Pain (1 - 3)  ALBUTerol    0.083% 2.5 milliGRAM(s) Nebulizer every 2 hours PRN Shortness of Breath and/or Wheezing  metoprolol tartrate Injectable 5 milliGRAM(s) IV Push every 6 hours PRN for heart rate above 110 bpm  morphine  - Injectable 4 milliGRAM(s) IV Push every 3 hours PRN Severe Pain (7 - 10)  nicotine  Polacrilex Gum 4 milliGRAM(s) Oral every 6 hours PRN nicotine withdrawl  ondansetron Injectable 4 milliGRAM(s) IV Push every 6 hours PRN Nausea and/or Vomiting  oxyCODONE    IR 5 milliGRAM(s) Oral every 6 hours PRN Moderate Pain (4 - 6)      Objective:   T(F): 98 (01-11 @ 04:33), Max: 98.9 (01-10 @ 15:16)  HR: 74 (01-11 @ 08:21) (74 - 107)  BP: 118/83 (01-11 @ 04:33) (104/65 - 118/83)  BP(mean): --  ABP: --  ABP(mean): --  RR: 20 (01-11 @ 04:33) (18 - 20)  SpO2: 94% (01-11 @ 08:21) (94% - 98%)      Physical Exam:   GEN: patient resting comfortably in bed, in no acute distress  RESP: respirations are unlabored, no accessory muscle use, no conversational dyspnea  CVS: RRR  GI: Abdomen soft, non-tender, non-distended, no rebound tenderness / guarding  Skin: Right medial thigh w/ 2cm incision, minor amount of fibrinous exudate. No active drainage. Mild erythema improved compared to previous exam.     I&O's    01-10 @ 07:01  -  01-11 @ 07:00  --------------------------------------------------------  IN:    IV PiggyBack: 300 mL  Total IN: 300 mL    OUT:  Total OUT: 0 mL    Total NET: 300 mL          LABS:                        12.6   13.5  )-----------( 266      ( 10 Durga 2019 05:46 )             39.5     01-10    136  |  97<L>  |  20.0  ----------------------------<  99  3.6   |  28.0  |  0.84    Ca    8.1<L>      10 Durga 2019 05:46      PT/INR - ( 10 Durga 2019 05:46 )   PT: 17.1 sec;   INR: 1.47 ratio               MICROBIOLOGY:     Culture - Blood (collected 01-08 @ 17:53)  Source: .Blood  Preliminary Report (01-10 @ 19:01):    No growth at 48 hours    Culture - Blood (collected 01-08 @ 11:51)  Source: .Blood  Preliminary Report (01-10 @ 13:01):    No growth at 48 hours    Culture - Other (collected 01-08 @ 10:11)  Source: Skin  Final Report (01-10 @ 10:06):    Few Coag Negative Staphylococcus    Few Corynebacterium species        PATHOLOGY:      \

## 2019-01-11 NOTE — PROGRESS NOTE ADULT - ASSESSMENT
57 y/o M h/o atrial fibrillation, ROCK, COPD with right medial thigh cellulitis. No drainable collection during seen on CT during IR procedure. Patient is hemodynamically stable, afebrile.    Plan:  - Imaging shows no drainable fluid collection  - Wound care instructions: Gently place gauze packing strip in incision. Cover w/ dry gauze. Secure w/ paper tape or tegaderm. Nursing to change dressing daily.    - No surgical intervention at this time  - ACS to sign off. Thank you for allowing us to participate in the care of this patient.

## 2019-01-11 NOTE — CHART NOTE - NSCHARTNOTEFT_GEN_A_CORE
Called by RN for an order of xarelto.  Pt with hx of atrial fibrillation, scheduled for drainage of right groin cellulitis early this am. Unable to do since no collection was found as per surgery.  No further surgical interventions at this time.  Will order xarelto for tonight given pt's A. fib hx.  RN to alert MD for standing order.
pt. found on 2ln/c and bipap was on standby 02 sat 94% no resp distress noted
Full surgical consultation to follow shortly.    58 morbidly obese male with multiple comorbidities with recent hospital admission for worsening shortness of breath was evaluated by Acute Care Surgery team for right groin collection that was incised and drained. No purulent output upon initial incision. Procedure complicated by bleeding due to anticoagulation for a fib. Bleeding ultimately controlled without surgical intervention and patient was discharged home. Patient with persistent leukocytosis, but felt to be related to steroid use for COPD.  He was sent home with oral antibiotics.  However, he reports purulent drainage from incision site, increasing pain and erythema over last 24 hours and was advised to return to ER. Denies fevers at home, chills.      PMHX: afib, COPD, morbid obesity  PSxHx: recent incision and drainge of right upper medial thigh  Meds: vanco, levaquin, xarelto  ALL: denies  ROS: please see HPI    Exam:  afebrile, tachycardic, BP systolics 120s  NAD, AAox3  Right leg with pitting edema of foot and ankle, right upper medial thigh with previous incision site draining small amount of seropurulent fluid, mild tenderness, surrounding erythema and induration. No crepitus. No bleeding.    DUplex - no DVT.    A/P cellulitis of right thigh with likely incompletely drained abscess cavity  - awaiting ultrasound of right upper medial thigh to evaluate for collections  - antibiotics  - if remaining collection present - will need to plan for repeat incision and drainage. Will consider doing in OR to have hemostatic control readily available.  Will need to be medically optimized though attempt would still be made to do under local anesthetic. Will follow up the ultrasound and discuss with medical team.

## 2019-01-11 NOTE — PROGRESS NOTE ADULT - SUBJECTIVE AND OBJECTIVE BOX
CC: right sided lateral leg pain    HPI:  57 y/o morbidly obese male discharged from [facility] on 1/2/19 after being admitted for dyspnea, copd,  and also had rt. groin area cellulitis, was seen by surgery team and rt. groin area I & d was done , had packing which was removed before diacharge, pt. was followed by ID group and was sent home on bactrim and levaquin. On the day of ER visit pt. noted rt. upper thigh area pain more over lateral aspect , moderate, on and  off , also noted that Pus which was red colored and had an odor was coming from his rt. groin area where he had packing. Pt. called ID office and was instructed to come to ER. Patient admitted for cellulitis and started on IV antibiotics (ertapenem). Seen by surgery who recommended no surgical intervention but suggested IR drainage; per IR not drainable pocket of fluid. ID will continue with IV antibiotics for 2 weeks treatment. patient to get PICC for outpatient therapy and ID followup.    INTERVAL HPI/OVERNIGHT EVENTS:  Patient with no new complaints. understands plan is for outpatient antibiotics; wife will likely help with administration. Will need home services for wounds.    ICU Vital Signs Last 24 Hrs  T(C): 36.6 (11 Jan 2019 16:03), Max: 36.7 (10 Durga 2019 22:01)  T(F): 97.8 (11 Jan 2019 16:03), Max: 98.1 (10 Durga 2019 22:01)  HR: 84 (11 Jan 2019 16:03) (74 - 101)  BP: 106/63 (11 Jan 2019 16:03) (106/63 - 118/83)  BP(mean): --  ABP: --  ABP(mean): --  RR: 20 (11 Jan 2019 04:33) (20 - 20)  SpO2: 95% (11 Jan 2019 16:03) (94% - 96%)      PHYSICAL EXAM:    Physical Exam: General: morbidly obese male lying in bed in NAD.   HEENT: AT, NC. PERRL.  Neck: supple. no JVD.   Chest: CTA bilaterally  Heart: S1,S2. Regular,  no heart murmur. 1 + edema of lower ext.   Abdomen: soft. non-tender. morbidly obese,  + BS.   Ext: no calf tenderness on either side. moves all ext without difficulty.   Neuro: AAO x3. no focal weakness. no speech disorder. CN II to XII intact.   vascular : 2 + DP B/L.   Skin: rt. groin area in the medial aspect has about 3.5 inch long and about 2 inch wide oval shaped area with erythema and induration, some purulent appearing drinage inside Psych : normal affect.	      LABS:                          13.3   18.2  )-----------( 250      ( 11 Jan 2019 13:15 )             41.1     01-11    138  |  98  |  16.0  ----------------------------<  87  4.2   |  26.0  |  0.72    Ca    8.2<L>      11 Jan 2019 13:15      MEDICATIONS  (STANDING):  ALBUTerol    90 MICROgram(s) HFA Inhaler 1 Puff(s) Inhalation every 4 hours  ALBUTerol/ipratropium for Nebulization 3 milliLiter(s) Nebulizer every 6 hours  ertapenem  IVPB 1000 milliGRAM(s) IV Intermittent every 24 hours  furosemide    Tablet 40 milliGRAM(s) Oral daily  influenza   Vaccine 0.5 milliLiter(s) IntraMuscular once  lactobacillus acidophilus 1 Tablet(s) Oral three times a day with meals  metoprolol tartrate 25 milliGRAM(s) Oral three times a day  predniSONE   Tablet 20 milliGRAM(s) Oral daily  rivaroxaban 20 milliGRAM(s) Oral every 24 hours  tiotropium 18 MICROgram(s) Capsule 1 Capsule(s) Inhalation daily  vancomycin  IVPB 1250 milliGRAM(s) IV Intermittent every 12 hours    MEDICATIONS  (PRN):  acetaminophen   Tablet .. 650 milliGRAM(s) Oral every 6 hours PRN Temp greater or equal to 38C (100.4F), Mild Pain (1 - 3)  ALBUTerol    0.083% 2.5 milliGRAM(s) Nebulizer every 2 hours PRN Shortness of Breath and/or Wheezing  metoprolol tartrate Injectable 5 milliGRAM(s) IV Push every 6 hours PRN for heart rate above 110 bpm  morphine  - Injectable 4 milliGRAM(s) IV Push every 3 hours PRN Severe Pain (7 - 10)  ondansetron Injectable 4 milliGRAM(s) IV Push every 6 hours PRN Nausea and/or Vomiting  oxyCODONE    IR 5 milliGRAM(s) Oral every 6 hours PRN Moderate Pain (4 - 6)      RADIOLOGY & ADDITIONAL TESTS:  < from: CT Hip w/ IV Cont, Right (01.10.19 @ 10:54) >  Impression:    Cellulitis along the anteromedial aspect of the right proximal thigh with   areas of confluence. No definite drainable collection is demonstrated.   There is subcutaneous air noted along the anteromedial cutaneous surface   and within the more anterior subcutaneous fatwhich may be related to   sequela of recent irrigation and drainage or be secondary to recent   intervention. Correlate clinically to exclude any underlying infection   with gas-forming organism. Additional areas of subcutaneous edema are   noted along the lateral subcutaneous fat. Prominent subcutaneous varices   are noted as above. No CT evidence of osteomyelitis. Findings discussed   with Dr. Qureshi.    < end of copied text >

## 2019-01-12 PROCEDURE — 99233 SBSQ HOSP IP/OBS HIGH 50: CPT

## 2019-01-12 PROCEDURE — 99232 SBSQ HOSP IP/OBS MODERATE 35: CPT

## 2019-01-12 RX ADMIN — Medication 1 TABLET(S): at 18:13

## 2019-01-12 RX ADMIN — OXYCODONE HYDROCHLORIDE 5 MILLIGRAM(S): 5 TABLET ORAL at 14:57

## 2019-01-12 RX ADMIN — Medication 3 MILLILITER(S): at 20:45

## 2019-01-12 RX ADMIN — GABAPENTIN 300 MILLIGRAM(S): 400 CAPSULE ORAL at 13:09

## 2019-01-12 RX ADMIN — CARVEDILOL PHOSPHATE 25 MILLIGRAM(S): 80 CAPSULE, EXTENDED RELEASE ORAL at 05:54

## 2019-01-12 RX ADMIN — MORPHINE SULFATE 4 MILLIGRAM(S): 50 CAPSULE, EXTENDED RELEASE ORAL at 21:50

## 2019-01-12 RX ADMIN — Medication 40 MILLIGRAM(S): at 13:09

## 2019-01-12 RX ADMIN — RIVAROXABAN 20 MILLIGRAM(S): KIT at 18:13

## 2019-01-12 RX ADMIN — Medication 0.5 MILLIGRAM(S): at 20:45

## 2019-01-12 RX ADMIN — ERTAPENEM SODIUM 120 MILLIGRAM(S): 1 INJECTION, POWDER, LYOPHILIZED, FOR SOLUTION INTRAMUSCULAR; INTRAVENOUS at 14:58

## 2019-01-12 RX ADMIN — Medication 3 MILLILITER(S): at 04:13

## 2019-01-12 RX ADMIN — Medication 3 MILLILITER(S): at 14:22

## 2019-01-12 RX ADMIN — Medication 10 MILLIGRAM(S): at 05:54

## 2019-01-12 RX ADMIN — OXYCODONE HYDROCHLORIDE 5 MILLIGRAM(S): 5 TABLET ORAL at 09:30

## 2019-01-12 RX ADMIN — GABAPENTIN 300 MILLIGRAM(S): 400 CAPSULE ORAL at 05:54

## 2019-01-12 RX ADMIN — NYSTATIN CREAM 1 APPLICATION(S): 100000 CREAM TOPICAL at 21:51

## 2019-01-12 RX ADMIN — Medication 3 MILLILITER(S): at 08:40

## 2019-01-12 RX ADMIN — NYSTATIN CREAM 1 APPLICATION(S): 100000 CREAM TOPICAL at 13:09

## 2019-01-12 RX ADMIN — SPIRONOLACTONE 12.5 MILLIGRAM(S): 25 TABLET, FILM COATED ORAL at 05:54

## 2019-01-12 RX ADMIN — CARVEDILOL PHOSPHATE 25 MILLIGRAM(S): 80 CAPSULE, EXTENDED RELEASE ORAL at 18:13

## 2019-01-12 RX ADMIN — Medication 1 TABLET(S): at 13:09

## 2019-01-12 RX ADMIN — MORPHINE SULFATE 4 MILLIGRAM(S): 50 CAPSULE, EXTENDED RELEASE ORAL at 22:05

## 2019-01-12 RX ADMIN — NYSTATIN CREAM 1 APPLICATION(S): 100000 CREAM TOPICAL at 05:54

## 2019-01-12 RX ADMIN — Medication 4 MILLIGRAM(S): at 08:45

## 2019-01-12 RX ADMIN — MORPHINE SULFATE 4 MILLIGRAM(S): 50 CAPSULE, EXTENDED RELEASE ORAL at 04:31

## 2019-01-12 RX ADMIN — Medication 4 MILLIGRAM(S): at 13:10

## 2019-01-12 RX ADMIN — DAPTOMYCIN 128 MILLIGRAM(S): 500 INJECTION, POWDER, LYOPHILIZED, FOR SOLUTION INTRAVENOUS at 23:01

## 2019-01-12 RX ADMIN — OXYCODONE HYDROCHLORIDE 5 MILLIGRAM(S): 5 TABLET ORAL at 08:48

## 2019-01-12 RX ADMIN — Medication 0.12 MILLIGRAM(S): at 05:54

## 2019-01-12 RX ADMIN — OXYCODONE HYDROCHLORIDE 5 MILLIGRAM(S): 5 TABLET ORAL at 15:30

## 2019-01-12 RX ADMIN — GABAPENTIN 300 MILLIGRAM(S): 400 CAPSULE ORAL at 21:51

## 2019-01-12 RX ADMIN — MORPHINE SULFATE 4 MILLIGRAM(S): 50 CAPSULE, EXTENDED RELEASE ORAL at 04:16

## 2019-01-12 RX ADMIN — Medication 0.5 MILLIGRAM(S): at 08:40

## 2019-01-12 RX ADMIN — Medication 4 MILLIGRAM(S): at 18:13

## 2019-01-12 RX ADMIN — Medication 1 TABLET(S): at 08:44

## 2019-01-12 NOTE — PROGRESS NOTE ADULT - ASSESSMENT
R upper thigh cellulitis  COPD  PCN allergy   Obesity     - Blood culture neg  - Leukocytosis is most likely secondary to steroid use.   - Wound culture with coag neg staph and corynebacterium possibly skin lyle   - USG with 5cm collection but unable to drain with surgical I&D and IR   - Continue daptomycin 700mg daily   - Continue ertapenem 1gm daily.   - PICC line is ordered.   - 2 weeks of daptomycin and ertapenem with ID follow up.  - Discussed with SW about script and insurance approval  - Will need daily wound care and packing.   - High risk for cellulitis due to morbid obesity.    Will follow.      Problem/Plan - 1:  ·  Problem: rt. thigh and groin area cellulitis   --> CT shows fluid but no area of collection able to drain; confirmed with IR who took him for US to see if an area for aspiration was present but they did not find any.  --> surgery and ID following; as per ID - 2 weeks IV abx to see if improvement; wound care at home.  --> on ertapenem and  vanco switched  to dapto  --> c.w lactobacillus     Problem/Plan - 2:  ·  Problem: Chronic atrial fibrillation.  Plan: will continue with xarelto.   --->  coreg 25 BID yesterday  --> cardiology recs appreciated  --> consider ACE/ ARB's on d/c      Problem/Plan - 3:  ·  Problem: Chronic obstructive pulmonary disease, unspecified COPD type.  Plan: not in exacerbation, will keep on duoneb.   --> tapering off po prednisone; currently on 10mg daily for 3 days     Problem/Plan - 4:  ·  Problem: ROCK (obstructive sleep apnea).  Plan: bipap qhs     Problem/Plan - 5:  ·  Problem: Severe  obesity.  advised to lose weight      Problem/Plan - 6  ·  Problem: Meralgia paresthetica--> gabapentin TID; patient in small chair that is impinging on area that is affected which will exacerbate it. Counseled patient; discussed with NM about getting larger chair.    dispo: home with IV antibiotics once PICC line in. Problem/Plan - 1:  ·  Problem: rt. thigh and groin area cellulitis   --> CT shows fluid but no area of collection able to drain; confirmed with IR who took him for US to see if an area for aspiration was present but they did not find any.  --> no surgical intervention needed ,  ID following; as per ID - 2 weeks IV abx to see if improvement; wound care at home.  --> on ertapenem and  vanco switched  to dapto  --> c.w lactobacillus , PICC line pending      Problem/Plan - 2:  ·  Problem: Chronic atrial fibrillation/ NICM ( EF 35-40% )  Plan:  --> cardiology recs appreciated:  cont coreg dig and lasix , Coreg increased to 25 mg BID    low dose aldactone 12.5 mg day added   repeat BNP if sig elevated increase lasix BID , not able to increase as BP on lower side   will FU in office ? trial of entresto       Problem/Plan - 3:  ·  Problem: Chronic obstructive pulmonary disease, unspecified COPD type.  Plan: not in exacerbation, will keep on duoneb.        Problem/Plan - 4:  ·  Problem: ROCK (obstructive sleep apnea).  Plan: bipap qhs     Problem/Plan - 5:  ·  Problem: Severe  obesity.  advised to lose weight      Problem/Plan - 6  ·  Problem: Meralgia paresthetica--> gabapentin TID; patient in small chair that is impinging on area that is affected which will exacerbate it. Counseled patient; discussed with NM about getting larger chair.    dispo: home with IV antibiotics once PICC line in.

## 2019-01-12 NOTE — PROGRESS NOTE ADULT - SUBJECTIVE AND OBJECTIVE BOX
[facility]  INFECTIOUS DISEASES AND INTERNAL MEDICINE at [place]  =======================================================  Bello García MD  Diplomates American Board of Internal Medicine and Infectious Diseases  =======================================================    [id number]  MAGGIE ELLIOTT     Follow up: Right upper thigh cellulitis  Afebrile, pain in right leg, swelling + but improving  IR couldn't drain any fluid from leg, looks like it is just cellulitis.     PAST MEDICAL & SURGICAL HISTORY:  Afib  ROCK (obstructive sleep apnea)  COPD (chronic obstructive pulmonary disease)  No significant past surgical history    Allergies  penicillins (Hives)  shellfish (Pruritus; Rash)    Antibiotics:  Daptomycin  Ertapenem     REVIEW OF SYSTEMS:  CONSTITUTIONAL:  No Fever or chills  HEENT:  No diplopia or blurred vision.  No sore throat or runny nose.  CARDIOVASCULAR:  No chest pain or SOB.  RESPIRATORY:  No cough, shortness of breath, PND or orthopnea.  GASTROINTESTINAL:  No nausea, vomiting or diarrhea.  GENITOURINARY:  No dysuria, frequency or urgency. No Blood in urine  MUSCULOSKELETAL:  right leg edema  SKIN:  right upper thigh swelling and discharge  NEUROLOGIC:  No paresthesias, fasciculations, seizures or weakness.  PSYCHIATRIC:  No disorder of thought or mood.  ENDOCRINE:  No heat or cold intolerance, polyuria or polydipsia.  HEMATOLOGICAL:  No easy bruising or bleeding.     Physical Exam:  Vital Signs Last 24 Hrs  T(C): 36.7 (12 Jan 2019 16:08), Max: 36.8 (12 Jan 2019 11:59)  T(F): 98 (12 Jan 2019 16:08), Max: 98.3 (12 Jan 2019 11:59)  HR: 70 (12 Jan 2019 16:08) (70 - 94)  BP: 115/74 (12 Jan 2019 16:08) (97/65 - 115/74)  RR: 18 (12 Jan 2019 11:59) (18 - 20)  SpO2: 93% (12 Jan 2019 14:23) (93% - 99%)  GEN: on O2 with mild respiratory distress , morbidly obese   HEENT: normocephalic and atraumatic. EOMI. PERRL.    NECK: Supple.  No lymphadenopathy   LUNGS: poor air movement. no wheezing or crackles   HEART: Regular rate and rhythm   ABDOMEN: Soft, nontender, and nondistended.  Positive bowel sounds.    : No CVA tenderness  EXTREMITIES: Right upper thigh with an open wound 4-5cm with less pus coming out, induration around it. 2+ leg and foot edema  NEUROLOGIC: grossly intact.  PSYCHIATRIC: Appropriate affect .  SKIN: No ulceration or induration present.    Labs:  01-11    138  |  98  |  16.0  ----------------------------<  87  4.2   |  26.0  |  0.72    Ca    8.2<L>      11 Jan 2019 13:15                        13.3   18.2  )-----------( 250      ( 11 Jan 2019 13:15 )             41.1     RECENT CULTURES:  01-08 @ 17:53 .Blood     No growth at 48 hours    01-08 @ 11:51 .Blood     No growth at 48 hours    01-08 @ 10:11 Skin     Few Coag Negative Staphylococcus  Few Corynebacterium species    12-30 @ 15:17 .Blood     No growth at 5 days.    12-30 @ 15:15 .Blood     No growth at 5 days.    All imaging and other data have been reviewed.  FINDINGS:  There is normal compressibility of the bilateral common femoral, femoral   and popliteal veins. No calf vein thrombosis is detected.  Doppler examination shows normal spontaneous and phasic flow.   Additional sonography in the area of concern in the right groin   demonstrates subcutaneous edema and heterogeneity of the subcutaneous fat   . Approximately 2-3 cm deep to the skin there is a mildly complex fluid   collection measuring 5.3 x 1.1 x 3.0 cm in this region.  IMPRESSION:   No evidence of bilateral lower extremity deep venous thrombosis.  In the area of concern in the right groin, there is a 5.3 cm mildly   complex fluid collection in the subcutaneous fat. Infection/abscess is   not excluded.    USG 1/8:   INTERPRETATION:   soft tissue ultrasound evaluation of the right groin     CPT 17446  CLINICAL INFORMATION:   Abscess.  Soft tissue mass.  TECHNIQUE:   Transcutaneous ultrasound was performed.  Evaluation was   directed at the right groin  FINDINGS:   No prior examinations are available for review.  Poorly defined fluid collection is seen in the subcutaneous fat measuring   approximately 5.3 x 1.2 x 4.5 cm.  IMPRESSION:  Poorly defined fluid collection in the right groin.    Doppler: negative for DVT

## 2019-01-12 NOTE — DIETITIAN INITIAL EVALUATION ADULT. - PROBLEM SELECTOR PLAN 1
rt. groin area cellulitis , concern for abscess, surgery consult requested, will get rt. groin area ultrasound to r/o abscess, requested dr. moyer to follow, discussed with surgery team, IV antibiotics and ID consult.

## 2019-01-12 NOTE — DIETITIAN INITIAL EVALUATION ADULT. - ADHERENCE
Pt recently admitted (last week)- was educated in depth about weight loss and heart healthy diet.. Pt with great comprehension and is trying to make changes in diet./good

## 2019-01-12 NOTE — PROGRESS NOTE ADULT - ASSESSMENT
57y/o man with HTN, Afib, ROCK, COPD and morbid obesity was admitted on 12/27 with dyspnea for COPD exacerbation. Also had swelling and pain in right upper thigh for one week for which was taking Abx po with no improvement.    USG showed 5cm complex collection in the area so surgery I&D'd with no purulent discharge (just blood came out), now after about one week, it has heavy pus pouring out. He was on vancomycin and later ertapenem was added due to PCN allergy and discharged on bactrim and Levaquin. Readmitted for worsening, IR failed to drain any fluid. Will treat him with IV Abx for cellulitis.     R upper thigh cellulitis  COPD  PCN allergy   Obesity     - Blood culture neg  - Leukocytosis is most likely secondary to steroid use.   - Wound culture with coag neg staph and corynebacterium possibly skin lyle   - USG with 5cm collection but unable to drain with surgical I&D and IR   - Continue daptomycin 700mg daily   - Continue ertapenem 1gm daily.   - PICC line is ordered.   - 2 weeks of daptomycin and ertapenem with ID follow up.  - Discussed with SW about script and insurance approval  - Will need daily wound care and packing.   - High risk for cellulitis due to morbid obesity.    Will follow.

## 2019-01-13 PROCEDURE — 99233 SBSQ HOSP IP/OBS HIGH 50: CPT

## 2019-01-13 RX ORDER — SODIUM CHLORIDE 9 MG/ML
1000 INJECTION INTRAMUSCULAR; INTRAVENOUS; SUBCUTANEOUS ONCE
Qty: 0 | Refills: 0 | Status: COMPLETED | OUTPATIENT
Start: 2019-01-13 | End: 2019-01-13

## 2019-01-13 RX ORDER — CARVEDILOL PHOSPHATE 80 MG/1
12.5 CAPSULE, EXTENDED RELEASE ORAL ONCE
Qty: 0 | Refills: 0 | Status: COMPLETED | OUTPATIENT
Start: 2019-01-13 | End: 2019-01-13

## 2019-01-13 RX ADMIN — Medication 4 MILLIGRAM(S): at 16:35

## 2019-01-13 RX ADMIN — GABAPENTIN 300 MILLIGRAM(S): 400 CAPSULE ORAL at 16:33

## 2019-01-13 RX ADMIN — MORPHINE SULFATE 4 MILLIGRAM(S): 50 CAPSULE, EXTENDED RELEASE ORAL at 21:46

## 2019-01-13 RX ADMIN — Medication 1 TABLET(S): at 16:34

## 2019-01-13 RX ADMIN — MORPHINE SULFATE 4 MILLIGRAM(S): 50 CAPSULE, EXTENDED RELEASE ORAL at 03:40

## 2019-01-13 RX ADMIN — Medication 4 MILLIGRAM(S): at 09:43

## 2019-01-13 RX ADMIN — GABAPENTIN 300 MILLIGRAM(S): 400 CAPSULE ORAL at 21:45

## 2019-01-13 RX ADMIN — GABAPENTIN 300 MILLIGRAM(S): 400 CAPSULE ORAL at 06:11

## 2019-01-13 RX ADMIN — Medication 10 MILLIGRAM(S): at 06:11

## 2019-01-13 RX ADMIN — CARVEDILOL PHOSPHATE 12.5 MILLIGRAM(S): 80 CAPSULE, EXTENDED RELEASE ORAL at 18:31

## 2019-01-13 RX ADMIN — Medication 1 TABLET(S): at 11:29

## 2019-01-13 RX ADMIN — ERTAPENEM SODIUM 120 MILLIGRAM(S): 1 INJECTION, POWDER, LYOPHILIZED, FOR SOLUTION INTRAMUSCULAR; INTRAVENOUS at 16:32

## 2019-01-13 RX ADMIN — MORPHINE SULFATE 4 MILLIGRAM(S): 50 CAPSULE, EXTENDED RELEASE ORAL at 22:01

## 2019-01-13 RX ADMIN — Medication 1 TABLET(S): at 09:43

## 2019-01-13 RX ADMIN — Medication 650 MILLIGRAM(S): at 17:50

## 2019-01-13 RX ADMIN — NYSTATIN CREAM 1 APPLICATION(S): 100000 CREAM TOPICAL at 12:34

## 2019-01-13 RX ADMIN — Medication 0.5 MILLIGRAM(S): at 20:57

## 2019-01-13 RX ADMIN — DAPTOMYCIN 128 MILLIGRAM(S): 500 INJECTION, POWDER, LYOPHILIZED, FOR SOLUTION INTRAVENOUS at 19:52

## 2019-01-13 RX ADMIN — Medication 3 MILLILITER(S): at 20:57

## 2019-01-13 RX ADMIN — RIVAROXABAN 20 MILLIGRAM(S): KIT at 16:34

## 2019-01-13 RX ADMIN — Medication 650 MILLIGRAM(S): at 16:52

## 2019-01-13 RX ADMIN — Medication 4 MILLIGRAM(S): at 11:26

## 2019-01-13 RX ADMIN — MORPHINE SULFATE 4 MILLIGRAM(S): 50 CAPSULE, EXTENDED RELEASE ORAL at 03:55

## 2019-01-13 RX ADMIN — NYSTATIN CREAM 1 APPLICATION(S): 100000 CREAM TOPICAL at 06:11

## 2019-01-13 RX ADMIN — Medication 3 MILLILITER(S): at 08:58

## 2019-01-13 RX ADMIN — NYSTATIN CREAM 1 APPLICATION(S): 100000 CREAM TOPICAL at 21:45

## 2019-01-13 RX ADMIN — Medication 0.5 MILLIGRAM(S): at 08:58

## 2019-01-13 RX ADMIN — Medication 3 MILLILITER(S): at 15:06

## 2019-01-13 NOTE — PROGRESS NOTE ADULT - SUBJECTIVE AND OBJECTIVE BOX
Patient seen and examined on 1/ 13 , note not in the chart ??    CC : R groin pain , b/l LE edema         MEDICATIONS  (STANDING):  ALBUTerol    90 MICROgram(s) HFA Inhaler 1 Puff(s) Inhalation every 4 hours  ALBUTerol/ipratropium for Nebulization 3 milliLiter(s) Nebulizer every 6 hours  buDESOnide   0.5 milliGRAM(s) Respule 0.5 milliGRAM(s) Inhalation every 12 hours  carvedilol 6.25 milliGRAM(s) Oral every 12 hours  DAPTOmycin IVPB 700 milliGRAM(s) IV Intermittent every 24 hours  digoxin     Tablet 0.125 milliGRAM(s) Oral daily  ertapenem  IVPB 1000 milliGRAM(s) IV Intermittent every 24 hours  furosemide    Tablet 40 milliGRAM(s) Oral daily  gabapentin 300 milliGRAM(s) Oral three times a day  lactobacillus acidophilus 1 Tablet(s) Oral three times a day with meals  nicotine  Polacrilex Gum 4 milliGRAM(s) Oral three times a day with meals  nystatin Powder 1 Application(s) Topical three times a day  rivaroxaban 20 milliGRAM(s) Oral every 24 hours  spironolactone 12.5 milliGRAM(s) Oral daily  tiotropium 18 MICROgram(s) Capsule 1 Capsule(s) Inhalation daily    MEDICATIONS  (PRN):  acetaminophen   Tablet .. 650 milliGRAM(s) Oral every 6 hours PRN Temp greater or equal to 38C (100.4F), Mild Pain (1 - 3)  ALBUTerol    0.083% 2.5 milliGRAM(s) Nebulizer every 2 hours PRN Shortness of Breath and/or Wheezing  metoprolol tartrate Injectable 5 milliGRAM(s) IV Push every 6 hours PRN for heart rate above 110 bpm  morphine  - Injectable 4 milliGRAM(s) IV Push every 3 hours PRN Severe Pain (7 - 10)  nicotine  Polacrilex Gum 4 milliGRAM(s) Oral every 6 hours PRN nicotine withdrawl  ondansetron Injectable 4 milliGRAM(s) IV Push every 6 hours PRN Nausea and/or Vomiting  oxyCODONE    IR 5 milliGRAM(s) Oral every 6 hours PRN Moderate Pain (4 - 6)      LABS:                          12.6   11.6  )-----------( 220      ( 14 Jan 2019 13:41 )             39.9     01-14    140  |  102  |  12.0  ----------------------------<  127<H>  3.9   |  24.0  |  0.85    Ca    8.1<L>      14 Jan 2019 13:41      REVIEW OF SYSTEMS:  As above , all other systems reviewed and are negative     Vital Signs Last 24 Hrs  T(C): 37.1 (15 Durga 2019 13:53), Max: 37.3 (15 Durga 2019 04:28)  T(F): 98.7 (15 Durga 2019 13:53), Max: 99.2 (15 Durga 2019 04:28)  HR: 78 (15 Durga 2019 13:53) (78 - 88)  BP: 116/66 (15 Durga 2019 13:53) (100/66 - 134/88)  BP(mean): --  RR: 18 (15 Durga 2019 13:53) (18 - 20)  SpO2: 95% (15 Durga 2019 04:28) (92% - 96%)    PHYSICAL EXAM:    GENERAL: NAD, well-groomed, morbidly obese   HEAD:  Atraumatic, Normocephalic  EYES: EOMI, PERRLA, conjunctiva and sclera clear  NECK: Supple, No JVD, Normal thyroid  NERVOUS SYSTEM:  Alert & Oriented X3, no focal deficit  CHEST/LUNG: CTA b/l ,  no  rales, rhonchi, wheezing, or rubs  HEART: Regular rate and rhythm; No murmurs, rubs, or gallops  ABDOMEN: Soft, Nontender, Nondistended; Bowel sounds present  EXTREMITIES:  2+ Peripheral Pulses, No clubbing, cyanosis, b/l LE edema R > L , R thigh swelling + , incision +   LYMPH: No lymphadenopathy noted  SKIN: No rashes or lesions

## 2019-01-13 NOTE — PROGRESS NOTE ADULT - ASSESSMENT
·  Problem: rt. thigh and groin area cellulitis   --> CT shows fluid but no area of collection able to drain; confirmed with IR who took him for US to see if an area for aspiration was present but they did not find any.  --> no surgical intervention needed ,  ID following; as per ID - 2 weeks IV abx to see if improvement; wound care at home.  --> on ertapenem and  vanco switched  to dapto  --> c.w lactobacillus , PICC line pending      Problem/Plan - 2:  ·  Problem: Chronic atrial fibrillation/ NICM ( EF 35-40% )  Plan:  --> cardiology recs appreciated:  cont coreg dig and lasix , Coreg increased to 25 mg BID    low dose aldactone 12.5 mg day added   repeat BNP if sig elevated increase lasix BID , not able to increase as BP on lower side   will FU in office ? trial of entresto       Problem/Plan - 3:  ·  Problem: Chronic obstructive pulmonary disease, unspecified COPD type.  Plan: not in exacerbation, will keep on duoneb.        Problem/Plan - 4:  ·  Problem: ROCK (obstructive sleep apnea).  Plan: bipap qhs     Problem/Plan - 5:  ·  Problem: Severe  obesity.  advised to lose weight , bariatric surgery ??     Problem/Plan - 6  ·  Problem: Meralgia paresthetica--> gabapentin TID; patient in small chair that is impinging on area that is affected which will exacerbate it. Counseled patient; discussed with NM about getting larger chair.    dispo: home with IV antibiotics once PICC line in.

## 2019-01-14 LAB
ANION GAP SERPL CALC-SCNC: 14 MMOL/L — SIGNIFICANT CHANGE UP (ref 5–17)
BUN SERPL-MCNC: 12 MG/DL — SIGNIFICANT CHANGE UP (ref 8–20)
CALCIUM SERPL-MCNC: 8.1 MG/DL — LOW (ref 8.6–10.2)
CHLORIDE SERPL-SCNC: 102 MMOL/L — SIGNIFICANT CHANGE UP (ref 98–107)
CK SERPL-CCNC: 44 U/L — SIGNIFICANT CHANGE UP (ref 30–200)
CO2 SERPL-SCNC: 24 MMOL/L — SIGNIFICANT CHANGE UP (ref 22–29)
CREAT SERPL-MCNC: 0.85 MG/DL — SIGNIFICANT CHANGE UP (ref 0.5–1.3)
GLUCOSE SERPL-MCNC: 127 MG/DL — HIGH (ref 70–115)
HCT VFR BLD CALC: 39.9 % — LOW (ref 42–52)
HGB BLD-MCNC: 12.6 G/DL — LOW (ref 14–18)
MCHC RBC-ENTMCNC: 29.7 PG — SIGNIFICANT CHANGE UP (ref 27–31)
MCHC RBC-ENTMCNC: 31.6 G/DL — LOW (ref 32–36)
MCV RBC AUTO: 94.1 FL — HIGH (ref 80–94)
PLATELET # BLD AUTO: 220 K/UL — SIGNIFICANT CHANGE UP (ref 150–400)
POTASSIUM SERPL-MCNC: 3.9 MMOL/L — SIGNIFICANT CHANGE UP (ref 3.5–5.3)
POTASSIUM SERPL-SCNC: 3.9 MMOL/L — SIGNIFICANT CHANGE UP (ref 3.5–5.3)
RBC # BLD: 4.24 M/UL — LOW (ref 4.6–6.2)
RBC # FLD: 14.2 % — SIGNIFICANT CHANGE UP (ref 11–15.6)
SODIUM SERPL-SCNC: 140 MMOL/L — SIGNIFICANT CHANGE UP (ref 135–145)
WBC # BLD: 11.6 K/UL — HIGH (ref 4.8–10.8)
WBC # FLD AUTO: 11.6 K/UL — HIGH (ref 4.8–10.8)

## 2019-01-14 PROCEDURE — 99232 SBSQ HOSP IP/OBS MODERATE 35: CPT

## 2019-01-14 RX ORDER — CARVEDILOL PHOSPHATE 80 MG/1
6.25 CAPSULE, EXTENDED RELEASE ORAL EVERY 12 HOURS
Qty: 0 | Refills: 0 | Status: DISCONTINUED | OUTPATIENT
Start: 2019-01-14 | End: 2019-01-15

## 2019-01-14 RX ADMIN — SPIRONOLACTONE 12.5 MILLIGRAM(S): 25 TABLET, FILM COATED ORAL at 05:46

## 2019-01-14 RX ADMIN — INFLUENZA VIRUS VACCINE 0.5 MILLILITER(S): 15; 15; 15; 15 SUSPENSION INTRAMUSCULAR at 09:18

## 2019-01-14 RX ADMIN — Medication 1 TABLET(S): at 11:05

## 2019-01-14 RX ADMIN — Medication 0.5 MILLIGRAM(S): at 10:12

## 2019-01-14 RX ADMIN — Medication 1 TABLET(S): at 17:51

## 2019-01-14 RX ADMIN — Medication 3 MILLILITER(S): at 20:55

## 2019-01-14 RX ADMIN — NYSTATIN CREAM 1 APPLICATION(S): 100000 CREAM TOPICAL at 10:56

## 2019-01-14 RX ADMIN — CARVEDILOL PHOSPHATE 25 MILLIGRAM(S): 80 CAPSULE, EXTENDED RELEASE ORAL at 05:46

## 2019-01-14 RX ADMIN — MORPHINE SULFATE 4 MILLIGRAM(S): 50 CAPSULE, EXTENDED RELEASE ORAL at 23:16

## 2019-01-14 RX ADMIN — Medication 3 MILLILITER(S): at 03:48

## 2019-01-14 RX ADMIN — MORPHINE SULFATE 4 MILLIGRAM(S): 50 CAPSULE, EXTENDED RELEASE ORAL at 03:27

## 2019-01-14 RX ADMIN — Medication 1 TABLET(S): at 09:24

## 2019-01-14 RX ADMIN — ERTAPENEM SODIUM 120 MILLIGRAM(S): 1 INJECTION, POWDER, LYOPHILIZED, FOR SOLUTION INTRAMUSCULAR; INTRAVENOUS at 15:09

## 2019-01-14 RX ADMIN — NYSTATIN CREAM 1 APPLICATION(S): 100000 CREAM TOPICAL at 23:13

## 2019-01-14 RX ADMIN — GABAPENTIN 300 MILLIGRAM(S): 400 CAPSULE ORAL at 23:13

## 2019-01-14 RX ADMIN — Medication 0.5 MILLIGRAM(S): at 20:55

## 2019-01-14 RX ADMIN — Medication 40 MILLIGRAM(S): at 11:00

## 2019-01-14 RX ADMIN — GABAPENTIN 300 MILLIGRAM(S): 400 CAPSULE ORAL at 05:46

## 2019-01-14 RX ADMIN — Medication 3 MILLILITER(S): at 15:45

## 2019-01-14 RX ADMIN — CARVEDILOL PHOSPHATE 6.25 MILLIGRAM(S): 80 CAPSULE, EXTENDED RELEASE ORAL at 17:50

## 2019-01-14 RX ADMIN — Medication 0.12 MILLIGRAM(S): at 05:46

## 2019-01-14 RX ADMIN — MORPHINE SULFATE 4 MILLIGRAM(S): 50 CAPSULE, EXTENDED RELEASE ORAL at 03:42

## 2019-01-14 RX ADMIN — MORPHINE SULFATE 4 MILLIGRAM(S): 50 CAPSULE, EXTENDED RELEASE ORAL at 23:31

## 2019-01-14 RX ADMIN — NYSTATIN CREAM 1 APPLICATION(S): 100000 CREAM TOPICAL at 05:46

## 2019-01-14 RX ADMIN — DAPTOMYCIN 128 MILLIGRAM(S): 500 INJECTION, POWDER, LYOPHILIZED, FOR SOLUTION INTRAVENOUS at 19:29

## 2019-01-14 RX ADMIN — Medication 4 MILLIGRAM(S): at 09:25

## 2019-01-14 RX ADMIN — OXYCODONE HYDROCHLORIDE 5 MILLIGRAM(S): 5 TABLET ORAL at 16:07

## 2019-01-14 RX ADMIN — RIVAROXABAN 20 MILLIGRAM(S): KIT at 17:51

## 2019-01-14 RX ADMIN — Medication 4 MILLIGRAM(S): at 11:03

## 2019-01-14 RX ADMIN — Medication 3 MILLILITER(S): at 10:12

## 2019-01-14 RX ADMIN — GABAPENTIN 300 MILLIGRAM(S): 400 CAPSULE ORAL at 11:04

## 2019-01-14 RX ADMIN — OXYCODONE HYDROCHLORIDE 5 MILLIGRAM(S): 5 TABLET ORAL at 15:07

## 2019-01-14 NOTE — PROGRESS NOTE ADULT - ASSESSMENT
1. 57 yo morbidly obese male with slowly resolving right groin infection and cellultis. CT showed no drainable collection.  2. chronic afib-on Xarelto  3. COPD-recently quit smoking.  4. ROCK.  5. Cardiomyopathy-etiology unclear but no hx of ischemic events-on coreg/aldactone/digoxin and oral lasix. At present with borderline BP would continue current regimen. Would not add Entresto at this time 1. 59 yo morbidly obese male with slowly resolving right groin infection and cellulitis. CT showed no drainable collection.  2. chronic afib-on Xarelto  3. COPD-recently quit smoking.  4. ROCK.  5. Cardiomyopathy-etiology unclear but no hx of ischemic events-on coreg/aldactone/digoxin and oral lasix. At present with borderline BP would continue current regimen. Would not add Entresto at this time

## 2019-01-14 NOTE — PROGRESS NOTE ADULT - ASSESSMENT
57 y/o morbidly obese male discharged from [facility] on 1/2/19 after being admitted for dyspnea, copd,  and also had rt. groin area cellulitis, was seen by surgery team and rt. groin area I & d was done , had packing which was removed before diacharge, pt. was followed by ID group and was sent home on bactrim and levaquin. On the day of ER visit pt. noted rt. upper thigh area pain more over lateral aspect , moderate, on and  off , also noted that Pus which was red colored and had an odor was coming from his rt. groin area where he had packing. Pt. called ID office and was instructed to come to ER. pt , reports no fever. no cp. no sob. no abd. pain. no n/v/d. (08 Jan 2019 01:34)        ·  Problem: rt. thigh and groin area cellulitis   --> CT shows fluid but no area of collection able to drain; confirmed with IR who took him for US to see if an area for aspiration was present but they did not find any.  --> no surgical intervention needed ,  ID following; as per ID - 2 weeks IV abx to see if improvement; wound care at home.  --> on ertapenem and  vanco switched  to dapto  --> c.w lactobacillus , PICC line pending      Problem/Plan - 2:  ·  Problem: Chronic atrial fibrillation/ NICM ( EF 35-40% )  Plan:  --> cardiology recs appreciated:  cont coreg dig and lasix , Coreg increased to 25 mg BID    low dose aldactone 12.5 mg day added   repeat BNP if sig elevated increase lasix BID , not able to increase as BP on lower side   will FU in office ? trial of entresto       Problem/Plan - 3:  ·  Problem: Chronic obstructive pulmonary disease, unspecified COPD type.  Plan: not in exacerbation, will keep on duoneb.        Problem/Plan - 4:  ·  Problem: ROCK (obstructive sleep apnea).  Plan: bipap qhs     Problem/Plan - 5:  ·  Problem: Severe  obesity.  advised to lose weight      Problem/Plan - 6  ·  Problem: Meralgia paresthetica--> gabapentin TID; patient in small chair that is impinging on area that is affected which will exacerbate it. Counseled patient; discussed with NM about getting larger chair.    dispo: home with IV antibiotics once PICC line in. 57 y/o morbidly obese male discharged from [facility] on 1/2/19 after being admitted for dyspnea, copd,  and also had rt. groin area cellulitis, was seen by surgery team and rt. groin area I & d was done , had packing which was removed before diacharge, pt. was followed by ID group and was sent home on bactrim and levaquin. On the day of ER visit pt. noted rt. upper thigh area pain more over lateral aspect , moderate, on and  off , also noted that Pus which was red colored and had an odor was coming from his rt. groin area where he had packing. Pt. called ID office and was instructed to come to ER. pt , reports no fever. no cp. no sob. no abd. pain. no n/v/d. (08 Jan 2019 01:34) . On iv abx as per ID , wound care  . BP on lower side , cardiology re called . D/W Dr Peace /Beck , pending PICC line placement .        ·  Problem: rt. thigh and groin area cellulitis   --> CT shows fluid but no area of collection able to drain; confirmed with IR who took him for US to see if an area for aspiration was present but they did not find any.  --> no surgical intervention needed ,  ID following; as per ID - 2 weeks IV abx to see if improvement; wound care at home.  --> on ertapenem and  vanco switched  to dapto  --> c.w lactobacillus , PICC line pending      Problem/Plan - 2:  ·  Problem: Chronic atrial fibrillation/ NICM ( EF 35-40% )  Plan:  Continue Xarelto , d/w Dr Deng - recommended to decrease Coreg to 6.25 mg BID secondary to BP being on lower side , she will titrate as on outpatient   NICM with b/l LE edema - continue Lsix , Spironolactone ,  Entresto to be considered as on outpatient       Problem/Plan - 3:  ·  Problem: Chronic obstructive pulmonary disease, unspecified COPD type.  Plan: not in exacerbation, will keep on duoneb.        Problem/Plan - 4:  ·  Problem: ROCK (obstructive sleep apnea).  Plan: bipap qhs     Problem/Plan - 5:  ·  Problem: Severe  obesity.  advised to lose weight      Problem/Plan - 6  ·  Problem: Meralgia paresthetica--> gabapentin TID; patient in small chair that is impinging on area that is affected which will exacerbate it. Counseled patient; discussed with NM about getting larger chair.    dispo: home with IV antibiotics once PICC line in and BP stable on adjusted meds - likely in am 59 y/o morbidly obese male discharged from [facility] on 1/2/19 after being admitted for dyspnea, copd,  and also had rt. groin area cellulitis, was seen by surgery team and rt. groin area I & d was done , had packing which was removed before diacharge, pt. was followed by ID group and was sent home on bactrim and levaquin. On the day of ER visit pt. noted rt. upper thigh area pain more over lateral aspect , moderate, on and  off , also noted that Pus which was red colored and had an odor was coming from his rt. groin area where he had packing. Pt. called ID office and was instructed to come to ER. pt , reports no fever. no cp. no sob. no abd. pain. no n/v/d. (08 Jan 2019 01:34) . On iv abx as per ID , wound care  . BP on lower side , cardiology re called . D/W Dr Peace /Beck , pending PICC line placement .        ·  Problem: rt. thigh and groin area cellulitis   --> CT shows fluid but no area of collection able to drain; confirmed with IR who took him for US to see if an area for aspiration was present but they did not find any.  --> no surgical intervention needed ,  ID following; as per ID - 2 weeks IV abx to see if improvement; wound care at home.  --> on ertapenem and  vanco switched  to dapto  --> c.w lactobacillus , PICC line pending      Problem/Plan - 2:  ·  Problem: Chronic atrial fibrillation/ NICM ( EF 35-40% )  Plan:  Continue Xarelto , continue BB ,  d/w Dr Deng - recommended to decrease Coreg to 6.25 mg BID secondary to BP being on lower side , she will titrate as on outpatient   NICM with b/l LE edema - continue Lsix , Spironolactone ,  Entresto to be considered as on outpatient       Problem/Plan - 3:  ·  Problem: Chronic obstructive pulmonary disease, unspecified COPD type.  Plan: not in exacerbation, will keep on duoneb.        Problem/Plan - 4:  ·  Problem: ROCK (obstructive sleep apnea).  Plan: bipap qhs     Problem/Plan - 5:  ·  Problem: Severe  obesity.  advised to lose weight      Problem/Plan - 6  ·  Problem: Meralgia paresthetica--> gabapentin TID; patient in small chair that is impinging on area that is affected which will exacerbate it. Counseled patient; discussed with NM about getting larger chair.    dispo: home with IV antibiotics once PICC line in and BP stable on adjusted meds - likely in am

## 2019-01-14 NOTE — PROGRESS NOTE ADULT - ASSESSMENT
59y/o man with HTN, Afib, ROCK, COPD and morbid obesity was admitted on 12/27 with dyspnea for COPD exacerbation. Also had swelling and pain in right upper thigh for one week for which was taking Abx po with no improvement.    USG showed 5cm complex collection in the area so surgery I&D'd with no purulent discharge (just blood came out), now after about one week, it has heavy pus pouring out. He was on vancomycin and later ertapenem was added due to PCN allergy and discharged on bactrim and Levaquin. Readmitted for worsening, IR failed to drain any fluid. Will treat him with IV Abx for cellulitis.     R upper thigh cellulitis  COPD  PCN allergy   Obesity     - Blood culture neg  - Leukocytosis is most likely secondary to steroid use.   - Wound culture with coag neg staph and corynebacterium possibly skin lyle   - USG with 5cm collection but unable to drain with surgical I&D and IR   - Continue daptomycin 700mg daily   - Continue ertapenem 1gm daily.   - PICC line today  - 2 weeks of daptomycin and ertapenem total with ID follow up.l  - Will need daily wound care and packing.   - High risk for cellulitis due to morbid obesity.    Will sign off please call with any question.

## 2019-01-14 NOTE — PROGRESS NOTE ADULT - REASON FOR ADMISSION
rt. upper leg pain.

## 2019-01-14 NOTE — PROGRESS NOTE ADULT - SUBJECTIVE AND OBJECTIVE BOX
Patient seen and examined . Comfortable , R thigh pain better controlled today , no other complaints .     CC : R thigh pain / cellulitis  , c/o increasing of B/L LE edema       MEDICATIONS  (STANDING):  ALBUTerol    90 MICROgram(s) HFA Inhaler 1 Puff(s) Inhalation every 4 hours  ALBUTerol/ipratropium for Nebulization 3 milliLiter(s) Nebulizer every 6 hours  buDESOnide   0.5 milliGRAM(s) Respule 0.5 milliGRAM(s) Inhalation every 12 hours  carvedilol 6.25 milliGRAM(s) Oral every 12 hours  DAPTOmycin IVPB 700 milliGRAM(s) IV Intermittent every 24 hours  digoxin     Tablet 0.125 milliGRAM(s) Oral daily  ertapenem  IVPB 1000 milliGRAM(s) IV Intermittent every 24 hours  furosemide    Tablet 40 milliGRAM(s) Oral daily  gabapentin 300 milliGRAM(s) Oral three times a day  lactobacillus acidophilus 1 Tablet(s) Oral three times a day with meals  nicotine  Polacrilex Gum 4 milliGRAM(s) Oral three times a day with meals  nystatin Powder 1 Application(s) Topical three times a day  rivaroxaban 20 milliGRAM(s) Oral every 24 hours  spironolactone 12.5 milliGRAM(s) Oral daily  tiotropium 18 MICROgram(s) Capsule 1 Capsule(s) Inhalation daily    MEDICATIONS  (PRN):  acetaminophen   Tablet .. 650 milliGRAM(s) Oral every 6 hours PRN Temp greater or equal to 38C (100.4F), Mild Pain (1 - 3)  ALBUTerol    0.083% 2.5 milliGRAM(s) Nebulizer every 2 hours PRN Shortness of Breath and/or Wheezing  metoprolol tartrate Injectable 5 milliGRAM(s) IV Push every 6 hours PRN for heart rate above 110 bpm  morphine  - Injectable 4 milliGRAM(s) IV Push every 3 hours PRN Severe Pain (7 - 10)  nicotine  Polacrilex Gum 4 milliGRAM(s) Oral every 6 hours PRN nicotine withdrawl  ondansetron Injectable 4 milliGRAM(s) IV Push every 6 hours PRN Nausea and/or Vomiting  oxyCODONE    IR 5 milliGRAM(s) Oral every 6 hours PRN Moderate Pain (4 - 6)        RADIOLOGY & ADDITIONAL TESTS:    < from: TTE Echo Complete w/Doppler (12.28.18 @ 17:40) >    EXAM:  ECHO TRANSTHORACIC COMP W DOPP      PROCEDURE DATE:  Dec 28 2018   .      INTERPRETATION:  REPORT:    TRANSTHORACIC ECHOCARDIOGRAM REPORT         Patient Name:   MAGGIE ELLIOTT Patient Location: [facility] Rec #:  [id number]   Accession #:      [id number]  Account #:                         Height:           66.9 in 170.0 cm  YOB: 1960           Weight:           379.2 lb 172.00 kg  Patient Age:    58 years           BSA:              2.65 m²  Patient Gender: M                  BP:               91/55 mmHg       Date of Exam:        12/28/2018 5:40:31 PM  Sonographer:         Perez St  Referring Physician: Abdirahman Medina MD    Procedure:     2D Echo/Doppler/Color Doppler Complete.  Indications:   Dyspnea, unspecified - R06.00  Diagnosis:     Dyspnea, unspecified - R06.00  Study Details: Technically difficult study. Study quality was adversely   affected                 due to patient obesity, body habitus, COPD, no parasternal                 windows and lung interference.         2D AND M-MODE MEASUREMENTS (normal ranges within parentheses):  Aorta/Left Atrium:         Normal  Aortic Root (2D):  3.28 cm (2.4-3.7)    LV DIASTOLIC FUNCTION:  MV Peak E: 1.07 m/s E/e' Ratio: 8.60      Decel Time: 127 msec    SPECTRAL DOPPLER ANALYSIS (where applicable):  Mitral Valve:  MV P1/2 Time: 36.83 msec  MV Area, PHT: 5.97 cm²               PHYSICIAN INTERPRETATION:  Left Ventricle: Endocardial visualization was enhanced with intravenous   echo contrast.  The mitral in-flow pattern reveals no discernable A-wave, therefore no   comment on diastolic function can be made.  LV was not well seen despite use of contrast. LV function appears to be   grossly moderate-severely reduced.  Right Ventricle: The right ventricle was not well visualized.  Left Atrium: The left atrium was not well visualized.  Right Atrium: The right atrium was not well visualized.  Pericardium: There is no evidence of pericardial effusion.  Mitral Valve: Mitralvalve not well seen. No evidence of significant   regurgitation.  Tricuspid Valve: The tricuspid valve is not well seen.  Aortic Valve: No evidence of aortic valve regurgitation is seen. Aortic   valve was not well seen. No evidence of aortic stenosis on doppler   analysis.  Pulmonic Valve: The pulmonic valve was not well visualized.  Aorta: The aortic root is normal in size and structure.  Pulmonary Artery: The pulmonary artery is not well seen.  Venous: The pulmonary veins were not well visualized. The inferior vena   cava is not well visualized.       Summary:   1. Technically very difficult study.   2. LV was not well seen despite use of contrast. LV function appears to   be grossly moderate-severely reduced.   3. The mitral in-flow pattern reveals no discernable A-wave, therefore   no comment on diastolic function can be made.   4. Mitral valve not well seen. No evidence of significant regurgitation.   5. Aortic valve was not well seen. No evidence of aortic stenosis on   doppler analysis.   6. There is no evidence of pericardial effusion.   7. Endocardial visualization was enhanced with intravenous echo contrast.    [id number] Linsey Portillo MD, Electronically signed on 12/29/2018 at   11:46:29 AM              *** Final ***                  LINSEY PORTILLO   This document has been electronically signed. Dec 28 2018  5:40PM    < end of copied text >        REVIEW OF SYSTEMS:    R thigh pain     Vital Signs Last 24 Hrs  T(C): 36.8 (14 Jan 2019 04:35), Max: 37 (13 Jan 2019 21:10)  T(F): 98.2 (14 Jan 2019 04:35), Max: 98.6 (13 Jan 2019 21:10)  HR: 102 (14 Jan 2019 10:13) (75 - 103)  BP: 106/67 (14 Jan 2019 04:35) (102/58 - 114/65)  BP(mean): --  RR: 20 (14 Jan 2019 04:35) (18 - 20)  SpO2: 96% (14 Jan 2019 10:13) (95% - 98%)    PHYSICAL EXAM:    GENERAL: NAD, well-groomed, morbidly obese    HEAD:  Atraumatic, Normocephalic  EYES: EOMI, PERRLA, conjunctiva and sclera clear  NECK: Supple, No JVD, Normal thyroid  NERVOUS SYSTEM:  Alert & Oriented X3, no focal deficit  CHEST/LUNG: CTA b/l ,  no  rales, rhonchi, wheezing, or rubs  HEART: irregular rate and rhythm; No murmurs, rubs, or gallops , distant sounds   ABDOMEN: Soft, Nontender, Nondistended; Bowel sounds present , obese   EXTREMITIES:  2+ Peripheral Pulses, No clubbing, cyanosis, R LE 3+ edema , L LE 2+ edema   LYMPH: No lymphadenopathy noted  SKIN: No rashes or lesions Patient seen and examined . Comfortable , R thigh pain better controlled today , no other complaints .     CC : R thigh pain / cellulitis  , c/o increasing of B/L LE edema       MEDICATIONS  (STANDING):  ALBUTerol    90 MICROgram(s) HFA Inhaler 1 Puff(s) Inhalation every 4 hours  ALBUTerol/ipratropium for Nebulization 3 milliLiter(s) Nebulizer every 6 hours  buDESOnide   0.5 milliGRAM(s) Respule 0.5 milliGRAM(s) Inhalation every 12 hours  carvedilol 6.25 milliGRAM(s) Oral every 12 hours  DAPTOmycin IVPB 700 milliGRAM(s) IV Intermittent every 24 hours  digoxin     Tablet 0.125 milliGRAM(s) Oral daily  ertapenem  IVPB 1000 milliGRAM(s) IV Intermittent every 24 hours  furosemide    Tablet 40 milliGRAM(s) Oral daily  gabapentin 300 milliGRAM(s) Oral three times a day  lactobacillus acidophilus 1 Tablet(s) Oral three times a day with meals  nicotine  Polacrilex Gum 4 milliGRAM(s) Oral three times a day with meals  nystatin Powder 1 Application(s) Topical three times a day  rivaroxaban 20 milliGRAM(s) Oral every 24 hours  spironolactone 12.5 milliGRAM(s) Oral daily  tiotropium 18 MICROgram(s) Capsule 1 Capsule(s) Inhalation daily    MEDICATIONS  (PRN):  acetaminophen   Tablet .. 650 milliGRAM(s) Oral every 6 hours PRN Temp greater or equal to 38C (100.4F), Mild Pain (1 - 3)  ALBUTerol    0.083% 2.5 milliGRAM(s) Nebulizer every 2 hours PRN Shortness of Breath and/or Wheezing  metoprolol tartrate Injectable 5 milliGRAM(s) IV Push every 6 hours PRN for heart rate above 110 bpm  morphine  - Injectable 4 milliGRAM(s) IV Push every 3 hours PRN Severe Pain (7 - 10)  nicotine  Polacrilex Gum 4 milliGRAM(s) Oral every 6 hours PRN nicotine withdrawl  ondansetron Injectable 4 milliGRAM(s) IV Push every 6 hours PRN Nausea and/or Vomiting  oxyCODONE    IR 5 milliGRAM(s) Oral every 6 hours PRN Moderate Pain (4 - 6)        RADIOLOGY & ADDITIONAL TESTS:    < from: TTE Echo Complete w/Doppler (12.28.18 @ 17:40) >    EXAM:  ECHO TRANSTHORACIC COMP W DOPP      PROCEDURE DATE:  Dec 28 2018   .      INTERPRETATION:  REPORT:    TRANSTHORACIC ECHOCARDIOGRAM REPORT         Patient Name:   MAGGIE ELLIOTT Patient Location: [facility] Rec #:  [id number]   Accession #:      [id number]  Account #:                         Height:           66.9 in 170.0 cm  YOB: 1960           Weight:           379.2 lb 172.00 kg  Patient Age:    58 years           BSA:              2.65 m²  Patient Gender: M                  BP:               91/55 mmHg       Date of Exam:        12/28/2018 5:40:31 PM  Sonographer:         Perez St  Referring Physician: Abdirahman Medina MD    Procedure:     2D Echo/Doppler/Color Doppler Complete.  Indications:   Dyspnea, unspecified - R06.00  Diagnosis:     Dyspnea, unspecified - R06.00  Study Details: Technically difficult study. Study quality was adversely   affected                 due to patient obesity, body habitus, COPD, no parasternal                 windows and lung interference.         2D AND M-MODE MEASUREMENTS (normal ranges within parentheses):  Aorta/Left Atrium:         Normal  Aortic Root (2D):  3.28 cm (2.4-3.7)    LV DIASTOLIC FUNCTION:  MV Peak E: 1.07 m/s E/e' Ratio: 8.60      Decel Time: 127 msec    SPECTRAL DOPPLER ANALYSIS (where applicable):  Mitral Valve:  MV P1/2 Time: 36.83 msec  MV Area, PHT: 5.97 cm²               PHYSICIAN INTERPRETATION:  Left Ventricle: Endocardial visualization was enhanced with intravenous   echo contrast.  The mitral in-flow pattern reveals no discernable A-wave, therefore no   comment on diastolic function can be made.  LV was not well seen despite use of contrast. LV function appears to be   grossly moderate-severely reduced.  Right Ventricle: The right ventricle was not well visualized.  Left Atrium: The left atrium was not well visualized.  Right Atrium: The right atrium was not well visualized.  Pericardium: There is no evidence of pericardial effusion.  Mitral Valve: Mitralvalve not well seen. No evidence of significant   regurgitation.  Tricuspid Valve: The tricuspid valve is not well seen.  Aortic Valve: No evidence of aortic valve regurgitation is seen. Aortic   valve was not well seen. No evidence of aortic stenosis on doppler   analysis.  Pulmonic Valve: The pulmonic valve was not well visualized.  Aorta: The aortic root is normal in size and structure.  Pulmonary Artery: The pulmonary artery is not well seen.  Venous: The pulmonary veins were not well visualized. The inferior vena   cava is not well visualized.       Summary:   1. Technically very difficult study.   2. LV was not well seen despite use of contrast. LV function appears to   be grossly moderate-severely reduced.   3. The mitral in-flow pattern reveals no discernable A-wave, therefore   no comment on diastolic function can be made.   4. Mitral valve not well seen. No evidence of significant regurgitation.   5. Aortic valve was not well seen. No evidence of aortic stenosis on   doppler analysis.   6. There is no evidence of pericardial effusion.   7. Endocardial visualization was enhanced with intravenous echo contrast.    [id number] Linsey Portillo MD, Electronically signed on 12/29/2018 at   11:46:29 AM              *** Final ***              LINSEY PORTILLO   This document has been electronically signed. Dec 28 2018  5:40PM    < end of copied text >        REVIEW OF SYSTEMS:    R thigh pain     Vital Signs Last 24 Hrs  T(C): 36.8 (14 Jan 2019 04:35), Max: 37 (13 Jan 2019 21:10)  T(F): 98.2 (14 Jan 2019 04:35), Max: 98.6 (13 Jan 2019 21:10)  HR: 102 (14 Jan 2019 10:13) (75 - 103)  BP: 106/67 (14 Jan 2019 04:35) (102/58 - 114/65)  BP(mean): --  RR: 20 (14 Jan 2019 04:35) (18 - 20)  SpO2: 96% (14 Jan 2019 10:13) (95% - 98%)    PHYSICAL EXAM:    GENERAL: NAD, well-groomed, morbidly obese    HEAD:  Atraumatic, Normocephalic  EYES: EOMI, PERRLA, conjunctiva and sclera clear  NECK: Supple, No JVD, Normal thyroid  NERVOUS SYSTEM:  Alert & Oriented X3, no focal deficit  CHEST/LUNG: CTA b/l ,  no  rales, rhonchi, wheezing, or rubs  HEART: irregular rate and rhythm; No murmurs, rubs, or gallops , distant sounds   ABDOMEN: Soft, Nontender, Nondistended; Bowel sounds present , obese   EXTREMITIES:  2+ Peripheral Pulses, No clubbing, cyanosis, R LE 3+ edema , L LE 2+ edema   LYMPH: No lymphadenopathy noted  SKIN: No rashes or lesions

## 2019-01-14 NOTE — PROGRESS NOTE ADULT - SUBJECTIVE AND OBJECTIVE BOX
[facility]  INFECTIOUS DISEASES AND INTERNAL MEDICINE at [place]  =======================================================  Bello García MD  Diplomates American Board of Internal Medicine and Infectious Diseases  =======================================================    MRN-[id number]  MAGGIE ELLIOTT     Follow up: Right upper thigh cellulitis  Afebrile, pain in right leg, improved significantly on IV Abx.   Still has L lower leg edema.     PAST MEDICAL & SURGICAL HISTORY:  Afib  ROCK (obstructive sleep apnea)  COPD (chronic obstructive pulmonary disease)  No significant past surgical history    Allergies  penicillins (Hives)  shellfish (Pruritus; Rash)    Antibiotics:  Daptomycin  Ertapenem     REVIEW OF SYSTEMS:  CONSTITUTIONAL:  No Fever or chills  HEENT:  No diplopia or blurred vision.  No sore throat or runny nose.  CARDIOVASCULAR:  No chest pain or SOB.  RESPIRATORY:  No cough, shortness of breath, PND or orthopnea.  GASTROINTESTINAL:  No nausea, vomiting or diarrhea.  GENITOURINARY:  No dysuria, frequency or urgency. No Blood in urine  MUSCULOSKELETAL:  right leg edema  SKIN:  right upper thigh swelling and discharge  NEUROLOGIC:  No paresthesias, fasciculations, seizures or weakness.  PSYCHIATRIC:  No disorder of thought or mood.  ENDOCRINE:  No heat or cold intolerance, polyuria or polydipsia.  HEMATOLOGICAL:  No easy bruising or bleeding.     Physical Exam:  Vital Signs Last 24 Hrs  T(C): 36.8 (14 Jan 2019 04:35), Max: 37 (13 Jan 2019 21:10)  T(F): 98.2 (14 Jan 2019 04:35), Max: 98.6 (13 Jan 2019 21:10)  HR: 87 (14 Jan 2019 15:45) (75 - 103)  BP: 98/66 (14 Jan 2019 10:57) (98/66 - 114/65)  BP(mean): --  RR: 20 (14 Jan 2019 04:35) (18 - 20)  SpO2: 96% (14 Jan 2019 15:45) (95% - 98%)  GEN: on O2 with mild respiratory distress , morbidly obese   HEENT: normocephalic and atraumatic. EOMI. PERRL.    NECK: Supple.  No lymphadenopathy   LUNGS: poor air movement. no wheezing or crackles   HEART: Regular rate and rhythm   ABDOMEN: Soft, nontender, and nondistended.  Positive bowel sounds.    : No CVA tenderness  EXTREMITIES: Right upper thigh with an open wound 4-5cm with less secretions induration around it. 2+ leg and foot edema  NEUROLOGIC: grossly intact.  PSYCHIATRIC: Appropriate affect .  SKIN: No ulceration or induration present.    Labs:  01-14    140  |  102  |  12.0  ----------------------------<  127<H>  3.9   |  24.0  |  0.85    Ca    8.1<L>      14 Jan 2019 13:41                       12.6   11.6  )-----------( 220      ( 14 Jan 2019 13:41 )             39.9   CARDIAC MARKERS ( 14 Jan 2019 13:41 )  x     / x     / 44 U/L / x     / x        RECENT CULTURES:  01-08 @ 17:53 .Blood     No growth at 5 days.    01-08 @ 11:51 .Blood     No growth at 5 days.    01-08 @ 10:11 Skin     Few Coag Negative Staphylococcus  Few Corynebacterium species    All imaging and other data have been reviewed.  FINDINGS:  There is normal compressibility of the bilateral common femoral, femoral   and popliteal veins. No calf vein thrombosis is detected.  Doppler examination shows normal spontaneous and phasic flow.   Additional sonography in the area of concern in the right groin   demonstrates subcutaneous edema and heterogeneity of the subcutaneous fat   . Approximately 2-3 cm deep to the skin there is a mildly complex fluid   collection measuring 5.3 x 1.1 x 3.0 cm in this region.  IMPRESSION:   No evidence of bilateral lower extremity deep venous thrombosis.  In the area of concern in the right groin, there is a 5.3 cm mildly   complex fluid collection in the subcutaneous fat. Infection/abscess is   not excluded.    USG 1/8:   INTERPRETATION:   soft tissue ultrasound evaluation of the right groin     CPT 96095  CLINICAL INFORMATION:   Abscess.  Soft tissue mass.  TECHNIQUE:   Transcutaneous ultrasound was performed.  Evaluation was   directed at the right groin  FINDINGS:   No prior examinations are available for review.  Poorly defined fluid collection is seen in the subcutaneous fat measuring   approximately 5.3 x 1.2 x 4.5 cm.  IMPRESSION:  Poorly defined fluid collection in the right groin.    Doppler: negative for DVT

## 2019-01-14 NOTE — PROGRESS NOTE ADULT - PROVIDER SPECIALTY LIST ADULT
Cardiology
Cardiology
Hospitalist
Infectious Disease
Internal Medicine
Intervent Radiology
Surgery
Infectious Disease
Infectious Disease

## 2019-01-14 NOTE — PROGRESS NOTE ADULT - SUBJECTIVE AND OBJECTIVE BOX
Chief Complaint: chart and hx reviewed.    HPI: 57 yo male admitted with right groin infection. On IV antibiotics with gradual improvement. Comorbidities include morbid obesity/copd (was a 2ppd smoker until current adm)/zaida/chronic afib-on Xarelto. Current echo was a TDS but showed decreased LVEF-at least moderate. No hx of MI/cva/diabetes or allergy. On coreg/digoxin/aldactone and lasix.    PAST MEDICAL & SURGICAL HISTORY:  Afib  ZAIDA (obstructive sleep apnea)  COPD (chronic obstructive pulmonary disease)  No significant past surgical history      PREVIOUS DIAGNOSTIC TESTING:      ECHO  FINDINGS: see above    STRESS  FINDINGS:    CATHETERIZATION  FINDINGS:    MEDICATIONS  (STANDING):  ALBUTerol    90 MICROgram(s) HFA Inhaler 1 Puff(s) Inhalation every 4 hours  ALBUTerol/ipratropium for Nebulization 3 milliLiter(s) Nebulizer every 6 hours  buDESOnide   0.5 milliGRAM(s) Respule 0.5 milliGRAM(s) Inhalation every 12 hours  carvedilol 25 milliGRAM(s) Oral every 12 hours  DAPTOmycin IVPB 700 milliGRAM(s) IV Intermittent every 24 hours  digoxin     Tablet 0.125 milliGRAM(s) Oral daily  ertapenem  IVPB 1000 milliGRAM(s) IV Intermittent every 24 hours  furosemide    Tablet 40 milliGRAM(s) Oral daily  gabapentin 300 milliGRAM(s) Oral three times a day  lactobacillus acidophilus 1 Tablet(s) Oral three times a day with meals  nicotine  Polacrilex Gum 4 milliGRAM(s) Oral three times a day with meals  nystatin Powder 1 Application(s) Topical three times a day  rivaroxaban 20 milliGRAM(s) Oral every 24 hours  spironolactone 12.5 milliGRAM(s) Oral daily  tiotropium 18 MICROgram(s) Capsule 1 Capsule(s) Inhalation daily    MEDICATIONS  (PRN):  acetaminophen   Tablet .. 650 milliGRAM(s) Oral every 6 hours PRN Temp greater or equal to 38C (100.4F), Mild Pain (1 - 3)  ALBUTerol    0.083% 2.5 milliGRAM(s) Nebulizer every 2 hours PRN Shortness of Breath and/or Wheezing  metoprolol tartrate Injectable 5 milliGRAM(s) IV Push every 6 hours PRN for heart rate above 110 bpm  morphine  - Injectable 4 milliGRAM(s) IV Push every 3 hours PRN Severe Pain (7 - 10)  nicotine  Polacrilex Gum 4 milliGRAM(s) Oral every 6 hours PRN nicotine withdrawl  ondansetron Injectable 4 milliGRAM(s) IV Push every 6 hours PRN Nausea and/or Vomiting  oxyCODONE    IR 5 milliGRAM(s) Oral every 6 hours PRN Moderate Pain (4 - 6)      FAMILY HISTORY:  Family history of CHF (congestive heart failure) (Mother)      ROS: Negative other than as mentioned in HPI.    Vital Signs Last 24 Hrs  T(C): 36.8 (14 Jan 2019 04:35), Max: 37 (13 Jan 2019 21:10)  T(F): 98.2 (14 Jan 2019 04:35), Max: 98.6 (13 Jan 2019 21:10)  HR: 75 irreg (14 Jan 2019 04:45) (75 - 90)  BP: 100 systolic left arm manually. (14 Jan 2019 04:35) (102/58 - 114/65)  BP(mean): --  RR: 16 ORA (14 Jan 2019 04:35) (18 - 20)  SpO2: 98% (14 Jan 2019 04:45) (95% - 98%)    PHYSICAL EXAM:  General: Appears well developed, well nourished alert and cooperative. Morbidly obese afebrile MA male nad.  HEENT: Head; normocephalic, atraumatic.  Eyes;   Pupils reactive, cornea wnl.  Neck; Supple, no nodes adenopathy, no NVD or carotid bruit or thyromegaly.  CARDIOVASCULAR; No murmur, rub, gallop or lift. Normal S1 and S2. Distant and irreg heart tones.  LUNGS; coarse BS bilat.  ABDOMEN ; Soft, nontender without mass or organomegaly. bowel sounds normoactive.  EXTREMITIES; 2+ bilat pretibial edema R>L.  SKIN; warm and dry with normal turgor.  NEURO; Alert/oriented x 3/normal motor exam.  PSYCH; normal affect.            INTERPRETATION OF TELEMETRY:    ECG: ekg-afib with IVCD and left axis  CXR underpenetrated portable    I&O's Detail      LABS: BNP 2400 to 1900.    lab and imaging reviewed.              I&O's Summary      RADIOLOGY & ADDITIONAL STUDIES:

## 2019-01-15 ENCOUNTER — TRANSCRIPTION ENCOUNTER (OUTPATIENT)
Age: 59
End: 2019-01-15

## 2019-01-15 PROCEDURE — 90686 IIV4 VACC NO PRSV 0.5 ML IM: CPT

## 2019-01-15 PROCEDURE — 94760 N-INVAS EAR/PLS OXIMETRY 1: CPT

## 2019-01-15 PROCEDURE — 87040 BLOOD CULTURE FOR BACTERIA: CPT

## 2019-01-15 PROCEDURE — 83605 ASSAY OF LACTIC ACID: CPT

## 2019-01-15 PROCEDURE — 85610 PROTHROMBIN TIME: CPT

## 2019-01-15 PROCEDURE — 85652 RBC SED RATE AUTOMATED: CPT

## 2019-01-15 PROCEDURE — 36415 COLL VENOUS BLD VENIPUNCTURE: CPT

## 2019-01-15 PROCEDURE — 96375 TX/PRO/DX INJ NEW DRUG ADDON: CPT

## 2019-01-15 PROCEDURE — 99239 HOSP IP/OBS DSCHRG MGMT >30: CPT

## 2019-01-15 PROCEDURE — 80048 BASIC METABOLIC PNL TOTAL CA: CPT

## 2019-01-15 PROCEDURE — 96366 THER/PROPH/DIAG IV INF ADDON: CPT

## 2019-01-15 PROCEDURE — 86140 C-REACTIVE PROTEIN: CPT

## 2019-01-15 PROCEDURE — 76882 US LMTD JT/FCL EVL NVASC XTR: CPT

## 2019-01-15 PROCEDURE — 96376 TX/PRO/DX INJ SAME DRUG ADON: CPT

## 2019-01-15 PROCEDURE — 73701 CT LOWER EXTREMITY W/DYE: CPT

## 2019-01-15 PROCEDURE — 87070 CULTURE OTHR SPECIMN AEROBIC: CPT

## 2019-01-15 PROCEDURE — 93971 EXTREMITY STUDY: CPT

## 2019-01-15 PROCEDURE — 99285 EMERGENCY DEPT VISIT HI MDM: CPT | Mod: 25

## 2019-01-15 PROCEDURE — 93005 ELECTROCARDIOGRAM TRACING: CPT

## 2019-01-15 PROCEDURE — 80202 ASSAY OF VANCOMYCIN: CPT

## 2019-01-15 PROCEDURE — 71045 X-RAY EXAM CHEST 1 VIEW: CPT | Mod: 26

## 2019-01-15 PROCEDURE — 83880 ASSAY OF NATRIURETIC PEPTIDE: CPT

## 2019-01-15 PROCEDURE — 80053 COMPREHEN METABOLIC PANEL: CPT

## 2019-01-15 PROCEDURE — 96365 THER/PROPH/DIAG IV INF INIT: CPT

## 2019-01-15 PROCEDURE — 85027 COMPLETE CBC AUTOMATED: CPT

## 2019-01-15 PROCEDURE — 94640 AIRWAY INHALATION TREATMENT: CPT

## 2019-01-15 PROCEDURE — 82550 ASSAY OF CK (CPK): CPT

## 2019-01-15 PROCEDURE — 83735 ASSAY OF MAGNESIUM: CPT

## 2019-01-15 PROCEDURE — 36569 INSJ PICC 5 YR+ W/O IMAGING: CPT

## 2019-01-15 PROCEDURE — 71045 X-RAY EXAM CHEST 1 VIEW: CPT

## 2019-01-15 PROCEDURE — 94660 CPAP INITIATION&MGMT: CPT

## 2019-01-15 RX ORDER — NYSTATIN CREAM 100000 [USP'U]/G
1 CREAM TOPICAL
Qty: 0 | Refills: 0 | COMMUNITY
Start: 2019-01-15

## 2019-01-15 RX ORDER — NYSTATIN CREAM 100000 [USP'U]/G
1 CREAM TOPICAL
Qty: 1 | Refills: 0 | OUTPATIENT
Start: 2019-01-15

## 2019-01-15 RX ORDER — DAPTOMYCIN 500 MG/10ML
700 INJECTION, POWDER, LYOPHILIZED, FOR SOLUTION INTRAVENOUS
Qty: 0 | Refills: 0 | COMMUNITY
Start: 2019-01-15

## 2019-01-15 RX ORDER — ERTAPENEM SODIUM 1 G/1
0 INJECTION, POWDER, LYOPHILIZED, FOR SOLUTION INTRAMUSCULAR; INTRAVENOUS
Qty: 0 | Refills: 0 | COMMUNITY
Start: 2019-01-15

## 2019-01-15 RX ORDER — DIGOXIN 250 MCG
1 TABLET ORAL
Qty: 0 | Refills: 0 | COMMUNITY
Start: 2019-01-15

## 2019-01-15 RX ORDER — GABAPENTIN 400 MG/1
1 CAPSULE ORAL
Qty: 0 | Refills: 0 | COMMUNITY
Start: 2019-01-15

## 2019-01-15 RX ORDER — SPIRONOLACTONE 25 MG/1
1 TABLET, FILM COATED ORAL
Qty: 0 | Refills: 0 | DISCHARGE
Start: 2019-01-15

## 2019-01-15 RX ORDER — SPIRONOLACTONE 25 MG/1
0.5 TABLET, FILM COATED ORAL
Qty: 15 | Refills: 0
Start: 2019-01-15

## 2019-01-15 RX ORDER — LEVALBUTEROL 1.25 MG/.5ML
0.5 SOLUTION, CONCENTRATE RESPIRATORY (INHALATION)
Qty: 0 | Refills: 0 | COMMUNITY

## 2019-01-15 RX ORDER — GABAPENTIN 400 MG/1
1 CAPSULE ORAL
Qty: 901 | Refills: 0
Start: 2019-01-15

## 2019-01-15 RX ORDER — LACTOBACILLUS ACIDOPHILUS 100MM CELL
1 CAPSULE ORAL
Qty: 0 | Refills: 0 | COMMUNITY
Start: 2019-01-15

## 2019-01-15 RX ORDER — DIGOXIN 250 MCG
1 TABLET ORAL
Qty: 30 | Refills: 0
Start: 2019-01-15

## 2019-01-15 RX ORDER — SPIRONOLACTONE 25 MG/1
0.5 TABLET, FILM COATED ORAL
Qty: 0 | Refills: 0 | COMMUNITY
Start: 2019-01-15

## 2019-01-15 RX ADMIN — Medication 0.5 MILLIGRAM(S): at 08:56

## 2019-01-15 RX ADMIN — MORPHINE SULFATE 4 MILLIGRAM(S): 50 CAPSULE, EXTENDED RELEASE ORAL at 04:50

## 2019-01-15 RX ADMIN — SPIRONOLACTONE 12.5 MILLIGRAM(S): 25 TABLET, FILM COATED ORAL at 05:19

## 2019-01-15 RX ADMIN — Medication 40 MILLIGRAM(S): at 13:05

## 2019-01-15 RX ADMIN — Medication 3 MILLILITER(S): at 03:23

## 2019-01-15 RX ADMIN — MORPHINE SULFATE 4 MILLIGRAM(S): 50 CAPSULE, EXTENDED RELEASE ORAL at 04:35

## 2019-01-15 RX ADMIN — Medication 0.12 MILLIGRAM(S): at 05:19

## 2019-01-15 RX ADMIN — Medication 650 MILLIGRAM(S): at 13:53

## 2019-01-15 RX ADMIN — Medication 1 TABLET(S): at 13:05

## 2019-01-15 RX ADMIN — NYSTATIN CREAM 1 APPLICATION(S): 100000 CREAM TOPICAL at 05:20

## 2019-01-15 RX ADMIN — CARVEDILOL PHOSPHATE 6.25 MILLIGRAM(S): 80 CAPSULE, EXTENDED RELEASE ORAL at 05:20

## 2019-01-15 RX ADMIN — GABAPENTIN 300 MILLIGRAM(S): 400 CAPSULE ORAL at 13:05

## 2019-01-15 RX ADMIN — GABAPENTIN 300 MILLIGRAM(S): 400 CAPSULE ORAL at 05:20

## 2019-01-15 RX ADMIN — Medication 1 TABLET(S): at 08:42

## 2019-01-15 RX ADMIN — Medication 3 MILLILITER(S): at 14:44

## 2019-01-15 RX ADMIN — NYSTATIN CREAM 1 APPLICATION(S): 100000 CREAM TOPICAL at 13:05

## 2019-01-15 RX ADMIN — Medication 4 MILLIGRAM(S): at 13:05

## 2019-01-15 RX ADMIN — Medication 3 MILLILITER(S): at 08:56

## 2019-01-15 RX ADMIN — ERTAPENEM SODIUM 120 MILLIGRAM(S): 1 INJECTION, POWDER, LYOPHILIZED, FOR SOLUTION INTRAMUSCULAR; INTRAVENOUS at 15:14

## 2019-01-15 NOTE — DISCHARGE NOTE ADULT - PROVIDER TOKENS
TOKEN:'[id number]',TOKEN:'[id number]',FREE:[LAST:[PMD],PHONE:[(   )    -],FAX:[(   )    -]]

## 2019-01-15 NOTE — DISCHARGE NOTE ADULT - PATIENT PORTAL LINK FT
You can access the [facility] Patient Portal, offered by [facility], by registering with the following website: [email or web address] [place]

## 2019-01-15 NOTE — DISCHARGE NOTE ADULT - PLAN OF CARE
To be treated continue IV Abx as per ID recommendation , wound care and packing Continue AC , SARITHA - follow up with Dr Deng continue neb treatment as prior admission continue Lasix / Spironolactone / Digoxin - follow up with Dr Peace in 1 wk

## 2019-01-15 NOTE — DISCHARGE NOTE ADULT - MEDICATION SUMMARY - MEDICATIONS TO STOP TAKING
I will STOP taking the medications listed below when I get home from the hospital:    predniSONE 10 mg oral tablet  -- 3 tab(s) by mouth once a day  x 3 days   2 tabs by mouth daily x 3 days   1 tab by mouth daily x 3 days     -- It is very important that you take or use this exactly as directed.  Do not skip doses or discontinue unless directed by your doctor.  Obtain medical advice before taking any non-prescription drugs as some may affect the action of this medication.  Take with food or milk.    celecoxib 100 mg oral capsule  -- 1 cap(s) by mouth every 12 hours, As needed, Moderate Pain (4 - 6)    Levaquin 500 mg oral tablet  -- 1 tab(s) by mouth once a day   -- Avoid prolonged or excessive exposure to direct and/or artificial sunlight while taking this medication.  Do not take dairy products, antacids, or iron preparations within one hour of this medication.  Finish all this medication unless otherwise directed by prescriber.  May cause drowsiness or dizziness.  Medication should be taken with plenty of water.    Bactrim  mg-160 mg oral tablet  -- 1 tab(s) by mouth 2 times a day   -- Avoid prolonged or excessive exposure to direct and/or artificial sunlight while taking this medication.  Finish all this medication unless otherwise directed by prescriber.  Medication should be taken with plenty of water.

## 2019-01-15 NOTE — DISCHARGE NOTE ADULT - SECONDARY DIAGNOSIS.
Chronic atrial fibrillation Chronic obstructive pulmonary disease, unspecified COPD type Morbid obesity ROCK (obstructive sleep apnea) NICM (nonischemic cardiomyopathy) Lower extremity edema

## 2019-01-15 NOTE — DISCHARGE NOTE ADULT - MEDICATION SUMMARY - MEDICATIONS TO TAKE
I will START or STAY ON the medications listed below when I get home from the hospital:    spironolactone 25 mg oral tablet  -- 0.5 tab(s) by mouth once a day  -- Indication: For Chf    digoxin 125 mcg (0.125 mg) oral tablet  -- 1 tab(s) by mouth once a day  -- Indication: For AFib     Xarelto 20 mg oral tablet  -- 1 tab(s) by mouth once a day (in the evening)  please hold today, tomorrow and restart on 1/4/18   -- Indication: For AFib    gabapentin 300 mg oral capsule  -- 1 cap(s) by mouth 3 times a day  -- Indication: For Neuropathy    ALPRAZolam 0.25 mg oral tablet  -- 1 tab(s) by mouth every 12 hours, As needed, anxiety  -- Indication: For Anxiety     carvedilol 6.25 mg oral tablet  -- 1 tab(s) by mouth every 12 hours  -- Indication: For AFib     ipratropium-albuterol 0.5 mg-2.5 mg/3 mLinhalation solution  -- 3 milliliter(s) inhaled every 6 hours  -- Indication: For COPD    Trelegy Ellipta inhalation powder  -- 1 puff(s) inhaled once a day  -- Indication: For COPD    ertapenem 1 g injection  -- injectable once a day , as per ID recommendation   -- Indication: For Cellulitis    nystatin 100,000 units/g topical powder  -- 1 application on skin 3 times a day  -- Indication: For Rash    furosemide 40 mg oral tablet  -- 1 tab(s) by mouth once a day  -- Indication: For Lower extremity edema    DAPTOmycin 500 mg intravenous injection  -- 700 milligram(s) intravenous every 24 hours , as per ID   -- Indication: For Cellulitis    nicotine 4 mg oral transmucosal gum  -- 1 gum chewed every 4 hours   -- Do not take this drug if you are pregnant.  It is very important that you take or use this exactly as directed.  Do not skip doses or discontinue unless directed by your doctor.    -- Indication: For sMOKING CESSATION

## 2019-01-15 NOTE — DISCHARGE NOTE ADULT - CARE PROVIDER_API CALL
Elen Deng), Cardiovascular Disease; Internal Medicine  [address] [place] [id number]  Phone: [phone]  Fax: [phone]    Prabhu García), Infectious Disease; Internal Medicine  [address] [place] [address]  Phone: [phone]  Fax: [phone]    PMD,   Phone: (   )    -  Fax: (   )    -

## 2019-01-15 NOTE — DISCHARGE NOTE ADULT - CARE PLAN
Principal Discharge DX:	Cellulitis of right lower extremity  Goal:	To be treated  Assessment and plan of treatment:	continue IV Abx as per ID recommendation , wound care and packing  Secondary Diagnosis:	Chronic atrial fibrillation  Goal:	Continue AC , BB - follow up with Dr Deng  Secondary Diagnosis:	Chronic obstructive pulmonary disease, unspecified COPD type  Goal:	continue neb treatment as prior admission  Secondary Diagnosis:	Morbid obesity  Secondary Diagnosis:	ROCK (obstructive sleep apnea)  Secondary Diagnosis:	NICM (nonischemic cardiomyopathy)  Goal:	continue Lasix / Spironolactone / Digoxin - follow up with Dr Peace in 1 wk  Secondary Diagnosis:	Lower extremity edema

## 2019-01-15 NOTE — DISCHARGE NOTE ADULT - HOSPITAL COURSE
57 y/o morbidly obese male discharged from [facility] on 1/2/19 after being admitted for dyspnea, copd,  and also had rt. groin area cellulitis, was seen by surgery team and rt. groin area I & d was done , had packing which was removed before diacharge, pt. was followed by ID group and was sent home on bactrim and levaquin. On the day of ER visit pt. noted rt. upper thigh area pain more over lateral aspect , moderate, on and  off , also noted that Pus which was red colored and had an odor was coming from his rt. groin area where he had packing. Pt. called ID office and was instructed to come to ER. pt , reports no fever. no cp. no sob. no abd. pain. no n/v/d. (08 Jan 2019 01:34) . On iv abx as per ID , wound care  . BP on lower side , cardiology re called . D/W Dr Peace /Beck , pending PICC line placement .  Today patient seen and examined , R thigh pain well controlled , c/o b/l LE tingling , no other complaints . BP better controlled with decreased Coreg dose .        ·  Problem: R  thigh and groin area cellulitis   --> CT shows fluid but no area of collection able to drain; confirmed with IR who took him for US to see if an area for aspiration was present but they did not find any.  --> no surgical intervention needed ,  ID following; as per ID - continue 2 weeks IV abx as per ID , wound care and packing at home.  --> on ertapenem and   dapto  --> c.w lactobacillus , PICC line pending      Problem/Plan - 2:  ·  Problem: Chronic atrial fibrillation/ NICM ( EF 40-45%  )  Plan:  Continue Xarelto , continue BB ,  d/w Dr Deng - recommended to decrease Coreg to 6.25 mg BID secondary to BP being on lower side , she will titrate as on outpatient   NICM with b/l LE edema - continue Lasix  , Spironolactone ,  Entresto to be considered as on outpatient  once BP better controlled      Problem/Plan - 3:  ·  Problem: Chronic obstructive pulmonary disease, unspecified COPD type.  Plan: not in exacerbation, will keep on duoneb.        Problem/Plan - 4:  ·  Problem: ROCK (obstructive sleep apnea).  Plan: bipap qhs     Problem/Plan - 5:  ·  Problem: Severe  obesity.  advised to lose weight      Problem/Plan - 6  ·  Problem: Meralgia paresthetica--> gabapentin TID; patient in small chair that is impinging on area that is affected which will exacerbate it. Counseled patient; discussed with NM about getting larger chair.    dispo: home with IV antibiotics once PICC line placed . 57 y/o morbidly obese male discharged from [facility] on 1/2/19 after being admitted for dyspnea, copd,  and also had rt. groin area cellulitis, was seen by surgery team and rt. groin area I & d was done , had packing which was removed before diacharge, pt. was followed by ID group and was sent home on bactrim and levaquin. On the day of ER visit pt. noted rt. upper thigh area pain more over lateral aspect , moderate, on and  off , also noted that Pus which was red colored and had an odor was coming from his rt. groin area where he had packing. Pt. called ID office and was instructed to come to ER. pt , reports no fever. no cp. no sob. no abd. pain. no n/v/d. (08 Jan 2019 01:34) . On iv abx as per ID , wound care  . BP on lower side , cardiology re called . D/W Dr Peace /Beck , pending PICC line placement .  Today patient seen and examined , R thigh pain well controlled , c/o b/l LE tingling , no other complaints . BP better controlled with decreased Coreg dose .    CC : R thigh pain well controlled , B/L LE tingling         LABS:                          12.6   11.6  )-----------( 220      ( 14 Jan 2019 13:41 )             39.9     01-14    140  |  102  |  12.0  ----------------------------<  127<H>  3.9   |  24.0  |  0.85    Ca    8.1<L>      14 Jan 2019 13:41    REVIEW OF SYSTEMS:  R thigh pain , b/l LE tingling , all other systems reviewed and are negative     Vital Signs Last 24 Hrs  T(C): 37.3 (15 Durga 2019 04:28), Max: 37.3 (15 Durga 2019 04:28)  T(F): 99.2 (15 Durga 2019 04:28), Max: 99.2 (15 Durga 2019 04:28)  HR: 83 (15 Durga 2019 04:28) (80 - 103)  BP: 110/66 (15 Durga 2019 04:28) (98/66 - 134/88)  BP(mean): --  RR: 20 (15 Durga 2019 04:28) (18 - 20)  SpO2: 95% (15 Durga 2019 04:28) (92% - 96%)  PHYSICAL EXAM:    GENERAL: NAD, well-groomed, morbidly obese   HEAD:  Atraumatic, Normocephalic  EYES: EOMI, PERRLA, conjunctiva and sclera clear  NECK: Supple, No JVD, Normal thyroid  NERVOUS SYSTEM:  Alert & Oriented X3, no focal deficit  CHEST/LUNG: CTA b/l ,  no  rales, rhonchi, wheezing, or rubs  HEART: Regular rate and rhythm; No murmurs, rubs, or gallops  ABDOMEN: Soft, Nontender, Nondistended; Bowel sounds present  EXTREMITIES:  2+ Peripheral Pulses, No clubbing, cyanosis, B/L LE edema R>L , R groin swelling ( decreasing ) , 2" incision ,  minor amount of fibrinous exudate. No active drainage.  LYMPH: No lymphadenopathy noted  SKIN: No rashes or lesions          ·  Problem: R  thigh and groin area cellulitis   --> CT shows fluid but no area of collection able to drain; confirmed with IR who took him for US to see if an area for aspiration was present but they did not find any.  --> no surgical intervention needed ,  ID following; as per ID - continue 2 weeks IV abx as per ID , wound care and packing at home.  --> on ertapenem and   dapto  --> c.w lactobacillus , PICC line pending      Problem/Plan - 2:  ·  Problem: Chronic atrial fibrillation/ NICM ( EF 40-45%  )  Plan:  Continue Xarelto , continue BB ,  d/w Dr Deng - recommended to decrease Coreg to 6.25 mg BID secondary to BP being on lower side , she will titrate as on outpatient   NICM with b/l LE edema - continue Lasix  , Spironolactone ,  Entresto to be considered as on outpatient  once BP better controlled      Problem/Plan - 3:  ·  Problem: Chronic obstructive pulmonary disease, unspecified COPD type.  Plan: not in exacerbation, will keep on duoneb.        Problem/Plan - 4:  ·  Problem: ROCK (obstructive sleep apnea).  Plan: bipap qhs     Problem/Plan - 5:  ·  Problem: Severe  obesity.  advised to lose weight      Problem/Plan - 6  ·  Problem: Meralgia paresthetica--> gabapentin TID; patient in small chair that is impinging on area that is affected which will exacerbate it. Counseled patient; discussed with NM about getting larger chair.    dispo: home with IV antibiotics once PICC line placed . 59 y/o morbidly obese male discharged from [facility] on 1/2/19 after being admitted for dyspnea, copd,  and also had rt. groin area cellulitis, was seen by surgery team and rt. groin area I & d was done , had packing which was removed before diacharge, pt. was followed by ID group and was sent home on bactrim and levaquin. On the day of ER visit pt. noted rt. upper thigh area pain more over lateral aspect , moderate, on and  off , also noted that Pus which was red colored and had an odor was coming from his rt. groin area where he had packing. Pt. called ID office and was instructed to come to ER. pt , reports no fever. no cp. no sob. no abd. pain. no n/v/d. (08 Jan 2019 01:34) . On iv abx as per ID , wound care  . BP on lower side , cardiology re called . D/W Dr Peace /Beck , pending PICC line placement .  Today patient seen and examined , R thigh pain well controlled , c/o b/l LE tingling , no other complaints . BP better controlled with decreased Coreg dose .    CC : R thigh pain well controlled , B/L LE tingling         LABS:                          12.6   11.6  )-----------( 220      ( 14 Jan 2019 13:41 )             39.9     01-14    140  |  102  |  12.0  ----------------------------<  127<H>  3.9   |  24.0  |  0.85    Ca    8.1<L>      14 Jan 2019 13:41    REVIEW OF SYSTEMS:  R thigh pain , b/l LE tingling , all other systems reviewed and are negative     Vital Signs Last 24 Hrs  T(C): 37.3 (15 Durga 2019 04:28), Max: 37.3 (15 Durga 2019 04:28)  T(F): 99.2 (15 Durga 2019 04:28), Max: 99.2 (15 Durga 2019 04:28)  HR: 83 (15 Durga 2019 04:28) (80 - 103)  BP: 110/66 (15 Durga 2019 04:28) (98/66 - 134/88)  BP(mean): --  RR: 20 (15 Durga 2019 04:28) (18 - 20)  SpO2: 95% (15 Durga 2019 04:28) (92% - 96%)  PHYSICAL EXAM:    GENERAL: NAD, well-groomed, morbidly obese   HEAD:  Atraumatic, Normocephalic  EYES: EOMI, PERRLA, conjunctiva and sclera clear  NECK: Supple, No JVD, Normal thyroid  NERVOUS SYSTEM:  Alert & Oriented X3, no focal deficit  CHEST/LUNG: CTA b/l ,  no  rales, rhonchi, wheezing, or rubs  HEART: Regular rate and rhythm; No murmurs, rubs, or gallops  ABDOMEN: Soft, Nontender, Nondistended; Bowel sounds present  EXTREMITIES:  2+ Peripheral Pulses, No clubbing, cyanosis, B/L LE edema R>L , R groin swelling ( decreasing ) , 2" incision ,  minor amount of fibrinous exudate. No active drainage.  LYMPH: No lymphadenopathy noted  SKIN: No rashes or lesions          ·  Problem: R  thigh and groin area cellulitis   --> CT shows fluid but no area of collection able to drain; confirmed with IR who took him for US to see if an area for aspiration was present but they did not find any.  --> no surgical intervention needed ,  ID following; as per ID - continue 2 weeks IV abx as per ID , wound care and packing at home.  --> on ertapenem and   dapto  --> c.w lactobacillus , PICC line pending      Problem/Plan - 2:  ·  Problem: Chronic atrial fibrillation/ NICM ( EF 40-45%  )  Plan:  Continue Xarelto , continue BB ,  d/w Dr Deng - recommended to decrease Coreg to 6.25 mg BID secondary to BP being on lower side , she will titrate as on outpatient   NICM with b/l LE edema - continue Lasix  , Spironolactone ,  Entresto to be considered as on outpatient  once BP better controlled      Problem/Plan - 3:  ·  Problem: Chronic obstructive pulmonary disease, unspecified COPD type.  Plan: not in exacerbation, will keep on duoneb.        Problem/Plan - 4:  ·  Problem: ROCK (obstructive sleep apnea).  Plan: bipap qhs     Problem/Plan - 5:  ·  Problem: Severe  obesity.  advised to lose weight      Problem/Plan - 6  ·  Problem: Meralgia paresthetica--> gabapentin TID; patient in small chair that is impinging on area that is affected which will exacerbate it. Counseled patient; discussed with NM about getting larger chair.    dispo: home with IV antibiotics once PICC line placed .    60 min spent with patient / nurse / CM / note

## 2019-01-15 NOTE — DISCHARGE NOTE ADULT - INSTRUCTIONS
DASH Place dry packing strip gently in incision located on R medial thigh. Cover with dry gauze and secure dressing w/ paper tape or Tegaderm Daily

## 2019-01-16 VITALS
DIASTOLIC BLOOD PRESSURE: 83 MMHG | TEMPERATURE: 98 F | RESPIRATION RATE: 18 BRPM | SYSTOLIC BLOOD PRESSURE: 132 MMHG | HEART RATE: 71 BPM

## 2019-01-22 ENCOUNTER — APPOINTMENT (OUTPATIENT)
Dept: PULMONOLOGY | Facility: CLINIC | Age: 59
End: 2019-01-22
Payer: COMMERCIAL

## 2019-01-22 VITALS — BODY MASS INDEX: 49.44 KG/M2 | HEIGHT: 67 IN | WEIGHT: 315 LBS

## 2019-01-22 VITALS — HEART RATE: 68 BPM | OXYGEN SATURATION: 97 %

## 2019-01-22 DIAGNOSIS — Z87.891 PERSONAL HISTORY OF NICOTINE DEPENDENCE: ICD-10-CM

## 2019-01-22 PROCEDURE — 94010 BREATHING CAPACITY TEST: CPT

## 2019-01-22 PROCEDURE — 99496 TRANSJ CARE MGMT HIGH F2F 7D: CPT | Mod: 25

## 2019-01-22 NOTE — REASON FOR VISIT
[Follow-Up - From Hospitalization] : a hospitalization follow-up [COPD] : COPD [Sleep Apnea] : sleep apnea

## 2019-01-22 NOTE — ASSESSMENT
[FreeTextEntry1] : Patient's COPD is slightly down probably as a side effect of infection. I would like to give him prednisone but he is seeing the infectious disease doctor tomorrow and we will wait until we get approval from her. Otherwise I will see him next regular visit. He will continue with BiPAP at night.

## 2019-01-22 NOTE — HISTORY OF PRESENT ILLNESS
[FreeTextEntry1] : She was hospitalized with cellulitis requiring IV antibiotics and incision and drainage. He is undergoing  wound treatment currently. He's been faithful with BiPAP and used it in the hospital at night. He remains on Breo. He feels more out of breath recently. Denied coughing or wheezing.

## 2019-01-22 NOTE — PHYSICAL EXAM
[Normal Conjunctiva] : the conjunctiva exhibited no abnormalities [Eyelids - No Xanthelasma] : the eyelids demonstrated no xanthelasmas [Low Lying Soft Palate] : low lying soft palate [Elongated Uvula] : elongated uvula [Enlarged Base of the Tongue] : enlargement of the base of the tongue [IV] : IV [Neck Appearance] : the appearance of the neck was normal [Neck Cervical Mass (___cm)] : no neck mass was observed [Jugular Venous Distention Increased] : there was no jugular-venous distention [Thyroid Diffuse Enlargement] : the thyroid was not enlarged [Thyroid Nodule] : there were no palpable thyroid nodules [Neck Circumference: ___] : neck circumference is [unfilled] [Heart Rate And Rhythm] : heart rate and rhythm were normal [Heart Sounds] : normal S1 and S2 [Murmurs] : no murmurs present [Abdomen Soft] : soft [Abdomen Tenderness] : non-tender [Abdomen Mass (___ Cm)] : no abdominal mass palpated [Abnormal Walk] : normal gait [Gait - Sufficient For Exercise Testing] : the gait was sufficient for exercise testing [1+ Pitting] : 1+  pitting [Deep Tendon Reflexes (DTR)] : deep tendon reflexes were 2+ and symmetric [Sensation] : the sensory exam was normal to light touch and pinprick [No Focal Deficits] : no focal deficits [Oriented To Time, Place, And Person] : oriented to person, place, and time [Impaired Insight] : insight and judgment were intact [Affect] : the affect was normal [Skin Color & Pigmentation] : normal skin color and pigmentation [Skin Turgor] : normal skin turgor [] : no rash [FreeTextEntry1] : morbidly obese

## 2019-01-23 ENCOUNTER — APPOINTMENT (OUTPATIENT)
Dept: INTERNAL MEDICINE | Facility: CLINIC | Age: 59
End: 2019-01-23
Payer: COMMERCIAL

## 2019-01-23 VITALS
SYSTOLIC BLOOD PRESSURE: 112 MMHG | DIASTOLIC BLOOD PRESSURE: 50 MMHG | WEIGHT: 315 LBS | BODY MASS INDEX: 49.44 KG/M2 | HEIGHT: 67 IN

## 2019-01-23 PROCEDURE — 99213 OFFICE O/P EST LOW 20 MIN: CPT

## 2019-01-23 RX ORDER — UMECLIDINIUM BROMIDE AND VILANTEROL TRIFENATATE 62.5; 25 UG/1; UG/1
62.5-25 POWDER RESPIRATORY (INHALATION) DAILY
Qty: 1 | Refills: 5 | Status: DISCONTINUED | COMMUNITY
Start: 2018-01-11 | End: 2019-01-23

## 2019-01-23 RX ORDER — LEVOCETIRIZINE DIHYDROCHLORIDE 5 MG/1
5 TABLET ORAL
Qty: 30 | Refills: 0 | Status: DISCONTINUED | COMMUNITY
Start: 2017-02-13 | End: 2019-01-23

## 2019-01-23 RX ORDER — CELECOXIB 200 MG/1
200 CAPSULE ORAL
Qty: 60 | Refills: 0 | Status: DISCONTINUED | COMMUNITY
Start: 2017-03-21 | End: 2019-01-23

## 2019-01-23 RX ORDER — ALPRAZOLAM 0.25 MG/1
0.25 TABLET ORAL
Qty: 2 | Refills: 0 | Status: DISCONTINUED | COMMUNITY
Start: 2016-10-18 | End: 2019-01-23

## 2019-01-23 RX ORDER — ALBUTEROL SULFATE 90 UG/1
108 (90 BASE) AEROSOL, METERED RESPIRATORY (INHALATION)
Qty: 18 | Refills: 0 | Status: DISCONTINUED | COMMUNITY
Start: 2016-12-12 | End: 2019-01-23

## 2019-01-23 RX ORDER — LEVOCETIRIZINE DIHYDROCHLORIDE 5 MG/1
5 TABLET ORAL
Qty: 30 | Refills: 0 | Status: DISCONTINUED | COMMUNITY
Start: 2016-11-04 | End: 2019-01-23

## 2019-01-23 RX ORDER — FLUTICASONE PROPIONATE 100 UG/1
100 POWDER, METERED RESPIRATORY (INHALATION) TWICE DAILY
Qty: 1 | Refills: 5 | Status: DISCONTINUED | COMMUNITY
Start: 2018-01-11 | End: 2019-01-23

## 2019-01-23 RX ORDER — LEVOFLOXACIN 500 MG/1
500 TABLET, FILM COATED ORAL
Qty: 10 | Refills: 0 | Status: DISCONTINUED | COMMUNITY
Start: 2017-01-25 | End: 2019-01-23

## 2019-01-23 NOTE — PHYSICAL EXAM
[Sclera] : the sclera and conjunctiva were normal [PERRL With Normal Accommodation] : pupils were equal in size, round, reactive to light [Extraocular Movements] : extraocular movements were intact [Outer Ear] : the ears and nose were normal in appearance [Oropharynx] : the oropharynx was normal with no thrush [Neck Appearance] : the appearance of the neck was normal [Neck Cervical Mass (___cm)] : no neck mass was observed [Jugular Venous Distention Increased] : there was no jugular-venous distention [Thyroid Diffuse Enlargement] : the thyroid was not enlarged [Auscultation Breath Sounds / Voice Sounds] : lungs were clear to auscultation bilaterally [Heart Rate And Rhythm] : heart rate was normal and rhythm regular [Heart Sounds] : normal S1 and S2 [Bowel Sounds] : normal bowel sounds [Abdomen Soft] : soft [Abdomen Tenderness] : non-tender [] : no hepato-splenomegaly [Abdomen Mass (___ Cm)] : no abdominal mass palpated [Costovertebral Angle Tenderness] : no CVA tenderness [No Palpable Adenopathy] : no palpable adenopathy [Musculoskeletal - Swelling] : no joint swelling [Nail Clubbing] : no clubbing  or cyanosis of the fingernails [Motor Tone] : muscle strength and tone were normal [FreeTextEntry1] : Open wound in right upper thigh about 5cm, packed with serosangiunous fluid on gauze, no pus, induration improved significantly. No more right leg edema  [No Focal Deficits] : no focal deficits [Oriented To Time, Place, And Person] : oriented to person, place, and time

## 2019-01-23 NOTE — ASSESSMENT
[FreeTextEntry1] : - Will restart oral Levaquin and Bactrim (due to PCN allergy)\par - Will see him in one week before stopping ABx to make sure he doesn't need extended course of oral ABx. \par - No more packing in 2 days or so, just regular dressing. \par - Taking prednisone for his COPD for few days should be fine (given by pulmonary).  [Treatment Education] : treatment education [Treatment Adherence] : treatment adherence [Drug Interactions / Side Effects] : drug interactions/side effects [Anticipatory Guidance] : anticipatory guidance

## 2019-01-23 NOTE — HISTORY OF PRESENT ILLNESS
[FreeTextEntry1] : 57y/o man with HTN, Afib, ROCK, COPD and morbid obesity was admitted on 12/27\par with dyspnea for COPD exacerbation. Also had swelling and pain in right upper\par thigh for one week for which was taking Abx po with no improvement.\par USG showed 5cm complex collection in the area so surgery I&D'd with no purulent\par discharge (just blood came out), also IR tried with no success. So he was started on IV ABx vancomycin and ertapenem due to PCN allergy and discharged home on ertapenem and daptomycin to complete 2 weeks IV Abx treatment. \par He is here today for follow up. Completed IV 2 days ago but still has the midline just in case needs to continue IV. \par Discharge from the wound is better just minimal serosanguineous fluid coming out. \par No fever or any other symptoms. \par In the beginning nurse and now his wife are packing and dressing the wound. \par He was given bactrim + levaquin in the past for oral treatment (that failed), will start them today and continue until next week. \par

## 2019-01-23 NOTE — REVIEW OF SYSTEMS
[Shortness Of Breath] : shortness of breath [Negative] : Heme/Lymph [FreeTextEntry6] : seen pulmonologist yesterday , has COPD [de-identified] : Right upper thigh infection

## 2019-01-30 ENCOUNTER — APPOINTMENT (OUTPATIENT)
Dept: INTERNAL MEDICINE | Facility: CLINIC | Age: 59
End: 2019-01-30
Payer: COMMERCIAL

## 2019-01-30 VITALS
SYSTOLIC BLOOD PRESSURE: 110 MMHG | HEIGHT: 67 IN | DIASTOLIC BLOOD PRESSURE: 68 MMHG | WEIGHT: 315 LBS | BODY MASS INDEX: 49.44 KG/M2

## 2019-01-30 PROCEDURE — 99213 OFFICE O/P EST LOW 20 MIN: CPT

## 2019-01-30 NOTE — ASSESSMENT
[FreeTextEntry1] : - Will Continue oral Levaquin and Bactrim (due to PCN allergy) for one more week since the wound is still open and draining. but has a significant improvmenet. \par - Will see him in 2 weeks\par - VNS for packing and dressing the wound. \par - Still taking prednisone for COPD for 3 more days, this can delay healing of the wound as well. \par - Weight loss would be helpful to prevent SSTI. \par \par  [Treatment Education] : treatment education [Treatment Adherence] : treatment adherence [Rx Dose / Side Effects] : Rx dose/side effects

## 2019-01-30 NOTE — REVIEW OF SYSTEMS
[Negative] : Heme/Lymph [de-identified] : right upper thigh with open wound [de-identified] : numbness in legs

## 2019-02-04 ENCOUNTER — EMERGENCY (EMERGENCY)
Facility: HOSPITAL | Age: 59
LOS: 1 days | Discharge: HOME | End: 2019-02-04
Attending: STUDENT IN AN ORGANIZED HEALTH CARE EDUCATION/TRAINING PROGRAM
Payer: COMMERCIAL

## 2019-02-04 VITALS
SYSTOLIC BLOOD PRESSURE: 115 MMHG | TEMPERATURE: 98 F | RESPIRATION RATE: 18 BRPM | HEIGHT: 67 IN | WEIGHT: 315 LBS | OXYGEN SATURATION: 98 % | HEART RATE: 93 BPM | DIASTOLIC BLOOD PRESSURE: 70 MMHG

## 2019-02-04 PROCEDURE — 99285 EMERGENCY DEPT VISIT HI MDM: CPT | Mod: 25

## 2019-02-04 NOTE — ED ADULT TRIAGE NOTE - CHIEF COMPLAINT QUOTE
colette ambulated to er - states that he is having a hard time breathing - shortness of breath when he walks

## 2019-02-05 VITALS
RESPIRATION RATE: 16 BRPM | TEMPERATURE: 98 F | DIASTOLIC BLOOD PRESSURE: 67 MMHG | HEART RATE: 91 BPM | SYSTOLIC BLOOD PRESSURE: 143 MMHG | OXYGEN SATURATION: 93 %

## 2019-02-05 LAB
ALBUMIN SERPL ELPH-MCNC: 3.6 G/DL — SIGNIFICANT CHANGE UP (ref 3.3–5.2)
ALP SERPL-CCNC: 37 U/L — LOW (ref 40–120)
ALT FLD-CCNC: 17 U/L — SIGNIFICANT CHANGE UP
ANION GAP SERPL CALC-SCNC: 13 MMOL/L — SIGNIFICANT CHANGE UP (ref 5–17)
APTT BLD: 34.5 SEC — SIGNIFICANT CHANGE UP (ref 27.5–36.3)
AST SERPL-CCNC: 24 U/L — SIGNIFICANT CHANGE UP
BASOPHILS # BLD AUTO: 0 K/UL — SIGNIFICANT CHANGE UP (ref 0–0.2)
BASOPHILS NFR BLD AUTO: 0.2 % — SIGNIFICANT CHANGE UP (ref 0–2)
BILIRUB SERPL-MCNC: 0.4 MG/DL — SIGNIFICANT CHANGE UP (ref 0.4–2)
BUN SERPL-MCNC: 23 MG/DL — HIGH (ref 8–20)
CALCIUM SERPL-MCNC: 8.6 MG/DL — SIGNIFICANT CHANGE UP (ref 8.6–10.2)
CHLORIDE SERPL-SCNC: 99 MMOL/L — SIGNIFICANT CHANGE UP (ref 98–107)
CO2 SERPL-SCNC: 23 MMOL/L — SIGNIFICANT CHANGE UP (ref 22–29)
CREAT SERPL-MCNC: 1.09 MG/DL — SIGNIFICANT CHANGE UP (ref 0.5–1.3)
EOSINOPHIL # BLD AUTO: 0.2 K/UL — SIGNIFICANT CHANGE UP (ref 0–0.5)
EOSINOPHIL NFR BLD AUTO: 1.4 % — SIGNIFICANT CHANGE UP (ref 0–5)
GLUCOSE SERPL-MCNC: 108 MG/DL — SIGNIFICANT CHANGE UP (ref 70–115)
HCT VFR BLD CALC: 42.2 % — SIGNIFICANT CHANGE UP (ref 42–52)
HGB BLD-MCNC: 14.2 G/DL — SIGNIFICANT CHANGE UP (ref 14–18)
INR BLD: 1.98 RATIO — HIGH (ref 0.88–1.16)
LYMPHOCYTES # BLD AUTO: 3.9 K/UL — SIGNIFICANT CHANGE UP (ref 1–4.8)
LYMPHOCYTES # BLD AUTO: 31 % — SIGNIFICANT CHANGE UP (ref 20–55)
MCHC RBC-ENTMCNC: 30.5 PG — SIGNIFICANT CHANGE UP (ref 27–31)
MCHC RBC-ENTMCNC: 33.6 G/DL — SIGNIFICANT CHANGE UP (ref 32–36)
MCV RBC AUTO: 90.8 FL — SIGNIFICANT CHANGE UP (ref 80–94)
MONOCYTES # BLD AUTO: 1.2 K/UL — HIGH (ref 0–0.8)
MONOCYTES NFR BLD AUTO: 9.6 % — SIGNIFICANT CHANGE UP (ref 3–10)
NEUTROPHILS # BLD AUTO: 7.2 K/UL — SIGNIFICANT CHANGE UP (ref 1.8–8)
NEUTROPHILS NFR BLD AUTO: 57.2 % — SIGNIFICANT CHANGE UP (ref 37–73)
NT-PROBNP SERPL-SCNC: 1104 PG/ML — HIGH (ref 0–300)
PLATELET # BLD AUTO: 184 K/UL — SIGNIFICANT CHANGE UP (ref 150–400)
POTASSIUM SERPL-MCNC: 4.7 MMOL/L — SIGNIFICANT CHANGE UP (ref 3.5–5.3)
POTASSIUM SERPL-SCNC: 4.7 MMOL/L — SIGNIFICANT CHANGE UP (ref 3.5–5.3)
PROT SERPL-MCNC: 7.1 G/DL — SIGNIFICANT CHANGE UP (ref 6.6–8.7)
PROTHROM AB SERPL-ACNC: 23.2 SEC — HIGH (ref 10–12.9)
RBC # BLD: 4.65 M/UL — SIGNIFICANT CHANGE UP (ref 4.6–6.2)
RBC # FLD: 15 % — SIGNIFICANT CHANGE UP (ref 11–15.6)
SODIUM SERPL-SCNC: 135 MMOL/L — SIGNIFICANT CHANGE UP (ref 135–145)
TROPONIN T SERPL-MCNC: <0.01 NG/ML — SIGNIFICANT CHANGE UP (ref 0–0.06)
TROPONIN T SERPL-MCNC: <0.01 NG/ML — SIGNIFICANT CHANGE UP (ref 0–0.06)
WBC # BLD: 12.6 K/UL — HIGH (ref 4.8–10.8)
WBC # FLD AUTO: 12.6 K/UL — HIGH (ref 4.8–10.8)

## 2019-02-05 PROCEDURE — 93010 ELECTROCARDIOGRAM REPORT: CPT

## 2019-02-05 PROCEDURE — 85730 THROMBOPLASTIN TIME PARTIAL: CPT

## 2019-02-05 PROCEDURE — 85027 COMPLETE CBC AUTOMATED: CPT

## 2019-02-05 PROCEDURE — 36415 COLL VENOUS BLD VENIPUNCTURE: CPT

## 2019-02-05 PROCEDURE — 94640 AIRWAY INHALATION TREATMENT: CPT

## 2019-02-05 PROCEDURE — 71045 X-RAY EXAM CHEST 1 VIEW: CPT | Mod: 26

## 2019-02-05 PROCEDURE — 84484 ASSAY OF TROPONIN QUANT: CPT

## 2019-02-05 PROCEDURE — 85610 PROTHROMBIN TIME: CPT

## 2019-02-05 PROCEDURE — 99284 EMERGENCY DEPT VISIT MOD MDM: CPT | Mod: 25

## 2019-02-05 PROCEDURE — 83880 ASSAY OF NATRIURETIC PEPTIDE: CPT

## 2019-02-05 PROCEDURE — 80053 COMPREHEN METABOLIC PANEL: CPT

## 2019-02-05 PROCEDURE — 93005 ELECTROCARDIOGRAM TRACING: CPT

## 2019-02-05 PROCEDURE — 71045 X-RAY EXAM CHEST 1 VIEW: CPT

## 2019-02-05 PROCEDURE — 96375 TX/PRO/DX INJ NEW DRUG ADDON: CPT

## 2019-02-05 PROCEDURE — 96374 THER/PROPH/DIAG INJ IV PUSH: CPT

## 2019-02-05 RX ORDER — ASPIRIN/CALCIUM CARB/MAGNESIUM 324 MG
81 TABLET ORAL ONCE
Qty: 0 | Refills: 0 | Status: COMPLETED | OUTPATIENT
Start: 2019-02-05 | End: 2019-02-05

## 2019-02-05 RX ORDER — FUROSEMIDE 40 MG
40 TABLET ORAL ONCE
Qty: 0 | Refills: 0 | Status: COMPLETED | OUTPATIENT
Start: 2019-02-05 | End: 2019-02-05

## 2019-02-05 RX ORDER — IPRATROPIUM/ALBUTEROL SULFATE 18-103MCG
3 AEROSOL WITH ADAPTER (GRAM) INHALATION ONCE
Qty: 0 | Refills: 0 | Status: COMPLETED | OUTPATIENT
Start: 2019-02-05 | End: 2019-02-05

## 2019-02-05 RX ADMIN — Medication 40 MILLIGRAM(S): at 06:39

## 2019-02-05 RX ADMIN — Medication 81 MILLIGRAM(S): at 06:39

## 2019-02-05 RX ADMIN — Medication 125 MILLIGRAM(S): at 04:29

## 2019-02-05 RX ADMIN — Medication 3 MILLILITER(S): at 04:29

## 2019-02-05 NOTE — ED PROVIDER NOTE - ATTENDING CONTRIBUTION TO CARE
I personally saw the patient with the PA, and completed the key components of the history and physical exam. I then discussed the management plan with the PA.   gen morbid obesity resp mild rales bases cardaic no murmur abd soft chronic pedal edema

## 2019-02-05 NOTE — ED ADULT NURSE NOTE - OBJECTIVE STATEMENT
pt c/o sob while lying down elevated by pillows, pt denies chest pain or pressure at any time. pt with afib on cm.

## 2019-02-05 NOTE — CONSULT NOTE ADULT - ASSESSMENT
1. 57 yo M with transient jznljeb-efxmfenhvfrlvr-yys/copd/MO-no evidence of PNA or PE.  2. chronic afib rate controlled and AC'd  3. groin infection-on oral antibiotics.   4. NIC  Pt appears to be stable from a cardiopulmonary perspective has chronic disease and can be sent home on current meds.

## 2019-02-05 NOTE — ED ADULT NURSE REASSESSMENT NOTE - NS ED NURSE REASSESS COMMENT FT1
Report received from outgoing RN Anette Fitzgerald at change of shift and assumed care of pt at that time. pt received sitting on chair next to stretcher awake, alert, no acute distress, no complaints. Pt understands need for repeated troponin level and asks to use bathroom first. Pt understands plan of care and verbalized he uses cPap at home when asleep and is nervous to rest while awaiting results. Pt was given reassurance that he would be monitored and frequently rounded on for safety. Will monitor.

## 2019-02-05 NOTE — ED PROVIDER NOTE - OBJECTIVE STATEMENT
Patient is a 57 y/o male with Patient is a 59 y/o male with a pmhx of Afib (on xarelto), COPD, ROCK, CHF, presenting for SOB that onset tonight. Patient states he was at home when he became SOB, one hour prior to arrival to ED. Patient admits to dry cough for the past day. Patient states he is not on home oxygen, but has nebuilzer that he was using at home when the SOB started, which did not improve the SOB. Patient reports that he was at [facility] at the end of december with SOB diagnosed with CHF. Patient then returned to [facility] in january for cellulitis of the right leg, has been on antibiotics since d/c, currently on levaquin and bactrim, following up with wound doctor. Patient follows up with [facility], is scheduled for echo (iv) next week due to limited echo results when preformed at [facility] when patient was admitted in dec. Patient states upon arrival to ED the SOB was improving. Patient denies current chest pain. Patient denies abdominal pain, fevers, nausea, vomiting, calf pain. Patient states smoker in the past, but no longer smokes. Patient currently on 2 L oxygen on evaluatin

## 2019-02-05 NOTE — ED PROVIDER NOTE - PHYSICAL EXAMINATION
Const: Awake, alert and oriented. In no acute distress. Well appearing.  HEENT: NC/AT. Moist mucous membranes.  Eyes: Conjunctiva pink, sclera white bilaterally, EOMI.  Neck:. Soft and supple. Full ROM without pain.  Cardiac: +S1/S2. No murmurs. Pedal edema b/l   Resp: Speaking in full sentences. No evidence of respiratory distress. Decreased breath sounds on asucultation.  Abd: Soft, non-tender, non-distended. Normal bowel sounds in all 4 quadrants. No guarding or rebound.  Back: Spine midline and non-tender. No CVAT.  Skin: Cellulitis noted to right upper leg, wound noted, not actively draining   Lymph: No cervical lymphadenopathy.  Neuro: Awake, alert & oriented x 3. CN II-XII intact, finger to nose intact, neurovasculary intact, muscle strength fair, gait without ataxia, reflexes intact

## 2019-02-05 NOTE — CONSULT NOTE ADULT - SUBJECTIVE AND OBJECTIVE BOX
Chief Complaint: dyspnea    HPI: old records reviewed. 57 yo male noted resting dyspnea and dry cough last nite and came to ER. PMH+ copd (just quit 2ppd smoking 5 weeks ago)/zaida on c-pap/chronic afib on AC/NICM/morbid obesity and recent hosp for groin abcess (no bacteremia) with IV antibiotics and now on bactrim and levoquin. No recent fever or sputum or chills. No hx of cva/syncope/seizure/renal/hepatic/heme or endocrine hx. No recent etoh. ROS neg for  edema or orthopnea or cp. No hemoptysis. Allergy to pcn/shellfish. OP meds: xanax/bactrim/levoquin/coreg bid/dig 0.125 qd/lasix 40 qd/neurontin 300 tid/inhalers/nicotine gum/aldactone 12.5 qd/xarelto 20 qd.    PAST MEDICAL & SURGICAL HISTORY:  Afib  ZAIDA (obstructive sleep apnea)  COPD (chronic obstructive pulmonary disease)  No significant past surgical history      PREVIOUS DIAGNOSTIC TESTING:      ECHO  FINDINGS: LVEF reduced    STRESS  FINDINGS:    CATHETERIZATION  FINDINGS: nl coronaries    MEDICATIONS  (STANDING):    MEDICATIONS  (PRN):      FAMILY HISTORY:  Family history of CHF (congestive heart failure) (Mother)      SOCIAL HISTORY:     CIGARETTES: just quit    ALCOHOL: 0    ROS: Negative other than as mentioned in HPI.    Vital Signs Last 24 Hrs  T(C): 36.8 (05 Feb 2019 09:06), Max: 37 (05 Feb 2019 03:33)  T(F): 98.3 (05 Feb 2019 09:06), Max: 98.6 (05 Feb 2019 03:33)  HR: 80 irreg (05 Feb 2019 09:06) (62 - 96)  BP: 120/80 la manually (05 Feb 2019 09:06) (115/70 - 143/67)  BP(mean): --  RR: 14 ora (05 Feb 2019 09:06) (16 - 20)  SpO2: 93% (05 Feb 2019 09:06) (93% - 98%)    PHYSICAL EXAM:  General: Appears well developed, well nourished alert and cooperative. MO ma male afebrile nad.  HEENT: Head; normocephalic, atraumatic.  Eyes;   Pupils reactive, cornea wnl.  Neck; Supple, no nodes adenopathy, no NVD or carotid bruit or thyromegaly.  CARDIOVASCULAR; No murmur, rub, gallop or lift. Normal S1 and S2. irreg rhythm/distant heart tones.  LUNGS; No rales, rhonchi or wheeze. Normal breath sounds bilaterally.  ABDOMEN ; Soft, nontender without mass or organomegaly. bowel sounds normoactive. MO  EXTREMITIES; No clubbing, cyanosis or edema.  SKIN; warm and dry with normal turgor.  NEURO; Alert/oriented x 3/normal motor exam.    PSYCH; normal affect.            INTERPRETATION OF TELEMETRY:    ECG: afib lbbb  cxr clear.    I&O's Detail      LABS:                        14.2   12.6  )-----------( 184      ( 05 Feb 2019 04:16 )             42.2     02-05    135  |  99  |  23.0<H>  ----------------------------<  108  4.7   |  23.0  |  1.09    Ca    8.6      05 Feb 2019 04:16    TPro  7.1  /  Alb  3.6  /  TBili  0.4  /  DBili  x   /  AST  24  /  ALT  17  /  AlkPhos  37<L>  02-05    CARDIAC MARKERS ( 05 Feb 2019 07:47 )  x     / <0.01 ng/mL / x     / x     / x      CARDIAC MARKERS ( 05 Feb 2019 04:16 )  x     / <0.01 ng/mL / x     / x     / x          PT/INR - ( 05 Feb 2019 04:16 )   PT: 23.2 sec;   INR: 1.98 ratio         PTT - ( 05 Feb 2019 04:16 )  PTT:34.5 sec    I&O's Summary      RADIOLOGY & ADDITIONAL STUDIES:

## 2019-02-05 NOTE — ED PROVIDER NOTE - PROGRESS NOTE DETAILS
Pt not on oxygen anymore in ed, 02sat 95% Pt reports when he walked to x-ray he was not SOB, pt reports improvement of sxs, reviewed lab work, ekg, chest x-ray, will get [address] [place] [address] consult in A.M. PT seen and reassessed.  Patient symptomatically improved and cleared by cardiology for discharge.   AAOX3, NAD, VSS.  Discussed test results w/ patient, given copy of results. Patient verbalized understanding of hospital course and outpatient plans, has decisional making capacity.  Will follow-up with Primary care doctor in the next 2 days; patient will call for an appointment. Will return to the ED if there is any worsening of symptoms.  Patient able to ambulate w/o difficulty, is tolerating PO intake

## 2019-02-06 ENCOUNTER — APPOINTMENT (OUTPATIENT)
Dept: PULMONOLOGY | Facility: CLINIC | Age: 59
End: 2019-02-06
Payer: COMMERCIAL

## 2019-02-06 VITALS
OXYGEN SATURATION: 96 % | BODY MASS INDEX: 60.61 KG/M2 | HEART RATE: 80 BPM | WEIGHT: 315 LBS | SYSTOLIC BLOOD PRESSURE: 110 MMHG | DIASTOLIC BLOOD PRESSURE: 60 MMHG

## 2019-02-06 VITALS — RESPIRATION RATE: 16 BRPM

## 2019-02-06 PROCEDURE — 99214 OFFICE O/P EST MOD 30 MIN: CPT

## 2019-02-06 NOTE — ASSESSMENT
[FreeTextEntry1] : Possible COPD exacerbation. After the patient finishes his current prednisone at 50 mg Aygestin to stay on 10 mg daily till the next visit in a month. Other medicines will be continued as well

## 2019-02-06 NOTE — HISTORY OF PRESENT ILLNESS
[FreeTextEntry1] : The patient was in [place] emergency room the other night. He had abrupt onset of increased shortness of breath. I reviewed the hospital records. Appears that this may have been COPD. His BNP was elevated but not as much as in previous times and his chest x-ray did not show heart failure. He has had increased leg edema and has been given increased Lasix. He was also sent home on prednisone at 50 mg for 4 days. Currently feels well.His cellulitis is improved according to ID.

## 2019-02-06 NOTE — PHYSICAL EXAM
[FreeTextEntry1] : morbidly obese [Normal Conjunctiva] : the conjunctiva exhibited no abnormalities [Eyelids - No Xanthelasma] : the eyelids demonstrated no xanthelasmas [Low Lying Soft Palate] : low lying soft palate [Elongated Uvula] : elongated uvula [Enlarged Base of the Tongue] : enlargement of the base of the tongue [IV] : IV [Neck Appearance] : the appearance of the neck was normal [Neck Cervical Mass (___cm)] : no neck mass was observed [Jugular Venous Distention Increased] : there was no jugular-venous distention [Thyroid Diffuse Enlargement] : the thyroid was not enlarged [Thyroid Nodule] : there were no palpable thyroid nodules [Neck Circumference: ___] : neck circumference is [unfilled] [Heart Rate And Rhythm] : heart rate and rhythm were normal [Heart Sounds] : normal S1 and S2 [Murmurs] : no murmurs present [Abdomen Soft] : soft [Abdomen Tenderness] : non-tender [Abdomen Mass (___ Cm)] : no abdominal mass palpated [Abnormal Walk] : normal gait [Gait - Sufficient For Exercise Testing] : the gait was sufficient for exercise testing [1+ Pitting] : 1+  pitting [Deep Tendon Reflexes (DTR)] : deep tendon reflexes were 2+ and symmetric [Sensation] : the sensory exam was normal to light touch and pinprick [No Focal Deficits] : no focal deficits [Oriented To Time, Place, And Person] : oriented to person, place, and time [Impaired Insight] : insight and judgment were intact [Affect] : the affect was normal [Skin Color & Pigmentation] : normal skin color and pigmentation [Skin Turgor] : normal skin turgor [] : no rash

## 2019-02-13 ENCOUNTER — APPOINTMENT (OUTPATIENT)
Dept: INTERNAL MEDICINE | Facility: CLINIC | Age: 59
End: 2019-02-13
Payer: COMMERCIAL

## 2019-02-13 VITALS
WEIGHT: 315 LBS | DIASTOLIC BLOOD PRESSURE: 60 MMHG | SYSTOLIC BLOOD PRESSURE: 112 MMHG | HEIGHT: 67 IN | BODY MASS INDEX: 49.44 KG/M2

## 2019-02-13 PROCEDURE — 99213 OFFICE O/P EST LOW 20 MIN: CPT

## 2019-02-13 RX ORDER — SULFAMETHOXAZOLE AND TRIMETHOPRIM 800; 160 MG/1; MG/1
800-160 TABLET ORAL TWICE DAILY
Qty: 14 | Refills: 0 | Status: DISCONTINUED | COMMUNITY
Start: 2019-01-30 | End: 2019-02-13

## 2019-02-13 NOTE — PHYSICAL EXAM
[Sclera] : the sclera and conjunctiva were normal [PERRL With Normal Accommodation] : pupils were equal in size, round, reactive to light [Extraocular Movements] : extraocular movements were intact [Outer Ear] : the ears and nose were normal in appearance [Oropharynx] : the oropharynx was normal with no thrush [Neck Appearance] : the appearance of the neck was normal [Neck Cervical Mass (___cm)] : no neck mass was observed [Jugular Venous Distention Increased] : there was no jugular-venous distention [Thyroid Diffuse Enlargement] : the thyroid was not enlarged [Auscultation Breath Sounds / Voice Sounds] : lungs were clear to auscultation bilaterally [Heart Rate And Rhythm] : heart rate was normal and rhythm regular [Heart Sounds] : normal S1 and S2 [Bowel Sounds] : normal bowel sounds [Abdomen Soft] : soft [Abdomen Tenderness] : non-tender [] : no hepato-splenomegaly [Abdomen Mass (___ Cm)] : no abdominal mass palpated [Costovertebral Angle Tenderness] : no CVA tenderness [No Palpable Adenopathy] : no palpable adenopathy [Musculoskeletal - Swelling] : no joint swelling [Nail Clubbing] : no clubbing  or cyanosis of the fingernails [Motor Tone] : muscle strength and tone were normal [FreeTextEntry1] : 1cm open wound in right upper thigh with no cellulitis, erythema or induration. [No Focal Deficits] : no focal deficits [Oriented To Time, Place, And Person] : oriented to person, place, and time

## 2019-02-13 NOTE — HISTORY OF PRESENT ILLNESS
[FreeTextEntry1] : 59y/o man with HTN, Afib, ROCK, COPD and morbid obesity was admitted on 12/27\par with dyspnea for COPD exacerbation. Also had swelling and pain in right upper\par thigh for one week for which was taking Abx po with no improvement.\par USG showed 5cm complex collection in the area so surgery I&D'd with no purulent\par discharge (just blood came out), also IR tried with no success. So he was started on IV ABx vancomycin and ertapenem due to PCN allergy and discharged home on ertapenem and daptomycin to complete 2 weeks IV Abx treatment. \par He is here today for his second follow up. \par Wound is healing well, no more cellulitis has small open wound. no fever.\par

## 2019-02-13 NOTE — REVIEW OF SYSTEMS
[Negative] : Heme/Lymph [de-identified] : Right upper thigh with small opening and clera discharge

## 2019-02-13 NOTE — ASSESSMENT
[FreeTextEntry1] : \par - No need for any antibiotics anymore. \par - continue dressing the wound even though almost healed. \par - Still taking prednisone for COPD this can delay healing of the wound as well. \par - Weight loss would be helpful to prevent SSTI. \par RTC PRN  [Treatment Education] : treatment education

## 2019-02-25 ENCOUNTER — APPOINTMENT (OUTPATIENT)
Dept: ORTHOPEDIC SURGERY | Facility: CLINIC | Age: 59
End: 2019-02-25
Payer: COMMERCIAL

## 2019-02-25 ENCOUNTER — MEDICATION RENEWAL (OUTPATIENT)
Age: 59
End: 2019-02-25

## 2019-02-25 VITALS
BODY MASS INDEX: 49.44 KG/M2 | TEMPERATURE: 98.9 F | SYSTOLIC BLOOD PRESSURE: 94 MMHG | HEART RATE: 91 BPM | WEIGHT: 315 LBS | DIASTOLIC BLOOD PRESSURE: 66 MMHG | HEIGHT: 67 IN

## 2019-02-25 PROCEDURE — 99214 OFFICE O/P EST MOD 30 MIN: CPT | Mod: 25

## 2019-02-25 PROCEDURE — 20610 DRAIN/INJ JOINT/BURSA W/O US: CPT

## 2019-02-25 NOTE — HISTORY OF PRESENT ILLNESS
[Pain Location] : pain [Worsening] : worsening [___ yrs] : [unfilled] year(s) ago [4] : an average pain level of 4/10 [0] : a minimum pain level of 0/10 [8] : a maximum pain level of 8/10 [Walking] : worsened by walking [Rest] : relieved by rest [de-identified] : 58 year old male presents back to the office for bilateral knee pain, left worse than right. He has severe valgus deformity in the left knee. His pain was severe but cortisone injections have been working well. He is trying to lose weight. pt is on prednisone 10 mg for COPD. He has a history of CHF with recent exacerbation resulting in two hospitalizations at [facility] two months ago (12/24-1/1/19). He is also currently under treatment for right thigh cellulitis with open wounds.  He has not smoked after his hospitalization. He has an oxygen tank at home but does not use it. In the past he has received cortisone injections with good relief, and wishes to receive additional cortisone injections today.

## 2019-02-25 NOTE — PROCEDURE
[de-identified] : I injected the patient's bilateral knees today with cortisone.\par \par I discussed at length with the patient the planned steroid and lidocaine injection. The risks, benefits, convalescence and alternatives were reviewed. The possible side effects discussed included but were not limited to: pain, swelling, heat, bleeding, and redness. Symptoms are generally mild but if they are extensive then contact the office. Giving pain relievers by mouth such as NSAIDs or Tylenol can generally treat the reactions to steroid and lidocaine. Rare cases of infection have been noted. Rash, hives and itching may occur post injection. If you have muscle pain or cramps, flushing and or swelling of the face, rapid heart beat, nausea, dizziness, fever, chills, headache, difficulty breathing, swelling in the arms or legs, or have a prickly feeling of your skin, contact a health care provider immediately. Following this discussion, the knee was prepped with Alcohol and under sterile condition the 80 mg Depo-Medrol and 6 cc Lidocaine injection was performed with a 20 gauge needle through a superolateral injection site. The needle was introduced into the joint, aspiration was performed to ensure intra-articular placement and the medication was injected. Upon withdrawal of the needle the site was cleaned with alcohol and a band aid applied. The patient tolerated the injection well and there were no adverse effects. Post injection instructions included no strenuous activity for 24 hours, cryotherapy and if there are any adverse effects to contact the office. \par \par \par

## 2019-02-25 NOTE — ADDENDUM
[FreeTextEntry1] : I, Endy Orozco, acted solely as a scribe for Dr. Bassem Rao on this date 02/25/2019.

## 2019-02-25 NOTE — DISCUSSION/SUMMARY
[de-identified] : 58 year old obese male with severe valgus deformity and end-stage lateral compartment osteoarthritis. In the past he has attempted cortisone injections with good relief. Based on his exam and x-rays, the patient is a candidate for a knee replacement once conservative treatments have been exhausted and have failed. I believe this would be his best option for long term relief. He elected to receive bilateral knee cortisone injections, which he tolerated well. The patient has COPD,  but he quit smoking finally.  He is very high risk for now we need to defer from surgery\par \par A knee replacement means resurfacing of all 3 surfaces of the patella, the femur, and tibia with metal and plastic parts. The prosthetic parts are usually cemented into position and well outpatient range of motion from full extension to about 120° of flexion. The postoperative motion, however, is determined by multiple factors, the most important of which is preoperative motion. In general, the better motion preoperatively, the better the motion postoperatively. The operation, pending medical clearance, gently requires hospitalization of 3-4 days for one knee, 5-7 days for bilateral knee replacements. In general, we prefer to perform the procedure under spinal anesthesia with femoral nerve block and occasional single shot sciatic nerve block. We may ask a patient to give 2 units of blood for bilateral total knee arthroplasties, for one knee we institute a normogram which may include administration of preoperative Procrit. The operative procedure takes probably 1-2 hours. The operation requires a straight incision anywhere from 5-7 inches down from the knee. Postoperatively, the patient is positioned in a continuous passive motion machine within the first 24 hours and is walking the day after surgery. The first couple days are very painful and the pain medication will alleviate, but not eliminate the pain. The patient must really push hard to get range of motion. Our goal for having a person go home as that the range of motion is approximately 0-90° of flexion and that they can walk with a walker or cane. A walking aid is to be dispensed once the patient is secure enough. In general there, there is no cast or brace required with routine knee replacement. In the long term, we do not encourage our patients to run for the sake of running, although pending their preoperative status, we often allow patient to play doubles tennis or comparative activities. We also allowed them to do gentle intermediate downhill skiing if they are truly an expert skier. Biking is encouraged as well swimming. The followup periods are usually 3 weeks, 6 weeks, 3 months, and yearly intervals. Potential complications with total knee replacement included anesthetic complications and death, infection around 1%, nerve damage, by which means peroneal nerve palsy, footdrop or flapping foot with ambulation. This is particularly more apt to occur in the patient with a valgus or knock-knee deformity. The incidence can be quite high in this particular patient population. There will be areas of skin numbness, but this is not an untoward effect nor do we consider it a complication. Other potential complications include dislocation of the patella component, usually less than 2%; loosening of the tibial or femoral component is much more infrequent. Most often this occurs with infection or long-term use. Patient of extreme risk including markedly overweight patient's may be more prone to prosthetic wear. Major blood vessel damage is also extremely rare. Directly because of the anatomic proximity of the popliteal artery this could be lacerated with subsequent repair required. Be it unlikely, disruption of the popliteal artery could theoretically result in amputation. Similarly, infection could theoretically result in amputation if one were to grow out of an organism that cannot be controlled with antibiotics. General medical complications include phlebitis, for which we would prophylactically anticoagulate patients, but could still occur, and fatal pulmonary embolus which has been reported. Cardiovascular problems, such as heart attack or ischemia are always a concern with such hemodynamic changes in the blood vascular system. Other General complications are rare, but anything medicine could theoretically happen. I think the patient understands the risk benefit ratio of total knee replacement and will think about whether there like to pursue with an operation or nonoperative treatment program.

## 2019-02-25 NOTE — REASON FOR VISIT
[Follow-Up Visit] : a follow-up visit for [Knee Pain] : knee pain [FreeTextEntry2] : Bilateral knee pain L>R

## 2019-02-25 NOTE — PHYSICAL EXAM
[Antalgic] : antalgic [LE] : Sensory: Intact in bilateral lower extremities [ALL] : dorsalis pedis, posterior tibial, femoral, popliteal, and radial 2+ and symmetric bilaterally [Normal] : Oriented to person, place, and time, insight and judgement were intact and the affect was normal [Poor Appearance] : well-appearing [Acute Distress] : not in acute distress [de-identified] : GENERAL APPEARANCE: Well nourished and hydrated, pleasant, alert, and oriented x 3. Appears their stated age. \par HEENT: Normocephalic, extraocular eye motion intact. Nasal septum midline. Oral cavity clear. External auditory canal clear. \par RESPIRATORY: Breath sounds clear and audible in all lobes. No wheezing, No accessory muscle use.\par CARDIOVASCULAR: No apparent abnormalities. No lower leg edema. No varicosities. Pedal pulses are palpable.\par NEUROLOGIC: Sensation is normal, no muscle weakness in the upper or lower extremities.\par DERMATOLOGIC: No apparent skin lesions, moist, warm, no rash.\par SPINE: Cervical spine appears normal and moves freely; thoracic spine appears normal and moves freely; lumbosacral spine appears normal and moves freely, normal, nontender.\par MUSCULOSKELETAL: Hands, wrists, and elbows are normal and move freely, shoulders are normal and move freely. [de-identified] : Bilateral knee exam shows valgus deformity, pain in the lateral joint line, his range of motion is preserved.

## 2019-02-28 ENCOUNTER — OUTPATIENT (OUTPATIENT)
Dept: OUTPATIENT SERVICES | Facility: HOSPITAL | Age: 59
LOS: 1 days | Discharge: HOME | End: 2019-02-28
Payer: COMMERCIAL

## 2019-02-28 DIAGNOSIS — L97.101 NON-PRESSURE CHRONIC ULCER OF UNSPECIFIED THIGH LIMITED TO BREAKDOWN OF SKIN: ICD-10-CM

## 2019-02-28 PROCEDURE — G0463: CPT

## 2019-03-02 DIAGNOSIS — I50.9 HEART FAILURE, UNSPECIFIED: ICD-10-CM

## 2019-03-02 DIAGNOSIS — I48.91 UNSPECIFIED ATRIAL FIBRILLATION: ICD-10-CM

## 2019-03-02 DIAGNOSIS — Z82.5 FAMILY HISTORY OF ASTHMA AND OTHER CHRONIC LOWER RESPIRATORY DISEASES: ICD-10-CM

## 2019-03-02 DIAGNOSIS — Z82.49 FAMILY HISTORY OF ISCHEMIC HEART DISEASE AND OTHER DISEASES OF THE CIRCULATORY SYSTEM: ICD-10-CM

## 2019-03-02 DIAGNOSIS — I83.91 ASYMPTOMATIC VARICOSE VEINS OF RIGHT LOWER EXTREMITY: ICD-10-CM

## 2019-03-02 DIAGNOSIS — Z87.891 PERSONAL HISTORY OF NICOTINE DEPENDENCE: ICD-10-CM

## 2019-03-02 DIAGNOSIS — Z79.899 OTHER LONG TERM (CURRENT) DRUG THERAPY: ICD-10-CM

## 2019-03-02 DIAGNOSIS — E66.01 MORBID (SEVERE) OBESITY DUE TO EXCESS CALORIES: ICD-10-CM

## 2019-03-02 DIAGNOSIS — T81.89XA OTHER COMPLICATIONS OF PROCEDURES, NOT ELSEWHERE CLASSIFIED, INITIAL ENCOUNTER: ICD-10-CM

## 2019-03-02 DIAGNOSIS — G47.30 SLEEP APNEA, UNSPECIFIED: ICD-10-CM

## 2019-03-02 DIAGNOSIS — J44.9 CHRONIC OBSTRUCTIVE PULMONARY DISEASE, UNSPECIFIED: ICD-10-CM

## 2019-03-02 DIAGNOSIS — Z91.013 ALLERGY TO SEAFOOD: ICD-10-CM

## 2019-03-02 DIAGNOSIS — Z88.0 ALLERGY STATUS TO PENICILLIN: ICD-10-CM
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
## 2019-03-04 ENCOUNTER — OUTPATIENT (OUTPATIENT)
Dept: OUTPATIENT SERVICES | Facility: HOSPITAL | Age: 59
LOS: 1 days | End: 2019-03-04
Payer: COMMERCIAL

## 2019-03-04 VITALS
OXYGEN SATURATION: 97 % | HEART RATE: 83 BPM | SYSTOLIC BLOOD PRESSURE: 116 MMHG | HEIGHT: 67 IN | TEMPERATURE: 98 F | WEIGHT: 315 LBS | DIASTOLIC BLOOD PRESSURE: 58 MMHG | RESPIRATION RATE: 20 BRPM

## 2019-03-04 DIAGNOSIS — Z98.890 OTHER SPECIFIED POSTPROCEDURAL STATES: Chronic | ICD-10-CM

## 2019-03-04 DIAGNOSIS — J44.9 CHRONIC OBSTRUCTIVE PULMONARY DISEASE, UNSPECIFIED: ICD-10-CM

## 2019-03-04 DIAGNOSIS — I50.20 UNSPECIFIED SYSTOLIC (CONGESTIVE) HEART FAILURE: ICD-10-CM

## 2019-03-04 DIAGNOSIS — G47.33 OBSTRUCTIVE SLEEP APNEA (ADULT) (PEDIATRIC): ICD-10-CM

## 2019-03-04 DIAGNOSIS — Z01.810 ENCOUNTER FOR PREPROCEDURAL CARDIOVASCULAR EXAMINATION: ICD-10-CM

## 2019-03-04 DIAGNOSIS — I48.91 UNSPECIFIED ATRIAL FIBRILLATION: ICD-10-CM

## 2019-03-04 LAB
ANION GAP SERPL CALC-SCNC: 13 MMOL/L — SIGNIFICANT CHANGE UP (ref 5–17)
APTT BLD: 28.8 SEC — SIGNIFICANT CHANGE UP (ref 27.5–36.3)
BUN SERPL-MCNC: 28 MG/DL — HIGH (ref 8–20)
CALCIUM SERPL-MCNC: 9.1 MG/DL — SIGNIFICANT CHANGE UP (ref 8.6–10.2)
CHLORIDE SERPL-SCNC: 97 MMOL/L — LOW (ref 98–107)
CHOLEST SERPL-MCNC: 141 MG/DL — SIGNIFICANT CHANGE UP (ref 110–199)
CO2 SERPL-SCNC: 25 MMOL/L — SIGNIFICANT CHANGE UP (ref 22–29)
CREAT SERPL-MCNC: 1.01 MG/DL — SIGNIFICANT CHANGE UP (ref 0.5–1.3)
GLUCOSE SERPL-MCNC: 118 MG/DL — HIGH (ref 70–115)
HCT VFR BLD CALC: 41.8 % — LOW (ref 42–52)
HDLC SERPL-MCNC: 43 MG/DL — SIGNIFICANT CHANGE UP
HGB BLD-MCNC: 14 G/DL — SIGNIFICANT CHANGE UP (ref 14–18)
INR BLD: 1.41 RATIO — HIGH (ref 0.88–1.16)
LIPID PNL WITH DIRECT LDL SERPL: 73 MG/DL — SIGNIFICANT CHANGE UP
MAGNESIUM SERPL-MCNC: 2.2 MG/DL — SIGNIFICANT CHANGE UP (ref 1.6–2.6)
MCHC RBC-ENTMCNC: 30.7 PG — SIGNIFICANT CHANGE UP (ref 27–31)
MCHC RBC-ENTMCNC: 33.5 G/DL — SIGNIFICANT CHANGE UP (ref 32–36)
MCV RBC AUTO: 91.7 FL — SIGNIFICANT CHANGE UP (ref 80–94)
PLATELET # BLD AUTO: 198 K/UL — SIGNIFICANT CHANGE UP (ref 150–400)
POTASSIUM SERPL-MCNC: 4.4 MMOL/L — SIGNIFICANT CHANGE UP (ref 3.5–5.3)
POTASSIUM SERPL-SCNC: 4.4 MMOL/L — SIGNIFICANT CHANGE UP (ref 3.5–5.3)
PROTHROM AB SERPL-ACNC: 16.4 SEC — HIGH (ref 10–12.9)
RBC # BLD: 4.56 M/UL — LOW (ref 4.6–6.2)
RBC # FLD: 15.1 % — SIGNIFICANT CHANGE UP (ref 11–15.6)
SODIUM SERPL-SCNC: 135 MMOL/L — SIGNIFICANT CHANGE UP (ref 135–145)
TOTAL CHOLESTEROL/HDL RATIO MEASUREMENT: 3 RATIO — LOW (ref 3.4–9.6)
TRIGL SERPL-MCNC: 124 MG/DL — SIGNIFICANT CHANGE UP (ref 10–200)
WBC # BLD: 14.9 K/UL — HIGH (ref 4.8–10.8)
WBC # FLD AUTO: 14.9 K/UL — HIGH (ref 4.8–10.8)

## 2019-03-04 PROCEDURE — 80061 LIPID PANEL: CPT

## 2019-03-04 PROCEDURE — 85610 PROTHROMBIN TIME: CPT

## 2019-03-04 PROCEDURE — G0463: CPT

## 2019-03-04 PROCEDURE — 36415 COLL VENOUS BLD VENIPUNCTURE: CPT

## 2019-03-04 PROCEDURE — 85730 THROMBOPLASTIN TIME PARTIAL: CPT

## 2019-03-04 PROCEDURE — 85027 COMPLETE CBC AUTOMATED: CPT

## 2019-03-04 PROCEDURE — 93010 ELECTROCARDIOGRAM REPORT: CPT

## 2019-03-04 PROCEDURE — 80048 BASIC METABOLIC PNL TOTAL CA: CPT

## 2019-03-04 PROCEDURE — 83735 ASSAY OF MAGNESIUM: CPT

## 2019-03-04 PROCEDURE — 93005 ELECTROCARDIOGRAM TRACING: CPT

## 2019-03-04 RX ORDER — AZTREONAM 2 G
1 VIAL (EA) INJECTION
Qty: 0 | Refills: 0 | COMMUNITY

## 2019-03-04 RX ORDER — CARVEDILOL PHOSPHATE 80 MG/1
1 CAPSULE, EXTENDED RELEASE ORAL
Qty: 0 | Refills: 0 | COMMUNITY

## 2019-03-04 RX ORDER — CIPROFLOXACIN LACTATE 400MG/40ML
1 VIAL (ML) INTRAVENOUS
Qty: 0 | Refills: 0 | COMMUNITY

## 2019-03-04 RX ORDER — LACTOBACILLUS ACIDOPHILUS 100MM CELL
1 CAPSULE ORAL
Qty: 0 | Refills: 0 | COMMUNITY

## 2019-03-04 NOTE — ASU PATIENT PROFILE, ADULT - PMH
Afib    Cardiomyopathy, ischemic  wearing life vest  2-28-19  CHF (congestive heart failure)    COPD (chronic obstructive pulmonary disease)    Ejection fraction < 50%  last echo 2-13-19  31%  ROCK (obstructive sleep apnea)

## 2019-03-04 NOTE — H&P PST ADULT - PSH
H/O cardiac catheterization  5 years ago at [facility].  History of cardioversion    History of incision and drainage  right groin abcess  jan 2019  [facility]  History of loop recorder  2017  [facility]

## 2019-03-04 NOTE — ASU PATIENT PROFILE, ADULT - PSH
History of incision and drainage  right groin abcess  jan 2019  [facility]  History of loop recorder  2017  [facility]

## 2019-03-04 NOTE — H&P PST ADULT - HISTORY OF PRESENT ILLNESS
58 year old male with ischemia cardiomyopathy, Afib, COPD, ROCK, obesity, with right groin abcess (last wound care visit last week and cleared for cath per pt) for LHC/RHC/cardiomems    Of note: pt has life vest               Right groin packed bid by wife

## 2019-03-04 NOTE — H&P PST ADULT - ASSESSMENT
58 year old male with Afib and ischemic cardiomyopathy for LHC/RHC/cardiomems    Bleeding risk: 58 year old male with Afib and ischemic cardiomyopathy for LHC/RHC/cardiomems    Bleeding risk: 2.3%

## 2019-03-04 NOTE — H&P PST ADULT - PMH
Afib    Asthma    Cardiomyopathy, ischemic  wearing life vest  2-28-19  CHF (congestive heart failure)    COPD (chronic obstructive pulmonary disease)    Ejection fraction < 50%  last echo 2-13-19  31%  HFrEF (heart failure with reduced ejection fraction)    ROCK (obstructive sleep apnea)

## 2019-03-06 ENCOUNTER — OUTPATIENT (OUTPATIENT)
Dept: OUTPATIENT SERVICES | Facility: HOSPITAL | Age: 59
LOS: 1 days | Discharge: HOME | End: 2019-03-06
Payer: COMMERCIAL

## 2019-03-06 ENCOUNTER — TRANSCRIPTION ENCOUNTER (OUTPATIENT)
Age: 59
End: 2019-03-06

## 2019-03-06 VITALS
SYSTOLIC BLOOD PRESSURE: 120 MMHG | DIASTOLIC BLOOD PRESSURE: 73 MMHG | HEART RATE: 76 BPM | OXYGEN SATURATION: 97 % | RESPIRATION RATE: 18 BRPM

## 2019-03-06 VITALS
TEMPERATURE: 98 F | HEART RATE: 75 BPM | SYSTOLIC BLOOD PRESSURE: 125 MMHG | DIASTOLIC BLOOD PRESSURE: 61 MMHG | RESPIRATION RATE: 20 BRPM | OXYGEN SATURATION: 95 %

## 2019-03-06 DIAGNOSIS — I42.8 OTHER CARDIOMYOPATHIES: ICD-10-CM

## 2019-03-06 DIAGNOSIS — Z98.890 OTHER SPECIFIED POSTPROCEDURAL STATES: Chronic | ICD-10-CM

## 2019-03-06 LAB
BLD GP AB SCN SERPL QL: SIGNIFICANT CHANGE UP
TYPE + AB SCN PNL BLD: SIGNIFICANT CHANGE UP
WBC # BLD: 13.3 K/UL — HIGH (ref 4.8–10.8)
WBC # FLD AUTO: 13.3 K/UL — HIGH (ref 4.8–10.8)

## 2019-03-06 PROCEDURE — C1894: CPT

## 2019-03-06 PROCEDURE — C1887: CPT

## 2019-03-06 PROCEDURE — 86901 BLOOD TYPING SEROLOGIC RH(D): CPT

## 2019-03-06 PROCEDURE — 86850 RBC ANTIBODY SCREEN: CPT

## 2019-03-06 PROCEDURE — C1769: CPT

## 2019-03-06 PROCEDURE — C1889: CPT

## 2019-03-06 PROCEDURE — C2624: CPT

## 2019-03-06 PROCEDURE — 33289 TCAT IMPL WRLS P-ART PRS SNR: CPT

## 2019-03-06 PROCEDURE — 36415 COLL VENOUS BLD VENIPUNCTURE: CPT

## 2019-03-06 PROCEDURE — 86900 BLOOD TYPING SEROLOGIC ABO: CPT

## 2019-03-06 PROCEDURE — C1760: CPT

## 2019-03-06 PROCEDURE — 76937 US GUIDE VASCULAR ACCESS: CPT

## 2019-03-06 PROCEDURE — 85048 AUTOMATED LEUKOCYTE COUNT: CPT

## 2019-03-06 RX ORDER — GABAPENTIN 400 MG/1
300 CAPSULE ORAL THREE TIMES A DAY
Qty: 0 | Refills: 0 | Status: DISCONTINUED | OUTPATIENT
Start: 2019-03-06 | End: 2019-03-21

## 2019-03-06 RX ORDER — FUROSEMIDE 40 MG
40 TABLET ORAL
Qty: 0 | Refills: 0 | Status: DISCONTINUED | OUTPATIENT
Start: 2019-03-06 | End: 2019-03-21

## 2019-03-06 RX ORDER — FLUTICASONE FUROATE, UMECLIDINIUM BROMIDE AND VILANTEROL TRIFENATATE 200; 62.5; 25 UG/1; UG/1; UG/1
1 POWDER RESPIRATORY (INHALATION)
Qty: 0 | Refills: 0 | COMMUNITY

## 2019-03-06 RX ORDER — RIVAROXABAN 15 MG-20MG
1 KIT ORAL
Qty: 0 | Refills: 0 | COMMUNITY

## 2019-03-06 RX ORDER — CARVEDILOL PHOSPHATE 80 MG/1
12.5 CAPSULE, EXTENDED RELEASE ORAL EVERY 12 HOURS
Qty: 0 | Refills: 0 | Status: DISCONTINUED | OUTPATIENT
Start: 2019-03-06 | End: 2019-03-21

## 2019-03-06 RX ADMIN — CARVEDILOL PHOSPHATE 12.5 MILLIGRAM(S): 80 CAPSULE, EXTENDED RELEASE ORAL at 18:32

## 2019-03-06 RX ADMIN — Medication 40 MILLIGRAM(S): at 19:20

## 2019-03-06 NOTE — DISCHARGE NOTE ADULT - CARE PLAN
Principal Discharge DX:	HFrEF (heart failure with reduced ejection fraction)  Goal:	No SOB  Assessment and plan of treatment:	Follow up with your doctor as instructed.  Continue medications as instructed.  Follow prescribed diet.

## 2019-03-06 NOTE — DISCHARGE NOTE ADULT - NS AS ACTIVITY OBS
Do not drive or operate machinery/Showering allowed/Walking-Outdoors allowed/No Heavy lifting/straining/Do not make important decisions/Stairs allowed/Walking-Indoors allowed

## 2019-03-06 NOTE — PROGRESS NOTE ADULT - SUBJECTIVE AND OBJECTIVE BOX
Department of Cardiology                                                                  [facility]/[place] [address] [place] [address]                                                            Telephone: [phone]. Fax:[phone]                                                                          Post CardioMEMS Implant Note    58y Male S/P RHC and CardioMEMS Implant.  The patient denies chest pain, SOB, palpitations or dizziness.    Diagnosis:       ACC/AHA Stage: C       NYHA Class: 3       Type: HFrEF       Acuity: Chronic    Right Heart Pressures:       RA: 9       RV: 40/10/12       PA: 36/21/29       PCWP: 20       CO: 6.78       CI: 2.59    Sensor Information:       Sensor Serial Number:        Calibration Code:     Implanting Physician: Oscar Deal  Following Physician: Elen Deng  _____________________________________________________________________________________    HPI: 58 year old male with ischemia cardiomyopathy, Afib, COPD, ROCK, obesity, with right groin abcess    PAST MEDICAL & SURGICAL HISTORY:  HFrEF (heart failure with reduced ejection fraction)  Asthma  Ejection fraction < 50%: last echo 2-13-19  31%  Cardiomyopathy, ischemic: wearing life vest  2-28-19  CHF (congestive heart failure)  Afib  ROCK (obstructive sleep apnea)  COPD (chronic obstructive pulmonary disease)  H/O cardiac catheterization: 5 years ago at [facility].  History of cardioversion  History of incision and drainage: right groin abcess  jan 2019  [facility]  History of loop recorder: 2017  [facility]    PE:   T(C): 36.7 (03-06-19 @ 12:39), Max: 36.7 (03-06-19 @ 12:39)  HR: 86 (03-06-19 @ 15:19) (75 - 86)  BP: 125/61 (03-06-19 @ 12:39) (125/61 - 125/61)  RR: 20 (03-06-19 @ 15:19) (20 - 20)  SpO2: 94% (03-06-19 @ 15:19) (94% - 95%)    CM: NSR  General: Awake, alert, oriented, speech clear, in no acute distress.  Cardiac: S1, S2, no murmur  Respiratory: CTA  Neuro: A & O X 3                          14.0   14.9  )-----------( 198      ( 04 Mar 2019 16:51 )             41.8     03-04    135  |  97    |  28.0  ----------------------------<  118  4.4   |  25.0  |  1.01    Ca    9.1      04 Mar 2019 16:51  Mg     2.2     03-04      PT/INR - ( 04 Mar 2019 16:51 )   PT: 16.4 sec;   INR: 1.41 ratio         PTT - ( 04 Mar 2019 16:51 )  PTT:28.8 sec Department of Cardiology                                                                  [facility]/[place] [address]                                                            Telephone: [phone]. Fax:[phone]                                                                          Post CardioMEMS Implant Note    58y Male S/P RHC and CardioMEMS Implant.  The patient denies chest pain, SOB, palpitations or dizziness.    Diagnosis:       ACC/AHA Stage: C       NYHA Class: 3       Type: HFrEF       Acuity: Chronic    Right Heart Pressures:       RA: 9       RV: 40/10/12       PA: 36/21/29       PCWP: 20       CO: 6.78       CI: 2.59    Sensor Information:       Sensor Serial Number: [id number]       Calibration Code: [id number]    Implanting Physician: Oscar Deal  Following Physician: Elen Deng  _____________________________________________________________________________________    HPI: 58 year old male with ischemia cardiomyopathy, Afib, COPD, ROCK, obesity, with right groin abcess    PAST MEDICAL & SURGICAL HISTORY:  HFrEF (heart failure with reduced ejection fraction)  Asthma  Ejection fraction < 50%: last echo 2-13-19  31%  Cardiomyopathy, ischemic: wearing life vest  2-28-19  CHF (congestive heart failure)  Afib  ROCK (obstructive sleep apnea)  COPD (chronic obstructive pulmonary disease)  H/O cardiac catheterization: 5 years ago at [facility].  History of cardioversion  History of incision and drainage: right groin abcess  jan 2019  [facility]  History of loop recorder: 2017  [facility]    PE:   T(C): 36.7 (03-06-19 @ 12:39), Max: 36.7 (03-06-19 @ 12:39)  HR: 86 (03-06-19 @ 15:19) (75 - 86)  BP: 125/61 (03-06-19 @ 12:39) (125/61 - 125/61)  RR: 20 (03-06-19 @ 15:19) (20 - 20)  SpO2: 94% (03-06-19 @ 15:19) (94% - 95%)    CM: NSR  General: Awake, alert, oriented, speech clear, in no acute distress.  Cardiac: S1, S2, no murmur  Respiratory: CTA  Neuro: A & O X 3                          14.0   14.9  )-----------( 198      ( 04 Mar 2019 16:51 )             41.8     03-04    135  |  97    |  28.0  ----------------------------<  118  4.4   |  25.0  |  1.01    Ca    9.1      04 Mar 2019 16:51  Mg     2.2     03-04      PT/INR - ( 04 Mar 2019 16:51 )   PT: 16.4 sec;   INR: 1.41 ratio         PTT - ( 04 Mar 2019 16:51 )  PTT:28.8 sec

## 2019-03-06 NOTE — DISCHARGE NOTE ADULT - PATIENT PORTAL LINK FT
You can access the [facility] Patient Portal, offered by [facility], by registering with the following website: [email or web address]

## 2019-03-06 NOTE — DISCHARGE NOTE ADULT - MEDICATION SUMMARY - MEDICATIONS TO TAKE
I will START or STAY ON the medications listed below when I get home from the hospital:    predniSONE 10 mg oral tablet  -- 1 tab(s) by mouth once a day  -- Indication: For COPD (chronic obstructive pulmonary disease)    spironolactone 25 mg oral tablet  -- 0.5 tab(s) by mouth once a day  -- Indication: For HFrEF (heart failure with reduced ejection fraction)    digoxin 125 mcg (0.125 mg) oral tablet  -- 1 tab(s) by mouth once a day  -- Indication: For Atrial fibrillation    Xarelto 20 mg oral tablet  -- 1 tab(s) by mouth once a day (in the evening)  please hold today, tomorrow and restart on March 8, 2019   -- Indication: For Atrial fibrillation    gabapentin 300 mg oral capsule  -- 1 cap(s) by mouth 3 times a day  -- Indication: For Neuropathy    carvedilol 12.5 mg oral tablet  -- 1  by mouth 2 times a day with meals  -- Indication: For Hypertension    Trelegy Ellipta inhalation powder  -- 1 puff(s) inhaled once a day  -- Indication: For COPD (chronic obstructive pulmonary disease)    ipratropium-albuterol 0.5 mg-2.5 mg/3 mLinhalation solution  -- 3 milliliter(s) inhaled every 6 hours  -- Indication: For COPD (chronic obstructive pulmonary disease)    furosemide 40 mg oral tablet  -- 1 tab(s) by mouth 2 times a day  -- Indication: For HFrEF (heart failure with reduced ejection fraction)

## 2019-03-06 NOTE — PROGRESS NOTE ADULT - SUBJECTIVE AND OBJECTIVE BOX
Patient is a 58y old  Male who presents to the cath lab holding area today for Left and Right heart catheterization with cardiomems placement.    HPI:   History of Present Illness		  58 year old male with ischemia cardiomyopathy, Afib, COPD, ROCK, obesity, with right groin abcess (last wound care visit last week and cleared for cath per pt) for LHC/RHC/cardiomems    Of note: pt has life vest               Right groin packed bid by wife        PAST MEDICAL & SURGICAL HISTORY:  HFrEF (heart failure with reduced ejection fraction)  Asthma  Ejection fraction < 50%: last echo 2-13-19  31%  Cardiomyopathy, ischemic: wearing life vest  2-28-19  CHF (congestive heart failure)  Afib  ROCK (obstructive sleep apnea)  COPD (chronic obstructive pulmonary disease)  H/O cardiac catheterization: 5 years ago at [facility].  History of cardioversion  History of incision and drainage: right groin abcess  jan 2019  [facility]  History of loop recorder: 2017  [facility]      Allergies    penicillins (Hives)  shellfish (Pruritus; Rash)        Vital Signs Last 24 Hrs  T(C): 36.7 (06 Mar 2019 12:39), Max: 36.7 (06 Mar 2019 12:39)  T(F): 98.1 (06 Mar 2019 12:39), Max: 98.1 (06 Mar 2019 12:39)  HR: 75 (06 Mar 2019 12:39) (75 - 75)  BP: 125/61 (06 Mar 2019 12:39) (125/61 - 125/61)  BP(mean): --  RR: 20 (06 Mar 2019 12:39) (20 - 20)  SpO2: 95% (06 Mar 2019 12:39) (95% - 95%)    I&O's Detail      PHYSICAL EXAM:  Appearance: Normal, well nourished	  HEENT:   Normal oral mucosa, PERRL, EOMI, sclera non-icteric	  Lymphatic: No cervical lymphadenopathy  Cardiovascular: Normal S1 S2, No JVD, No cardiac murmurs, No carotid bruits, No peripheral edema  Respiratory: Lungs clear to auscultation	  Psychiatry: A & O x 3, Mood & affect appropriate  Gastrointestinal:  Soft, Non-tender, + BS, no bruits	  Skin: No rashes, No ecchymoses, No cyanosis  Neurologic: Grossly non-focal with full strength in all four extremities  Extremities: Normal range of motion, No clubbing, cyanosis or edema  Vascular: Peripheral pulses palpable 2+ bilaterally      INTERPRETATION OF TELEMETRY:  Afib        LABS:                        14.0   14.9  )-----------( 198      ( 04 Mar 2019 16:51 )             41.8     03-04    135  |  97<L>  |  28.0<H>  ----------------------------<  118<H>  4.4   |  25.0  |  1.01    Ca    9.1      04 Mar 2019 16:51  Mg     2.2     03-04          PT/INR - ( 04 Mar 2019 16:51 )   PT: 16.4 sec;   INR: 1.41 ratio         PTT - ( 04 Mar 2019 16:51 )  PTT:28.8 sec    Assessment and Plan    Morbidly obese  male with CHF     Pt NPO

## 2019-03-06 NOTE — PROGRESS NOTE ADULT - ASSESSMENT
Diagnosis: HFrEF  Procedure: RHC and CardioMEMS implant    1. Post procedure and device teaching performed with verbalization of understanding.    2. Continue current medications:        Plavix: N/A, will resume Xarelto 20 mg daily on March 8, 2019       Aspirin: N/A       Beta Blocker: Coreg 12.5 mg BID       ACEI/ARB: N/A       Other medications: Lasix 40 mg BID, Aldactone 12.5 mg daily    3. Follow up with: Dr. Deng    4. Discharge home today.

## 2019-03-06 NOTE — DISCHARGE NOTE ADULT - CARE PROVIDER_API CALL
Elen Deng)  Cardiovascular Disease; Internal Medicine  [address] [place] [id number]  Phone: [phone]  Fax: [phone]  Follow Up Time: 1 week

## 2019-03-06 NOTE — DISCHARGE NOTE ADULT - HOSPITAL COURSE
58 year old male with ischemia cardiomyopathy, Afib, COPD, ROCK, obesity, with right groin abcess (last wound care visit last week and cleared for cath per pt) for LHC/RHC/cardiomems. He had a RHC and CardioMEMS Implant.

## 2019-03-06 NOTE — DISCHARGE NOTE ADULT - PLAN OF CARE
No SOB Follow up with your doctor as instructed.  Continue medications as instructed.  Follow prescribed diet.

## 2019-03-08 ENCOUNTER — RX RENEWAL (OUTPATIENT)
Age: 59
End: 2019-03-08

## 2019-03-12 ENCOUNTER — APPOINTMENT (OUTPATIENT)
Dept: PULMONOLOGY | Facility: CLINIC | Age: 59
End: 2019-03-12
Payer: COMMERCIAL

## 2019-03-12 VITALS — HEART RATE: 70 BPM | OXYGEN SATURATION: 97 % | DIASTOLIC BLOOD PRESSURE: 60 MMHG | SYSTOLIC BLOOD PRESSURE: 108 MMHG

## 2019-03-12 VITALS — WEIGHT: 315 LBS | BODY MASS INDEX: 57.48 KG/M2

## 2019-03-12 PROBLEM — J45.909 UNSPECIFIED ASTHMA, UNCOMPLICATED: Chronic | Status: ACTIVE | Noted: 2019-03-04

## 2019-03-12 PROBLEM — I25.5 ISCHEMIC CARDIOMYOPATHY: Chronic | Status: ACTIVE | Noted: 2019-03-04

## 2019-03-12 PROBLEM — I50.9 HEART FAILURE, UNSPECIFIED: Chronic | Status: ACTIVE | Noted: 2019-03-04

## 2019-03-12 PROBLEM — R94.30 ABNORMAL RESULT OF CARDIOVASCULAR FUNCTION STUDY, UNSPECIFIED: Chronic | Status: ACTIVE | Noted: 2019-03-04

## 2019-03-12 PROBLEM — I50.20 UNSPECIFIED SYSTOLIC (CONGESTIVE) HEART FAILURE: Chronic | Status: ACTIVE | Noted: 2019-03-04

## 2019-03-12 PROCEDURE — 99214 OFFICE O/P EST MOD 30 MIN: CPT | Mod: 25

## 2019-03-12 PROCEDURE — 94010 BREATHING CAPACITY TEST: CPT

## 2019-03-12 RX ORDER — LEVOFLOXACIN 500 MG/1
500 TABLET, FILM COATED ORAL DAILY
Qty: 7 | Refills: 0 | Status: DISCONTINUED | COMMUNITY
Start: 2019-01-30 | End: 2019-03-12

## 2019-03-14 ENCOUNTER — OUTPATIENT (OUTPATIENT)
Dept: OUTPATIENT SERVICES | Facility: HOSPITAL | Age: 59
LOS: 1 days | Discharge: HOME | End: 2019-03-14
Payer: COMMERCIAL

## 2019-03-14 DIAGNOSIS — Z98.890 OTHER SPECIFIED POSTPROCEDURAL STATES: Chronic | ICD-10-CM

## 2019-03-14 DIAGNOSIS — T81.89XA OTHER COMPLICATIONS OF PROCEDURES, NOT ELSEWHERE CLASSIFIED, INITIAL ENCOUNTER: ICD-10-CM

## 2019-03-14 PROCEDURE — G0463: CPT

## 2019-03-15 DIAGNOSIS — Z79.899 OTHER LONG TERM (CURRENT) DRUG THERAPY: ICD-10-CM

## 2019-03-15 DIAGNOSIS — T81.89XD OTHER COMPLICATIONS OF PROCEDURES, NOT ELSEWHERE CLASSIFIED, SUBSEQUENT ENCOUNTER: ICD-10-CM

## 2019-03-15 DIAGNOSIS — E66.01 MORBID (SEVERE) OBESITY DUE TO EXCESS CALORIES: ICD-10-CM

## 2019-03-15 DIAGNOSIS — Z82.49 FAMILY HISTORY OF ISCHEMIC HEART DISEASE AND OTHER DISEASES OF THE CIRCULATORY SYSTEM: ICD-10-CM

## 2019-03-15 DIAGNOSIS — Z87.891 PERSONAL HISTORY OF NICOTINE DEPENDENCE: ICD-10-CM

## 2019-03-15 DIAGNOSIS — I83.91 ASYMPTOMATIC VARICOSE VEINS OF RIGHT LOWER EXTREMITY: ICD-10-CM

## 2019-03-15 DIAGNOSIS — I48.91 UNSPECIFIED ATRIAL FIBRILLATION: ICD-10-CM

## 2019-03-15 DIAGNOSIS — I50.9 HEART FAILURE, UNSPECIFIED: ICD-10-CM

## 2019-03-15 DIAGNOSIS — G47.30 SLEEP APNEA, UNSPECIFIED: ICD-10-CM

## 2019-03-15 DIAGNOSIS — J44.9 CHRONIC OBSTRUCTIVE PULMONARY DISEASE, UNSPECIFIED: ICD-10-CM
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
## 2019-03-15 DIAGNOSIS — Z88.0 ALLERGY STATUS TO PENICILLIN: ICD-10-CM

## 2019-03-15 DIAGNOSIS — Z91.013 ALLERGY TO SEAFOOD: ICD-10-CM

## 2019-03-15 DIAGNOSIS — Z82.5 FAMILY HISTORY OF ASTHMA AND OTHER CHRONIC LOWER RESPIRATORY DISEASES: ICD-10-CM

## 2019-03-18 ENCOUNTER — APPOINTMENT (OUTPATIENT)
Dept: NEUROLOGY | Facility: CLINIC | Age: 59
End: 2019-03-18
Payer: COMMERCIAL

## 2019-03-18 VITALS — BODY MASS INDEX: 49.44 KG/M2 | HEIGHT: 67 IN | WEIGHT: 315 LBS

## 2019-03-18 DIAGNOSIS — Z82.49 FAMILY HISTORY OF ISCHEMIC HEART DISEASE AND OTHER DISEASES OF THE CIRCULATORY SYSTEM: ICD-10-CM

## 2019-03-18 DIAGNOSIS — G57.13 MERALGIA PARESTHETICA, BILATERAL LOWER LIMBS: ICD-10-CM

## 2019-03-18 PROCEDURE — 99204 OFFICE O/P NEW MOD 45 MIN: CPT

## 2019-03-18 NOTE — REVIEW OF SYSTEMS
[Numbness] : numbness [As Noted in HPI] : as noted in HPI [Abnormal Sensation] : an abnormal sensation [Negative] : Heme/Lymph

## 2019-03-18 NOTE — ASSESSMENT
[FreeTextEntry1] : This is a 58-year-old man with bilateral thigh numbness in the lateral thigh. Since distribution of lateral cutaneous nerve of the thigh. It is likely her to wear it usually is putting pressure upon the lateral cutaneous nerve of the thigh causing intermittent numbness that he could walk out. At this time he is getting relief from gabapentin 300 mg 3 times daily. I would continue this. At this point I would be happy to see him back in the office should the numbness change in quality or severity or should his pain return. I told him to call me with any questions or concerns.

## 2019-03-18 NOTE — REASON FOR VISIT
[Consultation] : a consultation visit [FreeTextEntry1] : right worse than left thigh pain and numbness

## 2019-03-18 NOTE — CONSULT LETTER
[Consult Letter:] : I had the pleasure of evaluating your patient, [unfilled]. [Dear  ___] : Dear  [unfilled], [Please see my note below.] : Please see my note below. [Consult Closing:] : Thank you very much for allowing me to participate in the care of this patient.  If you have any questions, please do not hesitate to contact me. [FreeTextEntry3] : Endy Villeda M.D., Ph.D. DPN-N\par [facility]\par Neurology at [place]\par Medical Director of Stroke Services\par [facility]\par  [Sincerely,] : Sincerely,

## 2019-03-18 NOTE — PHYSICAL EXAM
[General Appearance - Alert] : alert [FreeTextEntry1] : Morbid obesity [General Appearance - In No Acute Distress] : in no acute distress [Person] : oriented to person [Time] : oriented to time [Place] : oriented to place [Remote Intact] : remote memory intact [Short Term Intact] : short term memory intact [Registration Intact] : recent registration memory intact [Span Intact] : the attention span was normal [Concentration Intact] : normal concentrating ability [Visual Intact] : visual attention was ~T not ~L decreased [Naming Objects] : no difficulty naming common objects [Repeating Phrases] : no difficulty repeating a phrase [Fluency] : fluency intact [Comprehension] : comprehension intact [Current Events] : adequate knowledge of current events [Past History] : adequate knowledge of personal past history [Cranial Nerves Optic (II)] : visual acuity intact bilaterally,  visual fields full to confrontation, pupils equal round and reactive to light [Cranial Nerves Trigeminal (V)] : facial sensation intact symmetrically [Cranial Nerves Oculomotor (III)] : extraocular motion intact [Cranial Nerves Facial (VII)] : face symmetrical [Cranial Nerves Glossopharyngeal (IX)] : tongue and palate midline [Cranial Nerves Vestibulocochlear (VIII)] : hearing was intact bilaterally [Cranial Nerves Hypoglossal (XII)] : there was no tongue deviation with protrusion [Cranial Nerves Accessory (XI - Cranial And Spinal)] : head turning and shoulder shrug symmetric [Motor Strength] : muscle strength was normal in all four extremities [No Muscle Atrophy] : normal bulk in all four extremities [Paresis Pronator Drift Right-Sided] : no pronator drift on the right [Paresis Pronator Drift Left-Sided] : no pronator drift on the left [Sensation Tactile Decrease] : light touch was intact [Sensation Pain / Temperature Decrease] : pain and temperature was intact [Proprioception] : proprioception was intact [Sensation Vibration Decrease] : vibration was intact [Balance] : balance was intact [Tremor] : no tremor present [Coordination - Dysmetria Impaired Finger-to-Nose Bilateral] : not present [2+] : Patella left 2+ [Sclera] : the sclera and conjunctiva were normal [PERRL With Normal Accommodation] : pupils were equal in size, round, reactive to light, with normal accommodation [Optic Disc Abnormality] : the optic disc were normal in size and color [Extraocular Movements] : extraocular movements were intact [No APD] : no afferent pupillary defect [No PETTY] : no internuclear ophthalmoplegia [Full Visual Field] : full visual field [Abnormal Walk] : normal gait [___ +] : bilateral [unfilled]+ pitting edema to the ankles [Involuntary Movements] : no involuntary movements were seen [Motor Tone] : muscle strength and tone were normal

## 2019-03-18 NOTE — HISTORY OF PRESENT ILLNESS
[FreeTextEntry1] : Initial office visit of March 18, 2019:\par This is a 58-year-old man who presents today for evaluation of numbness and pain in his lateral thigh. This is affecting the right side worse than the left side. It started after a bout of cellulitis in his groin. Now he notes that if he lays on one side for too longer have numbness into that side. He was started on gabapentin 300 mg 3 times a day with relief of the burning type dysesthesias. He was have intermittent numbness if he lays on his side for too long however he can stand up and walk it off. He comes here today for neurologic evaluation.

## 2019-03-28 ENCOUNTER — APPOINTMENT (OUTPATIENT)
Dept: VASCULAR SURGERY | Facility: CLINIC | Age: 59
End: 2019-03-28

## 2019-04-22 ENCOUNTER — RX RENEWAL (OUTPATIENT)
Age: 59
End: 2019-04-22

## 2019-04-29 ENCOUNTER — OUTPATIENT (OUTPATIENT)
Dept: OUTPATIENT SERVICES | Facility: HOSPITAL | Age: 59
LOS: 1 days | End: 2019-04-29
Payer: COMMERCIAL

## 2019-04-29 DIAGNOSIS — Z98.890 OTHER SPECIFIED POSTPROCEDURAL STATES: Chronic | ICD-10-CM

## 2019-04-29 DIAGNOSIS — I50.22 CHRONIC SYSTOLIC (CONGESTIVE) HEART FAILURE: ICD-10-CM

## 2019-05-15 ENCOUNTER — APPOINTMENT (OUTPATIENT)
Dept: ORTHOPEDIC SURGERY | Facility: CLINIC | Age: 59
End: 2019-05-15
Payer: COMMERCIAL

## 2019-05-15 PROCEDURE — 20610 DRAIN/INJ JOINT/BURSA W/O US: CPT | Mod: 50

## 2019-05-15 PROCEDURE — 99214 OFFICE O/P EST MOD 30 MIN: CPT | Mod: 25

## 2019-05-15 NOTE — PROCEDURE
[de-identified] : I injected the patient's bilateral knees today with cortisone.\par \par I discussed at length with the patient the planned steroid and lidocaine injection. The risks, benefits, convalescence and alternatives were reviewed. The possible side effects discussed included but were not limited to: pain, swelling, heat, bleeding, and redness. Symptoms are generally mild but if they are extensive then contact the office. Giving pain relievers by mouth such as NSAIDs or Tylenol can generally treat the reactions to steroid and lidocaine. Rare cases of infection have been noted. Rash, hives and itching may occur post injection. If you have muscle pain or cramps, flushing and or swelling of the face, rapid heart beat, nausea, dizziness, fever, chills, headache, difficulty breathing, swelling in the arms or legs, or have a prickly feeling of your skin, contact a health care provider immediately. Following this discussion, the knee was prepped with Alcohol and under sterile condition the 80 mg Depo-Medrol and 6 cc Lidocaine injection was performed with a 20 gauge needle through a superolateral injection site. The needle was introduced into the joint, aspiration was performed to ensure intra-articular placement and the medication was injected. Upon withdrawal of the needle the site was cleaned with alcohol and a band aid applied. The patient tolerated the injection well and there were no adverse effects. Post injection instructions included no strenuous activity for 24 hours, cryotherapy and if there are any adverse effects to contact the office. \par \par \par

## 2019-05-15 NOTE — PHYSICAL EXAM
[Antalgic] : antalgic [LE] : Sensory: Intact in bilateral lower extremities [ALL] : dorsalis pedis, posterior tibial, femoral, popliteal, and radial 2+ and symmetric bilaterally [Normal] : Oriented to person, place, and time, insight and judgement were intact and the affect was normal [Obese] : obese [Poor Appearance] : well-appearing [de-identified] : GENERAL APPEARANCE: Well nourished and hydrated, pleasant, alert, and oriented x 3. Appears their stated age. \par HEENT: Normocephalic, extraocular eye motion intact. Nasal septum midline. Oral cavity clear. External auditory canal clear. \par RESPIRATORY: Breath sounds clear and audible in all lobes. No wheezing, No accessory muscle use.\par CARDIOVASCULAR: No apparent abnormalities. No lower leg edema. No varicosities. Pedal pulses are palpable.\par NEUROLOGIC: Sensation is normal, no muscle weakness in the upper or lower extremities.\par DERMATOLOGIC: No apparent skin lesions, moist, warm, no rash.\par SPINE: Cervical spine appears normal and moves freely; thoracic spine appears normal and moves freely; lumbosacral spine appears normal and moves freely, normal, nontender.\par MUSCULOSKELETAL: Hands, wrists, and elbows are normal and move freely, shoulders are normal and move freely. [Acute Distress] : not in acute distress [de-identified] : Bilateral knee exam shows valgus deformity, pain in the lateral joint line, his range of motion is preserved.

## 2019-05-15 NOTE — HISTORY OF PRESENT ILLNESS
[Pain Location] : pain [Worsening] : worsening [___ yrs] : [unfilled] year(s) ago [4] : an average pain level of 4/10 [0] : a minimum pain level of 0/10 [8] : a maximum pain level of 8/10 [Walking] : worsened by walking [Rest] : relieved by rest [de-identified] : 59 year old male presents for follow up of bilateral knee pain, left worse than right, secondary to osteoarthritis. He had injections with relief in Feb 2019 with great relief, and presents for repeat injections. He notes that he is having cardiac pacemaker placed on Friday. He notes that he has stopped smoking and drinking alcohol. \par He has a history of CHF with recent exacerbation resulting in two hospitalizations at [facility] about 4 months ago (12/24-1/1/19). He has an oxygen tank at home but does not use it.

## 2019-05-15 NOTE — ADDENDUM
[FreeTextEntry1] : I, Endy Orozco, acted solely as a scribe for Dr. Bassem Rao on this date 05/15/2019. 
Implemented All Universal Safety Interventions:  [place] to call system. Call bell, personal items and telephone within reach. Instruct patient to call for assistance. Room bathroom lighting operational. Non-slip footwear when patient is off stretcher. Physically safe environment: no spills, clutter or unnecessary equipment. Stretcher in lowest position, wheels locked, appropriate side rails in place.

## 2019-05-15 NOTE — DISCUSSION/SUMMARY
[de-identified] : 59 year old obese male with severe valgus deformity and end-stage lateral compartment osteoarthritis. In the past he has attempted cortisone injections with good relief. Based on his exam and x-rays, the patient is a candidate for a knee replacement once conservative treatments have been exhausted and have failed. I believe this would be his best option for long term relief. The patient has COPD,  but he quit smoking finally. He is very high risk for now, and we must defer from surgical treatment. He elected to receive a cortisone injection in his bilateral knees, which he tolerated well. He can follow-up in 3 months for re-evaluation and possible repeat cortisone injections. \par \par A knee replacement means resurfacing of all 3 surfaces of the patella, the femur, and tibia with metal and plastic parts. The prosthetic parts are usually cemented into position and well outpatient range of motion from full extension to about 120° of flexion. The postoperative motion, however, is determined by multiple factors, the most important of which is preoperative motion. In general, the better motion preoperatively, the better the motion postoperatively. The operation, pending medical clearance, gently requires hospitalization of 3-4 days for one knee, 5-7 days for bilateral knee replacements. In general, we prefer to perform the procedure under spinal anesthesia with femoral nerve block and occasional single shot sciatic nerve block. We may ask a patient to give 2 units of blood for bilateral total knee arthroplasties, for one knee we institute a normogram which may include administration of preoperative Procrit. The operative procedure takes probably 1-2 hours. The operation requires a straight incision anywhere from 5-7 inches down from the knee. Postoperatively, the patient is positioned in a continuous passive motion machine within the first 24 hours and is walking the day after surgery. The first couple days are very painful and the pain medication will alleviate, but not eliminate the pain. The patient must really push hard to get range of motion. Our goal for having a person go home as that the range of motion is approximately 0-90° of flexion and that they can walk with a walker or cane. A walking aid is to be dispensed once the patient is secure enough. In general there, there is no cast or brace required with routine knee replacement. In the long term, we do not encourage our patients to run for the sake of running, although pending their preoperative status, we often allow patient to play doubles tennis or comparative activities. We also allowed them to do gentle intermediate downhill skiing if they are truly an expert skier. Biking is encouraged as well swimming. The followup periods are usually 3 weeks, 6 weeks, 3 months, and yearly intervals. Potential complications with total knee replacement included anesthetic complications and death, infection around 1%, nerve damage, by which means peroneal nerve palsy, footdrop or flapping foot with ambulation. This is particularly more apt to occur in the patient with a valgus or knock-knee deformity. The incidence can be quite high in this particular patient population. There will be areas of skin numbness, but this is not an untoward effect nor do we consider it a complication. Other potential complications include dislocation of the patella component, usually less than 2%; loosening of the tibial or femoral component is much more infrequent. Most often this occurs with infection or long-term use. Patient of extreme risk including markedly overweight patient's may be more prone to prosthetic wear. Major blood vessel damage is also extremely rare. Directly because of the anatomic proximity of the popliteal artery this could be lacerated with subsequent repair required. Be it unlikely, disruption of the popliteal artery could theoretically result in amputation. Similarly, infection could theoretically result in amputation if one were to grow out of an organism that cannot be controlled with antibiotics. General medical complications include phlebitis, for which we would prophylactically anticoagulate patients, but could still occur, and fatal pulmonary embolus which has been reported. Cardiovascular problems, such as heart attack or ischemia are always a concern with such hemodynamic changes in the blood vascular system. Other General complications are rare, but anything medicine could theoretically happen. I think the patient understands the risk benefit ratio of total knee replacement and will think about whether there like to pursue with an operation or nonoperative treatment program.

## 2019-05-29 ENCOUNTER — APPOINTMENT (OUTPATIENT)
Dept: PULMONOLOGY | Facility: CLINIC | Age: 59
End: 2019-05-29
Payer: COMMERCIAL

## 2019-05-29 VITALS
WEIGHT: 315 LBS | SYSTOLIC BLOOD PRESSURE: 115 MMHG | DIASTOLIC BLOOD PRESSURE: 60 MMHG | OXYGEN SATURATION: 95 % | HEART RATE: 86 BPM | BODY MASS INDEX: 57.95 KG/M2

## 2019-05-29 PROCEDURE — 94664 DEMO&/EVAL PT USE INHALER: CPT | Mod: 59

## 2019-05-29 PROCEDURE — 94060 EVALUATION OF WHEEZING: CPT

## 2019-05-29 PROCEDURE — 99214 OFFICE O/P EST MOD 30 MIN: CPT | Mod: 25

## 2019-05-29 NOTE — HISTORY OF PRESENT ILLNESS
[FreeTextEntry1] : The patient has been on Trelegy with prednisone 10 mg daily. He feels somewhat more congested recently. He had a recent permanent pacemaker placed. He remains fully compliant with CPAP. Compliant shows use greater than 4 hours of 97% the nights with a residual AHI of 0.8. He is averaging 7-1/2 hours of use per night.

## 2019-05-29 NOTE — PHYSICAL EXAM
[FreeTextEntry1] : morbidly obese [Normal Conjunctiva] : the conjunctiva exhibited no abnormalities [Low Lying Soft Palate] : low lying soft palate [Eyelids - No Xanthelasma] : the eyelids demonstrated no xanthelasmas [Elongated Uvula] : elongated uvula [Enlarged Base of the Tongue] : enlargement of the base of the tongue [IV] : IV [Neck Appearance] : the appearance of the neck was normal [Jugular Venous Distention Increased] : there was no jugular-venous distention [Neck Cervical Mass (___cm)] : no neck mass was observed [Thyroid Nodule] : there were no palpable thyroid nodules [Thyroid Diffuse Enlargement] : the thyroid was not enlarged [Neck Circumference: ___] : neck circumference is [unfilled] [Heart Rate And Rhythm] : heart rate and rhythm were normal [Heart Sounds] : normal S1 and S2 [Murmurs] : no murmurs present [Exaggerated Use Of Accessory Muscles For Inspiration] : no accessory muscle use [Respiration, Rhythm And Depth] : normal respiratory rhythm and effort [Auscultation Breath Sounds / Voice Sounds] : lungs were clear to auscultation bilaterally [Abdomen Tenderness] : non-tender [Abdomen Soft] : soft [Abdomen Mass (___ Cm)] : no abdominal mass palpated [Abnormal Walk] : normal gait [Gait - Sufficient For Exercise Testing] : the gait was sufficient for exercise testing [1+ Pitting] : 1+  pitting [Deep Tendon Reflexes (DTR)] : deep tendon reflexes were 2+ and symmetric [No Focal Deficits] : no focal deficits [Sensation] : the sensory exam was normal to light touch and pinprick [Oriented To Time, Place, And Person] : oriented to person, place, and time [Impaired Insight] : insight and judgment were intact [Affect] : the affect was normal [Skin Color & Pigmentation] : normal skin color and pigmentation [Skin Turgor] : normal skin turgor [] : no rash

## 2019-05-29 NOTE — ASSESSMENT
[FreeTextEntry1] : Asthma/COPD slightly worse. Sleep apnea with excellent compliance and benefit from CPAP. A short course of prednisone at 40 mg will be given follow up return to 10 mg. Followup in 3 months.

## 2019-06-14 PROCEDURE — 93798 PHYS/QHP OP CAR RHAB W/ECG: CPT

## 2019-06-20 ENCOUNTER — RX RENEWAL (OUTPATIENT)
Age: 59
End: 2019-06-20

## 2019-07-22 ENCOUNTER — RX RENEWAL (OUTPATIENT)
Age: 59
End: 2019-07-22

## 2019-08-05 ENCOUNTER — APPOINTMENT (OUTPATIENT)
Dept: ORTHOPEDIC SURGERY | Facility: CLINIC | Age: 59
End: 2019-08-05
Payer: COMMERCIAL

## 2019-08-05 VITALS
HEART RATE: 68 BPM | DIASTOLIC BLOOD PRESSURE: 68 MMHG | HEIGHT: 67 IN | SYSTOLIC BLOOD PRESSURE: 108 MMHG | WEIGHT: 315 LBS | TEMPERATURE: 98.9 F | BODY MASS INDEX: 49.44 KG/M2

## 2019-08-05 PROCEDURE — 20610 DRAIN/INJ JOINT/BURSA W/O US: CPT | Mod: 50

## 2019-08-05 PROCEDURE — 99214 OFFICE O/P EST MOD 30 MIN: CPT | Mod: 25

## 2019-08-05 NOTE — DISCUSSION/SUMMARY
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 Based on imaging the patient is a candidate for TKA, but we must defer from surgery at this time. He is very high risk for surgery. The patient has COPD,  but he finally quit smoking. He also has cardiovascular issues and is on Xarelto. He has responded well to cortisone injections in the past and he elected to receive repeat bilateral knee cortisone injections. F/U 3 months. \par \par A knee replacement means resurfacing of all 3 surfaces of the patella, the femur, and tibia with metal and plastic parts. The prosthetic parts are usually cemented into position and well outpatient range of motion from full extension to about 120° of flexion. The postoperative motion, however, is determined by multiple factors, the most important of which is preoperative motion. In general, the better motion preoperatively, the better the motion postoperatively. The operation, pending medical clearance, gently requires hospitalization of 3-4 days for one knee, 5-7 days for bilateral knee replacements. In general, we prefer to perform the procedure under spinal anesthesia with femoral nerve block and occasional single shot sciatic nerve block. We may ask a patient to give 2 units of blood for bilateral total knee arthroplasties, for one knee we institute a normogram which may include administration of preoperative Procrit. The operative procedure takes probably 1-2 hours. The operation requires a straight incision anywhere from 5-7 inches down from the knee. Postoperatively, the patient is positioned in a continuous passive motion machine within the first 24 hours and is walking the day after surgery. The first couple days are very painful and the pain medication will alleviate, but not eliminate the pain. The patient must really push hard to get range of motion. Our goal for having a person go home as that the range of motion is approximately 0-90° of flexion and that they can walk with a walker or cane. A walking aid is to be dispensed once the patient is secure enough. In general there, there is no cast or brace required with routine knee replacement. In the long term, we do not encourage our patients to run for the sake of running, although pending their preoperative status, we often allow patient to play doubles tennis or comparative activities. We also allowed them to do gentle intermediate downhill skiing if they are truly an expert skier. Biking is encouraged as well swimming. The followup periods are usually 3 weeks, 6 weeks, 3 months, and yearly intervals. Potential complications with total knee replacement included anesthetic complications and death, infection around 1%, nerve damage, by which means peroneal nerve palsy, footdrop or flapping foot with ambulation. This is particularly more apt to occur in the patient with a valgus or knock-knee deformity. The incidence can be quite high in this particular patient population. There will be areas of skin numbness, but this is not an untoward effect nor do we consider it a complication. Other potential complications include dislocation of the patella component, usually less than 2%; loosening of the tibial or femoral component is much more infrequent. Most often this occurs with infection or long-term use. Patient of extreme risk including markedly overweight patient's may be more prone to prosthetic wear. Major blood vessel damage is also extremely rare. Directly because of the anatomic proximity of the popliteal artery this could be lacerated with subsequent repair required. Be it unlikely, disruption of the popliteal artery could theoretically result in amputation. Similarly, infection could theoretically result in amputation if one were to grow out of an organism that cannot be controlled with antibiotics. General medical complications include phlebitis, for which we would prophylactically anticoagulate patients, but could still occur, and fatal pulmonary embolus which has been reported. Cardiovascular problems, such as heart attack or ischemia are always a concern with such hemodynamic changes in the blood vascular system. Other General complications are rare, but anything medicine could theoretically happen. I think the patient understands the risk benefit ratio of total knee replacement and will think about whether there like to pursue with an operation or nonoperative treatment program. I reviewed the plan of care as well as a model of a total knee implant equivalent to the one that will be used for their total knee joint replacement. The patient agreed to the plan of care as well as the use of implants for their knee total joint replacement.

## 2019-08-05 NOTE — HISTORY OF PRESENT ILLNESS
[Pain Location] : pain [Worsening] : worsening [___ yrs] : [unfilled] year(s) ago [4] : an average pain level of 4/10 [0] : a minimum pain level of 0/10 [8] : a maximum pain level of 8/10 [Rest] : relieved by rest [Standing] : standing [Walking] : walking [Intermit.] : ~He/She~ states the symptoms seem to be intermittent [Bending] : worsened by bending [Recumbency] : relieved by recumbency [de-identified] : 59 year old male presents for follow-up of bilateral knee pain secondary to end-stage OA. He has responded well to cortisone injection in the past, and he reports 3 months of pain relief. Pain is intermittent. He has a history of CHF with recent exacerbation resulting in two hospitalizations at [facility]. He has an oxygen tank at home but does not use it. He recently had cardiac pacemaker placed. He is on Xarelto and is seeing cardiology frequently. He continues to work.  [de-identified] : cortisone injection

## 2019-08-05 NOTE — PROCEDURE
[de-identified] : I injected the patient's bilateral knee today with cortisone.\par \par I discussed at length with the patient the planned steroid and lidocaine injection. The risks, benefits, convalescence and alternatives were reviewed. The possible side effects discussed included but were not limited to: pain, swelling, heat, bleeding, and redness. Symptoms are generally mild but if they are extensive then contact the office. Giving pain relievers by mouth such as NSAIDs or Tylenol can generally treat the reactions to steroid and lidocaine. Rare cases of infection have been noted. Rash, hives and itching may occur post injection. If you have muscle pain or cramps, flushing and or swelling of the face, rapid heart beat, nausea, dizziness, fever, chills, headache, difficulty breathing, swelling in the arms or legs, or have a prickly feeling of your skin, contact a health care provider immediately. Following this discussion, the knee was prepped with Alcohol and under sterile condition the 80 mg Depo-Medrol and 6 cc Lidocaine injection was performed with a 20 gauge needle through a superolateral injection site. The needle was introduced into the joint, aspiration was performed to ensure intra-articular placement and the medication was injected. Upon withdrawal of the needle the site was cleaned with alcohol and a band aid applied. The patient tolerated the injection well and there were no adverse effects. Post injection instructions included no strenuous activity for 24 hours, cryotherapy and if there are any adverse effects to contact the office. \par \par \par

## 2019-08-05 NOTE — PHYSICAL EXAM
[Antalgic] : antalgic [LE] : Sensory: Intact in bilateral lower extremities [ALL] : dorsalis pedis, posterior tibial, femoral, popliteal, and radial 2+ and symmetric bilaterally [Obese] : obese [Normal] : Oriented to person, place, and time, insight and judgement were intact and the affect was normal [Poor Appearance] : well-appearing [Acute Distress] : not in acute distress [de-identified] : GENERAL APPEARANCE: Well nourished and hydrated, pleasant, alert, and oriented x 3. Appears their stated age. \par HEENT: Normocephalic, extraocular eye motion intact. Nasal septum midline. Oral cavity clear. External auditory canal clear. \par RESPIRATORY: Breath sounds clear and audible in all lobes. No wheezing, No accessory muscle use.\par CARDIOVASCULAR: No apparent abnormalities. No lower leg edema. No varicosities. Pedal pulses are palpable.\par NEUROLOGIC: Sensation is normal, no muscle weakness in the upper or lower extremities.\par DERMATOLOGIC: No apparent skin lesions, moist, warm, no rash.\par SPINE: Cervical spine appears normal and moves freely; thoracic spine appears normal and moves freely; lumbosacral spine appears normal and moves freely, normal, nontender.\par MUSCULOSKELETAL: Hands, wrists, and elbows are normal and move freely, shoulders are normal and move freely. [de-identified] : Bilateral knee exam shows valgus deformity, pain in the lateral joint line, his range of motion is preserved.

## 2019-08-05 NOTE — ADDENDUM
[FreeTextEntry1] : I, Endy Orozco, acted solely as a scribe for Dr. Bassem Rao on this date 08/05/2019.

## 2019-08-26 ENCOUNTER — APPOINTMENT (OUTPATIENT)
Dept: PULMONOLOGY | Facility: CLINIC | Age: 59
End: 2019-08-26
Payer: COMMERCIAL

## 2019-08-26 VITALS
SYSTOLIC BLOOD PRESSURE: 112 MMHG | OXYGEN SATURATION: 98 % | HEART RATE: 98 BPM | RESPIRATION RATE: 16 BRPM | DIASTOLIC BLOOD PRESSURE: 68 MMHG

## 2019-08-26 VITALS — BODY MASS INDEX: 58.42 KG/M2 | WEIGHT: 315 LBS

## 2019-08-26 PROCEDURE — 99214 OFFICE O/P EST MOD 30 MIN: CPT | Mod: 25

## 2019-08-26 PROCEDURE — 94010 BREATHING CAPACITY TEST: CPT

## 2019-08-26 RX ORDER — PREDNISONE 50 MG/1
TABLET ORAL
Refills: 0 | Status: DISCONTINUED | COMMUNITY
End: 2019-08-26

## 2019-08-26 RX ORDER — PREDNISONE 20 MG/1
20 TABLET ORAL DAILY
Qty: 20 | Refills: 5 | Status: DISCONTINUED | COMMUNITY
Start: 2019-05-29 | End: 2019-08-26

## 2019-08-26 RX ORDER — PREDNISONE 10 MG/1
10 TABLET ORAL
Qty: 30 | Refills: 1 | Status: DISCONTINUED | COMMUNITY
Start: 2019-01-22 | End: 2019-08-26

## 2019-08-26 NOTE — HISTORY OF PRESENT ILLNESS
[FreeTextEntry1] : Patient with some mild increase in shortness of breath recently. His cardiac parameters are apparently more consistent with heart failure currently. Denies cough or wheeze. Remains compliant with CPAP with use greater than 4 hours on 90% of the nights. Remains on prednisone 5 mg daily. He is contemplating bariatric surgery.

## 2019-08-26 NOTE — PHYSICAL EXAM
[FreeTextEntry1] : morbidly obese [Normal Conjunctiva] : the conjunctiva exhibited no abnormalities [Eyelids - No Xanthelasma] : the eyelids demonstrated no xanthelasmas [Low Lying Soft Palate] : low lying soft palate [Elongated Uvula] : elongated uvula [Enlarged Base of the Tongue] : enlargement of the base of the tongue [IV] : IV [Neck Appearance] : the appearance of the neck was normal [Neck Cervical Mass (___cm)] : no neck mass was observed [Jugular Venous Distention Increased] : there was no jugular-venous distention [Thyroid Diffuse Enlargement] : the thyroid was not enlarged [Thyroid Nodule] : there were no palpable thyroid nodules [Neck Circumference: ___] : neck circumference is [unfilled] [Heart Rate And Rhythm] : heart rate and rhythm were normal [Heart Sounds] : normal S1 and S2 [Murmurs] : no murmurs present [Exaggerated Use Of Accessory Muscles For Inspiration] : no accessory muscle use [Respiration, Rhythm And Depth] : normal respiratory rhythm and effort [Auscultation Breath Sounds / Voice Sounds] : lungs were clear to auscultation bilaterally [Abdomen Soft] : soft [Abdomen Tenderness] : non-tender [Abdomen Mass (___ Cm)] : no abdominal mass palpated [Gait - Sufficient For Exercise Testing] : the gait was sufficient for exercise testing [Abnormal Walk] : normal gait [1+ Pitting] : 1+  pitting [Deep Tendon Reflexes (DTR)] : deep tendon reflexes were 2+ and symmetric [Sensation] : the sensory exam was normal to light touch and pinprick [No Focal Deficits] : no focal deficits [Oriented To Time, Place, And Person] : oriented to person, place, and time [Impaired Insight] : insight and judgment were intact [Skin Color & Pigmentation] : normal skin color and pigmentation [Affect] : the affect was normal [Skin Turgor] : normal skin turgor [] : no rash

## 2019-08-26 NOTE — ASSESSMENT
[FreeTextEntry1] : Asthma and sleep apnea both optimal. I told the patient we would clear him for bariatric surgery. Current medications will be continued. Followup 3 months.

## 2019-09-09 ENCOUNTER — APPOINTMENT (OUTPATIENT)
Dept: ORTHOPEDIC SURGERY | Facility: CLINIC | Age: 59
End: 2019-09-09

## 2019-09-20 ENCOUNTER — RX RENEWAL (OUTPATIENT)
Age: 59
End: 2019-09-20

## 2019-10-15 ENCOUNTER — APPOINTMENT (OUTPATIENT)
Dept: PULMONOLOGY | Facility: CLINIC | Age: 59
End: 2019-10-15
Payer: COMMERCIAL

## 2019-10-15 VITALS
BODY MASS INDEX: 52.48 KG/M2 | SYSTOLIC BLOOD PRESSURE: 118 MMHG | DIASTOLIC BLOOD PRESSURE: 60 MMHG | HEIGHT: 65 IN | WEIGHT: 315 LBS | HEART RATE: 99 BPM | OXYGEN SATURATION: 98 %

## 2019-10-15 VITALS — RESPIRATION RATE: 16 BRPM

## 2019-10-15 DIAGNOSIS — Z86.79 PERSONAL HISTORY OF OTHER DISEASES OF THE CIRCULATORY SYSTEM: ICD-10-CM

## 2019-10-15 PROCEDURE — 99214 OFFICE O/P EST MOD 30 MIN: CPT | Mod: 25

## 2019-10-15 PROCEDURE — 94010 BREATHING CAPACITY TEST: CPT

## 2019-10-15 RX ORDER — CELECOXIB 200 MG/1
200 CAPSULE ORAL DAILY
Refills: 0 | Status: DISCONTINUED | COMMUNITY
Start: 2016-11-04 | End: 2019-10-15

## 2019-10-15 RX ORDER — PREDNISONE 10 MG/1
10 TABLET ORAL
Qty: 30 | Refills: 1 | Status: DISCONTINUED | COMMUNITY
Start: 2019-02-06 | End: 2019-10-15

## 2019-10-15 NOTE — REASON FOR VISIT
[Follow-Up] : a follow-up visit [COPD] : COPD [Sleep Apnea] : sleep apnea [Shortness of Breath] : shortness of Breath

## 2019-10-15 NOTE — ASSESSMENT
[FreeTextEntry1] : Most likely the patient's slight drop in lung function is secondary to CHF rather than COPD. She should respond to increased Lasix. A short course of increased prednisone was given to cover that as well. Followup next regular visit. Patient should be on CPAP at 11 if he is hospitalized.

## 2019-10-15 NOTE — HISTORY OF PRESENT ILLNESS
[FreeTextEntry1] : Patient has been complaining of increasing shortness of breath the past week. He was seen by cardiology who felt that he was fluid overloaded and was given increase in diuretics. He continues to be somewhat short of breath. He will probably be going in for IV Lasix and milrinone therapy next week.He increased his prednisone to 20 mg daily

## 2019-10-15 NOTE — CONSULT LETTER
[Dear  ___] : Dear  [unfilled], [Courtesy Letter:] : I had the pleasure of seeing your patient, [unfilled], in my office today. [Please see my note below.] : Please see my note below. [Consult Closing:] : Thank you very much for allowing me to participate in the care of this patient.  If you have any questions, please do not hesitate to contact me. [Sincerely,] : Sincerely, [FreeTextEntry3] : Willow Tamayo MD FCCP\par D-ABSM\par ABIM board certified in  Pulmonary diseases, Sleep medicine\par Internal medicine\par

## 2019-10-15 NOTE — PHYSICAL EXAM
[Normal Conjunctiva] : the conjunctiva exhibited no abnormalities [Eyelids - No Xanthelasma] : the eyelids demonstrated no xanthelasmas [Elongated Uvula] : elongated uvula [Low Lying Soft Palate] : low lying soft palate [Enlarged Base of the Tongue] : enlargement of the base of the tongue [IV] : IV [Neck Appearance] : the appearance of the neck was normal [Neck Cervical Mass (___cm)] : no neck mass was observed [Jugular Venous Distention Increased] : there was no jugular-venous distention [Thyroid Diffuse Enlargement] : the thyroid was not enlarged [Thyroid Nodule] : there were no palpable thyroid nodules [Neck Circumference: ___] : neck circumference is [unfilled] [Heart Rate And Rhythm] : heart rate and rhythm were normal [Heart Sounds] : normal S1 and S2 [Murmurs] : no murmurs present [Respiration, Rhythm And Depth] : normal respiratory rhythm and effort [Exaggerated Use Of Accessory Muscles For Inspiration] : no accessory muscle use [Auscultation Breath Sounds / Voice Sounds] : lungs were clear to auscultation bilaterally [Chest Palpation] : palpation of the chest revealed no abnormalities [Abdomen Soft] : soft [Lungs Percussion] : the lungs were normal to percussion [Abdomen Tenderness] : non-tender [Abdomen Mass (___ Cm)] : no abdominal mass palpated [Gait - Sufficient For Exercise Testing] : the gait was sufficient for exercise testing [Abnormal Walk] : normal gait [1+ Pitting] : 1+  pitting [Deep Tendon Reflexes (DTR)] : deep tendon reflexes were 2+ and symmetric [Sensation] : the sensory exam was normal to light touch and pinprick [Oriented To Time, Place, And Person] : oriented to person, place, and time [Impaired Insight] : insight and judgment were intact [No Focal Deficits] : no focal deficits [Affect] : the affect was normal [Skin Color & Pigmentation] : normal skin color and pigmentation [] : no rash [Skin Turgor] : normal skin turgor [FreeTextEntry1] : morbidly obese

## 2019-10-28 ENCOUNTER — RX RENEWAL (OUTPATIENT)
Age: 59
End: 2019-10-28

## 2019-10-29 ENCOUNTER — INPATIENT (INPATIENT)
Facility: HOSPITAL | Age: 59
LOS: 4 days | Discharge: HOME | DRG: 292 | End: 2019-11-03
Attending: HOSPITALIST | Admitting: HOSPITALIST
Payer: COMMERCIAL

## 2019-10-29 VITALS
HEART RATE: 90 BPM | OXYGEN SATURATION: 100 % | RESPIRATION RATE: 20 BRPM | SYSTOLIC BLOOD PRESSURE: 112 MMHG | DIASTOLIC BLOOD PRESSURE: 54 MMHG | HEIGHT: 67 IN | TEMPERATURE: 98 F | WEIGHT: 315 LBS

## 2019-10-29 DIAGNOSIS — I48.91 UNSPECIFIED ATRIAL FIBRILLATION: ICD-10-CM

## 2019-10-29 DIAGNOSIS — I50.9 HEART FAILURE, UNSPECIFIED: ICD-10-CM

## 2019-10-29 DIAGNOSIS — Z98.890 OTHER SPECIFIED POSTPROCEDURAL STATES: Chronic | ICD-10-CM

## 2019-10-29 DIAGNOSIS — G47.33 OBSTRUCTIVE SLEEP APNEA (ADULT) (PEDIATRIC): ICD-10-CM

## 2019-10-29 DIAGNOSIS — J44.9 CHRONIC OBSTRUCTIVE PULMONARY DISEASE, UNSPECIFIED: ICD-10-CM

## 2019-10-29 DIAGNOSIS — E66.9 OBESITY, UNSPECIFIED: ICD-10-CM

## 2019-10-29 LAB
ALBUMIN SERPL ELPH-MCNC: 3.6 G/DL — SIGNIFICANT CHANGE UP (ref 3.3–5.2)
ALP SERPL-CCNC: 32 U/L — LOW (ref 40–120)
ALT FLD-CCNC: 18 U/L — SIGNIFICANT CHANGE UP
ANION GAP SERPL CALC-SCNC: 13 MMOL/L — SIGNIFICANT CHANGE UP (ref 5–17)
APPEARANCE UR: CLEAR — SIGNIFICANT CHANGE UP
AST SERPL-CCNC: 17 U/L — SIGNIFICANT CHANGE UP
BACTERIA # UR AUTO: NEGATIVE — SIGNIFICANT CHANGE UP
BASOPHILS # BLD AUTO: 0.03 K/UL — SIGNIFICANT CHANGE UP (ref 0–0.2)
BASOPHILS NFR BLD AUTO: 0.2 % — SIGNIFICANT CHANGE UP (ref 0–2)
BILIRUB SERPL-MCNC: 0.4 MG/DL — SIGNIFICANT CHANGE UP (ref 0.4–2)
BILIRUB UR-MCNC: NEGATIVE — SIGNIFICANT CHANGE UP
BUN SERPL-MCNC: 22 MG/DL — HIGH (ref 8–20)
CALCIUM SERPL-MCNC: 8.6 MG/DL — SIGNIFICANT CHANGE UP (ref 8.6–10.2)
CHLORIDE SERPL-SCNC: 95 MMOL/L — LOW (ref 98–107)
CK SERPL-CCNC: 60 U/L — SIGNIFICANT CHANGE UP (ref 30–200)
CO2 SERPL-SCNC: 26 MMOL/L — SIGNIFICANT CHANGE UP (ref 22–29)
COLOR SPEC: YELLOW — SIGNIFICANT CHANGE UP
CREAT SERPL-MCNC: 1.11 MG/DL — SIGNIFICANT CHANGE UP (ref 0.5–1.3)
DIFF PNL FLD: NEGATIVE — SIGNIFICANT CHANGE UP
DIGOXIN SERPL-MCNC: 0.5 NG/ML — LOW (ref 0.8–2)
EOSINOPHIL # BLD AUTO: 0.07 K/UL — SIGNIFICANT CHANGE UP (ref 0–0.5)
EOSINOPHIL NFR BLD AUTO: 0.5 % — SIGNIFICANT CHANGE UP (ref 0–6)
EPI CELLS # UR: NEGATIVE — SIGNIFICANT CHANGE UP
GLUCOSE SERPL-MCNC: 240 MG/DL — HIGH (ref 70–115)
GLUCOSE UR QL: NEGATIVE MG/DL — SIGNIFICANT CHANGE UP
HCT VFR BLD CALC: 35.9 % — LOW (ref 39–50)
HGB BLD-MCNC: 11.4 G/DL — LOW (ref 13–17)
IMM GRANULOCYTES NFR BLD AUTO: 1.4 % — SIGNIFICANT CHANGE UP (ref 0–1.5)
KETONES UR-MCNC: NEGATIVE — SIGNIFICANT CHANGE UP
LEUKOCYTE ESTERASE UR-ACNC: NEGATIVE — SIGNIFICANT CHANGE UP
LYMPHOCYTES # BLD AUTO: 1.61 K/UL — SIGNIFICANT CHANGE UP (ref 1–3.3)
LYMPHOCYTES # BLD AUTO: 12.5 % — LOW (ref 13–44)
MAGNESIUM SERPL-MCNC: 2.4 MG/DL — SIGNIFICANT CHANGE UP (ref 1.6–2.6)
MCHC RBC-ENTMCNC: 30.3 PG — SIGNIFICANT CHANGE UP (ref 27–34)
MCHC RBC-ENTMCNC: 31.8 GM/DL — LOW (ref 32–36)
MCV RBC AUTO: 95.5 FL — SIGNIFICANT CHANGE UP (ref 80–100)
MONOCYTES # BLD AUTO: 0.99 K/UL — HIGH (ref 0–0.9)
MONOCYTES NFR BLD AUTO: 7.7 % — SIGNIFICANT CHANGE UP (ref 2–14)
NEUTROPHILS # BLD AUTO: 10.02 K/UL — HIGH (ref 1.8–7.4)
NEUTROPHILS NFR BLD AUTO: 77.7 % — HIGH (ref 43–77)
NITRITE UR-MCNC: NEGATIVE — SIGNIFICANT CHANGE UP
NT-PROBNP SERPL-SCNC: 1471 PG/ML — HIGH (ref 0–300)
PH UR: 6 — SIGNIFICANT CHANGE UP (ref 5–8)
PLATELET # BLD AUTO: 212 K/UL — SIGNIFICANT CHANGE UP (ref 150–400)
POTASSIUM SERPL-MCNC: 3.9 MMOL/L — SIGNIFICANT CHANGE UP (ref 3.5–5.3)
POTASSIUM SERPL-SCNC: 3.9 MMOL/L — SIGNIFICANT CHANGE UP (ref 3.5–5.3)
PROT SERPL-MCNC: 6.1 G/DL — LOW (ref 6.6–8.7)
PROT UR-MCNC: 15 MG/DL
RBC # BLD: 3.76 M/UL — LOW (ref 4.2–5.8)
RBC # FLD: 14.6 % — HIGH (ref 10.3–14.5)
RBC CASTS # UR COMP ASSIST: NEGATIVE /HPF — SIGNIFICANT CHANGE UP (ref 0–4)
SODIUM SERPL-SCNC: 134 MMOL/L — LOW (ref 135–145)
SP GR SPEC: 1.01 — SIGNIFICANT CHANGE UP (ref 1.01–1.02)
T3 SERPL-MCNC: 105 NG/DL — SIGNIFICANT CHANGE UP (ref 80–200)
T4 AB SER-ACNC: 6.9 UG/DL — SIGNIFICANT CHANGE UP (ref 4.5–12)
TROPONIN T SERPL-MCNC: 0.01 NG/ML — SIGNIFICANT CHANGE UP (ref 0–0.06)
TSH SERPL-MCNC: 1.75 UIU/ML — SIGNIFICANT CHANGE UP (ref 0.27–4.2)
UROBILINOGEN FLD QL: NEGATIVE MG/DL — SIGNIFICANT CHANGE UP
WBC # BLD: 12.9 K/UL — HIGH (ref 3.8–10.5)
WBC # FLD AUTO: 12.9 K/UL — HIGH (ref 3.8–10.5)
WBC UR QL: SIGNIFICANT CHANGE UP

## 2019-10-29 PROCEDURE — 93010 ELECTROCARDIOGRAM REPORT: CPT

## 2019-10-29 PROCEDURE — 71046 X-RAY EXAM CHEST 2 VIEWS: CPT | Mod: 26

## 2019-10-29 PROCEDURE — 99223 1ST HOSP IP/OBS HIGH 75: CPT

## 2019-10-29 PROCEDURE — 99285 EMERGENCY DEPT VISIT HI MDM: CPT

## 2019-10-29 RX ORDER — GABAPENTIN 400 MG/1
300 CAPSULE ORAL THREE TIMES A DAY
Refills: 0 | Status: DISCONTINUED | OUTPATIENT
Start: 2019-10-29 | End: 2019-11-03

## 2019-10-29 RX ORDER — FUROSEMIDE 40 MG
40 TABLET ORAL DAILY
Refills: 0 | Status: DISCONTINUED | OUTPATIENT
Start: 2019-10-29 | End: 2019-10-31

## 2019-10-29 RX ORDER — LACTOBACILLUS ACIDOPHILUS 100MM CELL
1 CAPSULE ORAL
Refills: 0 | Status: DISCONTINUED | OUTPATIENT
Start: 2019-10-29 | End: 2019-11-03

## 2019-10-29 RX ORDER — MILRINONE LACTATE 1 MG/ML
0.38 INJECTION, SOLUTION INTRAVENOUS
Qty: 20 | Refills: 0 | Status: COMPLETED | OUTPATIENT
Start: 2019-10-29 | End: 2020-09-26

## 2019-10-29 RX ORDER — RIVAROXABAN 15 MG-20MG
20 KIT ORAL DAILY
Refills: 0 | Status: DISCONTINUED | OUTPATIENT
Start: 2019-10-29 | End: 2019-11-03

## 2019-10-29 RX ORDER — FUROSEMIDE 40 MG
40 TABLET ORAL ONCE
Refills: 0 | Status: COMPLETED | OUTPATIENT
Start: 2019-10-29 | End: 2019-10-29

## 2019-10-29 RX ORDER — MILRINONE LACTATE 1 MG/ML
0.38 INJECTION, SOLUTION INTRAVENOUS
Qty: 20 | Refills: 0 | Status: DISCONTINUED | OUTPATIENT
Start: 2019-10-29 | End: 2019-11-02

## 2019-10-29 RX ORDER — DIAZEPAM 5 MG
2 TABLET ORAL DAILY
Refills: 0 | Status: DISCONTINUED | OUTPATIENT
Start: 2019-10-29 | End: 2019-10-29

## 2019-10-29 RX ORDER — DIAZEPAM 5 MG
2 TABLET ORAL
Refills: 0 | Status: DISCONTINUED | OUTPATIENT
Start: 2019-10-29 | End: 2019-11-03

## 2019-10-29 RX ORDER — IPRATROPIUM/ALBUTEROL SULFATE 18-103MCG
3 AEROSOL WITH ADAPTER (GRAM) INHALATION THREE TIMES A DAY
Refills: 0 | Status: DISCONTINUED | OUTPATIENT
Start: 2019-10-29 | End: 2019-11-03

## 2019-10-29 RX ORDER — DIGOXIN 250 MCG
0.12 TABLET ORAL DAILY
Refills: 0 | Status: DISCONTINUED | OUTPATIENT
Start: 2019-10-29 | End: 2019-11-03

## 2019-10-29 RX ADMIN — MILRINONE LACTATE 18.17 MICROGRAM(S)/KG/MIN: 1 INJECTION, SOLUTION INTRAVENOUS at 16:02

## 2019-10-29 RX ADMIN — Medication 2 MILLIGRAM(S): at 21:03

## 2019-10-29 RX ADMIN — Medication 3 MILLILITER(S): at 20:16

## 2019-10-29 RX ADMIN — GABAPENTIN 300 MILLIGRAM(S): 400 CAPSULE ORAL at 15:04

## 2019-10-29 RX ADMIN — RIVAROXABAN 20 MILLIGRAM(S): KIT at 17:18

## 2019-10-29 RX ADMIN — Medication 1 TABLET(S): at 17:18

## 2019-10-29 RX ADMIN — Medication 40 MILLIGRAM(S): at 10:05

## 2019-10-29 RX ADMIN — Medication 10 MILLIGRAM(S): at 15:04

## 2019-10-29 RX ADMIN — GABAPENTIN 300 MILLIGRAM(S): 400 CAPSULE ORAL at 22:12

## 2019-10-29 RX ADMIN — Medication 2 MILLIGRAM(S): at 15:04

## 2019-10-29 RX ADMIN — MILRINONE LACTATE 18.17 MICROGRAM(S)/KG/MIN: 1 INJECTION, SOLUTION INTRAVENOUS at 11:21

## 2019-10-29 NOTE — ED ADULT NURSE NOTE - PSH
H/O cardiac catheterization  5 years ago at [facility].  History of cardioversion    History of incision and drainage  right groin abcess  jan 2019  [facility]  History of loop recorder  2017  gsh

## 2019-10-29 NOTE — CONSULT NOTE ADULT - SUBJECTIVE AND OBJECTIVE BOX
[facility], THE HEART CENTER                                   [address] [place] [address]                                                      PHONE: [phone]                                                         FAX: [phone]  [email or web address]  ---------------------------------------------------------------------------------------------------------------------------------    Reason for Consult: dyspnea    HPI:  MAGGIE ELLIOTT is an 59y Male with sig COPD, ROCK on CPAP morbid obesity,  NICM baseline EF < 35%, chronic AF on AC, s/p BiV ICD, cardiomems in place, receiving outpt IV lasix in CHF clinic who came to ER with progressive dyspnea.  Recent outpt discussion regarding IV ionotropic suppport for HFrEF.  Most recent PAD 27 mm Hg.    PAST MEDICAL & SURGICAL HISTORY:  HFrEF (heart failure with reduced ejection fraction)  Asthma  Ejection fraction < 50%: last echo 2-13-19  31%  Cardiomyopathy, ischemic: wearing life vest  2-28-19  CHF (congestive heart failure)  Afib  ROCK (obstructive sleep apnea)  COPD (chronic obstructive pulmonary disease)  H/O cardiac catheterization: 5 years ago at [facility].  History of cardioversion  History of incision and drainage: right groin abcess  jan 2019  [facility]  History of loop recorder: 2017  gsh      penicillins (Hives)  shellfish (Pruritus; Rash)      MEDICATIONS  (STANDING):    MEDICATIONS  (PRN):      Social History:  Cigarettes:    neg               Alchohol:          neg       Illicit Drug Abuse:  neg  neg FH CAD    ROS: Negative other than as mentioned in HPI.    Vital Signs Last 24 Hrs  T(C): 36.9 (29 Oct 2019 08:02), Max: 36.9 (29 Oct 2019 08:02)  T(F): 98.5 (29 Oct 2019 08:02), Max: 98.5 (29 Oct 2019 08:02)  HR: 90 (29 Oct 2019 08:02) (90 - 90)  BP: 112/54 (29 Oct 2019 08:02) (112/54 - 112/54)  BP(mean): --  RR: 20 (29 Oct 2019 08:02) (20 - 20)  SpO2: 100% (29 Oct 2019 08:02) (100% - 100%)  ICU Vital Signs Last 24 Hrs  MAGGIE GARMEENA  I&O's Detail    I&O's Summary    Drug Dosing Weight  MAGGIE KESSLERELIANA      PHYSICAL EXAM:  General: Appears well developed, well nourished alert and cooperative.  HEENT: Head; normocephalic, atraumatic.  Eyes: Pupils reactive, cornea wnl.  Neck: Supple, no nodes adenopathy, no NVD or carotid bruit or thyromegaly.  CARDIOVASCULAR: Normal S1 and S2, No murmur, rub, gallop or lift.   LUNGS: No rales, rhonchi or wheeze. Normal breath sounds bilaterally.  ABDOMEN: Soft, nontender without mass or organomegaly. bowel sounds normoactive.  EXTREMITIES: No clubbing, cyanosis or edema. Distal pulses wnl.   SKIN: warm and dry with normal turgor.  NEURO: Alert/oriented x 3/normal motor exam. No pathologic reflexes.    PSYCH: normal affect.        LABS:          MAGGIE DONALDSONMEENA            RADIOLOGY & ADDITIONAL STUDIES:    INTERPRETATION OF TELEMETRY (personally reviewed):    ECG: AF with MVR IVCD no acute changes    ECHO: 4/2019 with definity anteroseptal and lateral hypokinesia EF 30%        CARDIAC CATHETERIZATION: < from: Cardiac Cath Lab - Adult (03.06.19 @ 14:05) >  VENTRICLES: EF 25%-30% w/ mild MR.  CORONARY VESSELS: R dominant.  Large RCA w/ minimal disease.  large LAD w/ mild disease.  Moderate sized LCx w/ minimal disease.  COMPLICATIONS: No complications occurred during the cath lab visit.  DIAGNOSTIC IMPRESSIONS: Moderate pulmonary HTN.  Severe LV dysfunction.  Nonobstructive CAD.  L pulmonary angiogram.  Cardiomems implant to L PA.  DIAGNOSTIC RECOMMENDATIONS: Resume a/c in 48 hrs.  Followup 1 week at [facility].  INTERVENTIONAL IMPRESSIONS: Moderate pulmonary HTN.  Severe LV dysfunction.  Nonobstructive CAD.  L pulmonary angiogram.  Cardiomems implant to L PA.  INTERVENTIONAL RECOMMENDATIONS: Resume a/c in 48 hrs.  Followup 1 week at [facility].  Prepared and signed by  Oscar Deal MD  Signed 03/06/2019 15:11:53  HEMODYNAMIC TABLES  Pressures:  Baseline  Pressures:  - HR: 61  Pressures:  - Rhythm:  Pressures:  -- Aortic Pressure (S/D/M): 120/78/96  Pressures:  -- Left Ventricle (s/edp): 120/18/--  Pressures:  -- Pulmonary Artery (S/D/M): 36/21/29  Pressures:  -- Pulmonary Capillary Wedge: 24/22/20  Pressures:  -- Right Atrium (a/v/M): 10/11/9  Pressures:  -- Right Ventricle (s/edp): 40/12/--  O2 Sats:  Baseline    < end of copied text > [facility], THE HEART CENTER                                   [address] [place] [address]                                                      PHONE: [phone]                                                         FAX: [phone]  [email or web address]  ---------------------------------------------------------------------------------------------------------------------------------    Reason for Consult: dyspnea    HPI:  MAGGIE ELLIOTT is an 59y Male with mild COPD, ROCK on CPAP morbid obesity,  NICM baseline EF < 35%, chronic AF on AC, s/p BiV ICD, cardiomems in place, receiving outpt IV lasix in CHF clinic who came to ER with progressive dyspnea.  Recent outpt discussion regarding IV ionotropic suppport for HFrEF.  Most recent PAD 30 mm Hg, optimal PAD low 20s.  Pt has had progressive MALIK and PND, no assoc chest pain.    PAST MEDICAL & SURGICAL HISTORY:  HFrEF (heart failure with reduced ejection fraction)  Asthma  Ejection fraction < 50%: last echo 2-13-19  31%  Cardiomyopathy, ischemic: wearing life vest  2-28-19  CHF (congestive heart failure)  Afib  ROCK (obstructive sleep apnea)  COPD (chronic obstructive pulmonary disease)  H/O cardiac catheterization: 5 years ago at [facility].  History of cardioversion  History of incision and drainage: right groin abcess  jan 2019  [facility]  History of loop recorder: 2017  gsh      penicillins (Hives)  shellfish (Pruritus; Rash)      MEDICATIONS  (STANDING):    MEDICATIONS  (PRN):      Social History:  Cigarettes:    neg               Alchohol:          neg       Illicit Drug Abuse:  neg  neg FH CAD    ROS: Negative other than as mentioned in HPI.    Vital Signs Last 24 Hrs  T(C): 36.9 (29 Oct 2019 08:02), Max: 36.9 (29 Oct 2019 08:02)  T(F): 98.5 (29 Oct 2019 08:02), Max: 98.5 (29 Oct 2019 08:02)  HR: 90 (29 Oct 2019 08:02) (90 - 90)  BP: 112/54 (29 Oct 2019 08:02) (112/54 - 112/54)  BP(mean): --  RR: 20 (29 Oct 2019 08:02) (20 - 20)  SpO2: 100% (29 Oct 2019 08:02) (100% - 100%)  ICU Vital Signs Last 24 Hrs  MAGGIE ELLIOTT  I&O's Detail    I&O's Summary    Drug Dosing Weight  MAGGIERONY MENDOZAALEX      PHYSICAL EXAM:  General: Appears well developed, well nourished alert and cooperative.  HEENT: Head; normocephalic, atraumatic.  Eyes: Pupils reactive, cornea wnl.  Neck: Supple, no nodes adenopathy, no NVD or carotid bruit or thyromegaly.  CARDIOVASCULAR: Normal S1 and S2, No murmur, rub, gallop or lift.   LUNGS: No rales, rhonchi or wheeze. Normal breath sounds bilaterally.  ABDOMEN: Soft, nontender without mass or organomegaly. bowel sounds normoactive.  EXTREMITIES: No clubbing, cyanosis or edema. Distal pulses wnl.   SKIN: warm and dry with normal turgor.  NEURO: Alert/oriented x 3/normal motor exam. No pathologic reflexes.    PSYCH: normal affect.        LABS:          MAGGIE ELLIOTT            RADIOLOGY & ADDITIONAL STUDIES:    INTERPRETATION OF TELEMETRY (personally reviewed):    ECG: AF with MVR IVCD no acute changes    ECHO: 4/2019 with definity anteroseptal and lateral hypokinesia EF 30%        CARDIAC CATHETERIZATION: < from: Cardiac Cath Lab - Adult (03.06.19 @ 14:05) >  VENTRICLES: EF 25%-30% w/ mild MR.  CORONARY VESSELS: R dominant.  Large RCA w/ minimal disease.  large LAD w/ mild disease.  Moderate sized LCx w/ minimal disease.  COMPLICATIONS: No complications occurred during the cath lab visit.  DIAGNOSTIC IMPRESSIONS: Moderate pulmonary HTN.  Severe LV dysfunction.  Nonobstructive CAD.  L pulmonary angiogram.  Cardiomems implant to L PA.  DIAGNOSTIC RECOMMENDATIONS: Resume a/c in 48 hrs.  Followup 1 week at [facility].  INTERVENTIONAL IMPRESSIONS: Moderate pulmonary HTN.  Severe LV dysfunction.  Nonobstructive CAD.  L pulmonary angiogram.  Cardiomems implant to L PA.  INTERVENTIONAL RECOMMENDATIONS: Resume a/c in 48 hrs.  Followup 1 week at [facility].  Prepared and signed by  Oscar Deal MD  Signed 03/06/2019 15:11:53  HEMODYNAMIC TABLES  Pressures:  Baseline  Pressures:  - HR: 61  Pressures:  - Rhythm:  Pressures:  -- Aortic Pressure (S/D/M): 120/78/96  Pressures:  -- Left Ventricle (s/edp): 120/18/--  Pressures:  -- Pulmonary Artery (S/D/M): 36/21/29  Pressures:  -- Pulmonary Capillary Wedge: 24/22/20  Pressures:  -- Right Atrium (a/v/M): 10/11/9  Pressures:  -- Right Ventricle (s/edp): 40/12/--  O2 Sats:  Baseline    < end of copied text >

## 2019-10-29 NOTE — H&P ADULT - HISTORY OF PRESENT ILLNESS
58 y/o obese male with h/o CHF s/p AICD, A Fib on Xarelto, past smoker with COPD on chronic prednisone, ROCK on nocturnal C pap, was referred to the ER by cardiologist for IV diuretics. patient noticed 10 lb weight gain 4 days ago so went to the cardiologist and had iv Lasix and lost that much weight then gained 6 lb again. so was referred to the Er. patient c/o progresssive SOB on exertion over the last 2 weeks. no lower ext edema. patient is not aware of orthopnea as he uses C pap every night at time of sleep. patient was seen this week by the Pulmonologist, Dr. Ceron who advised that his lung function is markedly improved and that his SOB is not related to his lungs. no chest pain

## 2019-10-29 NOTE — ED PROVIDER NOTE - OBJECTIVE STATEMENT
59M h/o COPD, obesity, ROCK on CPAP, CHF, AICD, afib sent in for diuresis. Pt p/w MALIK x 2-3 weeks. On torsemide and weekly IV lasix. Sent in by Dr Peace. Denies CP, palpitations, orthopnea, fever.

## 2019-10-29 NOTE — H&P ADULT - PROBLEM SELECTOR PLAN 1
IV lasix. Milrinone drip. Strict I&O. daily weight Cardio: Bradly. Hold Lisinopril and Coreg while on Milrinone drip. Daily BNP.

## 2019-10-29 NOTE — ED ADULT NURSE NOTE - CHIEF COMPLAINT QUOTE
has CHF and COPD with dyspnea. Cardiologist says Friday try the medications, he called today and they said meet Elen Peace of [place] today. No chest pain

## 2019-10-29 NOTE — H&P ADULT - NSICDXPASTSURGICALHX_GEN_ALL_CORE_FT
PAST SURGICAL HISTORY:  H/O cardiac catheterization 5 years ago at [facility].    History of cardioversion     History of incision and drainage right groin abcess  jan 2019  [facility]    History of loop recorder 2017  gsh

## 2019-10-29 NOTE — H&P ADULT - NSICDXPASTMEDICALHX_GEN_ALL_CORE_FT
PAST MEDICAL HISTORY:  Afib     Asthma     Cardiomyopathy, ischemic wearing life vest  2-28-19    CHF (congestive heart failure)     COPD (chronic obstructive pulmonary disease)     Ejection fraction < 50% last echo 2-13-19  31%    HFrEF (heart failure with reduced ejection fraction)     ROCK (obstructive sleep apnea)

## 2019-10-29 NOTE — ED PROVIDER NOTE - PSH
H/O cardiac catheterization  5 years ago at [facility].  History of cardioversion    History of incision and drainage  right groin abcess  jan 2019  [facility]  History of loop recorder  2017  gsh

## 2019-10-29 NOTE — CONSULT NOTE ADULT - ASSESSMENT
Assessment  Acute on chronic systolic HF  HFrEF  Chronic Af on AC  s/p BiVICD  cardiomems  severe COPD compensated  morbid obesty Assessment  Acute on chronic systolic HF with recent elevated PAD despite outpt IV diuretics  HFrEF  Chronic Af on AC  s/p BiVICD  cardiomems in place  mild COPD compensated  morbid obesity      Rec  admit telemetry  IV lasix 40 mg day  IV milrinone o.375 mcg/k/min infusion  cont xarelto  hold coreg and ACE while on milrinine  CXR  trend BNP  strict I/O daily weights

## 2019-10-29 NOTE — ED ADULT NURSE NOTE - OBJECTIVE STATEMENT
Pt with a month of SOB and has hx of COPD, CHF. Pt is asymptomatic at time of RN assessment but states he was told to come in by Cardiologist Dr. Degn for "IV Lasix". Pt is noted to be mildly tachypneic and tachycardic. Pt is morbidly obese. Swelling to b/l LE's is dependant. Pt denies CP.

## 2019-10-29 NOTE — ED ADULT TRIAGE NOTE - CHIEF COMPLAINT QUOTE
has CHF and COPD with dyspnea. Cardiologist says Friday try the medications, he called today and they said meet Elen Peace of [place] today. No chest pain

## 2019-10-30 LAB
ALBUMIN SERPL ELPH-MCNC: 3.4 G/DL — SIGNIFICANT CHANGE UP (ref 3.3–5.2)
ALP SERPL-CCNC: 27 U/L — LOW (ref 40–120)
ALT FLD-CCNC: 16 U/L — SIGNIFICANT CHANGE UP
ANION GAP SERPL CALC-SCNC: 14 MMOL/L — SIGNIFICANT CHANGE UP (ref 5–17)
AST SERPL-CCNC: 15 U/L — SIGNIFICANT CHANGE UP
BASOPHILS # BLD AUTO: 0.04 K/UL — SIGNIFICANT CHANGE UP (ref 0–0.2)
BASOPHILS NFR BLD AUTO: 0.3 % — SIGNIFICANT CHANGE UP (ref 0–2)
BILIRUB SERPL-MCNC: 0.4 MG/DL — SIGNIFICANT CHANGE UP (ref 0.4–2)
BUN SERPL-MCNC: 17 MG/DL — SIGNIFICANT CHANGE UP (ref 8–20)
CALCIUM SERPL-MCNC: 8.7 MG/DL — SIGNIFICANT CHANGE UP (ref 8.6–10.2)
CHLORIDE SERPL-SCNC: 95 MMOL/L — LOW (ref 98–107)
CHOLEST SERPL-MCNC: 126 MG/DL — SIGNIFICANT CHANGE UP (ref 110–199)
CO2 SERPL-SCNC: 29 MMOL/L — SIGNIFICANT CHANGE UP (ref 22–29)
CREAT SERPL-MCNC: 0.98 MG/DL — SIGNIFICANT CHANGE UP (ref 0.5–1.3)
EOSINOPHIL # BLD AUTO: 0.1 K/UL — SIGNIFICANT CHANGE UP (ref 0–0.5)
EOSINOPHIL NFR BLD AUTO: 0.8 % — SIGNIFICANT CHANGE UP (ref 0–6)
GLUCOSE SERPL-MCNC: 138 MG/DL — HIGH (ref 70–115)
HCT VFR BLD CALC: 34.8 % — LOW (ref 39–50)
HCV AB S/CO SERPL IA: 0.29 S/CO — SIGNIFICANT CHANGE UP (ref 0–0.99)
HCV AB SERPL-IMP: SIGNIFICANT CHANGE UP
HDLC SERPL-MCNC: 37 MG/DL — LOW
HGB BLD-MCNC: 11.1 G/DL — LOW (ref 13–17)
IMM GRANULOCYTES NFR BLD AUTO: 1 % — SIGNIFICANT CHANGE UP (ref 0–1.5)
LIPID PNL WITH DIRECT LDL SERPL: 65 MG/DL — SIGNIFICANT CHANGE UP
LYMPHOCYTES # BLD AUTO: 2.68 K/UL — SIGNIFICANT CHANGE UP (ref 1–3.3)
LYMPHOCYTES # BLD AUTO: 21.9 % — SIGNIFICANT CHANGE UP (ref 13–44)
MAGNESIUM SERPL-MCNC: 2.5 MG/DL — SIGNIFICANT CHANGE UP (ref 1.6–2.6)
MCHC RBC-ENTMCNC: 29.9 PG — SIGNIFICANT CHANGE UP (ref 27–34)
MCHC RBC-ENTMCNC: 31.9 GM/DL — LOW (ref 32–36)
MCV RBC AUTO: 93.8 FL — SIGNIFICANT CHANGE UP (ref 80–100)
MONOCYTES # BLD AUTO: 1.01 K/UL — HIGH (ref 0–0.9)
MONOCYTES NFR BLD AUTO: 8.2 % — SIGNIFICANT CHANGE UP (ref 2–14)
NEUTROPHILS # BLD AUTO: 8.3 K/UL — HIGH (ref 1.8–7.4)
NEUTROPHILS NFR BLD AUTO: 67.8 % — SIGNIFICANT CHANGE UP (ref 43–77)
NT-PROBNP SERPL-SCNC: 934 PG/ML — HIGH (ref 0–300)
PLATELET # BLD AUTO: 206 K/UL — SIGNIFICANT CHANGE UP (ref 150–400)
POTASSIUM SERPL-MCNC: 3.9 MMOL/L — SIGNIFICANT CHANGE UP (ref 3.5–5.3)
POTASSIUM SERPL-SCNC: 3.9 MMOL/L — SIGNIFICANT CHANGE UP (ref 3.5–5.3)
PROT SERPL-MCNC: 6.1 G/DL — LOW (ref 6.6–8.7)
RBC # BLD: 3.71 M/UL — LOW (ref 4.2–5.8)
RBC # FLD: 14.4 % — SIGNIFICANT CHANGE UP (ref 10.3–14.5)
SODIUM SERPL-SCNC: 138 MMOL/L — SIGNIFICANT CHANGE UP (ref 135–145)
TOTAL CHOLESTEROL/HDL RATIO MEASUREMENT: 3 RATIO — LOW (ref 3.4–9.6)
TRIGL SERPL-MCNC: 121 MG/DL — SIGNIFICANT CHANGE UP (ref 10–200)
WBC # BLD: 12.25 K/UL — HIGH (ref 3.8–10.5)
WBC # FLD AUTO: 12.25 K/UL — HIGH (ref 3.8–10.5)

## 2019-10-30 PROCEDURE — 99232 SBSQ HOSP IP/OBS MODERATE 35: CPT

## 2019-10-30 RX ORDER — CARVEDILOL PHOSPHATE 80 MG/1
3.12 CAPSULE, EXTENDED RELEASE ORAL EVERY 12 HOURS
Refills: 0 | Status: DISCONTINUED | OUTPATIENT
Start: 2019-10-30 | End: 2019-10-31

## 2019-10-30 RX ORDER — FLUTICASONE FUROATE, UMECLIDINIUM BROMIDE AND VILANTEROL TRIFENATATE 200; 62.5; 25 UG/1; UG/1; UG/1
1 POWDER RESPIRATORY (INHALATION) DAILY
Refills: 0 | Status: DISCONTINUED | OUTPATIENT
Start: 2019-10-30 | End: 2019-11-03

## 2019-10-30 RX ORDER — FLUTICASONE PROPIONATE 50 MCG
1 SPRAY, SUSPENSION NASAL
Refills: 0 | Status: DISCONTINUED | OUTPATIENT
Start: 2019-10-30 | End: 2019-11-03

## 2019-10-30 RX ADMIN — Medication 3 MILLILITER(S): at 09:29

## 2019-10-30 RX ADMIN — Medication 1 TABLET(S): at 16:35

## 2019-10-30 RX ADMIN — Medication 10 MILLIGRAM(S): at 05:51

## 2019-10-30 RX ADMIN — GABAPENTIN 300 MILLIGRAM(S): 400 CAPSULE ORAL at 05:51

## 2019-10-30 RX ADMIN — Medication 40 MILLIGRAM(S): at 05:51

## 2019-10-30 RX ADMIN — Medication 0.12 MILLIGRAM(S): at 05:51

## 2019-10-30 RX ADMIN — Medication 3 MILLILITER(S): at 20:37

## 2019-10-30 RX ADMIN — Medication 1 SPRAY(S): at 16:33

## 2019-10-30 RX ADMIN — CARVEDILOL PHOSPHATE 3.12 MILLIGRAM(S): 80 CAPSULE, EXTENDED RELEASE ORAL at 16:36

## 2019-10-30 RX ADMIN — Medication 2 MILLIGRAM(S): at 21:19

## 2019-10-30 RX ADMIN — MILRINONE LACTATE 18.17 MICROGRAM(S)/KG/MIN: 1 INJECTION, SOLUTION INTRAVENOUS at 22:43

## 2019-10-30 RX ADMIN — Medication 2 MILLIGRAM(S): at 16:36

## 2019-10-30 RX ADMIN — Medication 3 MILLILITER(S): at 15:13

## 2019-10-30 RX ADMIN — RIVAROXABAN 20 MILLIGRAM(S): KIT at 16:36

## 2019-10-30 RX ADMIN — MILRINONE LACTATE 18.17 MICROGRAM(S)/KG/MIN: 1 INJECTION, SOLUTION INTRAVENOUS at 11:24

## 2019-10-30 RX ADMIN — Medication 1 TABLET(S): at 09:20

## 2019-10-30 RX ADMIN — FLUTICASONE FUROATE, UMECLIDINIUM BROMIDE AND VILANTEROL TRIFENATATE 1 PUFF(S): 200; 62.5; 25 POWDER RESPIRATORY (INHALATION) at 10:46

## 2019-10-30 RX ADMIN — GABAPENTIN 300 MILLIGRAM(S): 400 CAPSULE ORAL at 21:19

## 2019-10-30 RX ADMIN — GABAPENTIN 300 MILLIGRAM(S): 400 CAPSULE ORAL at 11:24

## 2019-10-30 NOTE — PROGRESS NOTE ADULT - SUBJECTIVE AND OBJECTIVE BOX
[place] [facility] - [facility], THE HEART CENTER                                   [address] [place] [address]                                                      PHONE: [phone]                                                         FAX: [phone]  [email or web address]  ---------------------------------------------------------------------------------------------------------------------------------    Overnight events/patient complaints:  Pt feels better    penicillins (Hives)  shellfish (Pruritus; Rash)    MEDICATIONS  (STANDING):  albuterol/ipratropium for Nebulization 3 milliLiter(s) Nebulizer three times a day  carvedilol 3.125 milliGRAM(s) Oral every 12 hours  digoxin     Tablet 0.125 milliGRAM(s) Oral daily  fluticasone furoate/umeclidinium/vilanterol 100-62.5-25 MICROgram(s) Inhaler 1 Puff(s) Inhalation daily  furosemide   Injectable 40 milliGRAM(s) IV Push daily  gabapentin 300 milliGRAM(s) Oral three times a day  lactobacillus acidophilus 1 Tablet(s) Oral two times a day with meals  milrinone Infusion 0.375 MICROgram(s)/kG/Min (18.169 mL/Hr) IV Continuous <Continuous>  predniSONE   Tablet 10 milliGRAM(s) Oral daily  rivaroxaban 20 milliGRAM(s) Oral daily    MEDICATIONS  (PRN):  diazepam    Tablet 2 milliGRAM(s) Oral two times a day PRN Anxiety      Vital Signs Last 24 Hrs  T(C): 36.4 (30 Oct 2019 05:44), Max: 37.1 (29 Oct 2019 11:18)  T(F): 97.6 (30 Oct 2019 05:44), Max: 98.7 (29 Oct 2019 11:18)  HR: 110 (30 Oct 2019 05:44) (62 - 112)  BP: 100/48 (30 Oct 2019 05:44) (79/40 - 106/50)  BP(mean): --  RR: 16 (30 Oct 2019 05:44) (16 - 18)  SpO2: 97% (30 Oct 2019 05:44) (93% - 97%)  ICU Vital Signs Last 24 Hrs  MAGGIE MENDOZAALEX  I&O's Detail    29 Oct 2019 07:01  -  30 Oct 2019 07:00  --------------------------------------------------------  IN:    milrinone  Infusion: 162.9 mL    Oral Fluid: 240 mL  Total IN: 402.9 mL    OUT:    Voided: 350 mL  Total OUT: 350 mL    Total NET: 52.9 mL        Drug Dosing Weight  MAGGIE DONALDSONMEENA      PHYSICAL EXAM:  General: Appears well developed, well nourished alert and cooperative.  HEENT: Head; normocephalic, atraumatic.  Eyes: Pupils reactive, cornea wnl.  Neck: Supple, no nodes adenopathy, no NVD or carotid bruit or thyromegaly.  CARDIOVASCULAR: Normal S1 and S2, No murmur, rub, gallop or lift.   LUNGS: No rales, rhonchi or wheeze. Normal breath sounds bilaterally.  ABDOMEN: Soft, nontender without mass or organomegaly. bowel sounds normoactive.  EXTREMITIES: +1 edema. Distal pulses wnl.   SKIN: warm and dry with normal turgor.  NEURO: Alert/oriented x 3/normal motor exam. No pathologic reflexes.    PSYCH: normal affect.        LABS:                        11.1   12.25 )-----------( 206      ( 30 Oct 2019 06:12 )             34.8     10-30    138  |  95<L>  |  17.0  ----------------------------<  138<H>  3.9   |  29.0  |  0.98    Ca    8.7      30 Oct 2019 06:12  Mg     2.5     10-30    TPro  6.1<L>  /  Alb  3.4  /  TBili  0.4  /  DBili  x   /  AST  15  /  ALT  16  /  AlkPhos  27<L>  10-30    MAGGIE ELLIOTT  CARDIAC MARKERS ( 29 Oct 2019 09:06 )  x     / 0.01 ng/mL / 60 U/L / x     / x            Urinalysis Basic - ( 29 Oct 2019 09:37 )    Color: Yellow / Appearance: Clear / S.015 / pH: x  Gluc: x / Ketone: Negative  / Bili: Negative / Urobili: Negative mg/dL   Blood: x / Protein: 15 mg/dL / Nitrite: Negative   Leuk Esterase: Negative / RBC: Negative /HPF / WBC 0-2   Sq Epi: x / Non Sq Epi: Negative / Bacteria: Negative        RADIOLOGY & ADDITIONAL STUDIES:    INTERPRETATION OF TELEMETRY (personally reviewed): afib with RVR         ECHO: < from: TTE Echo Complete w/Doppler (18 @ 17:40) >  Summary:   1. Technically very difficult study.   2. LV was not well seen despite use of contrast. LV function appears to   be grossly moderate-severely reduced.   3. The mitral in-flow pattern reveals no discernable A-wave, therefore   no comment on diastolic function can be made.   4. Mitral valve not well seen. No evidence of significant regurgitation.   5. Aortic valve was not well seen. No evidence of aortic stenosis on   doppler analysis.   6. There is no evidence of pericardial effusion.   7. Endocardial visualization was enhanced with intravenous echo contrast.    [id number] Stevie Portillo MD, Electronically signed on 2018 at   11:46:29 AM    < end of copied text >           CARDIAC CATHETERIZATION: < from: Cardiac Cath Lab - Adult (19 @ 14:05) >  VENTRICLES: EF 25%-30% w/ mild MR.  CORONARY VESSELS: R dominant.  Large RCA w/ minimal disease.  large LAD w/ mild disease.  Moderate sized LCx w/ minimal disease.  COMPLICATIONS: No complications occurred during the cath lab visit.  DIAGNOSTIC IMPRESSIONS: Moderate pulmonary HTN.  Severe LV dysfunction.  Nonobstructive CAD.  L pulmonary angiogram.  Cardiomems implant to L PA.  DIAGNOSTIC RECOMMENDATIONS: Resume a/c in 48 hrs.  Followup 1 week at [facility].  INTERVENTIONAL IMPRESSIONS: Moderate pulmonary HTN.  Severe LV dysfunction.  Nonobstructive CAD.  L pulmonary angiogram.  Cardiomems implant to L PA.  INTERVENTIONAL RECOMMENDATIONS: Resume a/c in 48 hrs.  Followup 1 week at [facility].  Prepared and signed by  Oscar Deal MD  Signed 2019 15:11:53    < end of copied text >      ASSESSMENT AND PLAN:  In summary, MAGGIE ELLIOTT is an 59y Male with past medical history significant for HFrEF sp BIV-ICD, CardioMems, Chronic AF on Xarelto, COPD, morbid obesity aw CHF.    1) Restarted Coreg 3.125mg 2x/day for Afib rate control. Ok while on milrinone.  2) Strict I/Os   3) CW IV lasix  4) cw AC

## 2019-10-30 NOTE — PROGRESS NOTE ADULT - SUBJECTIVE AND OBJECTIVE BOX
MAGGIE EARL  ----------------------------------------  The patient was seen and evaluated for heart failure.  The patient is in no acute distress.  Denied any chest pain, palpitations, dyspnea at rest, or abdominal pain.  Offers no complaints.    Vital Signs Last 24 Hrs  T(C): 36.4 (30 Oct 2019 11:00), Max: 36.6 (29 Oct 2019 20:00)  T(F): 97.5 (30 Oct 2019 11:00), Max: 97.9 (29 Oct 2019 20:00)  HR: 99 (30 Oct 2019 11:00) (62 - 112)  BP: 108/46 (30 Oct 2019 11:00) (90/61 - 108/46)  BP(mean): --  RR: 18 (30 Oct 2019 11:00) (16 - 18)  SpO2: 95% (30 Oct 2019 11:00) (94% - 97%)    PHYSICAL EXAMINATION:  ----------------------------------------  General appearance: No acute distress, Awake, Alert  HEENT: Normocephalic, Atraumatic, Conjunctiva clear, EOMI  Neck: Supple, No JVD, No tenderness  Lungs: Breath sound equal bilaterally, No wheezes, No rales  Cardiovascular: S1S2, Regular rhythm  Abdomen: Soft, Nontender, Nondistended, No guarding/rebound, Positive bowel sounds  Extremities: No clubbing, No cyanosis, No edema, No calf tenderness  Neuro: Strength equal bilaterally, No tremors  Psychiatric: Appropriate mood, Normal affect    LABORATORY STUDIES:  ----------------------------------------             11.1   12.25 )-----------( 206      ( 30 Oct 2019 06:12 )             34.8     10-30    138  |  95<L>  |  17.0  ----------------------------<  138<H>  3.9   |  29.0  |  0.98    Ca    8.7      30 Oct 2019 06:12  Mg     2.5     10-30    TPro  6.1<L>  /  Alb  3.4  /  TBili  0.4  /  DBili  x   /  AST  15  /  ALT  16  /  AlkPhos  27<L>  10-30    LIVER FUNCTIONS - ( 30 Oct 2019 06:12 )  Alb: 3.4 g/dL / Pro: 6.1 g/dL / ALK PHOS: 27 U/L / ALT: 16 U/L / AST: 15 U/L / GGT: x           CARDIAC MARKERS ( 29 Oct 2019 09:06 )  x     / 0.01 ng/mL / 60 U/L / x     / x        Urinalysis Basic - ( 29 Oct 2019 09:37 )  Color: Yellow / Appearance: Clear / S.015 / pH: x  Gluc: x / Ketone: Negative  / Bili: Negative / Urobili: Negative mg/dL   Blood: x / Protein: 15 mg/dL / Nitrite: Negative   Leuk Esterase: Negative / RBC: Negative /HPF / WBC 0-2   Sq Epi: x / Non Sq Epi: Negative / Bacteria: Negative    MEDICATIONS  (STANDING):  albuterol/ipratropium for Nebulization 3 milliLiter(s) Nebulizer three times a day  carvedilol 3.125 milliGRAM(s) Oral every 12 hours  digoxin     Tablet 0.125 milliGRAM(s) Oral daily  fluticasone furoate/umeclidinium/vilanterol 100-62.5-25 MICROgram(s) Inhaler 1 Puff(s) Inhalation daily  fluticasone propionate 50 MICROgram(s)/spray Nasal Spray 1 Spray(s) Both Nostrils two times a day  furosemide   Injectable 40 milliGRAM(s) IV Push daily  gabapentin 300 milliGRAM(s) Oral three times a day  lactobacillus acidophilus 1 Tablet(s) Oral two times a day with meals  milrinone Infusion 0.375 MICROgram(s)/kG/Min (18.169 mL/Hr) IV Continuous <Continuous>  predniSONE   Tablet 10 milliGRAM(s) Oral daily  rivaroxaban 20 milliGRAM(s) Oral daily    MEDICATIONS  (PRN):  diazepam    Tablet 2 milliGRAM(s) Oral two times a day PRN Anxiety      ASSESSMENT / PLAN:  ----------------------------------------  58 y/o male with CHF, COPD, morbid obesity with ROCK,       Acute on chronic systolic heart failure - On milrinone infusion and intravenous furosemide. Cardiology follow up noted. On carvedilol.    Atrial fibrillation - On digoxin. On rivaroxaban for anticoagulation.    COPD - Inhaled bronchodilator therapy.    Obstructive sleep apnea - Nocturnal CPAP.

## 2019-10-31 LAB
ANION GAP SERPL CALC-SCNC: 15 MMOL/L — SIGNIFICANT CHANGE UP (ref 5–17)
ANION GAP SERPL CALC-SCNC: 17 MMOL/L — SIGNIFICANT CHANGE UP (ref 5–17)
BUN SERPL-MCNC: 16 MG/DL — SIGNIFICANT CHANGE UP (ref 8–20)
BUN SERPL-MCNC: 18 MG/DL — SIGNIFICANT CHANGE UP (ref 8–20)
CALCIUM SERPL-MCNC: 8.4 MG/DL — LOW (ref 8.6–10.2)
CALCIUM SERPL-MCNC: 8.4 MG/DL — LOW (ref 8.6–10.2)
CHLORIDE SERPL-SCNC: 92 MMOL/L — LOW (ref 98–107)
CHLORIDE SERPL-SCNC: 97 MMOL/L — LOW (ref 98–107)
CO2 SERPL-SCNC: 25 MMOL/L — SIGNIFICANT CHANGE UP (ref 22–29)
CO2 SERPL-SCNC: 25 MMOL/L — SIGNIFICANT CHANGE UP (ref 22–29)
CREAT SERPL-MCNC: 0.93 MG/DL — SIGNIFICANT CHANGE UP (ref 0.5–1.3)
CREAT SERPL-MCNC: 1.22 MG/DL — SIGNIFICANT CHANGE UP (ref 0.5–1.3)
GLUCOSE SERPL-MCNC: 147 MG/DL — HIGH (ref 70–115)
GLUCOSE SERPL-MCNC: 210 MG/DL — HIGH (ref 70–115)
POTASSIUM SERPL-MCNC: 3.7 MMOL/L — SIGNIFICANT CHANGE UP (ref 3.5–5.3)
POTASSIUM SERPL-MCNC: 3.9 MMOL/L — SIGNIFICANT CHANGE UP (ref 3.5–5.3)
POTASSIUM SERPL-SCNC: 3.7 MMOL/L — SIGNIFICANT CHANGE UP (ref 3.5–5.3)
POTASSIUM SERPL-SCNC: 3.9 MMOL/L — SIGNIFICANT CHANGE UP (ref 3.5–5.3)
SODIUM SERPL-SCNC: 134 MMOL/L — LOW (ref 135–145)
SODIUM SERPL-SCNC: 137 MMOL/L — SIGNIFICANT CHANGE UP (ref 135–145)

## 2019-10-31 PROCEDURE — 99233 SBSQ HOSP IP/OBS HIGH 50: CPT

## 2019-10-31 RX ORDER — FUROSEMIDE 40 MG
80 TABLET ORAL
Refills: 0 | Status: DISCONTINUED | OUTPATIENT
Start: 2019-10-31 | End: 2019-11-02

## 2019-10-31 RX ORDER — CARVEDILOL PHOSPHATE 80 MG/1
6.25 CAPSULE, EXTENDED RELEASE ORAL EVERY 12 HOURS
Refills: 0 | Status: DISCONTINUED | OUTPATIENT
Start: 2019-10-31 | End: 2019-11-01

## 2019-10-31 RX ADMIN — Medication 3 MILLILITER(S): at 20:21

## 2019-10-31 RX ADMIN — Medication 1 TABLET(S): at 18:00

## 2019-10-31 RX ADMIN — CARVEDILOL PHOSPHATE 3.12 MILLIGRAM(S): 80 CAPSULE, EXTENDED RELEASE ORAL at 06:09

## 2019-10-31 RX ADMIN — GABAPENTIN 300 MILLIGRAM(S): 400 CAPSULE ORAL at 12:55

## 2019-10-31 RX ADMIN — MILRINONE LACTATE 18.17 MICROGRAM(S)/KG/MIN: 1 INJECTION, SOLUTION INTRAVENOUS at 23:15

## 2019-10-31 RX ADMIN — Medication 2 MILLIGRAM(S): at 16:30

## 2019-10-31 RX ADMIN — Medication 1 SPRAY(S): at 06:09

## 2019-10-31 RX ADMIN — Medication 0.12 MILLIGRAM(S): at 06:09

## 2019-10-31 RX ADMIN — MILRINONE LACTATE 18.17 MICROGRAM(S)/KG/MIN: 1 INJECTION, SOLUTION INTRAVENOUS at 04:45

## 2019-10-31 RX ADMIN — FLUTICASONE FUROATE, UMECLIDINIUM BROMIDE AND VILANTEROL TRIFENATATE 1 PUFF(S): 200; 62.5; 25 POWDER RESPIRATORY (INHALATION) at 08:52

## 2019-10-31 RX ADMIN — GABAPENTIN 300 MILLIGRAM(S): 400 CAPSULE ORAL at 06:09

## 2019-10-31 RX ADMIN — Medication 3 MILLILITER(S): at 08:52

## 2019-10-31 RX ADMIN — MILRINONE LACTATE 18.17 MICROGRAM(S)/KG/MIN: 1 INJECTION, SOLUTION INTRAVENOUS at 10:41

## 2019-10-31 RX ADMIN — Medication 80 MILLIGRAM(S): at 18:04

## 2019-10-31 RX ADMIN — MILRINONE LACTATE 18.17 MICROGRAM(S)/KG/MIN: 1 INJECTION, SOLUTION INTRAVENOUS at 17:49

## 2019-10-31 RX ADMIN — GABAPENTIN 300 MILLIGRAM(S): 400 CAPSULE ORAL at 21:12

## 2019-10-31 RX ADMIN — Medication 3 MILLILITER(S): at 14:52

## 2019-10-31 RX ADMIN — RIVAROXABAN 20 MILLIGRAM(S): KIT at 18:00

## 2019-10-31 RX ADMIN — Medication 2 MILLIGRAM(S): at 21:12

## 2019-10-31 RX ADMIN — CARVEDILOL PHOSPHATE 6.25 MILLIGRAM(S): 80 CAPSULE, EXTENDED RELEASE ORAL at 18:00

## 2019-10-31 RX ADMIN — Medication 1 TABLET(S): at 08:51

## 2019-10-31 RX ADMIN — Medication 40 MILLIGRAM(S): at 06:09

## 2019-10-31 RX ADMIN — Medication 1 SPRAY(S): at 18:00

## 2019-10-31 RX ADMIN — Medication 10 MILLIGRAM(S): at 06:09

## 2019-10-31 NOTE — PROGRESS NOTE ADULT - SUBJECTIVE AND OBJECTIVE BOX
[place] [facility] - [facility], THE [facility] CENTER                                   [address] [place] [address]                                                      PHONE: [phone]                                                         FAX: [phone]  [email or web address]  ---------------------------------------------------------------------------------------------------------------------------------    Overnight events/patient complaints:  Pt feels well    penicillins (Hives)  shellfish (Pruritus; Rash)    MEDICATIONS  (STANDING):  albuterol/ipratropium for Nebulization 3 milliLiter(s) Nebulizer three times a day  carvedilol 6.25 milliGRAM(s) Oral every 12 hours  digoxin     Tablet 0.125 milliGRAM(s) Oral daily  fluticasone furoate/umeclidinium/vilanterol 100-62.5-25 MICROgram(s) Inhaler 1 Puff(s) Inhalation daily  fluticasone propionate 50 MICROgram(s)/spray Nasal Spray 1 Spray(s) Both Nostrils two times a day  furosemide   Injectable 80 milliGRAM(s) IV Push two times a day  gabapentin 300 milliGRAM(s) Oral three times a day  lactobacillus acidophilus 1 Tablet(s) Oral two times a day with meals  milrinone Infusion 0.375 MICROgram(s)/kG/Min (18.169 mL/Hr) IV Continuous <Continuous>  predniSONE   Tablet 10 milliGRAM(s) Oral daily  rivaroxaban 20 milliGRAM(s) Oral daily    MEDICATIONS  (PRN):  diazepam    Tablet 2 milliGRAM(s) Oral two times a day PRN Anxiety      Vital Signs Last 24 Hrs  T(C): 36.7 (31 Oct 2019 06:05), Max: 37.1 (30 Oct 2019 16:14)  T(F): 98 (31 Oct 2019 06:05), Max: 98.8 (30 Oct 2019 16:14)  HR: 98 (31 Oct 2019 08:52) (89 - 114)  BP: 120/60 (31 Oct 2019 06:05) (99/60 - 120/60)  BP(mean): --  RR: 18 (31 Oct 2019 06:05) (18 - 18)  SpO2: 97% (31 Oct 2019 08:52) (94% - 97%)  ICU Vital Signs Last 24 Hrs  MAGGIE ELLIOTT  I&O's Detail    30 Oct 2019 07:01  -  31 Oct 2019 07:00  --------------------------------------------------------  IN:    milrinone  Infusion: 434.4 mL    Oral Fluid: 480 mL  Total IN: 914.4 mL    OUT:    Voided: 2200 mL  Total OUT: 2200 mL    Total NET: -1285.6 mL      31 Oct 2019 07:01  -  31 Oct 2019 09:26  --------------------------------------------------------  IN:    milrinone  Infusion: 36.2 mL  Total IN: 36.2 mL    OUT:    Voided: 600 mL  Total OUT: 600 mL    Total NET: -563.8 mL        Drug Dosing Weight  MAGGIE ELLIOTT      PHYSICAL EXAM:  General: Appears well developed, well nourished alert and cooperative.  HEENT: Head; normocephalic, atraumatic.  Eyes: Pupils reactive, cornea wnl.  Neck: Supple, no nodes adenopathy, no NVD or carotid bruit or thyromegaly.  CARDIOVASCULAR: Normal S1 and S2, No murmur, rub, gallop or lift.   LUNGS: No rales, rhonchi or wheeze. Normal breath sounds bilaterally.  ABDOMEN: Soft, nontender without mass or organomegaly. bowel sounds normoactive.  EXTREMITIES: trace edema. Distal pulses wnl.   SKIN: warm and dry with normal turgor.  NEURO: Alert/oriented x 3/normal motor exam. No pathologic reflexes.    PSYCH: normal affect.        LABS:                        11.1   12.25 )-----------( 206      ( 30 Oct 2019 06:12 )             34.8     10-    137  |  97<L>  |  16.0  ----------------------------<  147<H>  3.9   |  25.0  |  0.93    Ca    8.4<L>      31 Oct 2019 06:30  Mg     2.5     10-30    TPro  6.1<L>  /  Alb  3.4  /  TBili  0.4  /  DBili  x   /  AST  15  /  ALT  16  /  AlkPhos  27<L>  10-30    MAGGIE ELLIOTT        Urinalysis Basic - ( 29 Oct 2019 09:37 )    Color: Yellow / Appearance: Clear / S.015 / pH: x  Gluc: x / Ketone: Negative  / Bili: Negative / Urobili: Negative mg/dL   Blood: x / Protein: 15 mg/dL / Nitrite: Negative   Leuk Esterase: Negative / RBC: Negative /HPF / WBC 0-2   Sq Epi: x / Non Sq Epi: Negative / Bacteria: Negative        RADIOLOGY & ADDITIONAL STUDIES:    INTERPRETATION OF TELEMETRY (personally reviewed): afib      ECHO: < from: TTE Echo Complete w/Doppler (18 @ 17:40) >  Summary:   1. Technically very difficult study.   2. LV was not well seen despite use of contrast. LV function appears to   be grossly moderate-severely reduced.   3. The mitral in-flow pattern reveals no discernable A-wave, therefore   no comment on diastolic function can be made.   4. Mitral valve not well seen. No evidence of significant regurgitation.   5. Aortic valve was not well seen. No evidence of aortic stenosis on   doppler analysis.   6. There is no evidence of pericardial effusion.   7. Endocardial visualization was enhanced with intravenous echo contrast.    [id number] Stevie Portillo MD, Electronically signed on 2018 at   11:46:29 AM    < end of copied text >           CARDIAC CATHETERIZATION: < from: Cardiac Cath Lab - Adult (19 @ 14:05) >  VENTRICLES: EF 25%-30% w/ mild MR.  CORONARY VESSELS: R dominant.  Large RCA w/ minimal disease.  large LAD w/ mild disease.  Moderate sized LCx w/ minimal disease.  COMPLICATIONS: No complications occurred during the cath lab visit.  DIAGNOSTIC IMPRESSIONS: Moderate pulmonary HTN.  Severe LV dysfunction.  Nonobstructive CAD.  L pulmonary angiogram.  Cardiomems implant to L PA.  DIAGNOSTIC RECOMMENDATIONS: Resume a/c in 48 hrs.  Followup 1 week at [facility].  INTERVENTIONAL IMPRESSIONS: Moderate pulmonary HTN.  Severe LV dysfunction.  Nonobstructive CAD.  L pulmonary angiogram.  Cardiomems implant to  PA.  INTERVENTIONAL RECOMMENDATIONS: Resume a/c in 48 hrs.  Followup 1 week at [facility].  Prepared and signed by  Oscar Deal MD  Signed 2019 15:11:53    < end of copied text >      ASSESSMENT AND PLAN:  In summary, MAGGIE ELLIOTT is an 59y Male with past medical history significant for HFrEF sp BIV-ICD, CardioMems, Chronic AF on Xarelto, COPD, morbid obesity aw CHF.    1) increass Coreg 6.25mg 2x/day for Afib rate control. Ok while on milrinone.  2) Strict I/Os   3) Increase IV lasix to 80mg IV 2x/day  	-Will need to monitor K+ and Creatinine twice daily  4)  AC

## 2019-10-31 NOTE — PROGRESS NOTE ADULT - SUBJECTIVE AND OBJECTIVE BOX
MAGGIERONY ELLIOTT  ----------------------------------------  The patient was seen and evaluated for heart failure.  The patient is in no acute distress.  Denied any chest pain, palpitations, or abdominal pain.  Reports some improvement in the dyspnea on exertion.    Vital Signs Last 24 Hrs  T(C): 36.8 (31 Oct 2019 10:00), Max: 37.1 (30 Oct 2019 16:14)  T(F): 98.3 (31 Oct 2019 10:00), Max: 98.8 (30 Oct 2019 16:14)  HR: 105 (31 Oct 2019 10:00) (94 - 114)  BP: 106/65 (31 Oct 2019 10:00) (99/60 - 120/60)  BP(mean): --  RR: 18 (31 Oct 2019 10:00) (18 - 18)  SpO2: 96% (31 Oct 2019 10:00) (95% - 97%)    PHYSICAL EXAMINATION:  ----------------------------------------  General appearance: No acute distress, Awake, Alert  HEENT: Normocephalic, Atraumatic, Conjunctiva clear, EOMI  Neck: Supple, No JVD, No tenderness  Lungs: Breath sound equal bilaterally, No wheezes, No rales  Cardiovascular: S1S2, Regular rhythm  Abdomen: Soft, Nontender, Nondistended, No guarding/rebound, Positive bowel sounds  Extremities: No clubbing, No cyanosis, No edema, No calf tenderness  Neuro: Strength equal bilaterally, No tremors  Psychiatric: Appropriate mood, Normal affect    LABORATORY STUDIES:  ----------------------------------------             11.1   12.25 )-----------( 206      ( 30 Oct 2019 06:12 )             34.8     10-31    137  |  97<L>  |  16.0  ----------------------------<  147<H>  3.9   |  25.0  |  0.93    Ca    8.4<L>      31 Oct 2019 06:30  Mg     2.5     10-30    TPro  6.1<L>  /  Alb  3.4  /  TBili  0.4  /  DBili  x   /  AST  15  /  ALT  16  /  AlkPhos  27<L>  10-30    LIVER FUNCTIONS - ( 30 Oct 2019 06:12 )  Alb: 3.4 g/dL / Pro: 6.1 g/dL / ALK PHOS: 27 U/L / ALT: 16 U/L / AST: 15 U/L / GGT: x           MEDICATIONS  (STANDING):  albuterol/ipratropium for Nebulization 3 milliLiter(s) Nebulizer three times a day  carvedilol 6.25 milliGRAM(s) Oral every 12 hours  digoxin     Tablet 0.125 milliGRAM(s) Oral daily  fluticasone furoate/umeclidinium/vilanterol 100-62.5-25 MICROgram(s) Inhaler 1 Puff(s) Inhalation daily  fluticasone propionate 50 MICROgram(s)/spray Nasal Spray 1 Spray(s) Both Nostrils two times a day  furosemide   Injectable 80 milliGRAM(s) IV Push two times a day  gabapentin 300 milliGRAM(s) Oral three times a day  lactobacillus acidophilus 1 Tablet(s) Oral two times a day with meals  milrinone Infusion 0.375 MICROgram(s)/kG/Min (18.169 mL/Hr) IV Continuous <Continuous>  predniSONE   Tablet 10 milliGRAM(s) Oral daily  rivaroxaban 20 milliGRAM(s) Oral daily    MEDICATIONS  (PRN):  diazepam    Tablet 2 milliGRAM(s) Oral two times a day PRN Anxiety      ASSESSMENT / PLAN:  ----------------------------------------  58 y/o male with CHF, COPD, morbid obesity with ROCK.    Acute on chronic systolic heart failure - On milrinone infusion. Dose of intravenous furosemide increased. Cardiology follow up noted. On carvedilol.    Atrial fibrillation - On digoxin. On rivaroxaban for anticoagulation.    COPD - Inhaled bronchodilator therapy.    Obstructive sleep apnea - Nocturnal CPAP.      Estimated date of discharge MAGGIERONY ELLIOTT  ----------------------------------------  The patient was seen and evaluated for heart failure.  The patient is in no acute distress.  Denied any chest pain, palpitations, or abdominal pain.  Reports some improvement in the dyspnea on exertion.    Vital Signs Last 24 Hrs  T(C): 36.8 (31 Oct 2019 10:00), Max: 37.1 (30 Oct 2019 16:14)  T(F): 98.3 (31 Oct 2019 10:00), Max: 98.8 (30 Oct 2019 16:14)  HR: 105 (31 Oct 2019 10:00) (94 - 114)  BP: 106/65 (31 Oct 2019 10:00) (99/60 - 120/60)  BP(mean): --  RR: 18 (31 Oct 2019 10:00) (18 - 18)  SpO2: 96% (31 Oct 2019 10:00) (95% - 97%)    PHYSICAL EXAMINATION:  ----------------------------------------  General appearance: No acute distress, Awake, Alert  HEENT: Normocephalic, Atraumatic, Conjunctiva clear, EOMI  Neck: Supple, No JVD, No tenderness  Lungs: Breath sound equal bilaterally, No wheezes, No rales  Cardiovascular: S1S2, Regular rhythm  Abdomen: Soft, Nontender, Nondistended, No guarding/rebound, Positive bowel sounds  Extremities: No clubbing, No cyanosis, No edema, No calf tenderness  Neuro: Strength equal bilaterally, No tremors  Psychiatric: Appropriate mood, Normal affect    LABORATORY STUDIES:  ----------------------------------------             11.1   12.25 )-----------( 206      ( 30 Oct 2019 06:12 )             34.8     10-31    137  |  97<L>  |  16.0  ----------------------------<  147<H>  3.9   |  25.0  |  0.93    Ca    8.4<L>      31 Oct 2019 06:30  Mg     2.5     10-30    TPro  6.1<L>  /  Alb  3.4  /  TBili  0.4  /  DBili  x   /  AST  15  /  ALT  16  /  AlkPhos  27<L>  10-30    LIVER FUNCTIONS - ( 30 Oct 2019 06:12 )  Alb: 3.4 g/dL / Pro: 6.1 g/dL / ALK PHOS: 27 U/L / ALT: 16 U/L / AST: 15 U/L / GGT: x           MEDICATIONS  (STANDING):  albuterol/ipratropium for Nebulization 3 milliLiter(s) Nebulizer three times a day  carvedilol 6.25 milliGRAM(s) Oral every 12 hours  digoxin     Tablet 0.125 milliGRAM(s) Oral daily  fluticasone furoate/umeclidinium/vilanterol 100-62.5-25 MICROgram(s) Inhaler 1 Puff(s) Inhalation daily  fluticasone propionate 50 MICROgram(s)/spray Nasal Spray 1 Spray(s) Both Nostrils two times a day  furosemide   Injectable 80 milliGRAM(s) IV Push two times a day  gabapentin 300 milliGRAM(s) Oral three times a day  lactobacillus acidophilus 1 Tablet(s) Oral two times a day with meals  milrinone Infusion 0.375 MICROgram(s)/kG/Min (18.169 mL/Hr) IV Continuous <Continuous>  predniSONE   Tablet 10 milliGRAM(s) Oral daily  rivaroxaban 20 milliGRAM(s) Oral daily    MEDICATIONS  (PRN):  diazepam    Tablet 2 milliGRAM(s) Oral two times a day PRN Anxiety      ASSESSMENT / PLAN:  ----------------------------------------  60 y/o male with CHF, COPD, morbid obesity with ROCK.    Acute on chronic systolic heart failure - On milrinone infusion. Dose of intravenous furosemide increased. Cardiology follow up noted. On carvedilol.    Atrial fibrillation - On digoxin. On rivaroxaban for anticoagulation.    COPD - Inhaled bronchodilator therapy.    Obstructive sleep apnea - Nocturnal CPAP.

## 2019-11-01 LAB
ANION GAP SERPL CALC-SCNC: 15 MMOL/L — SIGNIFICANT CHANGE UP (ref 5–17)
BUN SERPL-MCNC: 16 MG/DL — SIGNIFICANT CHANGE UP (ref 8–20)
CALCIUM SERPL-MCNC: 8.5 MG/DL — LOW (ref 8.6–10.2)
CHLORIDE SERPL-SCNC: 94 MMOL/L — LOW (ref 98–107)
CO2 SERPL-SCNC: 27 MMOL/L — SIGNIFICANT CHANGE UP (ref 22–29)
CREAT SERPL-MCNC: 1.07 MG/DL — SIGNIFICANT CHANGE UP (ref 0.5–1.3)
GLUCOSE SERPL-MCNC: 163 MG/DL — HIGH (ref 70–115)
POTASSIUM SERPL-MCNC: 3.7 MMOL/L — SIGNIFICANT CHANGE UP (ref 3.5–5.3)
POTASSIUM SERPL-SCNC: 3.7 MMOL/L — SIGNIFICANT CHANGE UP (ref 3.5–5.3)
SODIUM SERPL-SCNC: 136 MMOL/L — SIGNIFICANT CHANGE UP (ref 135–145)

## 2019-11-01 PROCEDURE — 99232 SBSQ HOSP IP/OBS MODERATE 35: CPT

## 2019-11-01 RX ORDER — INFLUENZA VIRUS VACCINE 15; 15; 15; 15 UG/.5ML; UG/.5ML; UG/.5ML; UG/.5ML
0.5 SUSPENSION INTRAMUSCULAR ONCE
Refills: 0 | Status: COMPLETED | OUTPATIENT
Start: 2019-11-01 | End: 2019-11-03

## 2019-11-01 RX ORDER — CARVEDILOL PHOSPHATE 80 MG/1
12.5 CAPSULE, EXTENDED RELEASE ORAL EVERY 12 HOURS
Refills: 0 | Status: DISCONTINUED | OUTPATIENT
Start: 2019-11-01 | End: 2019-11-03

## 2019-11-01 RX ORDER — ACETAMINOPHEN 500 MG
650 TABLET ORAL EVERY 6 HOURS
Refills: 0 | Status: DISCONTINUED | OUTPATIENT
Start: 2019-11-01 | End: 2019-11-03

## 2019-11-01 RX ADMIN — GABAPENTIN 300 MILLIGRAM(S): 400 CAPSULE ORAL at 05:22

## 2019-11-01 RX ADMIN — GABAPENTIN 300 MILLIGRAM(S): 400 CAPSULE ORAL at 21:35

## 2019-11-01 RX ADMIN — Medication 650 MILLIGRAM(S): at 22:05

## 2019-11-01 RX ADMIN — MILRINONE LACTATE 18.17 MICROGRAM(S)/KG/MIN: 1 INJECTION, SOLUTION INTRAVENOUS at 04:06

## 2019-11-01 RX ADMIN — Medication 650 MILLIGRAM(S): at 21:35

## 2019-11-01 RX ADMIN — Medication 0.12 MILLIGRAM(S): at 05:22

## 2019-11-01 RX ADMIN — Medication 650 MILLIGRAM(S): at 15:25

## 2019-11-01 RX ADMIN — Medication 1 SPRAY(S): at 05:22

## 2019-11-01 RX ADMIN — Medication 10 MILLIGRAM(S): at 05:22

## 2019-11-01 RX ADMIN — Medication 80 MILLIGRAM(S): at 05:22

## 2019-11-01 RX ADMIN — GABAPENTIN 300 MILLIGRAM(S): 400 CAPSULE ORAL at 15:53

## 2019-11-01 RX ADMIN — CARVEDILOL PHOSPHATE 6.25 MILLIGRAM(S): 80 CAPSULE, EXTENDED RELEASE ORAL at 05:22

## 2019-11-01 RX ADMIN — FLUTICASONE FUROATE, UMECLIDINIUM BROMIDE AND VILANTEROL TRIFENATATE 1 PUFF(S): 200; 62.5; 25 POWDER RESPIRATORY (INHALATION) at 08:46

## 2019-11-01 RX ADMIN — Medication 2 MILLIGRAM(S): at 23:16

## 2019-11-01 RX ADMIN — Medication 3 MILLILITER(S): at 08:51

## 2019-11-01 RX ADMIN — Medication 650 MILLIGRAM(S): at 14:55

## 2019-11-01 RX ADMIN — Medication 1 TABLET(S): at 18:43

## 2019-11-01 RX ADMIN — Medication 3 MILLILITER(S): at 20:12

## 2019-11-01 RX ADMIN — Medication 1 SPRAY(S): at 18:43

## 2019-11-01 RX ADMIN — RIVAROXABAN 20 MILLIGRAM(S): KIT at 18:43

## 2019-11-01 RX ADMIN — Medication 1 TABLET(S): at 08:55

## 2019-11-01 RX ADMIN — Medication 3 MILLILITER(S): at 15:22

## 2019-11-01 RX ADMIN — Medication 2 MILLIGRAM(S): at 15:53

## 2019-11-01 NOTE — PROGRESS NOTE ADULT - SUBJECTIVE AND OBJECTIVE BOX
[place] [facility] - [facility], THE [facility] CENTER                                   [address] [place] [address]                                                      PHONE: [phone]                                                         FAX: [phone]  [email or web address]  ---------------------------------------------------------------------------------------------------------------------------------    Overnight events/patient complaints:  Pt feels well and is breathing better    penicillins (Hives)  shellfish (Pruritus; Rash)    MEDICATIONS  (STANDING):  albuterol/ipratropium for Nebulization 3 milliLiter(s) Nebulizer three times a day  carvedilol 6.25 milliGRAM(s) Oral every 12 hours  digoxin     Tablet 0.125 milliGRAM(s) Oral daily  fluticasone furoate/umeclidinium/vilanterol 100-62.5-25 MICROgram(s) Inhaler 1 Puff(s) Inhalation daily  fluticasone propionate 50 MICROgram(s)/spray Nasal Spray 1 Spray(s) Both Nostrils two times a day  furosemide   Injectable 80 milliGRAM(s) IV Push two times a day  gabapentin 300 milliGRAM(s) Oral three times a day  lactobacillus acidophilus 1 Tablet(s) Oral two times a day with meals  milrinone Infusion 0.375 MICROgram(s)/kG/Min (18.169 mL/Hr) IV Continuous <Continuous>  predniSONE   Tablet 10 milliGRAM(s) Oral daily  rivaroxaban 20 milliGRAM(s) Oral daily    MEDICATIONS  (PRN):  diazepam    Tablet 2 milliGRAM(s) Oral two times a day PRN Anxiety      Vital Signs Last 24 Hrs  T(C): 36.8 (01 Nov 2019 05:19), Max: 37.1 (31 Oct 2019 20:49)  T(F): 98.3 (01 Nov 2019 05:19), Max: 98.8 (31 Oct 2019 20:49)  HR: 108 (01 Nov 2019 05:19) (51 - 111)  BP: 109/64 (01 Nov 2019 05:19) (106/65 - 118/72)  BP(mean): --  RR: 18 (01 Nov 2019 05:19) (17 - 18)  SpO2: 96% (01 Nov 2019 05:19) (96% - 97%)  ICU Vital Signs Last 24 Hrs  MAGGIE GARMEENA  I&O's Detail    31 Oct 2019 07:01  -  01 Nov 2019 07:00  --------------------------------------------------------  IN:    milrinone  Infusion: 398.2 mL    Oral Fluid: 360 mL  Total IN: 758.2 mL    OUT:    Voided: 4025 mL  Total OUT: 4025 mL    Total NET: -3266.8 mL        Drug Dosing Weight  MAGGIE MENDOZAALEX      PHYSICAL EXAM:  General: Appears well developed, well nourished alert and cooperative.  HEENT: Head; normocephalic, atraumatic.  Eyes: Pupils reactive, cornea wnl.  Neck: Supple, no nodes adenopathy, no NVD or carotid bruit or thyromegaly.  CARDIOVASCULAR: Normal S1 and S2, No murmur, rub, gallop or lift.   LUNGS: No rales, rhonchi or wheeze. Normal breath sounds bilaterally.  ABDOMEN: Soft, nontender without mass or organomegaly. bowel sounds normoactive.  EXTREMITIES: trace edema. Distal pulses wnl.   SKIN: warm and dry with normal turgor.  NEURO: Alert/oriented x 3/normal motor exam. No pathologic reflexes.    PSYCH: normal affect.        LABS:    11-01    136  |  94<L>  |  16.0  ----------------------------<  163<H>  3.7   |  27.0  |  1.07    Ca    8.5<L>      01 Nov 2019 05:55      MAGGIE ELLIOTT            RADIOLOGY & ADDITIONAL STUDIES:    INTERPRETATION OF TELEMETRY (personally reviewed): no events, HR mildly elevated     ECHO: < from: TTE Echo Complete w/Doppler (12.28.18 @ 17:40) >  Summary:   1. Technically very difficult study.   2. LV was not well seen despite use of contrast. LV function appears to   be grossly moderate-severely reduced.   3. The mitral in-flow pattern reveals no discernable A-wave, therefore   no comment on diastolic function can be made.   4. Mitral valve not well seen. No evidence of significant regurgitation.   5. Aortic valve was not well seen. No evidence of aortic stenosis on   doppler analysis.   6. There is no evidence of pericardial effusion.   7. Endocardial visualization was enhanced with intravenous echo contrast.    [id number] Stevie Portillo MD, Electronically signed on 12/29/2018 at   11:46:29 AM    < end of copied text >           CARDIAC CATHETERIZATION: < from: Cardiac Cath Lab - Adult (03.06.19 @ 14:05) >  VENTRICLES: EF 25%-30% w/ mild MR.  CORONARY VESSELS: R dominant.  Large RCA w/ minimal disease.  large LAD w/ mild disease.  Moderate sized LCx w/ minimal disease.  COMPLICATIONS: No complications occurred during the cath lab visit.  DIAGNOSTIC IMPRESSIONS: Moderate pulmonary HTN.  Severe LV dysfunction.  Nonobstructive CAD.  L pulmonary angiogram.  Cardiomems implant to L PA.  DIAGNOSTIC RECOMMENDATIONS: Resume a/c in 48 hrs.  Followup 1 week at [facility].  INTERVENTIONAL IMPRESSIONS: Moderate pulmonary HTN.  Severe LV dysfunction.  Nonobstructive CAD.  L pulmonary angiogram.  Cardiomems implant to L PA.  INTERVENTIONAL RECOMMENDATIONS: Resume a/c in 48 hrs.  Followup 1 week at [facility].  Prepared and signed by  Oscar Deal MD  Signed 03/06/2019 15:11:53    < end of copied text >      ASSESSMENT AND PLAN:  In summary, MAGGIE ELLIOTT is an 59y Male with past medical history significant for HFrEF sp BIV-ICD, CardioMems, Chronic AF on Xarelto, COPD, morbid obesity aw CHF.    1) Increase Coreg 12.5mg 2x/day for Afib rate control. Ok while on milrinone.  2) Strict I/Os   3) cw IV lasix to 80mg IV 2x/day  	-Will need to monitor K+ and Creatinine twice daily              -Urine outpt much better since on increased dosage. The patient will bring in CardioMems pillow today for reading  4) cw AC

## 2019-11-01 NOTE — PROGRESS NOTE ADULT - SUBJECTIVE AND OBJECTIVE BOX
MAGGIE ELLIOTT  ----------------------------------------  The patient was seen and evaluated for heart failure.  The patient is in no acute distress.  Denied any chest pain, palpitations, dyspnea, or abdominal pain.  Reports improvement in dyspnea. Ambulating well.    Vital Signs Last 24 Hrs  T(C): 36.6 (01 Nov 2019 10:00), Max: 37.1 (31 Oct 2019 20:49)  T(F): 97.9 (01 Nov 2019 10:00), Max: 98.8 (31 Oct 2019 20:49)  HR: 101 (01 Nov 2019 10:00) (51 - 111)  BP: 119/69 (01 Nov 2019 10:00) (109/64 - 119/69)  BP(mean): --  RR: 18 (01 Nov 2019 10:00) (18 - 18)  SpO2: 95% (01 Nov 2019 10:00) (95% - 97%)    PHYSICAL EXAMINATION:  ----------------------------------------  General appearance: No acute distress, Awake, Alert  HEENT: Normocephalic, Atraumatic, Conjunctiva clear, EOMI  Neck: Supple, No JVD, No tenderness  Lungs: Breath sound equal bilaterally, No wheezes, No rales  Cardiovascular: S1S2, Regular rhythm  Abdomen: Soft, Nontender, Nondistended, No guarding/rebound, Positive bowel sounds  Extremities: No clubbing, No cyanosis, No edema, No calf tenderness  Neuro: Strength equal bilaterally, No tremors  Psychiatric: Appropriate mood, Normal affect    LABORATORY STUDIES:  ----------------------------------------  11-01    136  |  94<L>  |  16.0  ----------------------------<  163<H>  3.7   |  27.0  |  1.07    Ca    8.5<L>      01 Nov 2019 05:55    MEDICATIONS  (STANDING):  albuterol/ipratropium for Nebulization 3 milliLiter(s) Nebulizer three times a day  carvedilol 12.5 milliGRAM(s) Oral every 12 hours  digoxin     Tablet 0.125 milliGRAM(s) Oral daily  fluticasone furoate/umeclidinium/vilanterol 100-62.5-25 MICROgram(s) Inhaler 1 Puff(s) Inhalation daily  fluticasone propionate 50 MICROgram(s)/spray Nasal Spray 1 Spray(s) Both Nostrils two times a day  furosemide   Injectable 80 milliGRAM(s) IV Push two times a day  gabapentin 300 milliGRAM(s) Oral three times a day  influenza   Vaccine 0.5 milliLiter(s) IntraMuscular once  lactobacillus acidophilus 1 Tablet(s) Oral two times a day with meals  milrinone Infusion 0.375 MICROgram(s)/kG/Min (18.169 mL/Hr) IV Continuous <Continuous>  predniSONE   Tablet 10 milliGRAM(s) Oral daily  rivaroxaban 20 milliGRAM(s) Oral daily    MEDICATIONS  (PRN):  acetaminophen   Tablet .. 650 milliGRAM(s) Oral every 6 hours PRN Mild Pain (1 - 3)  diazepam    Tablet 2 milliGRAM(s) Oral two times a day PRN Anxiety      ASSESSMENT / PLAN:  ----------------------------------------  58 y/o male with CHF, COPD, morbid obesity with ROCK. Referred to the hospital for increasing dyspnea on exertion and weight gain.    Acute on chronic systolic heart failure - On milrinone infusion and intravenous furosemide. Discussed with Cardiology, dose of carvedilol increased.    Atrial fibrillation - On digoxin and carvedilol. On rivaroxaban for anticoagulation.    COPD - Inhaled bronchodilator therapy. On chronic prednisone.    Obstructive sleep apnea - Nocturnal CPAP.

## 2019-11-01 NOTE — PROGRESS NOTE ADULT - ATTENDING COMMENTS
Estimated date of discharge 11/3/19
Estimated date of discharge undetermined.
Estimated date of discharge undetermined.

## 2019-11-02 LAB
ANION GAP SERPL CALC-SCNC: 14 MMOL/L — SIGNIFICANT CHANGE UP (ref 5–17)
BUN SERPL-MCNC: 16 MG/DL — SIGNIFICANT CHANGE UP (ref 8–20)
CALCIUM SERPL-MCNC: 8.6 MG/DL — SIGNIFICANT CHANGE UP (ref 8.6–10.2)
CHLORIDE SERPL-SCNC: 97 MMOL/L — LOW (ref 98–107)
CO2 SERPL-SCNC: 28 MMOL/L — SIGNIFICANT CHANGE UP (ref 22–29)
CREAT SERPL-MCNC: 0.95 MG/DL — SIGNIFICANT CHANGE UP (ref 0.5–1.3)
GLUCOSE SERPL-MCNC: 155 MG/DL — HIGH (ref 70–115)
MAGNESIUM SERPL-MCNC: 2.4 MG/DL — SIGNIFICANT CHANGE UP (ref 1.6–2.6)
POTASSIUM SERPL-MCNC: 3.9 MMOL/L — SIGNIFICANT CHANGE UP (ref 3.5–5.3)
POTASSIUM SERPL-SCNC: 3.9 MMOL/L — SIGNIFICANT CHANGE UP (ref 3.5–5.3)
SODIUM SERPL-SCNC: 139 MMOL/L — SIGNIFICANT CHANGE UP (ref 135–145)

## 2019-11-02 PROCEDURE — 99233 SBSQ HOSP IP/OBS HIGH 50: CPT

## 2019-11-02 RX ADMIN — CARVEDILOL PHOSPHATE 12.5 MILLIGRAM(S): 80 CAPSULE, EXTENDED RELEASE ORAL at 18:40

## 2019-11-02 RX ADMIN — Medication 3 MILLILITER(S): at 14:53

## 2019-11-02 RX ADMIN — FLUTICASONE FUROATE, UMECLIDINIUM BROMIDE AND VILANTEROL TRIFENATATE 1 PUFF(S): 200; 62.5; 25 POWDER RESPIRATORY (INHALATION) at 10:20

## 2019-11-02 RX ADMIN — CARVEDILOL PHOSPHATE 12.5 MILLIGRAM(S): 80 CAPSULE, EXTENDED RELEASE ORAL at 08:32

## 2019-11-02 RX ADMIN — GABAPENTIN 300 MILLIGRAM(S): 400 CAPSULE ORAL at 21:16

## 2019-11-02 RX ADMIN — Medication 3 MILLILITER(S): at 10:18

## 2019-11-02 RX ADMIN — Medication 3 MILLILITER(S): at 20:38

## 2019-11-02 RX ADMIN — Medication 650 MILLIGRAM(S): at 12:30

## 2019-11-02 RX ADMIN — Medication 2 MILLIGRAM(S): at 21:16

## 2019-11-02 RX ADMIN — Medication 10 MILLIGRAM(S): at 05:41

## 2019-11-02 RX ADMIN — Medication 0.12 MILLIGRAM(S): at 05:41

## 2019-11-02 RX ADMIN — Medication 2 MILLIGRAM(S): at 15:27

## 2019-11-02 RX ADMIN — Medication 30 MILLIGRAM(S): at 17:23

## 2019-11-02 RX ADMIN — RIVAROXABAN 20 MILLIGRAM(S): KIT at 18:40

## 2019-11-02 RX ADMIN — Medication 1 SPRAY(S): at 16:30

## 2019-11-02 RX ADMIN — Medication 80 MILLIGRAM(S): at 08:33

## 2019-11-02 RX ADMIN — GABAPENTIN 300 MILLIGRAM(S): 400 CAPSULE ORAL at 05:41

## 2019-11-02 RX ADMIN — Medication 1 TABLET(S): at 15:28

## 2019-11-02 RX ADMIN — Medication 1 SPRAY(S): at 05:40

## 2019-11-02 RX ADMIN — Medication 1 TABLET(S): at 08:32

## 2019-11-02 RX ADMIN — GABAPENTIN 300 MILLIGRAM(S): 400 CAPSULE ORAL at 15:27

## 2019-11-02 RX ADMIN — Medication 650 MILLIGRAM(S): at 11:54

## 2019-11-02 NOTE — PROGRESS NOTE ADULT - ASSESSMENT
60 y/o male with CHF, COPD, morbid obesity with ROCK. Referred to the hospital for increasing dyspnea on exertion and weight gain.    Acute on chronic systolic heart failure - Discussed with Cardiology Dr Harper - ernestina right sided heart failure .  To start Torsemide 30mg bid .  Plan to discharge home 11/3/19    Atrial fibrillation -  digoxin and carvedilol. On rivaroxaban for anticoagulation.    COPD - Inhaled bronchodilator therapy. On chronic prednisone.    Obstructive sleep apnea - Nocturnal CPAP.    Disposition: To dc home tomorrow in am .

## 2019-11-02 NOTE — PROGRESS NOTE ADULT - SUBJECTIVE AND OBJECTIVE BOX
[place] [facility] - [facility], THE HEART CENTER                                   [address] [place] [address]                                                      PHONE: [phone]                                                         FAX: [phone]  [email or web address]  ---------------------------------------------------------------------------------------------------------------------------------    Overnight events/patient complaints:  NAD     penicillins (Hives)  shellfish (Pruritus; Rash)    MEDICATIONS  (STANDING):  albuterol/ipratropium for Nebulization 3 milliLiter(s) Nebulizer three times a day  carvedilol 12.5 milliGRAM(s) Oral every 12 hours  digoxin     Tablet 0.125 milliGRAM(s) Oral daily  fluticasone furoate/umeclidinium/vilanterol 100-62.5-25 MICROgram(s) Inhaler 1 Puff(s) Inhalation daily  fluticasone propionate 50 MICROgram(s)/spray Nasal Spray 1 Spray(s) Both Nostrils two times a day  gabapentin 300 milliGRAM(s) Oral three times a day  influenza   Vaccine 0.5 milliLiter(s) IntraMuscular once  lactobacillus acidophilus 1 Tablet(s) Oral two times a day with meals  predniSONE   Tablet 10 milliGRAM(s) Oral daily  rivaroxaban 20 milliGRAM(s) Oral daily  torsemide 30 milliGRAM(s) Oral two times a day    MEDICATIONS  (PRN):  acetaminophen   Tablet .. 650 milliGRAM(s) Oral every 6 hours PRN Mild Pain (1 - 3)  diazepam    Tablet 2 milliGRAM(s) Oral two times a day PRN Anxiety      Vital Signs Last 24 Hrs  T(C): 36.9 (02 Nov 2019 05:23), Max: 37.1 (01 Nov 2019 21:27)  T(F): 98.5 (02 Nov 2019 05:23), Max: 98.8 (01 Nov 2019 21:27)  HR: 105 (02 Nov 2019 08:37) (92 - 122)  BP: 102/60 (02 Nov 2019 08:37) (90/53 - 123/73)  BP(mean): --  RR: 18 (02 Nov 2019 05:23) (16 - 18)  SpO2: 95% (02 Nov 2019 05:23) (95% - 100%)  ICU Vital Signs Last 24 Hrs  MAGGIE ELLIOTT  I&O's Detail    01 Nov 2019 07:01  -  02 Nov 2019 07:00  --------------------------------------------------------  IN:    milrinone  Infusion: 325.8 mL    Oral Fluid: 720 mL  Total IN: 1045.8 mL    OUT:    Voided: 3150 mL  Total OUT: 3150 mL    Total NET: -2104.2 mL        I&O's Summary    01 Nov 2019 07:01  -  02 Nov 2019 07:00  --------------------------------------------------------  IN: 1045.8 mL / OUT: 3150 mL / NET: -2104.2 mL      Drug Dosing Weight  MAGGIE ELLIOTT      PHYSICAL EXAM:  General: Appears well developed, well nourished alert and cooperative.  HEENT: Head; normocephalic, atraumatic.  Eyes: Pupils reactive, cornea wnl.  Neck: Supple, no nodes adenopathy, no NVD or carotid bruit or thyromegaly.  CARDIOVASCULAR: Normal S1 and S2, 3/6 murmur, rub, gallop or lift.   LUNGS: No rales, rhonchi or wheeze. Normal breath sounds bilaterally.  ABDOMEN: Soft, nontender without mass or organomegaly. bowel sounds normoactive.  EXTREMITIES: No clubbing, cyanosis + edema. Distal pulses wnl.   SKIN: warm and dry with normal turgor.  NEURO: Alert/oriented x 3/normal motor exam. No pathologic reflexes.    PSYCH: normal affect.        LABS:    11-02    139  |  97<L>  |  16.0  ----------------------------<  155<H>  3.9   |  28.0  |  0.95    Ca    8.6      02 Nov 2019 06:07  Mg     2.4     11-02      MAGGIE ELLIOTT            RADIOLOGY & ADDITIONAL STUDIES:    INTERPRETATION OF TELEMETRY (personally reviewed):      ECHO:  Summary:   1. Technically very difficult study.   2. LV was not well seen despite use of contrast. LV function appears to   be grossly moderate-severely reduced.   3. The mitral in-flow pattern reveals no discernable A-wave, therefore   no comment on diastolic function can be made.   4. Mitral valve not well seen. No evidence of significant regurgitation.   5. Aortic valve was not well seen. No evidence of aortic stenosis on   doppler analysis.   6. There is no evidence of pericardial effusion.   7. Endocardial visualization was enhanced with intravenous echo contrast.      ASSESSMENT AND PLAN:  In summary, MAGGIE ELLIOTT is an 59y Male with past medical history significant for HTN NICM s/p BiV HFrEF cardio mems who presents with acute on chronic HFrEF improving     Plan  1.  DC milrinone  2.  DC IV Lasix   3.  Restart home Torsemide but increase to 30 mg po BID   4.  Continue optimal cardiovascular therapy     DC planning on Sunday if stable

## 2019-11-02 NOTE — PROGRESS NOTE ADULT - SUBJECTIVE AND OBJECTIVE BOX
CC: CHF Exac. afib, Copd.  Dependent edema.    HPI:  60 y/o obese male with h/o CHF s/p AICD, A Fib on Xarelto, past smoker with COPD on chronic prednisone, ROCK on nocturnal C pap, was referred to the ER by cardiologist for IV diuretics. patient noticed 10 lb weight gain 4 days ago so went to the cardiologist and had iv Lasix and lost that much weight then gained 6 lb again. so was referred to the Er. patient c/o progressive SOB on exertion over the last 2 weeks. no lower ext edema. patient is not aware of orthopnea as he uses C pap every night at time of sleep. patient was seen this week by the Pulmonologist, Dr. Ceron who advised that his lung function is markedly improved and that his SOB is not related to his lungs. no chest pain (29 Oct 2019 10:56)    REVIEW OF SYSTEMS:    Patient denied fever, chills, abdominal pain, nausea, vomiting, cough, shortness of breath, chest pain or palpitations    Vital Signs Last 24 Hrs  T(C): 36.9 (02 Nov 2019 11:53), Max: 37.1 (01 Nov 2019 21:27)  T(F): 98.4 (02 Nov 2019 11:53), Max: 98.8 (01 Nov 2019 21:27)  HR: 90 (02 Nov 2019 11:53) (90 - 122)  BP: 120/62 (02 Nov 2019 11:53) (90/53 - 123/73)  BP(mean): --  RR: 18 (02 Nov 2019 05:23) (16 - 18)  SpO2: 95% (02 Nov 2019 05:23) (95% - 100%)I&O's Summary    01 Nov 2019 07:01  -  02 Nov 2019 07:00  --------------------------------------------------------  IN: 1045.8 mL / OUT: 3150 mL / NET: -2104.2 mL      PHYSICAL EXAM:  GENERAL: Extreme obese   HEENT: PERRL, +EOMI, anicteric, no Jackson  NECK: Supple, No JVD   CHEST/LUNG: CTA bilaterally; Normal effort  HEART: S1S2 Normal intensity, no murmurs, gallops or rubs noted  ABDOMEN: Soft, BS Normoactive, NT, ND, no HSM noted  EXTREMITIES:  2+ radial and DP pulses noted, no clubbing, cyanosis, Pitting pedal noted, FROM x 4  SKIN: No rashes or lesions noted  NEURO: A&Ox3, no focal deficits noted, CN II-XII intact  PSYCH: normal mood and affect; insight/judgement appropriate  LABS:    11-02    139  |  97<L>  |  16.0  ----------------------------<  155<H>  3.9   |  28.0  |  0.95    Ca    8.6      02 Nov 2019 06:07  Mg     2.4     11-02          RADIOLOGY & ADDITIONAL TESTS:    MEDICATIONS:  MEDICATIONS  (STANDING):  albuterol/ipratropium for Nebulization 3 milliLiter(s) Nebulizer three times a day  carvedilol 12.5 milliGRAM(s) Oral every 12 hours  digoxin     Tablet 0.125 milliGRAM(s) Oral daily  fluticasone furoate/umeclidinium/vilanterol 100-62.5-25 MICROgram(s) Inhaler 1 Puff(s) Inhalation daily  fluticasone propionate 50 MICROgram(s)/spray Nasal Spray 1 Spray(s) Both Nostrils two times a day  gabapentin 300 milliGRAM(s) Oral three times a day  influenza   Vaccine 0.5 milliLiter(s) IntraMuscular once  lactobacillus acidophilus 1 Tablet(s) Oral two times a day with meals  predniSONE   Tablet 10 milliGRAM(s) Oral daily  rivaroxaban 20 milliGRAM(s) Oral daily  torsemide 30 milliGRAM(s) Oral two times a day    MEDICATIONS  (PRN):  acetaminophen   Tablet .. 650 milliGRAM(s) Oral every 6 hours PRN Mild Pain (1 - 3)  diazepam    Tablet 2 milliGRAM(s) Oral two times a day PRN Anxiety

## 2019-11-03 ENCOUNTER — TRANSCRIPTION ENCOUNTER (OUTPATIENT)
Age: 59
End: 2019-11-03

## 2019-11-03 VITALS — DIASTOLIC BLOOD PRESSURE: 70 MMHG | HEART RATE: 88 BPM | TEMPERATURE: 99 F | SYSTOLIC BLOOD PRESSURE: 105 MMHG

## 2019-11-03 LAB
ANION GAP SERPL CALC-SCNC: 17 MMOL/L — SIGNIFICANT CHANGE UP (ref 5–17)
BUN SERPL-MCNC: 18 MG/DL — SIGNIFICANT CHANGE UP (ref 8–20)
CALCIUM SERPL-MCNC: 8.6 MG/DL — SIGNIFICANT CHANGE UP (ref 8.6–10.2)
CHLORIDE SERPL-SCNC: 93 MMOL/L — LOW (ref 98–107)
CO2 SERPL-SCNC: 26 MMOL/L — SIGNIFICANT CHANGE UP (ref 22–29)
CREAT SERPL-MCNC: 1.07 MG/DL — SIGNIFICANT CHANGE UP (ref 0.5–1.3)
GLUCOSE SERPL-MCNC: 179 MG/DL — HIGH (ref 70–115)
MAGNESIUM SERPL-MCNC: 1.9 MG/DL — SIGNIFICANT CHANGE UP (ref 1.8–2.6)
POTASSIUM SERPL-MCNC: 3.9 MMOL/L — SIGNIFICANT CHANGE UP (ref 3.5–5.3)
POTASSIUM SERPL-SCNC: 3.9 MMOL/L — SIGNIFICANT CHANGE UP (ref 3.5–5.3)
SODIUM SERPL-SCNC: 136 MMOL/L — SIGNIFICANT CHANGE UP (ref 135–145)

## 2019-11-03 PROCEDURE — 82550 ASSAY OF CK (CPK): CPT

## 2019-11-03 PROCEDURE — 99285 EMERGENCY DEPT VISIT HI MDM: CPT | Mod: 25

## 2019-11-03 PROCEDURE — 80048 BASIC METABOLIC PNL TOTAL CA: CPT

## 2019-11-03 PROCEDURE — 80061 LIPID PANEL: CPT

## 2019-11-03 PROCEDURE — 83735 ASSAY OF MAGNESIUM: CPT

## 2019-11-03 PROCEDURE — 84436 ASSAY OF TOTAL THYROXINE: CPT

## 2019-11-03 PROCEDURE — 94640 AIRWAY INHALATION TREATMENT: CPT

## 2019-11-03 PROCEDURE — 94660 CPAP INITIATION&MGMT: CPT

## 2019-11-03 PROCEDURE — 86803 HEPATITIS C AB TEST: CPT

## 2019-11-03 PROCEDURE — 84480 ASSAY TRIIODOTHYRONINE (T3): CPT

## 2019-11-03 PROCEDURE — 96374 THER/PROPH/DIAG INJ IV PUSH: CPT

## 2019-11-03 PROCEDURE — 83880 ASSAY OF NATRIURETIC PEPTIDE: CPT

## 2019-11-03 PROCEDURE — 93005 ELECTROCARDIOGRAM TRACING: CPT

## 2019-11-03 PROCEDURE — 85027 COMPLETE CBC AUTOMATED: CPT

## 2019-11-03 PROCEDURE — 71046 X-RAY EXAM CHEST 2 VIEWS: CPT

## 2019-11-03 PROCEDURE — 90686 IIV4 VACC NO PRSV 0.5 ML IM: CPT

## 2019-11-03 PROCEDURE — 81001 URINALYSIS AUTO W/SCOPE: CPT

## 2019-11-03 PROCEDURE — 80053 COMPREHEN METABOLIC PANEL: CPT

## 2019-11-03 PROCEDURE — 80162 ASSAY OF DIGOXIN TOTAL: CPT

## 2019-11-03 PROCEDURE — 84443 ASSAY THYROID STIM HORMONE: CPT

## 2019-11-03 PROCEDURE — 84484 ASSAY OF TROPONIN QUANT: CPT

## 2019-11-03 PROCEDURE — 99239 HOSP IP/OBS DSCHRG MGMT >30: CPT

## 2019-11-03 PROCEDURE — 36415 COLL VENOUS BLD VENIPUNCTURE: CPT

## 2019-11-03 RX ORDER — FLUTICASONE FUROATE, UMECLIDINIUM BROMIDE AND VILANTEROL TRIFENATATE 200; 62.5; 25 UG/1; UG/1; UG/1
1 POWDER RESPIRATORY (INHALATION)
Qty: 0 | Refills: 0 | DISCHARGE
Start: 2019-11-03

## 2019-11-03 RX ADMIN — INFLUENZA VIRUS VACCINE 0.5 MILLILITER(S): 15; 15; 15; 15 SUSPENSION INTRAMUSCULAR at 12:19

## 2019-11-03 RX ADMIN — Medication 10 MILLIGRAM(S): at 06:14

## 2019-11-03 RX ADMIN — Medication 0.12 MILLIGRAM(S): at 06:14

## 2019-11-03 RX ADMIN — Medication 30 MILLIGRAM(S): at 06:14

## 2019-11-03 RX ADMIN — CARVEDILOL PHOSPHATE 12.5 MILLIGRAM(S): 80 CAPSULE, EXTENDED RELEASE ORAL at 06:14

## 2019-11-03 RX ADMIN — Medication 3 MILLILITER(S): at 08:26

## 2019-11-03 RX ADMIN — FLUTICASONE FUROATE, UMECLIDINIUM BROMIDE AND VILANTEROL TRIFENATATE 1 PUFF(S): 200; 62.5; 25 POWDER RESPIRATORY (INHALATION) at 08:28

## 2019-11-03 RX ADMIN — Medication 1 SPRAY(S): at 06:14

## 2019-11-03 RX ADMIN — Medication 1 TABLET(S): at 09:35

## 2019-11-03 RX ADMIN — GABAPENTIN 300 MILLIGRAM(S): 400 CAPSULE ORAL at 06:14

## 2019-11-03 NOTE — DISCHARGE NOTE PROVIDER - CARE PROVIDER_API CALL
Laurent Harper)  Cardiology; Internal Medicine  [address] [place] [address]  Phone: [phone]  Fax: [phone]  Follow Up Time:

## 2019-11-03 NOTE — PROGRESS NOTE ADULT - SUBJECTIVE AND OBJECTIVE BOX
[place] [facility] - [facility], THE HEART CENTER                                   [address] [place] [address]                                                      PHONE: [phone]                                                         FAX: [phone]  [email or web address]  ---------------------------------------------------------------------------------------------------------------------------------    Overnight events/patient complaints:  NAD     penicillins (Hives)  shellfish (Pruritus; Rash)    MEDICATIONS  (STANDING):  albuterol/ipratropium for Nebulization 3 milliLiter(s) Nebulizer three times a day  carvedilol 12.5 milliGRAM(s) Oral every 12 hours  digoxin     Tablet 0.125 milliGRAM(s) Oral daily  fluticasone furoate/umeclidinium/vilanterol 100-62.5-25 MICROgram(s) Inhaler 1 Puff(s) Inhalation daily  fluticasone propionate 50 MICROgram(s)/spray Nasal Spray 1 Spray(s) Both Nostrils two times a day  gabapentin 300 milliGRAM(s) Oral three times a day  influenza   Vaccine 0.5 milliLiter(s) IntraMuscular once  lactobacillus acidophilus 1 Tablet(s) Oral two times a day with meals  predniSONE   Tablet 10 milliGRAM(s) Oral daily  rivaroxaban 20 milliGRAM(s) Oral daily  torsemide 30 milliGRAM(s) Oral two times a day    MEDICATIONS  (PRN):  acetaminophen   Tablet .. 650 milliGRAM(s) Oral every 6 hours PRN Mild Pain (1 - 3)  diazepam    Tablet 2 milliGRAM(s) Oral two times a day PRN Anxiety      Vital Signs Last 24 Hrs  T(C): 37.1 (03 Nov 2019 06:11), Max: 37.2 (02 Nov 2019 21:15)  T(F): 98.7 (03 Nov 2019 06:11), Max: 99 (02 Nov 2019 21:15)  HR: 90 (03 Nov 2019 06:11) (75 - 112)  BP: 95/60 (03 Nov 2019 06:36) (95/60 - 120/62)  BP(mean): --  RR: 18 (03 Nov 2019 06:11) (18 - 18)  SpO2: 99% (03 Nov 2019 06:11) (95% - 99%)  ICU Vital Signs Last 24 Hrs  MAGGIE MENDOZAALEX  I&O's Detail    02 Nov 2019 08:01  -  03 Nov 2019 07:00  --------------------------------------------------------  IN:    Oral Fluid: 600 mL  Total IN: 600 mL    OUT:    Voided: 4800 mL  Total OUT: 4800 mL    Total NET: -4200 mL        I&O's Summary    02 Nov 2019 08:01  -  03 Nov 2019 07:00  --------------------------------------------------------  IN: 600 mL / OUT: 4800 mL / NET: -4200 mL      Drug Dosing Weight  MAGGIE MENDOZAALEX      PHYSICAL EXAM:  General: Appears well developed, well nourished alert and cooperative.  HEENT: Head; normocephalic, atraumatic.  Eyes: Pupils reactive, cornea wnl.  Neck: Supple, no nodes adenopathy, no NVD or carotid bruit or thyromegaly.  CARDIOVASCULAR: Normal S1 and S2, 2/6 murmur, rub, gallop or lift.   LUNGS: No rales, rhonchi or wheeze. Normal breath sounds bilaterally.  ABDOMEN: Soft, nontender without mass or organomegaly. bowel sounds normoactive.  EXTREMITIES: No clubbing, cyanosis or edema. Distal pulses wnl.   SKIN: warm and dry with normal turgor.  NEURO: Alert/oriented x 3/normal motor exam. No pathologic reflexes.    PSYCH: normal affect.        LABS:    11-02    139  |  97<L>  |  16.0  ----------------------------<  155<H>  3.9   |  28.0  |  0.95    Ca    8.6      02 Nov 2019 06:07  Mg     2.4     11-02      MAGGIE ELLIOTT            RADIOLOGY & ADDITIONAL STUDIES:    INTERPRETATION OF TELEMETRY (personally reviewed):      ECHO:  Summary:   1. Technically very difficult study.   2. LV was not well seen despite use of contrast. LV function appears to   be grossly moderate-severely reduced.   3. The mitral in-flow pattern reveals no discernable A-wave, therefore   no comment on diastolic function can be made.   4. Mitral valve not well seen. No evidence of significant regurgitation.   5. Aortic valve was not well seen. No evidence of aortic stenosis on   doppler analysis.   6. There is no evidence of pericardial effusion.   7. Endocardial visualization was enhanced with intravenous echo contrast.      ASSESSMENT AND PLAN:  In summary, MAGGIE ELLIOTT is an 59y Male with past medical history significant for HTN NICM s/p BiV HFrEF cardio mems who presents with acute on chronic HFrEF improving; BP stable overnight     Plan  1.  DC planning from CV standpoint   2.  Restart home Torsemide but increase to 30 mg po BID   3.  Continue optimal cardiovascular therapy

## 2019-11-03 NOTE — DISCHARGE NOTE PROVIDER - NSDCCPCAREPLAN_GEN_ALL_CORE_FT
PRINCIPAL DISCHARGE DIAGNOSIS  Diagnosis: CHF exacerbation  Assessment and Plan of Treatment:       SECONDARY DISCHARGE DIAGNOSES  Diagnosis: Paroxysmal atrial fibrillation  Assessment and Plan of Treatment:     Diagnosis: Chronic obstructive pulmonary disease (COPD)  Assessment and Plan of Treatment:     Diagnosis: ROCK on CPAP  Assessment and Plan of Treatment:

## 2019-11-03 NOTE — DISCHARGE NOTE PROVIDER - NSDCMRMEDTOKEN_GEN_ALL_CORE_FT
carvedilol 12.5 mg oral tablet: 1  orally 2 times a day with meals  digoxin 125 mcg (0.125 mg) oral tablet: 1 tab(s) orally once a day  fluticasone/umeclidinium/vilanterol 100 mcg-62.5 mcg-25 mcg/inh inhalation powder: 1 puff(s) inhaled once a day  gabapentin 300 mg oral capsule: 1 cap(s) orally 3 times a day  ipratropium-albuterol 0.5 mg-2.5 mg/3 mLinhalation solution: 3 milliliter(s) inhaled every 6 hours  predniSONE 10 mg oral tablet: 1 tab(s) orally once a day  spironolactone 25 mg oral tablet: 0.5 tab(s) orally once a day  torsemide 10 mg oral tablet: 3 tab(s) orally 2 times a day  Trelegy Ellipta inhalation powder: 1 puff(s) inhaled once a day  Xarelto 20 mg oral tablet: 1 tab(s) orally once a day (in the evening)  please hold today, tomorrow and restart on March 8, 2019

## 2019-11-03 NOTE — DISCHARGE NOTE NURSING/CASE MANAGEMENT/SOCIAL WORK - PATIENT PORTAL LINK FT
You can access the FollowMyHealth Patient Portal offered by [facility] by registering at the following website: [email or web address]. By joining [employer]’s FollowMyHealth portal, you will also be able to view your health information using other applications (apps) compatible with our system.

## 2019-11-03 NOTE — DISCHARGE NOTE PROVIDER - HOSPITAL COURSE
58 y/o male with CHF, COPD, morbid obesity with ROCK. Referred to the hospital for increasing dyspnea on exertion and weight gain.        Acute on chronic systolic heart failure - Discussed with Cardiology Dr Harper - ernestina right sided heart failure .  Started Torsemide 30mg bid .    Plan to discharge home 11/3/19        Atrial fibrillation -  digoxin and carvedilol. On rivaroxaban for anticoagulation.        COPD - Inhaled bronchodilator therapy. On chronic prednisone.        Obstructive sleep apnea - Nocturnal CPAP.        Disposition: To [place] home today .

## 2019-11-03 NOTE — DISCHARGE NOTE PROVIDER - NSDCFUSCHEDAPPT_GEN_ALL_CORE_FT
MAGGIE ELLIOTT ; 11/18/2019 ; [facility] [address]   MAGGIE ELLIOTT ; 12/03/2019 ; [facility] [address] [place] [address] MAGGIE ELLIOTT ; 11/18/2019 ; [facility] [address]   MAGGIE ELLIOTT ; 12/03/2019 ; [facility] [address] [place] [address]

## 2019-11-03 NOTE — DISCHARGE NOTE NURSING/CASE MANAGEMENT/SOCIAL WORK - NSDCVIVACCINE_GEN_ALL_CORE_FT
Influenza , 2019/1/14 09:18 , Trip Ramos (RN) Influenza , 2019/1/14 09:18 , Trip Ramos (RN)  Influenza , 2019/11/3 12:19 , Gwendolyn Morton (RN)

## 2019-11-14 ENCOUNTER — APPOINTMENT (OUTPATIENT)
Dept: PULMONOLOGY | Facility: CLINIC | Age: 59
End: 2019-11-14
Payer: COMMERCIAL

## 2019-11-14 VITALS
DIASTOLIC BLOOD PRESSURE: 52 MMHG | WEIGHT: 315 LBS | OXYGEN SATURATION: 98 % | RESPIRATION RATE: 16 BRPM | HEART RATE: 106 BPM | SYSTOLIC BLOOD PRESSURE: 94 MMHG | BODY MASS INDEX: 61.57 KG/M2

## 2019-11-14 DIAGNOSIS — Z86.79 PERSONAL HISTORY OF OTHER DISEASES OF THE CIRCULATORY SYSTEM: ICD-10-CM

## 2019-11-14 PROCEDURE — 94010 BREATHING CAPACITY TEST: CPT

## 2019-11-14 PROCEDURE — 99214 OFFICE O/P EST MOD 30 MIN: CPT | Mod: 25

## 2019-11-14 RX ORDER — CARVEDILOL 6.25 MG/1
6.25 TABLET, FILM COATED ORAL
Refills: 0 | Status: DISCONTINUED | COMMUNITY
Start: 2017-02-21 | End: 2019-11-14

## 2019-11-14 NOTE — ASSESSMENT
[FreeTextEntry1] : Patient appears optimal from a respiratory and sleep medicine point of view. He will continue on increased diuretics per cardiology. Followup next regular visit.

## 2019-11-14 NOTE — HISTORY OF PRESENT ILLNESS
[FreeTextEntry1] : Patient was hospitalized with decompensated heart failure and was treated with milrinone and IV diuretics. He diuresed about 6 pounds. He is feeling better. Continues to be fully compliant with CPAP averaging 5 hours 20 minutes per night. Denies any significant shortness of breath. Down to prednisone 10 mg daily.

## 2019-11-14 NOTE — PHYSICAL EXAM
[FreeTextEntry1] : morbidly obese [Normal Conjunctiva] : the conjunctiva exhibited no abnormalities [Eyelids - No Xanthelasma] : the eyelids demonstrated no xanthelasmas [Low Lying Soft Palate] : low lying soft palate [Elongated Uvula] : elongated uvula [Enlarged Base of the Tongue] : enlargement of the base of the tongue [IV] : IV [Neck Appearance] : the appearance of the neck was normal [Neck Cervical Mass (___cm)] : no neck mass was observed [Jugular Venous Distention Increased] : there was no jugular-venous distention [Thyroid Diffuse Enlargement] : the thyroid was not enlarged [Thyroid Nodule] : there were no palpable thyroid nodules [Neck Circumference: ___] : neck circumference is [unfilled] [Heart Rate And Rhythm] : heart rate and rhythm were normal [Heart Sounds] : normal S1 and S2 [Murmurs] : no murmurs present [Respiration, Rhythm And Depth] : normal respiratory rhythm and effort [Exaggerated Use Of Accessory Muscles For Inspiration] : no accessory muscle use [Auscultation Breath Sounds / Voice Sounds] : lungs were clear to auscultation bilaterally [Chest Palpation] : palpation of the chest revealed no abnormalities [Lungs Percussion] : the lungs were normal to percussion [Abdomen Soft] : soft [Abdomen Tenderness] : non-tender [Abdomen Mass (___ Cm)] : no abdominal mass palpated [Abnormal Walk] : normal gait [Gait - Sufficient For Exercise Testing] : the gait was sufficient for exercise testing [1+ Pitting] : 1+  pitting [Deep Tendon Reflexes (DTR)] : deep tendon reflexes were 2+ and symmetric [No Focal Deficits] : no focal deficits [Sensation] : the sensory exam was normal to light touch and pinprick [Oriented To Time, Place, And Person] : oriented to person, place, and time [Impaired Insight] : insight and judgment were intact [Affect] : the affect was normal [Skin Color & Pigmentation] : normal skin color and pigmentation [Skin Turgor] : normal skin turgor [] : no rash

## 2019-11-18 ENCOUNTER — APPOINTMENT (OUTPATIENT)
Dept: ORTHOPEDIC SURGERY | Facility: CLINIC | Age: 59
End: 2019-11-18
Payer: COMMERCIAL

## 2019-11-18 VITALS
WEIGHT: 315 LBS | HEART RATE: 87 BPM | BODY MASS INDEX: 49.44 KG/M2 | SYSTOLIC BLOOD PRESSURE: 107 MMHG | HEIGHT: 67 IN | TEMPERATURE: 98.8 F | DIASTOLIC BLOOD PRESSURE: 63 MMHG

## 2019-11-18 PROCEDURE — 99214 OFFICE O/P EST MOD 30 MIN: CPT | Mod: 25

## 2019-11-18 PROCEDURE — 20610 DRAIN/INJ JOINT/BURSA W/O US: CPT | Mod: 50

## 2019-11-18 NOTE — PHYSICAL EXAM
[Antalgic] : antalgic [LE] : Sensory: Intact in bilateral lower extremities [ALL] : dorsalis pedis, posterior tibial, femoral, popliteal, and radial 2+ and symmetric bilaterally [Obese] : obese [Normal] : Oriented to person, place, and time, insight and judgement were intact and the affect was normal [Poor Appearance] : well-appearing [Acute Distress] : not in acute distress [de-identified] : GENERAL APPEARANCE: Well nourished and hydrated, pleasant, alert, and oriented x 3. Appears their stated age. \par HEENT: Normocephalic, extraocular eye motion intact. Nasal septum midline. Oral cavity clear. External auditory canal clear. \par RESPIRATORY: Breath sounds clear and audible in all lobes. No wheezing, No accessory muscle use.\par CARDIOVASCULAR: No apparent abnormalities. No lower leg edema. No varicosities. Pedal pulses are palpable.\par NEUROLOGIC: Sensation is normal, no muscle weakness in the upper or lower extremities.\par DERMATOLOGIC: No apparent skin lesions, moist, warm, no rash.\par SPINE: Cervical spine appears normal and moves freely; thoracic spine appears normal and moves freely; lumbosacral spine appears normal and moves freely, normal, nontender.\par MUSCULOSKELETAL: Hands, wrists, and elbows are normal and move freely, shoulders are normal and move freely. [de-identified] : Bilateral knee exam shows valgus deformity, pain in the lateral joint line, his range of motion is preserved.

## 2019-11-18 NOTE — PROCEDURE
[de-identified] : pt received b/l knee cortisone injection \par \par  I discussed at length with the patient the planned steroid and lidocaine injection for primary osteoarthritis. The risks, benefits, convalescence and alternatives were reviewed and pt consented for injection. The possible side effects discussed included but were not limited to: pain, swelling, heat, bleeding, and redness. Symptoms are generally mild but if they are extensive then contact the office. Giving pain relievers by mouth such as NSAIDs or Tylenol can generally treat the reactions to steroid and lidocaine. Rare cases of infection have been noted. Rash, hives and itching may occur post injection. If you have muscle pain or cramps, flushing and or swelling of the face, rapid heart beat, nausea, dizziness, fever, chills, headache, difficulty breathing, swelling in the arms or legs, or have a prickly feeling of your skin, contact a health care provider immediately. Following this discussion, the knee was prepped with Alcohol and under sterile condition the 80 mg Depo-Medrol and 6 cc Lidocaine injection was performed with a 20 gauge needle through a superolateral injection site. The needle was introduced into the joint, aspiration was performed to ensure intra-articular placement and the medication was injected. Upon withdrawal of the needle the site was cleaned with alcohol and a band aid applied. The patient tolerated the injection well and there were no adverse effects. Post injection instructions included no strenuous activity for 24 hours, cryotherapy and if there are any adverse effects to contact the office.\par

## 2019-11-18 NOTE — HISTORY OF PRESENT ILLNESS
[Worsening] : worsening [Pain Location] : pain [___ yrs] : [unfilled] year(s) ago [4] : an average pain level of 4/10 [0] : a minimum pain level of 0/10 [8] : a maximum pain level of 8/10 [Standing] : standing [Intermit.] : ~He/She~ states the symptoms seem to be intermittent [Bending] : worsened by bending [Walking] : worsened by walking [Recumbency] : relieved by recumbency [Rest] : relieved by rest [de-identified] : 59 year old male presents for follow up of bilateral knee pain, left worse than right, secondary to end-stage OA. He reports 3 M relief with cortisone injection. He has a history of CHF with frequent exacerbation resulting in hospitalizations at [facility]. He has an oxygen tank at home but does not use it. He recently had PPM insertion. Patient is seeing cardiologist frequently. Pt is on Xarelto. Pt is working [de-identified] : cortisone injection

## 2019-11-18 NOTE — ADDENDUM
[FreeTextEntry1] : I, Endy Orozco, acted solely as a scribe for Dr. Bassem Rao on this date 11/18/2019.

## 2019-11-18 NOTE — DISCUSSION/SUMMARY
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 We must defer from offering surgical treatment at this time. He is very high risk for surgery. The patient has COPD,  but he finally quit smoking. He also has CHF, cardiovascular issues and is on Xarelto. He has responded well to cortisone injections in the past, and today he elected to receive a cortisone injection in his bilateral knees which he tolerated well. F/U 3 months for repeat cortisone injections if needed. \par \par A knee replacement means resurfacing of all 3 surfaces of the patella, the femur, and tibia with metal and plastic parts. The prosthetic parts are usually cemented into position and well outpatient range of motion from full extension to about 120° of flexion. The postoperative motion, however, is determined by multiple factors, the most important of which is preoperative motion. In general, the better motion preoperatively, the better the motion postoperatively. The operation, pending medical clearance, gently requires hospitalization of 1 to 2 days for one knee, 2 to 4 days for bilateral knee replacements. In general, we prefer to perform the procedure under spinal anesthesia with femoral nerve block and occasional single shot sciatic nerve block. We may ask a patient to give 2 units of blood for bilateral total knee arthroplasties, for one knee we institute a normogram which may include administration of preoperative Procrit. The operative procedure takes probably 1-2 hours. The operation requires a straight incision anywhere from 5-7 inches down from the knee. Post-operatively the patient will be walking the day of surgery. The first couple days are very painful and the pain medication will alleviate, but not eliminate the pain. The patient must really push hard to get range of motion. Our goal for having a person go home as that the range of motion is approximately 0-90° of flexion and that they can walk with a walker or cane. A walking aid is to be dispensed once the patient is secure enough. In general there, there is no cast or brace required with routine knee replacement. In the long term, we do not encourage our patients to run for the sake of running, although pending their preoperative status, we often allow patient to play doubles tennis or comparative activities. We also allowed them to do gentle intermediate downhill skiing if they are truly an expert skier. Biking is encouraged as well swimming. The followup periods are usually 3 weeks, 6 weeks, 3 months, and yearly intervals. Potential complications with total knee replacement included anesthetic complications and death, infection around 1%, nerve damage, by which means peroneal nerve palsy, footdrop or flapping foot with ambulation. This is particularly more apt to occur in the patient with a valgus or knock-knee deformity. The incidence can be quite high in this particular patient population. There will be areas of skin numbness, but this is not an untoward effect nor do we consider it a complication. Other potential complications include dislocation of the patella component, usually less than 2%; loosening of the tibial or femoral component is much more infrequent. Most often this occurs with infection or long-term use. Patient of extreme risk including markedly overweight patient's may be more prone to prosthetic wear. Major blood vessel damage is also extremely rare. Directly because of the anatomic proximity of the popliteal artery this could be lacerated with subsequent repair required. Be it unlikely, disruption of the popliteal artery could theoretically result in amputation. Similarly, infection could theoretically result in amputation if one were to grow out of an organism that cannot be controlled with antibiotics. General medical complications include phlebitis, for which we would prophylactically anticoagulate patients, but could still occur, and fatal pulmonary embolus which has been reported. Cardiovascular problems, such as heart attack or ischemia are always a concern with such hemodynamic changes in the blood vascular system. Other General complications are rare, but anything medicine could theoretically happen. I think the patient understands the risk benefit ratio of total knee replacement and will think about whether there like to pursue with an operation or nonoperative treatment program. I reviewed the plan of care as well as a model of a total knee implant equivalent to the one that will be used for their total knee joint replacement. The patient agreed to the plan of care as well as the use of implants for their knee total joint replacement.

## 2019-11-21 ENCOUNTER — OUTPATIENT (OUTPATIENT)
Dept: OUTPATIENT SERVICES | Facility: HOSPITAL | Age: 59
LOS: 1 days | End: 2019-11-21
Payer: COMMERCIAL

## 2019-11-21 DIAGNOSIS — I50.9 HEART FAILURE, UNSPECIFIED: ICD-10-CM

## 2019-11-21 DIAGNOSIS — Z98.890 OTHER SPECIFIED POSTPROCEDURAL STATES: Chronic | ICD-10-CM

## 2019-12-04 PROCEDURE — 93798 PHYS/QHP OP CAR RHAB W/ECG: CPT

## 2019-12-12 ENCOUNTER — APPOINTMENT (OUTPATIENT)
Dept: PULMONOLOGY | Facility: CLINIC | Age: 59
End: 2019-12-12
Payer: COMMERCIAL

## 2019-12-12 VITALS
OXYGEN SATURATION: 98 % | DIASTOLIC BLOOD PRESSURE: 66 MMHG | WEIGHT: 315 LBS | BODY MASS INDEX: 60.3 KG/M2 | HEART RATE: 89 BPM | SYSTOLIC BLOOD PRESSURE: 112 MMHG

## 2019-12-12 VITALS — BODY MASS INDEX: 64.07 KG/M2 | HEIGHT: 65 IN

## 2019-12-12 VITALS — RESPIRATION RATE: 16 BRPM

## 2019-12-12 PROCEDURE — 99214 OFFICE O/P EST MOD 30 MIN: CPT | Mod: 25

## 2019-12-12 PROCEDURE — 94060 EVALUATION OF WHEEZING: CPT

## 2019-12-12 PROCEDURE — 94664 DEMO&/EVAL PT USE INHALER: CPT | Mod: 59

## 2019-12-12 NOTE — CONSULT LETTER
[Referral Letter:] : I am referring [unfilled] to you for further evaluation.  My most recent evaluation follows. [Please see my note below.] : Please see my note below. [Dear  ___] : Dear  [unfilled], [Consult Closing:] : Thank you very much for allowing me to participate in the care of this patient.  If you have any questions, please do not hesitate to contact me. [Sincerely,] : Sincerely, [FreeTextEntry3] : Willow Tamayo MD FCCP\par D-ABSM\par ABIM board certified in  Pulmonary diseases, Sleep medicine\par Internal medicine\par  [DrJunior  ___] : Dr. BRICE

## 2019-12-12 NOTE — PHYSICAL EXAM
[Normal Conjunctiva] : the conjunctiva exhibited no abnormalities [Eyelids - No Xanthelasma] : the eyelids demonstrated no xanthelasmas [Low Lying Soft Palate] : low lying soft palate [Elongated Uvula] : elongated uvula [Enlarged Base of the Tongue] : enlargement of the base of the tongue [IV] : IV [Neck Appearance] : the appearance of the neck was normal [Neck Cervical Mass (___cm)] : no neck mass was observed [Jugular Venous Distention Increased] : there was no jugular-venous distention [Thyroid Diffuse Enlargement] : the thyroid was not enlarged [Thyroid Nodule] : there were no palpable thyroid nodules [Neck Circumference: ___] : neck circumference is [unfilled] [Heart Rate And Rhythm] : heart rate and rhythm were normal [Heart Sounds] : normal S1 and S2 [Murmurs] : no murmurs present [Auscultation Breath Sounds / Voice Sounds] : lungs were clear to auscultation bilaterally [Exaggerated Use Of Accessory Muscles For Inspiration] : no accessory muscle use [Respiration, Rhythm And Depth] : normal respiratory rhythm and effort [Chest Palpation] : palpation of the chest revealed no abnormalities [Lungs Percussion] : the lungs were normal to percussion [Abdomen Soft] : soft [Abdomen Tenderness] : non-tender [Abdomen Mass (___ Cm)] : no abdominal mass palpated [Abnormal Walk] : normal gait [Gait - Sufficient For Exercise Testing] : the gait was sufficient for exercise testing [Deep Tendon Reflexes (DTR)] : deep tendon reflexes were 2+ and symmetric [1+ Pitting] : 1+  pitting [No Focal Deficits] : no focal deficits [Sensation] : the sensory exam was normal to light touch and pinprick [Oriented To Time, Place, And Person] : oriented to person, place, and time [Impaired Insight] : insight and judgment were intact [Skin Turgor] : normal skin turgor [Skin Color & Pigmentation] : normal skin color and pigmentation [] : no rash [Affect] : the affect was normal [FreeTextEntry1] : morbidly obese

## 2019-12-12 NOTE — ASSESSMENT
[FreeTextEntry1] : Patient's current symptoms may be secondary to asthma exacerbation. I don't see evidence for heart failure currently. I increased his prednisone to 40 mg daily for the next 5 days, then return to his regular 10 mg daily. Followup next regular appointment.

## 2019-12-12 NOTE — HISTORY OF PRESENT ILLNESS
[FreeTextEntry1] : He was sent by cardiology for increasing shortness of breath. He denies cough or wheeze. He had been on increasing doses of torsemide which was decreased recently.he had a chest x-ray done yesterday.

## 2019-12-13 ENCOUNTER — RX RENEWAL (OUTPATIENT)
Age: 59
End: 2019-12-13

## 2020-01-03 NOTE — ED ADULT NURSE NOTE - INTEGUMENTARY WDL
LVM for pt to call the office back. Color consistent with ethnicity/race, warm, dry intact, resilient.

## 2020-01-08 ENCOUNTER — TRANSCRIPTION ENCOUNTER (OUTPATIENT)
Age: 60
End: 2020-01-08

## 2020-01-11 NOTE — DISCHARGE NOTE ADULT - FLU SEASON?
Yes... [Follow-Up: _____] : a [unfilled] follow-up visit [Patient] : patient [Mother] : mother [FreeTextEntry1] : on MTX

## 2020-02-06 ENCOUNTER — APPOINTMENT (OUTPATIENT)
Dept: PULMONOLOGY | Facility: CLINIC | Age: 60
End: 2020-02-06
Payer: COMMERCIAL

## 2020-02-06 VITALS — HEIGHT: 66 IN | BODY MASS INDEX: 50.62 KG/M2 | WEIGHT: 315 LBS

## 2020-02-06 VITALS
OXYGEN SATURATION: 98 % | HEART RATE: 67 BPM | RESPIRATION RATE: 20 BRPM | DIASTOLIC BLOOD PRESSURE: 80 MMHG | SYSTOLIC BLOOD PRESSURE: 130 MMHG

## 2020-02-06 DIAGNOSIS — R06.02 SHORTNESS OF BREATH: ICD-10-CM

## 2020-02-06 PROCEDURE — 94060 EVALUATION OF WHEEZING: CPT

## 2020-02-06 PROCEDURE — 99214 OFFICE O/P EST MOD 30 MIN: CPT | Mod: 25

## 2020-02-06 PROCEDURE — 94664 DEMO&/EVAL PT USE INHALER: CPT | Mod: 59

## 2020-02-06 RX ORDER — ALPRAZOLAM 0.5 MG/1
0.5 TABLET ORAL
Qty: 60 | Refills: 0 | Status: DISCONTINUED | COMMUNITY
Start: 2016-11-04 | End: 2020-02-06

## 2020-02-06 RX ORDER — ERGOCALCIFEROL 1.25 MG/1
1.25 MG CAPSULE, LIQUID FILLED ORAL
Refills: 0 | Status: DISCONTINUED | COMMUNITY
Start: 2016-07-19 | End: 2020-02-06

## 2020-02-06 RX ORDER — TORSEMIDE 20 MG/1
20 TABLET ORAL
Refills: 0 | Status: DISCONTINUED | COMMUNITY
End: 2020-02-06

## 2020-02-06 RX ORDER — PREDNISONE 10 MG/1
10 TABLET ORAL
Qty: 100 | Refills: 1 | Status: DISCONTINUED | COMMUNITY
Start: 2019-12-13 | End: 2020-02-06

## 2020-02-06 NOTE — CONSULT LETTER
[Dear  ___] : Dear  [unfilled], [Please see my note below.] : Please see my note below. [Courtesy Letter:] : I had the pleasure of seeing your patient, [unfilled], in my office today. [Sincerely,] : Sincerely, [Consult Closing:] : Thank you very much for allowing me to participate in the care of this patient.  If you have any questions, please do not hesitate to contact me. [DrJunior  ___] : Dr. BRICE [FreeTextEntry3] : Willow Tamayo MD FCCP\par D-ABSM\par ABIM board certified in  Pulmonary diseases, Sleep medicine\par Internal medicine\par

## 2020-02-06 NOTE — ASSESSMENT
[FreeTextEntry1] : Patient has asthma and severe obstructive sleep apnea both under reasonable control. He is cleared for bariatric surgery from a pulmonary and sleep medicine point of view. He should remain on Trelegy perioperatively. He should bring his CPAP machine to the hospital for postoperative use for his surgery and his preoperative testing.

## 2020-02-06 NOTE — HISTORY OF PRESENT ILLNESS
[TextBox_4] : The patient is well known to me with severe obstructive sleep apnea on auto Pap. He has some emphysematous change on his CT scan but functionally with moderate persistent asthma. He remains on prednisone 10 mg daily as well as Trelegy inhaler. The patient is now preop for bariatric surgery. He remains fully compliant with CPAP. He has diastolic heart failure being followed at [facility] and has a [facility].

## 2020-02-06 NOTE — PHYSICAL EXAM
[FreeTextEntry1] : morbidly obese [Normal Conjunctiva] : the conjunctiva exhibited no abnormalities [Eyelids - No Xanthelasma] : the eyelids demonstrated no xanthelasmas [Low Lying Soft Palate] : low lying soft palate [Elongated Uvula] : elongated uvula [Enlarged Base of the Tongue] : enlargement of the base of the tongue [IV] : IV [Neck Appearance] : the appearance of the neck was normal [Neck Cervical Mass (___cm)] : no neck mass was observed [Jugular Venous Distention Increased] : there was no jugular-venous distention [Thyroid Diffuse Enlargement] : the thyroid was not enlarged [Thyroid Nodule] : there were no palpable thyroid nodules [Neck Circumference: ___] : neck circumference is [unfilled] [Heart Rate And Rhythm] : heart rate and rhythm were normal [Heart Sounds] : normal S1 and S2 [Murmurs] : no murmurs present [Respiration, Rhythm And Depth] : normal respiratory rhythm and effort [Exaggerated Use Of Accessory Muscles For Inspiration] : no accessory muscle use [Auscultation Breath Sounds / Voice Sounds] : lungs were clear to auscultation bilaterally [Chest Palpation] : palpation of the chest revealed no abnormalities [Lungs Percussion] : the lungs were normal to percussion [Abdomen Soft] : soft [Abdomen Tenderness] : non-tender [Abdomen Mass (___ Cm)] : no abdominal mass palpated [Abnormal Walk] : normal gait [Gait - Sufficient For Exercise Testing] : the gait was sufficient for exercise testing [1+ Pitting] : 1+  pitting [Deep Tendon Reflexes (DTR)] : deep tendon reflexes were 2+ and symmetric [Sensation] : the sensory exam was normal to light touch and pinprick [No Focal Deficits] : no focal deficits [Oriented To Time, Place, And Person] : oriented to person, place, and time [Impaired Insight] : insight and judgment were intact [Affect] : the affect was normal [Skin Turgor] : normal skin turgor [Skin Color & Pigmentation] : normal skin color and pigmentation [] : no rash

## 2020-02-06 NOTE — PROCEDURE
[FreeTextEntry1] : Spirometry shows mild obstruction that reverses with bronchodilators. FEV1 is 93% predicted improving to 100% postbronchodilator.

## 2020-02-06 NOTE — REASON FOR VISIT
[Follow-Up] : a follow-up visit [Asthma] : asthma [Pre-op Risk Stratification] : pre-op risk stratification [Sleep Apnea] : sleep apnea

## 2020-02-24 ENCOUNTER — APPOINTMENT (OUTPATIENT)
Dept: ORTHOPEDIC SURGERY | Facility: CLINIC | Age: 60
End: 2020-02-24
Payer: COMMERCIAL

## 2020-02-24 PROCEDURE — 20610 DRAIN/INJ JOINT/BURSA W/O US: CPT

## 2020-02-24 PROCEDURE — 99214 OFFICE O/P EST MOD 30 MIN: CPT | Mod: 25

## 2020-02-24 NOTE — PHYSICAL EXAM
[Antalgic] : antalgic [LE] : Sensory: Intact in bilateral lower extremities [ALL] : dorsalis pedis, posterior tibial, femoral, popliteal, and radial 2+ and symmetric bilaterally [Obese] : obese [Normal] : Oriented to person, place, and time, insight and judgement were intact and the affect was normal [Poor Appearance] : well-appearing [Acute Distress] : not in acute distress [de-identified] : GENERAL APPEARANCE: Well nourished and hydrated, pleasant, alert, and oriented x 3. Appears their stated age. \par HEENT: Normocephalic, extraocular eye motion intact. Nasal septum midline. Oral cavity clear. External auditory canal clear. \par RESPIRATORY: Breath sounds clear and audible in all lobes. No wheezing, No accessory muscle use.\par CARDIOVASCULAR: No apparent abnormalities. No lower leg edema. No varicosities. Pedal pulses are palpable.\par NEUROLOGIC: Sensation is normal, no muscle weakness in the upper or lower extremities.\par DERMATOLOGIC: No apparent skin lesions, moist, warm, no rash.\par SPINE: Cervical spine appears normal and moves freely; thoracic spine appears normal and moves freely; lumbosacral spine appears normal and moves freely, normal, nontender.\par MUSCULOSKELETAL: Hands, wrists, and elbows are normal and move freely, shoulders are normal and move freely. [de-identified] : Bilateral knee exam shows valgus deformity, pain in the lateral joint line, his range of motion is preserved.

## 2020-02-24 NOTE — PROCEDURE
[de-identified] : pt received b/l knee cortisone injection \par \par  I discussed at length with the patient the planned steroid and lidocaine injection for primary osteoarthritis. The risks, benefits, convalescence and alternatives were reviewed and pt consented for injection. The possible side effects discussed included but were not limited to: pain, swelling, heat, bleeding, and redness. Symptoms are generally mild but if they are extensive then contact the office. Giving pain relievers by mouth such as NSAIDs or Tylenol can generally treat the reactions to steroid and lidocaine. Rare cases of infection have been noted. Rash, hives and itching may occur post injection. If you have muscle pain or cramps, flushing and or swelling of the face, rapid heart beat, nausea, dizziness, fever, chills, headache, difficulty breathing, swelling in the arms or legs, or have a prickly feeling of your skin, contact a health care provider immediately. Following this discussion, the knee was prepped with Alcohol and under sterile condition the 80 mg Depo-Medrol and 6 cc Lidocaine injection was performed with a 20 gauge needle through a superolateral injection site. The needle was introduced into the joint, aspiration was performed to ensure intra-articular placement and the medication was injected. Upon withdrawal of the needle the site was cleaned with alcohol and a band aid applied. The patient tolerated the injection well and there were no adverse effects. Post injection instructions included no strenuous activity for 24 hours, cryotherapy and if there are any adverse effects to contact the office.\par

## 2020-02-24 NOTE — ADDENDUM
[FreeTextEntry1] : I, Endy Orozco, acted solely as a scribe for Dr. Bassem Rao on this date 02/24/2020.

## 2020-02-24 NOTE — DISCUSSION/SUMMARY
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 We must defer from offering surgical treatment at this time. He is very high risk for surgery. The patient has COPD,  but he finally quit smoking. He also has CHF, cardiovascular issues and is on Xarelto. He has responded well to cortisone injections in the past. Today he elected to receive a cortisone injection in his bilateral knees which he tolerated well. He may F/U 3 months for repeat cortisone injection if needed. \par \par A knee replacement means resurfacing of all 3 surfaces of the patella, the femur, and tibia with metal and plastic parts. The prosthetic parts are usually cemented into position and well outpatient range of motion from full extension to about 120° of flexion. The postoperative motion, however, is determined by multiple factors, the most important of which is preoperative motion. In general, the better motion preoperatively, the better the motion postoperatively. The operation, pending medical clearance, gently requires hospitalization of 1 to 2 days for one knee, 2 to 4 days for bilateral knee replacements. In general, we prefer to perform the procedure under spinal anesthesia with femoral nerve block and occasional single shot sciatic nerve block. We may ask a patient to give 2 units of blood for bilateral total knee arthroplasties, for one knee we institute a normogram which may include administration of preoperative Procrit. The operative procedure takes probably 1-2 hours. The operation requires a straight incision anywhere from 5-7 inches down from the knee. Post-operatively the patient will be walking the day of surgery. The first couple days are very painful and the pain medication will alleviate, but not eliminate the pain. The patient must really push hard to get range of motion. Our goal for having a person go home as that the range of motion is approximately 0-90° of flexion and that they can walk with a walker or cane. A walking aid is to be dispensed once the patient is secure enough. In general there, there is no cast or brace required with routine knee replacement. In the long term, we do not encourage our patients to run for the sake of running, although pending their preoperative status, we often allow patient to play doubles tennis or comparative activities. We also allowed them to do gentle intermediate downhill skiing if they are truly an expert skier. Biking is encouraged as well swimming. The followup periods are usually 3 weeks, 6 weeks, 3 months, and yearly intervals. Potential complications with total knee replacement included anesthetic complications and death, infection around 1%, nerve damage, by which means peroneal nerve palsy, footdrop or flapping foot with ambulation. This is particularly more apt to occur in the patient with a valgus or knock-knee deformity. The incidence can be quite high in this particular patient population. There will be areas of skin numbness, but this is not an untoward effect nor do we consider it a complication. Other potential complications include dislocation of the patella component, usually less than 2%; loosening of the tibial or femoral component is much more infrequent. Most often this occurs with infection or long-term use. Patient of extreme risk including markedly overweight patient's may be more prone to prosthetic wear. Major blood vessel damage is also extremely rare. Directly because of the anatomic proximity of the popliteal artery this could be lacerated with subsequent repair required. Be it unlikely, disruption of the popliteal artery could theoretically result in amputation. Similarly, infection could theoretically result in amputation if one were to grow out of an organism that cannot be controlled with antibiotics. General medical complications include phlebitis, for which we would prophylactically anticoagulate patients, but could still occur, and fatal pulmonary embolus which has been reported. Cardiovascular problems, such as heart attack or ischemia are always a concern with such hemodynamic changes in the blood vascular system. Other General complications are rare, but anything medicine could theoretically happen. I think the patient understands the risk benefit ratio of total knee replacement and will think about whether there like to pursue with an operation or nonoperative treatment program. I reviewed the plan of care as well as a model of a total knee implant equivalent to the one that will be used for their total knee joint replacement. The patient agreed to the plan of care as well as the use of implants for their knee total joint replacement.

## 2020-03-18 ENCOUNTER — TRANSCRIPTION ENCOUNTER (OUTPATIENT)
Age: 60
End: 2020-03-18

## 2020-04-09 ENCOUNTER — APPOINTMENT (OUTPATIENT)
Dept: PULMONOLOGY | Facility: CLINIC | Age: 60
End: 2020-04-09
Payer: COMMERCIAL

## 2020-04-09 VITALS
DIASTOLIC BLOOD PRESSURE: 72 MMHG | BODY MASS INDEX: 50.62 KG/M2 | WEIGHT: 315 LBS | HEART RATE: 64 BPM | SYSTOLIC BLOOD PRESSURE: 124 MMHG | HEIGHT: 66 IN | OXYGEN SATURATION: 97 % | RESPIRATION RATE: 16 BRPM

## 2020-04-09 PROCEDURE — 99214 OFFICE O/P EST MOD 30 MIN: CPT

## 2020-04-09 NOTE — HISTORY OF PRESENT ILLNESS
[TextBox_4] : Some increased shortness of breath over the last 2 weeks but did improve with increased use of inhalers. Denies cough or sputum production. Compliant with auto Pap. [Obstructive Sleep Apnea] : obstructive sleep apnea [APAP:] : APAP [TextBox_125] : 8-20 [TextBox_127] : 3/20 [TextBox_129] : 4/20 [TextBox_133] : 91111 [TextBox_141] : 6 [TextBox_147] : 0.4 [TextBox_165] : continues to benefit from CPAP use.

## 2020-04-09 NOTE — ASSESSMENT
[FreeTextEntry1] : The patient is compliant with CPAP and benefiting from its use. Supplies were renewed.\par COPD at baseline\par Continue current rx.  On levaquin for sinusitis.  F/U 2 months.

## 2020-04-09 NOTE — PHYSICAL EXAM
[FreeTextEntry1] : morbidly obese [Normal Conjunctiva] : the conjunctiva exhibited no abnormalities [Eyelids - No Xanthelasma] : the eyelids demonstrated no xanthelasmas [Low Lying Soft Palate] : low lying soft palate [Elongated Uvula] : elongated uvula [Enlarged Base of the Tongue] : enlargement of the base of the tongue [IV] : IV [Neck Appearance] : the appearance of the neck was normal [Neck Cervical Mass (___cm)] : no neck mass was observed [Jugular Venous Distention Increased] : there was no jugular-venous distention [Thyroid Diffuse Enlargement] : the thyroid was not enlarged [Thyroid Nodule] : there were no palpable thyroid nodules [Neck Circumference: ___] : neck circumference is [unfilled] [Heart Rate And Rhythm] : heart rate and rhythm were normal [Heart Sounds] : normal S1 and S2 [Murmurs] : no murmurs present [Respiration, Rhythm And Depth] : normal respiratory rhythm and effort [Exaggerated Use Of Accessory Muscles For Inspiration] : no accessory muscle use [Auscultation Breath Sounds / Voice Sounds] : lungs were clear to auscultation bilaterally [Chest Palpation] : palpation of the chest revealed no abnormalities [Lungs Percussion] : the lungs were normal to percussion [Abdomen Soft] : soft [Abdomen Tenderness] : non-tender [Abdomen Mass (___ Cm)] : no abdominal mass palpated [Abnormal Walk] : normal gait [Gait - Sufficient For Exercise Testing] : the gait was sufficient for exercise testing [1+ Pitting] : 1+  pitting [Deep Tendon Reflexes (DTR)] : deep tendon reflexes were 2+ and symmetric [Sensation] : the sensory exam was normal to light touch and pinprick [No Focal Deficits] : no focal deficits [Oriented To Time, Place, And Person] : oriented to person, place, and time [Impaired Insight] : insight and judgment were intact [Affect] : the affect was normal [Skin Color & Pigmentation] : normal skin color and pigmentation [Skin Turgor] : normal skin turgor [] : no rash

## 2020-04-20 ENCOUNTER — APPOINTMENT (OUTPATIENT)
Dept: ORTHOPEDIC SURGERY | Facility: CLINIC | Age: 60
End: 2020-04-20
Payer: COMMERCIAL

## 2020-04-20 VITALS
WEIGHT: 315 LBS | BODY MASS INDEX: 50.62 KG/M2 | HEIGHT: 66 IN | SYSTOLIC BLOOD PRESSURE: 115 MMHG | HEART RATE: 74 BPM | DIASTOLIC BLOOD PRESSURE: 63 MMHG

## 2020-04-20 PROCEDURE — 73562 X-RAY EXAM OF KNEE 3: CPT | Mod: 50

## 2020-04-20 PROCEDURE — 20610 DRAIN/INJ JOINT/BURSA W/O US: CPT | Mod: 50

## 2020-04-20 PROCEDURE — 99214 OFFICE O/P EST MOD 30 MIN: CPT | Mod: 25

## 2020-04-21 ENCOUNTER — APPOINTMENT (OUTPATIENT)
Dept: PULMONOLOGY | Facility: CLINIC | Age: 60
End: 2020-04-21

## 2020-05-26 RX ORDER — FLUTICASONE PROPIONATE 50 UG/1
50 SPRAY, METERED NASAL DAILY
Qty: 3 | Refills: 2 | Status: ACTIVE | COMMUNITY
Start: 2019-05-29

## 2020-06-04 ENCOUNTER — RX RENEWAL (OUTPATIENT)
Age: 60
End: 2020-06-04

## 2020-07-02 ENCOUNTER — RX RENEWAL (OUTPATIENT)
Age: 60
End: 2020-07-02

## 2020-07-07 ENCOUNTER — APPOINTMENT (OUTPATIENT)
Dept: PULMONOLOGY | Facility: CLINIC | Age: 60
End: 2020-07-07
Payer: COMMERCIAL

## 2020-07-07 VITALS — RESPIRATION RATE: 16 BRPM | HEART RATE: 83 BPM | OXYGEN SATURATION: 97 %

## 2020-07-07 VITALS — BODY MASS INDEX: 56.98 KG/M2 | WEIGHT: 315 LBS

## 2020-07-07 PROCEDURE — 99215 OFFICE O/P EST HI 40 MIN: CPT

## 2020-07-07 RX ORDER — LIRAGLUTIDE 6 MG/ML
18 INJECTION, SOLUTION SUBCUTANEOUS
Refills: 0 | Status: COMPLETED | COMMUNITY
End: 2020-07-07

## 2020-07-07 RX ORDER — PREDNISONE 10 MG/1
10 TABLET ORAL
Refills: 0 | Status: COMPLETED | COMMUNITY
End: 2020-07-07

## 2020-07-07 RX ORDER — PEN NEEDLE, DIABETIC 32 GX 1/4"
32G X 6 MM NEEDLE, DISPOSABLE MISCELLANEOUS
Refills: 0 | Status: COMPLETED | COMMUNITY
End: 2020-07-07

## 2020-07-07 NOTE — PHYSICAL EXAM
[FreeTextEntry1] : morbidly obese [Normal Conjunctiva] : the conjunctiva exhibited no abnormalities [Eyelids - No Xanthelasma] : the eyelids demonstrated no xanthelasmas [Low Lying Soft Palate] : low lying soft palate [Elongated Uvula] : elongated uvula [Enlarged Base of the Tongue] : enlargement of the base of the tongue [IV] : IV [Neck Appearance] : the appearance of the neck was normal [Neck Cervical Mass (___cm)] : no neck mass was observed [Jugular Venous Distention Increased] : there was no jugular-venous distention [Thyroid Diffuse Enlargement] : the thyroid was not enlarged [Neck Circumference: ___] : neck circumference is [unfilled] [Thyroid Nodule] : there were no palpable thyroid nodules [Heart Rate And Rhythm] : heart rate and rhythm were normal [Heart Sounds] : normal S1 and S2 [Murmurs] : no murmurs present [Respiration, Rhythm And Depth] : normal respiratory rhythm and effort [Exaggerated Use Of Accessory Muscles For Inspiration] : no accessory muscle use [Auscultation Breath Sounds / Voice Sounds] : lungs were clear to auscultation bilaterally [Lungs Percussion] : the lungs were normal to percussion [Chest Palpation] : palpation of the chest revealed no abnormalities [Abdomen Soft] : soft [Abdomen Tenderness] : non-tender [Abdomen Mass (___ Cm)] : no abdominal mass palpated [Abnormal Walk] : normal gait [Gait - Sufficient For Exercise Testing] : the gait was sufficient for exercise testing [Sensation] : the sensory exam was normal to light touch and pinprick [1+ Pitting] : 1+  pitting [Deep Tendon Reflexes (DTR)] : deep tendon reflexes were 2+ and symmetric [No Focal Deficits] : no focal deficits [Oriented To Time, Place, And Person] : oriented to person, place, and time [Skin Color & Pigmentation] : normal skin color and pigmentation [Impaired Insight] : insight and judgment were intact [Affect] : the affect was normal [Skin Turgor] : normal skin turgor [] : no rash

## 2020-07-07 NOTE — ASSESSMENT
[FreeTextEntry1] : Patient is cleared for bariatric surgery from a pulmonary and sleep medicine point of view. CPAP at 11 cm water should be used postoperatively while in the hospital. Patient will continue his outpatient Trelegy on the morning of surgery.Stress dose steroids should be given on the morning of surgery and the day after, namely solumedrol 30 mg/day.  F/U in the office in 6 months.

## 2020-07-07 NOTE — CONSULT LETTER
[Dear  ___] : Dear  [unfilled], [Courtesy Letter:] : I had the pleasure of seeing your patient, [unfilled], in my office today. [Please see my note below.] : Please see my note below. [Consult Closing:] : Thank you very much for allowing me to participate in the care of this patient.  If you have any questions, please do not hesitate to contact me. [Sincerely,] : Sincerely, [FreeTextEntry3] : Willow Tamayo MD FCCP\par D-ABSM\par ABIM board certified in  Pulmonary diseases, Sleep medicine\par Internal medicine\par  [___] : [unfilled] [DrJunior  ___] : Dr. BRICE

## 2020-07-07 NOTE — REASON FOR VISIT
[Follow-Up] : a follow-up visit [Sleep Apnea] : sleep apnea [COPD] : COPD [Pre-op Risk Stratification] : pre-op risk stratification

## 2020-07-07 NOTE — HISTORY OF PRESENT ILLNESS
[TextBox_4] : The patient is preoperative for bariatric surgery on July 15. This will be gastric sleeve at [facility]. The patient has moderate asthma that is under control on Trelegy and prednisone 10 mg/day. He has severe obstructive sleep apnea on auto Pap 8-20 cm water. His compliance has been excellent. His median pressure is 10.2. He feels well without any increased shortness of breath cough or wheeze.

## 2020-09-02 ENCOUNTER — APPOINTMENT (OUTPATIENT)
Dept: ORTHOPEDIC SURGERY | Facility: CLINIC | Age: 60
End: 2020-09-02
Payer: COMMERCIAL

## 2020-09-02 VITALS — WEIGHT: 295 LBS | BODY MASS INDEX: 47.41 KG/M2 | HEIGHT: 66 IN

## 2020-09-02 PROCEDURE — 99214 OFFICE O/P EST MOD 30 MIN: CPT | Mod: 25

## 2020-09-02 PROCEDURE — 20610 DRAIN/INJ JOINT/BURSA W/O US: CPT | Mod: 50

## 2020-09-02 NOTE — DISCUSSION/SUMMARY
[de-identified] : 60 year old male presents for follow up of bilateral knee pain, left worse than right, secondary to end-stage OA. \par pt report an incidence of severe pain in the left knee while walking at [facility]. he could not walk and troubled to walk back to car.\par he has been getting good pain relief with cortisone injection.\par He has a history of CHF with exacerbation resulting in hospitalizations at [facility] in the past. He has an oxygen tank at home but does not use it. \par he recently had PPM insertion. seeing cardiologist. pt is on Xarelto. pt is working\par \par \par \par Is a future candidate for staged bilateral total knee arthroplasty.\par \par The patient is a 60 year old individual with end stage arthritis of their bilateral knee joint. Based upon the patient's continued symptoms and failure to respond to conservative treatment I have recommended a staged bilateral total knee arthroplasty for this patient. A long discussion took place with the patient describing what a total joint replacement is and what the procedure would entail. A total knee arthroplasty model, similar to the implant that will be used during the operation, was utilized to demonstrate and to discuss the various bearing surfaces of the implants. The hospitalization and post-operative care and rehabilitation were also discussed. The use of perioperative antibiotics and DVT prophylaxis were discussed. The risk, benefits and alternatives to a surgical intervention were discussed at length with the patient.  The patient was also advised of risks related to the medical comorbidities, elevated body mass index (BMI),  and smoking where applicable.  We discussed how to reduce modifiable risk factors and encouraged smoking cessation were applicable.. A lengthy discussion took place to review the most common complications including but not limited to: deep vein thrombosis, pulmonary embolus, heart attack, stroke, infection, wound breakdown, numbness, damage to nerves, tendon, muscles, arteries or other blood vessels, death and other possible complications from anesthesia. The patient was told that we will take steps to minimize these risks by using sterile technique, antibiotics and DVT prophylaxis when appropriate and follow the patient postoperatively in the office setting to monitor progress. The possibility of recurrent pain, no improvement in pain and actual worsening of pain were also discussed with the patient.\par The discharge plan of care focused on the patient going home following surgery. The patient was encouraged to make the necessary arrangements to have someone stay with them when they are discharged home. Following discharge, a home care nurse will visit the patient. The home care nurse will open your home care case and request home physical therapy services. Home physical therapy will commence following discharge provided it is appropriate and covered by the health insurance benefit plan. \par The benefits of surgery were discussed with the patient including the potential for improving his/her current clinical condition through operative intervention. Alternatives to surgical intervention including continued conservative management were also discussed in detail. All questions were answered to the satisfaction of the patient. The treatment plan of care, as well as a model of a total knee arthroplasty equivalent to the one that will be used for their total joint replacement, was shared with the patient. The patient agreed to the plan of care as well as the use of implants in their total joint replacement. \par \par \par \par

## 2020-09-02 NOTE — PROCEDURE
[de-identified] : Received bilateral knee cortisone injections.\par I discussed at length with the patient the planned steroid and lidocaine injection. The risks, benefits, convalescence and alternatives were reviewed. The possible side effects discussed included but were not limited to: pain, swelling, heat, bleeding, and redness. Symptoms are generally mild but if they are extensive then contact the office. Giving pain relievers by mouth such as NSAIDs or Tylenol can generally treat the reactions to steroid and lidocaine. Rare cases of infection have been noted. Rash, hives and itching may occur post injection. If you have muscle pain or cramps, flushing and or swelling of the face, rapid heart beat, nausea, dizziness, fever, chills, headache, difficulty breathing, swelling in the arms or legs, or have a prickly feeling of your skin, contact a health care provider immediately. Following this discussion, the knee was prepped with Alcohol and under sterile condition the 80 mg Depo-Medrol and 6 cc Lidocaine injection was performed with a 20 gauge needle through a superolateral injection site. The needle was introduced into the joint, aspiration was performed to ensure intra-articular placement and the medication was injected. Upon withdrawal of the needle the site was cleaned with alcohol and a band aid applied. The patient tolerated the injection well and there were no adverse effects. Post injection instructions included no strenuous activity for 24 hours, cryotherapy and if there are any adverse effects to contact the office.

## 2020-09-02 NOTE — HISTORY OF PRESENT ILLNESS
[de-identified] : 60 year old male presents for follow up of bilateral knee pain, left worse than right, secondary to end-stage OA. \par pt report an incidence of severe pain in the left knee while walking at [facility]. he could not walk and troubled to walk back to car.\par he has been getting good pain relief with cortisone injection.\par He has a history of CHF with exacerbation resulting in hospitalizations at [facility] in the past. He has an oxygen tank at home but does not use it. \par he recently had PPM insertion. seeing cardiologist. pt is on Xarelto. pt is working\par he had gastric bypass and lost 70 lbs\par \par \par MAGGIE EARL presents with pain. He states the symptoms are worsening. Pain levels include a current pain level of 7/10. \par His symptoms occur while walking. He states the symptoms seem to be occuring daily. \par \par Modifying factors - worsened by walking. Relieving factors include relieved by ice and relieved by rest. \par

## 2020-09-02 NOTE — PHYSICAL EXAM
[de-identified] : Both knees have marked valgus deformity he has an antalgic gait his range of motion is preserved 0-1 15 [de-identified] : GENERAL APPEARANCE:   Well nourished and hydrated, pleasant, alert, and oriented x 3.  Appears their stated age.  \par Respiratory: No wheeezing, breath sounds clear. No cyanosis, no us of accesory musculature \par CARDIOVASCULAR:   No apparent abnormalities.  No lower leg edema.  No varicosities.  Pedal pulses are palpable.\par NEUROLOGIC:   Sensation is normal, no muscle weakness in the upper or lower extremities.\par DERMATOLOGIC:   No apparent skin lesions, moist, warm, no rash.\par SPINE:   Cervical spine appears normal and moves freely; thoracic spine appears normal and moves freely; lumbosacral spine appears normal and moves freely, normal, nontender.\par MUSCULOSKELETAL:   Hands, wrists, and elbows are normal and move freely, shoulders are normal and move freely. \par  [Antalgic] : antalgic

## 2020-09-23 NOTE — H&P ADULT - RS GEN PE MLT RESP DETAILS PC
Nuclear Med - Bone Scan completed @ 1200 today Checked with Angio- kyphoplasty on hold will discuss with  per Angio Tech - Concepcion Pellet no rales/no rhonchi/no wheezes/breath sounds equal/clear to auscultation bilaterally

## 2020-11-03 ENCOUNTER — APPOINTMENT (OUTPATIENT)
Dept: PULMONOLOGY | Facility: CLINIC | Age: 60
End: 2020-11-03
Payer: COMMERCIAL

## 2020-11-03 VITALS
OXYGEN SATURATION: 98 % | SYSTOLIC BLOOD PRESSURE: 98 MMHG | BODY MASS INDEX: 42.77 KG/M2 | DIASTOLIC BLOOD PRESSURE: 54 MMHG | HEART RATE: 80 BPM | WEIGHT: 265 LBS

## 2020-11-03 VITALS — RESPIRATION RATE: 16 BRPM

## 2020-11-03 PROCEDURE — 99072 ADDL SUPL MATRL&STAF TM PHE: CPT

## 2020-11-03 PROCEDURE — 99214 OFFICE O/P EST MOD 30 MIN: CPT

## 2020-11-03 RX ORDER — CEFDINIR 250 MG/5ML
250 POWDER, FOR SUSPENSION ORAL
Qty: 100 | Refills: 0 | Status: DISCONTINUED | COMMUNITY
Start: 2020-08-14

## 2020-11-03 RX ORDER — METAXALONE 800 MG/1
800 TABLET ORAL
Qty: 60 | Refills: 0 | Status: DISCONTINUED | COMMUNITY
Start: 2020-10-20

## 2020-11-03 RX ORDER — METOPROLOL SUCCINATE 50 MG/1
50 TABLET, EXTENDED RELEASE ORAL
Qty: 90 | Refills: 0 | Status: DISCONTINUED | COMMUNITY
Start: 2020-01-28

## 2020-11-03 RX ORDER — FLASH GLUCOSE SENSOR
KIT MISCELLANEOUS
Qty: 2 | Refills: 0 | Status: DISCONTINUED | COMMUNITY
Start: 2020-10-02

## 2020-11-03 RX ORDER — ONDANSETRON 4 MG/1
4 TABLET ORAL
Qty: 15 | Refills: 0 | Status: DISCONTINUED | COMMUNITY
Start: 2020-07-21

## 2020-11-03 RX ORDER — LANCETS 30 GAUGE
EACH MISCELLANEOUS
Qty: 100 | Refills: 0 | Status: DISCONTINUED | COMMUNITY
Start: 2020-04-23

## 2020-11-03 RX ORDER — CYANOCOBALAMIN (VITAMIN B-12) 500MCG/0.1
500 GEL (ML) NASAL
Refills: 0 | Status: DISCONTINUED | COMMUNITY
End: 2020-11-03

## 2020-11-03 RX ORDER — DULAGLUTIDE 1.5 MG/.5ML
1.5 INJECTION, SOLUTION SUBCUTANEOUS
Qty: 6 | Refills: 0 | Status: DISCONTINUED | COMMUNITY
Start: 2020-04-27

## 2020-11-03 RX ORDER — HYOSCYAMINE SULFATE 0.12 MG/1
0.12 TABLET SUBLINGUAL
Qty: 90 | Refills: 0 | Status: DISCONTINUED | COMMUNITY
Start: 2020-10-02

## 2020-11-03 RX ORDER — PANTOPRAZOLE 40 MG/1
40 TABLET, DELAYED RELEASE ORAL
Qty: 90 | Refills: 0 | Status: DISCONTINUED | COMMUNITY
Start: 2020-09-04

## 2020-11-03 RX ORDER — CLOTRIMAZOLE AND BETAMETHASONE DIPROPIONATE 10; .5 MG/G; MG/G
1-0.05 CREAM TOPICAL
Qty: 15 | Refills: 0 | Status: DISCONTINUED | COMMUNITY
Start: 2020-08-04

## 2020-11-03 RX ORDER — CALCIUM CARBONATE/VITAMIN D3 500-10/5ML
400 LIQUID (ML) ORAL
Refills: 0 | Status: DISCONTINUED | COMMUNITY
End: 2020-11-03

## 2020-11-03 RX ORDER — HYDROCORTISONE ACETATE 25 MG/1
25 SUPPOSITORY RECTAL
Qty: 12 | Refills: 0 | Status: DISCONTINUED | COMMUNITY
Start: 2020-03-25

## 2020-11-03 RX ORDER — CHLORHEXIDINE GLUCONATE, 0.12% ORAL RINSE 1.2 MG/ML
0.12 SOLUTION DENTAL
Qty: 473 | Refills: 0 | Status: DISCONTINUED | COMMUNITY
Start: 2020-09-18

## 2020-11-03 RX ORDER — HYOSCYAMINE SULFATE 0.12 MG/1
0.12 TABLET, ORALLY DISINTEGRATING ORAL
Qty: 84 | Refills: 0 | Status: DISCONTINUED | COMMUNITY
Start: 2020-07-27

## 2020-11-03 RX ORDER — FEXOFENADINE HYDROCHLORIDE 180 MG/1
180 TABLET ORAL
Qty: 30 | Refills: 0 | Status: DISCONTINUED | COMMUNITY
Start: 2020-10-08

## 2020-11-03 RX ORDER — POTASSIUM CHLORIDE 20 MEQ/15ML
20 MEQ/15ML SOLUTION ORAL
Qty: 1419 | Refills: 0 | Status: DISCONTINUED | COMMUNITY
Start: 2020-10-06

## 2020-11-03 RX ORDER — CLINDAMYCIN HYDROCHLORIDE 150 MG/1
150 CAPSULE ORAL
Qty: 14 | Refills: 0 | Status: DISCONTINUED | COMMUNITY
Start: 2020-10-16

## 2020-11-03 RX ORDER — HYDROCORTISONE ACETATE 30 MG/1
30 SUPPOSITORY RECTAL
Qty: 28 | Refills: 0 | Status: DISCONTINUED | COMMUNITY
Start: 2020-10-02

## 2020-11-03 RX ORDER — SODIUM CHLORIDE FOR INHALATION 0.9 %
0.9 VIAL, NEBULIZER (ML) INHALATION
Qty: 300 | Refills: 0 | Status: DISCONTINUED | COMMUNITY
Start: 2020-03-23

## 2020-11-03 RX ORDER — ACETAMINOPHEN AND CODEINE PHOSPHATE 120; 12 MG/5ML; MG/5ML
120-12 SOLUTION ORAL
Qty: 200 | Refills: 0 | Status: DISCONTINUED | COMMUNITY
Start: 2020-07-22

## 2020-11-03 RX ORDER — LIDOCAINE 5% 700 MG/1
5 PATCH TOPICAL
Qty: 30 | Refills: 0 | Status: DISCONTINUED | COMMUNITY
Start: 2020-09-17

## 2020-11-03 RX ORDER — DIGOXIN 125 UG/1
125 TABLET ORAL DAILY
Refills: 0 | Status: DISCONTINUED | COMMUNITY
Start: 2019-01-22 | End: 2020-11-03

## 2020-11-03 RX ORDER — AZELASTINE HYDROCHLORIDE 137 UG/1
0.1 SPRAY, METERED NASAL
Qty: 30 | Refills: 0 | Status: DISCONTINUED | COMMUNITY
Start: 2020-01-24

## 2020-11-03 RX ORDER — METOCLOPRAMIDE 10 MG/1
10 TABLET ORAL
Qty: 120 | Refills: 0 | Status: DISCONTINUED | COMMUNITY
Start: 2020-07-31

## 2020-11-03 RX ORDER — GABAPENTIN 250 MG/5ML
250 SOLUTION ORAL
Qty: 540 | Refills: 0 | Status: DISCONTINUED | COMMUNITY
Start: 2020-10-22

## 2020-11-03 RX ORDER — BLOOD SUGAR DIAGNOSTIC
STRIP MISCELLANEOUS
Qty: 25 | Refills: 0 | Status: DISCONTINUED | COMMUNITY
Start: 2020-04-23

## 2020-11-03 RX ORDER — INSULIN GLARGINE 300 U/ML
300 INJECTION, SOLUTION SUBCUTANEOUS
Qty: 6 | Refills: 0 | Status: DISCONTINUED | COMMUNITY
Start: 2020-07-15

## 2020-11-03 RX ORDER — METOPROLOL SUCCINATE 100 MG/1
100 TABLET, EXTENDED RELEASE ORAL
Qty: 90 | Refills: 0 | Status: DISCONTINUED | COMMUNITY
Start: 2020-01-28

## 2020-11-03 RX ORDER — GABAPENTIN 300 MG/1
300 CAPSULE ORAL
Qty: 270 | Refills: 0 | Status: DISCONTINUED | COMMUNITY
Start: 2020-03-06

## 2020-11-03 RX ORDER — METOPROLOL SUCCINATE 25 MG/1
25 TABLET, EXTENDED RELEASE ORAL
Qty: 90 | Refills: 0 | Status: DISCONTINUED | COMMUNITY
Start: 2020-10-02

## 2020-11-03 NOTE — HISTORY OF PRESENT ILLNESS
[TextBox_4] : Patient had bariatric surgery in July and has lost 120 pounds since then. He's been feeling much better. He's been able to cut back on some of his cardiac medications and is concerned that he would like to cut back on his prednisone. He remains fully compliant with his CPAP. His mean pressure is now down to 8.4 from 11.4 preoperatively.

## 2020-11-03 NOTE — ASSESSMENT
[FreeTextEntry1] : Patient is doing well with weight loss. We will cut the prednisone to 5 mg daily for the next to 3 weeks and then if he's feeling well he can stop that. CPAP will be continued. Followup will be in 3 months.

## 2020-11-17 ENCOUNTER — INPATIENT (INPATIENT)
Facility: HOSPITAL | Age: 60
LOS: 1 days | Discharge: HOME | DRG: 309 | End: 2020-11-19
Attending: INTERNAL MEDICINE | Admitting: FAMILY MEDICINE
Payer: COMMERCIAL

## 2020-11-17 VITALS
HEART RATE: 82 BPM | DIASTOLIC BLOOD PRESSURE: 57 MMHG | RESPIRATION RATE: 18 BRPM | TEMPERATURE: 98 F | OXYGEN SATURATION: 97 % | SYSTOLIC BLOOD PRESSURE: 130 MMHG

## 2020-11-17 DIAGNOSIS — I48.20 CHRONIC ATRIAL FIBRILLATION, UNSPECIFIED: ICD-10-CM

## 2020-11-17 DIAGNOSIS — Z98.890 OTHER SPECIFIED POSTPROCEDURAL STATES: Chronic | ICD-10-CM

## 2020-11-17 DIAGNOSIS — Z87.09 PERSONAL HISTORY OF OTHER DISEASES OF THE RESPIRATORY SYSTEM: ICD-10-CM

## 2020-11-17 DIAGNOSIS — E66.9 OBESITY, UNSPECIFIED: ICD-10-CM

## 2020-11-17 DIAGNOSIS — I49.8 OTHER SPECIFIED CARDIAC ARRHYTHMIAS: ICD-10-CM

## 2020-11-17 DIAGNOSIS — Z98.84 BARIATRIC SURGERY STATUS: Chronic | ICD-10-CM

## 2020-11-17 DIAGNOSIS — R55 SYNCOPE AND COLLAPSE: ICD-10-CM

## 2020-11-17 DIAGNOSIS — D72.829 ELEVATED WHITE BLOOD CELL COUNT, UNSPECIFIED: ICD-10-CM

## 2020-11-17 LAB
ALBUMIN SERPL ELPH-MCNC: 3.6 G/DL — SIGNIFICANT CHANGE UP (ref 3.3–5.2)
ALP SERPL-CCNC: 47 U/L — SIGNIFICANT CHANGE UP (ref 40–120)
ALT FLD-CCNC: 16 U/L — SIGNIFICANT CHANGE UP
ANION GAP SERPL CALC-SCNC: 13 MMOL/L — SIGNIFICANT CHANGE UP (ref 5–17)
APTT BLD: 32.9 SEC — SIGNIFICANT CHANGE UP (ref 27.5–35.5)
AST SERPL-CCNC: 23 U/L — SIGNIFICANT CHANGE UP
BASOPHILS # BLD AUTO: 0.07 K/UL — SIGNIFICANT CHANGE UP (ref 0–0.2)
BASOPHILS NFR BLD AUTO: 0.4 % — SIGNIFICANT CHANGE UP (ref 0–2)
BILIRUB SERPL-MCNC: 0.5 MG/DL — SIGNIFICANT CHANGE UP (ref 0.4–2)
BUN SERPL-MCNC: 16 MG/DL — SIGNIFICANT CHANGE UP (ref 8–20)
CALCIUM SERPL-MCNC: 9 MG/DL — SIGNIFICANT CHANGE UP (ref 8.6–10.2)
CHLORIDE SERPL-SCNC: 92 MMOL/L — LOW (ref 98–107)
CO2 SERPL-SCNC: 27 MMOL/L — SIGNIFICANT CHANGE UP (ref 22–29)
CREAT SERPL-MCNC: 1.06 MG/DL — SIGNIFICANT CHANGE UP (ref 0.5–1.3)
EOSINOPHIL # BLD AUTO: 0.09 K/UL — SIGNIFICANT CHANGE UP (ref 0–0.5)
EOSINOPHIL NFR BLD AUTO: 0.5 % — SIGNIFICANT CHANGE UP (ref 0–6)
GLUCOSE SERPL-MCNC: 109 MG/DL — HIGH (ref 70–99)
HCT VFR BLD CALC: 38.2 % — LOW (ref 39–50)
HGB BLD-MCNC: 12.7 G/DL — LOW (ref 13–17)
IMM GRANULOCYTES NFR BLD AUTO: 0.6 % — SIGNIFICANT CHANGE UP (ref 0–1.5)
INR BLD: 1.86 RATIO — HIGH (ref 0.88–1.16)
LYMPHOCYTES # BLD AUTO: 1.93 K/UL — SIGNIFICANT CHANGE UP (ref 1–3.3)
LYMPHOCYTES # BLD AUTO: 11.2 % — LOW (ref 13–44)
MAGNESIUM SERPL-MCNC: 2.1 MG/DL — SIGNIFICANT CHANGE UP (ref 1.6–2.6)
MAGNESIUM SERPL-MCNC: 2.7 MG/DL — HIGH (ref 1.8–2.6)
MCHC RBC-ENTMCNC: 30.8 PG — SIGNIFICANT CHANGE UP (ref 27–34)
MCHC RBC-ENTMCNC: 33.2 GM/DL — SIGNIFICANT CHANGE UP (ref 32–36)
MCV RBC AUTO: 92.5 FL — SIGNIFICANT CHANGE UP (ref 80–100)
MONOCYTES # BLD AUTO: 1.31 K/UL — HIGH (ref 0–0.9)
MONOCYTES NFR BLD AUTO: 7.6 % — SIGNIFICANT CHANGE UP (ref 2–14)
NEUTROPHILS # BLD AUTO: 13.67 K/UL — HIGH (ref 1.8–7.4)
NEUTROPHILS NFR BLD AUTO: 79.7 % — HIGH (ref 43–77)
PLATELET # BLD AUTO: 314 K/UL — SIGNIFICANT CHANGE UP (ref 150–400)
POTASSIUM SERPL-MCNC: 3.7 MMOL/L — SIGNIFICANT CHANGE UP (ref 3.5–5.3)
POTASSIUM SERPL-SCNC: 3.7 MMOL/L — SIGNIFICANT CHANGE UP (ref 3.5–5.3)
PROT SERPL-MCNC: 7 G/DL — SIGNIFICANT CHANGE UP (ref 6.6–8.7)
PROTHROM AB SERPL-ACNC: 20.9 SEC — HIGH (ref 10.6–13.6)
RBC # BLD: 4.13 M/UL — LOW (ref 4.2–5.8)
RBC # FLD: 14.5 % — SIGNIFICANT CHANGE UP (ref 10.3–14.5)
SARS-COV-2 RNA SPEC QL NAA+PROBE: SIGNIFICANT CHANGE UP
SODIUM SERPL-SCNC: 132 MMOL/L — LOW (ref 135–145)
TROPONIN T SERPL-MCNC: 0.03 NG/ML — SIGNIFICANT CHANGE UP (ref 0–0.06)
TROPONIN T SERPL-MCNC: 0.03 NG/ML — SIGNIFICANT CHANGE UP (ref 0–0.06)
WBC # BLD: 17.17 K/UL — HIGH (ref 3.8–10.5)
WBC # FLD AUTO: 17.17 K/UL — HIGH (ref 3.8–10.5)

## 2020-11-17 PROCEDURE — 99223 1ST HOSP IP/OBS HIGH 75: CPT

## 2020-11-17 PROCEDURE — 70450 CT HEAD/BRAIN W/O DYE: CPT | Mod: 26

## 2020-11-17 PROCEDURE — 93880 EXTRACRANIAL BILAT STUDY: CPT | Mod: 26

## 2020-11-17 PROCEDURE — 71045 X-RAY EXAM CHEST 1 VIEW: CPT | Mod: 26

## 2020-11-17 PROCEDURE — 93010 ELECTROCARDIOGRAM REPORT: CPT

## 2020-11-17 PROCEDURE — 99285 EMERGENCY DEPT VISIT HI MDM: CPT

## 2020-11-17 RX ORDER — POTASSIUM CHLORIDE 20 MEQ
20 PACKET (EA) ORAL ONCE
Refills: 0 | Status: COMPLETED | OUTPATIENT
Start: 2020-11-17 | End: 2020-11-17

## 2020-11-17 RX ORDER — MAGNESIUM SULFATE 500 MG/ML
2 VIAL (ML) INJECTION ONCE
Refills: 0 | Status: COMPLETED | OUTPATIENT
Start: 2020-11-17 | End: 2020-11-17

## 2020-11-17 RX ORDER — LACTOBACILLUS ACIDOPHILUS 100MM CELL
1 CAPSULE ORAL
Refills: 0 | Status: DISCONTINUED | OUTPATIENT
Start: 2020-11-17 | End: 2020-11-19

## 2020-11-17 RX ORDER — SPIRONOLACTONE 25 MG/1
12.5 TABLET, FILM COATED ORAL DAILY
Refills: 0 | Status: DISCONTINUED | OUTPATIENT
Start: 2020-11-17 | End: 2020-11-18

## 2020-11-17 RX ORDER — SODIUM CHLORIDE 9 MG/ML
500 INJECTION, SOLUTION INTRAVENOUS
Refills: 0 | Status: DISCONTINUED | OUTPATIENT
Start: 2020-11-17 | End: 2020-11-18

## 2020-11-17 RX ORDER — ALBUTEROL 90 UG/1
2 AEROSOL, METERED ORAL EVERY 6 HOURS
Refills: 0 | Status: DISCONTINUED | OUTPATIENT
Start: 2020-11-17 | End: 2020-11-19

## 2020-11-17 RX ORDER — AMIODARONE HYDROCHLORIDE 400 MG/1
150 TABLET ORAL ONCE
Refills: 0 | Status: COMPLETED | OUTPATIENT
Start: 2020-11-17 | End: 2020-11-17

## 2020-11-17 RX ORDER — RIVAROXABAN 15 MG-20MG
20 KIT ORAL
Refills: 0 | Status: DISCONTINUED | OUTPATIENT
Start: 2020-11-17 | End: 2020-11-19

## 2020-11-17 RX ORDER — DIGOXIN 250 MCG
0.12 TABLET ORAL DAILY
Refills: 0 | Status: DISCONTINUED | OUTPATIENT
Start: 2020-11-17 | End: 2020-11-19

## 2020-11-17 RX ORDER — GABAPENTIN 400 MG/1
600 CAPSULE ORAL THREE TIMES A DAY
Refills: 0 | Status: DISCONTINUED | OUTPATIENT
Start: 2020-11-17 | End: 2020-11-19

## 2020-11-17 RX ORDER — AMIODARONE HYDROCHLORIDE 400 MG/1
1 TABLET ORAL
Qty: 900 | Refills: 0 | Status: DISCONTINUED | OUTPATIENT
Start: 2020-11-17 | End: 2020-11-18

## 2020-11-17 RX ORDER — METOPROLOL TARTRATE 50 MG
25 TABLET ORAL
Refills: 0 | Status: DISCONTINUED | OUTPATIENT
Start: 2020-11-17 | End: 2020-11-18

## 2020-11-17 RX ADMIN — Medication 50 GRAM(S): at 15:22

## 2020-11-17 RX ADMIN — Medication 20 MILLIEQUIVALENT(S): at 20:32

## 2020-11-17 RX ADMIN — ALBUTEROL 2 PUFF(S): 90 AEROSOL, METERED ORAL at 21:21

## 2020-11-17 RX ADMIN — Medication 2 GRAM(S): at 16:23

## 2020-11-17 RX ADMIN — AMIODARONE HYDROCHLORIDE 150 MILLIGRAM(S): 400 TABLET ORAL at 15:23

## 2020-11-17 RX ADMIN — RIVAROXABAN 20 MILLIGRAM(S): KIT at 20:32

## 2020-11-17 RX ADMIN — AMIODARONE HYDROCHLORIDE 33.3 MG/MIN: 400 TABLET ORAL at 15:53

## 2020-11-17 RX ADMIN — SODIUM CHLORIDE 100 MILLILITER(S): 9 INJECTION, SOLUTION INTRAVENOUS at 15:54

## 2020-11-17 RX ADMIN — Medication 110 MILLIGRAM(S): at 21:29

## 2020-11-17 RX ADMIN — GABAPENTIN 600 MILLIGRAM(S): 400 CAPSULE ORAL at 21:29

## 2020-11-17 RX ADMIN — AMIODARONE HYDROCHLORIDE 618 MILLIGRAM(S): 400 TABLET ORAL at 15:13

## 2020-11-17 NOTE — ED PROVIDER NOTE - CLINICAL SUMMARY MEDICAL DECISION MAKING FREE TEXT BOX
h/o afib, cardiomyopathy with syncopal episode   + torsades on interrogation of defibrillator; admit for monitoring, labs, cardiology recommendations

## 2020-11-17 NOTE — H&P ADULT - PROBLEM SELECTOR PLAN 2
As per history Torsade rhythm in his cardiologist office, will be in step down, continue amiodarone, kep mag above 2 and potassium above 4. cardiology P[facility] to follow.

## 2020-11-17 NOTE — ED ADULT NURSE NOTE - OBJECTIVE STATEMENT
pt BIBA from cardiology office for reports of passing out at home today. pt AOX3 on arrival to ED with no complaints, states he stood up at home to get off the cough, felt dizzy and passed out. sent from [facility], for his defib firing. EMS states pt had 16 second run of Torsades. pt with AICD in place for AF as per patient, placed 2 years ago. pt with hx CHF, no SOB noted, afebrile with stable vitals on this RN exam. ER MD brumarvint to bedside. 20g to elft hand patent, placed by EMS.

## 2020-11-17 NOTE — H&P ADULT - HISTORY OF PRESENT ILLNESS
61 y/o male was sent in from [place] cardiology office where he went after having a syncopal episode by history for about 16 seconds. As per pt. he was in his office , felt a little dizzy before he passed out on his chair. pt. reports no cp or aicd firing, or palpitations in his chest before the incident. pt' s daughter who works for him was in the office. Pt. later went to his cardiologist dr. Peace office and his aicd was interrogated Torsade rhythm was noted .  Pt. was sent to the ER. pt. did not have urine / fecal loss during the incident, no tongue bite. no motor weakness in any parts of his body. no fall, no HA. pt. stated that he is on prednisone 5 mg po daily for his copd and his wbc count runs high and his pcp is aware. no sob, no wheeze in the chest reported. pt. on and off has some cough, no fever, no sick contact. As per pt. his covid-19 was negative 2 weeks ago. no abd. pain. no n/v/d. pt. had gastric bypass , gastric sleeve in july 2020. 59 y/o male was sent in from [place] cardiology office where he went after having a syncopal episode by history for about 16 seconds. As per pt. he was in his office , felt a little dizzy before he passed out on his chair. pt. reports no cp or aicd firing, or palpitations in his chest before the incident. pt' s daughter who works for him was in the office. Pt. later went to his cardiologist dr. Peace office and his aicd was interrogated Torsade rhythm was noted .  Pt. was sent to the ER. pt. did not have urine / fecal loss during the incident, no tongue bite. no motor weakness in any parts of his body. no fall, no HA. pt. stated that he is on prednisone 5 mg po daily for his copd and his wbc count runs high and his pcp is aware. no sob, no wheeze in the chest reported. pt. on and off has some cough, no fever, no sick contact. As per pt. his covid-19 was negative 2 weeks ago. no abd. pain. no n/v/d. pt. had gastric bypass , gastric sleeve in july 2020. pt. also stated that his bp is usually  95/50. pt. also stated that he is on metoprolol 25 mg po bid, in the system he appears to be on coreg. 61 y/o male with h/o afib, s/p aicd, copd, cardiomyopathy and chf was sent in from [place] cardiology office where he went after having a syncopal episode by history for about 16 seconds. As per pt. he was in his office , felt a little dizzy before he passed out on his chair. pt. reports no cp or aicd firing, or palpitations in his chest before the incident. pt' s daughter who works for him was in the office. Pt. later went to his cardiologist dr. Peace office and his aicd was interrogated Torsade rhythm was noted .  Pt. was sent to the ER. pt. did not have urine / fecal loss during the incident, no tongue bite. no motor weakness in any parts of his body. no fall, no HA. pt. stated that he is on prednisone 5 mg po daily for his copd and his wbc count runs high and his pcp is aware. no sob, no wheeze in the chest reported. pt. on and off has some cough, no fever, no sick contact. As per pt. his covid-19 was negative 2 weeks ago. no abd. pain. no n/v/d. pt. had gastric bypass , gastric sleeve in july 2020. pt. also stated that his bp is usually  95/50. pt. also stated that he is on metoprolol 25 mg po bid, in the system he appears to be on coreg. 61 y/o male with h/o afib, s/p aicd, copd, cardiomyopathy , sleep apnea and chf was sent in from [place] cardiology office where he went after having a syncopal episode by history for about 16 seconds. As per pt. he was in his office , felt a little dizzy before he passed out on his chair. pt. reports no cp or aicd firing, or palpitations in his chest before the incident. pt' s daughter who works for him was in the office. Pt. later went to his cardiologist dr. Peace office and his aicd was interrogated Torsade rhythm was noted .  Pt. was sent to the ER. pt. did not have urine / fecal loss during the incident, no tongue bite. no motor weakness in any parts of his body. no fall, no HA. pt. stated that he is on prednisone 5 mg po daily for his copd and his wbc count runs high and his pcp is aware. no sob, no wheeze in the chest reported. pt. on and off has some cough, no fever, no sick contact. As per pt. his covid-19 was negative 2 weeks ago. no abd. pain. no n/v/d. pt. had gastric bypass , gastric sleeve in july 2020. pt. also stated that his bp is usually  95/50. pt. also stated that he is on metoprolol 25 mg po bid, in the system he appears to be on coreg.

## 2020-11-17 NOTE — H&P ADULT - PROBLEM SELECTOR PLAN 1
Very likely arrythmia related, vasovagal, ct head negative, will get echo, carotid doppler, continue amiodarone drip. [facility] to follow.

## 2020-11-17 NOTE — ED ADULT NURSE REASSESSMENT NOTE - NS ED NURSE REASSESS COMMENT FT1
recvd report form [facility]. Pt in no apparent distress at this time. Airway patent, breathing spontaneous and nonlabored. Pt A&Ox3 resting in stretcher. pt with  no complaints at this time

## 2020-11-17 NOTE — ED PROVIDER NOTE - PSH
H/O cardiac catheterization  5 years ago at [facility].  History of cardioversion    History of incision and drainage  right groin abcess  jan 2019  [facility]  History of loop recorder  2017  [facility]

## 2020-11-17 NOTE — H&P ADULT - NSHPPHYSICALEXAM_GEN_ALL_CORE
General: Obese male lying in bed not in distress.  HEENT: AT, NC. PERRL. intact EOM. no throat erythema or exudate.   Neck: supple. no JVD.   Chest: air entry is fair bilaterally, no w /r r.   Heart: S1,S2. RRR. no heart murmur. no edema.   Abdomen: soft. non-tender. non-distended. obese, + BS.   Ext: no calf tenderness. ROM of all ext. intact, distal pulses intact.  Neuro: AAO x3. no focal weakness. no speech disorder, CNs ii to xii intact. sensory/ reflexes intact.  Skin: no pallor, no diaphoresis, no cyanosis.   Psych : normal affect, no si/hi.

## 2020-11-17 NOTE — ED ADULT NURSE NOTE - PSH
H/O cardiac catheterization  5 years ago at [facility].  History of cardioversion    History of incision and drainage  right groin abcess  jan 2019  [facility]  History of loop recorder  2017  [facility]

## 2020-11-17 NOTE — ED PROVIDER NOTE - OBJECTIVE STATEMENT
61 yo male pmh obesity, cardiomyopathy, chf, defibrillator  sent in from [facility] cardiology for syncopal episode while at home ; pt noted he was in chair when episode occurred, lasting approximately 16 seconds; pt noted his daughter thought he was having a seizure; but pt was responsive during episode; pt device was interrogated at the office and pt noted to be in torsades; pt sent to ed for admission;  pt denies any headache, chest pain or weakness

## 2020-11-17 NOTE — ED ADULT NURSE REASSESSMENT NOTE - NS ED NURSE REASSESS COMMENT FT1
received report from mary ruth and assumed care of pt. pt sitting calm in bed. a and o x3. breathing even and unlabored. sinus paced on cm. has no complaints at this time. will continue to monitor.

## 2020-11-17 NOTE — H&P ADULT - ASSESSMENT
In summary pt. is 61 y/o male with h/o afib, s/p aicd, copd, cardiomyopathy and chf was sent in from [place] cardiology office where he went after having a syncopal episode by history for about 16 seconds. As per pt. he was in his office , felt a little dizzy before he passed out on his chair. pt. reports no cp or aicd firing, or palpitations in his chest before the incident. pt' s daughter who works for him was in the office. Pt. later went to his cardiologist dr. Peace office and his aicd was interrogated Torsade rhythm was noted .  Pt. was sent to the ER. pt. did not have urine / fecal loss during the incident, no tongue bite. no motor weakness in any parts of his body. no fall, no HA. pt. stated that he is on prednisone 5 mg po daily for his copd and his wbc count runs high and his pcp is aware. no sob, no wheeze in the chest reported. pt. on and off has some cough, no fever, no sick contact, will be admitted to step down unit for syncopy and cardiac arrythmia, pt. is on amiodarone drip.

## 2020-11-17 NOTE — H&P ADULT - NSICDXPASTMEDICALHX_GEN_ALL_CORE_FT
PAST MEDICAL HISTORY:  Afib     AICD (automatic cardioverter/defibrillator) present     Asthma     Cardiomyopathy, ischemic wearing life vest  2-28-19    CHF (congestive heart failure)     COPD (chronic obstructive pulmonary disease)     Ejection fraction < 50% last echo 2-13-19  31%    HFrEF (heart failure with reduced ejection fraction)     ROCK (obstructive sleep apnea)

## 2020-11-17 NOTE — H&P ADULT - NSICDXPASTSURGICALHX_GEN_ALL_CORE_FT
PAST SURGICAL HISTORY:  H/O cardiac catheterization 5 years ago at [facility].    History of cardioversion     History of incision and drainage right groin abcess  jan 2019  [facility]    History of loop recorder 2017  [facility]    S/P gastric bypass

## 2020-11-17 NOTE — ED ADULT NURSE REASSESSMENT NOTE - NS ED NURSE REASSESS COMMENT FT1
pt refuses to use bedpan. pt explained he is on strict bedrest as he was in cardiac arrest (torsades) at the doctors office today. pt began to scream, using inappropriate language and threatening staff. pt reports "Just take out my fucking iv and ill walk to the bathroom. fuck you and this [facility]. get the doctor so I can sign myself out." dr. brar called to the bedside. pt has now agreed to use bedpan and stay in hospital.

## 2020-11-18 ENCOUNTER — TRANSCRIPTION ENCOUNTER (OUTPATIENT)
Age: 60
End: 2020-11-18

## 2020-11-18 LAB
ANION GAP SERPL CALC-SCNC: 15 MMOL/L — SIGNIFICANT CHANGE UP (ref 5–17)
APPEARANCE UR: CLEAR — SIGNIFICANT CHANGE UP
BASOPHILS # BLD AUTO: 0.07 K/UL — SIGNIFICANT CHANGE UP (ref 0–0.2)
BASOPHILS NFR BLD AUTO: 0.5 % — SIGNIFICANT CHANGE UP (ref 0–2)
BILIRUB UR-MCNC: NEGATIVE — SIGNIFICANT CHANGE UP
BUN SERPL-MCNC: 15 MG/DL — SIGNIFICANT CHANGE UP (ref 8–20)
CALCIUM SERPL-MCNC: 8.8 MG/DL — SIGNIFICANT CHANGE UP (ref 8.6–10.2)
CHLORIDE SERPL-SCNC: 93 MMOL/L — LOW (ref 98–107)
CO2 SERPL-SCNC: 28 MMOL/L — SIGNIFICANT CHANGE UP (ref 22–29)
COLOR SPEC: YELLOW — SIGNIFICANT CHANGE UP
CREAT SERPL-MCNC: 1.13 MG/DL — SIGNIFICANT CHANGE UP (ref 0.5–1.3)
DIFF PNL FLD: NEGATIVE — SIGNIFICANT CHANGE UP
EOSINOPHIL # BLD AUTO: 0.18 K/UL — SIGNIFICANT CHANGE UP (ref 0–0.5)
EOSINOPHIL NFR BLD AUTO: 1.3 % — SIGNIFICANT CHANGE UP (ref 0–6)
GLUCOSE SERPL-MCNC: 87 MG/DL — SIGNIFICANT CHANGE UP (ref 70–99)
GLUCOSE UR QL: NEGATIVE MG/DL — SIGNIFICANT CHANGE UP
HCT VFR BLD CALC: 39.1 % — SIGNIFICANT CHANGE UP (ref 39–50)
HGB BLD-MCNC: 12.6 G/DL — LOW (ref 13–17)
IMM GRANULOCYTES NFR BLD AUTO: 0.6 % — SIGNIFICANT CHANGE UP (ref 0–1.5)
KETONES UR-MCNC: NEGATIVE — SIGNIFICANT CHANGE UP
LEUKOCYTE ESTERASE UR-ACNC: NEGATIVE — SIGNIFICANT CHANGE UP
LYMPHOCYTES # BLD AUTO: 18.1 % — SIGNIFICANT CHANGE UP (ref 13–44)
LYMPHOCYTES # BLD AUTO: 2.47 K/UL — SIGNIFICANT CHANGE UP (ref 1–3.3)
MAGNESIUM SERPL-MCNC: 2.6 MG/DL — SIGNIFICANT CHANGE UP (ref 1.6–2.6)
MCHC RBC-ENTMCNC: 30.5 PG — SIGNIFICANT CHANGE UP (ref 27–34)
MCHC RBC-ENTMCNC: 32.2 GM/DL — SIGNIFICANT CHANGE UP (ref 32–36)
MCV RBC AUTO: 94.7 FL — SIGNIFICANT CHANGE UP (ref 80–100)
MONOCYTES # BLD AUTO: 1.24 K/UL — HIGH (ref 0–0.9)
MONOCYTES NFR BLD AUTO: 9.1 % — SIGNIFICANT CHANGE UP (ref 2–14)
NEUTROPHILS # BLD AUTO: 9.62 K/UL — HIGH (ref 1.8–7.4)
NEUTROPHILS NFR BLD AUTO: 70.4 % — SIGNIFICANT CHANGE UP (ref 43–77)
NITRITE UR-MCNC: NEGATIVE — SIGNIFICANT CHANGE UP
PH UR: 5 — SIGNIFICANT CHANGE UP (ref 5–8)
PLATELET # BLD AUTO: 285 K/UL — SIGNIFICANT CHANGE UP (ref 150–400)
POTASSIUM SERPL-MCNC: 3.3 MMOL/L — LOW (ref 3.5–5.3)
POTASSIUM SERPL-SCNC: 3.3 MMOL/L — LOW (ref 3.5–5.3)
PROT UR-MCNC: NEGATIVE MG/DL — SIGNIFICANT CHANGE UP
RBC # BLD: 4.13 M/UL — LOW (ref 4.2–5.8)
RBC # FLD: 14.6 % — HIGH (ref 10.3–14.5)
SODIUM SERPL-SCNC: 136 MMOL/L — SIGNIFICANT CHANGE UP (ref 135–145)
SP GR SPEC: 1.01 — SIGNIFICANT CHANGE UP (ref 1.01–1.02)
T3 SERPL-MCNC: 97 NG/DL — SIGNIFICANT CHANGE UP (ref 80–200)
T4 AB SER-ACNC: 6.8 UG/DL — SIGNIFICANT CHANGE UP (ref 4.5–12)
TROPONIN T SERPL-MCNC: 0.03 NG/ML — SIGNIFICANT CHANGE UP (ref 0–0.06)
TSH SERPL-MCNC: 3.34 UIU/ML — SIGNIFICANT CHANGE UP (ref 0.27–4.2)
UROBILINOGEN FLD QL: NEGATIVE MG/DL — SIGNIFICANT CHANGE UP
WBC # BLD: 13.66 K/UL — HIGH (ref 3.8–10.5)
WBC # FLD AUTO: 13.66 K/UL — HIGH (ref 3.8–10.5)

## 2020-11-18 PROCEDURE — 99232 SBSQ HOSP IP/OBS MODERATE 35: CPT

## 2020-11-18 RX ORDER — AMIODARONE HYDROCHLORIDE 400 MG/1
200 TABLET ORAL
Refills: 0 | Status: DISCONTINUED | OUTPATIENT
Start: 2020-11-18 | End: 2020-11-19

## 2020-11-18 RX ORDER — METOPROLOL TARTRATE 50 MG
25 TABLET ORAL
Refills: 0 | Status: DISCONTINUED | OUTPATIENT
Start: 2020-11-18 | End: 2020-11-19

## 2020-11-18 RX ORDER — FAMOTIDINE 10 MG/ML
20 INJECTION INTRAVENOUS DAILY
Refills: 0 | Status: DISCONTINUED | OUTPATIENT
Start: 2020-11-18 | End: 2020-11-19

## 2020-11-18 RX ORDER — PANTOPRAZOLE SODIUM 20 MG/1
40 TABLET, DELAYED RELEASE ORAL
Refills: 0 | Status: DISCONTINUED | OUTPATIENT
Start: 2020-11-18 | End: 2020-11-19

## 2020-11-18 RX ORDER — FLUTICASONE PROPIONATE 50 MCG
1 SPRAY, SUSPENSION NASAL
Refills: 0 | Status: DISCONTINUED | OUTPATIENT
Start: 2020-11-18 | End: 2020-11-19

## 2020-11-18 RX ORDER — BUMETANIDE 0.25 MG/ML
1 INJECTION INTRAMUSCULAR; INTRAVENOUS
Refills: 0 | Status: DISCONTINUED | OUTPATIENT
Start: 2020-11-18 | End: 2020-11-19

## 2020-11-18 RX ORDER — POTASSIUM CHLORIDE 20 MEQ
40 PACKET (EA) ORAL EVERY 4 HOURS
Refills: 0 | Status: COMPLETED | OUTPATIENT
Start: 2020-11-18 | End: 2020-11-18

## 2020-11-18 RX ORDER — SPIRONOLACTONE 25 MG/1
25 TABLET, FILM COATED ORAL DAILY
Refills: 0 | Status: DISCONTINUED | OUTPATIENT
Start: 2020-11-18 | End: 2020-11-19

## 2020-11-18 RX ADMIN — GABAPENTIN 600 MILLIGRAM(S): 400 CAPSULE ORAL at 05:21

## 2020-11-18 RX ADMIN — Medication 1 TABLET(S): at 10:35

## 2020-11-18 RX ADMIN — Medication 40 MILLIEQUIVALENT(S): at 14:13

## 2020-11-18 RX ADMIN — RIVAROXABAN 20 MILLIGRAM(S): KIT at 17:45

## 2020-11-18 RX ADMIN — Medication 25 MILLIGRAM(S): at 17:46

## 2020-11-18 RX ADMIN — Medication 40 MILLIEQUIVALENT(S): at 10:34

## 2020-11-18 RX ADMIN — Medication 25 MILLIGRAM(S): at 05:21

## 2020-11-18 RX ADMIN — ALBUTEROL 2 PUFF(S): 90 AEROSOL, METERED ORAL at 01:30

## 2020-11-18 RX ADMIN — Medication 110 MILLIGRAM(S): at 05:21

## 2020-11-18 RX ADMIN — SPIRONOLACTONE 12.5 MILLIGRAM(S): 25 TABLET, FILM COATED ORAL at 10:35

## 2020-11-18 RX ADMIN — Medication 0.12 MILLIGRAM(S): at 05:21

## 2020-11-18 RX ADMIN — GABAPENTIN 600 MILLIGRAM(S): 400 CAPSULE ORAL at 14:12

## 2020-11-18 RX ADMIN — Medication 5 MILLIGRAM(S): at 10:36

## 2020-11-18 RX ADMIN — Medication 110 MILLIGRAM(S): at 21:47

## 2020-11-18 RX ADMIN — AMIODARONE HYDROCHLORIDE 200 MILLIGRAM(S): 400 TABLET ORAL at 21:46

## 2020-11-18 RX ADMIN — GABAPENTIN 600 MILLIGRAM(S): 400 CAPSULE ORAL at 21:46

## 2020-11-18 RX ADMIN — Medication 1 TABLET(S): at 17:46

## 2020-11-18 RX ADMIN — FAMOTIDINE 20 MILLIGRAM(S): 10 INJECTION INTRAVENOUS at 17:44

## 2020-11-18 RX ADMIN — BUMETANIDE 1 MILLIGRAM(S): 0.25 INJECTION INTRAMUSCULAR; INTRAVENOUS at 17:44

## 2020-11-18 RX ADMIN — ALBUTEROL 2 PUFF(S): 90 AEROSOL, METERED ORAL at 06:30

## 2020-11-18 RX ADMIN — Medication 1 SPRAY(S): at 17:46

## 2020-11-18 NOTE — PROGRESS NOTE ADULT - ASSESSMENT
In summary pt. is 59 y/o male with h/o afib, s/p aicd, copd, cardiomyopathy and chf was sent in from [place] cardiology office where he went after having a syncopal episode by history for about 16 seconds. As per pt. he was in his office , felt a little dizzy before he passed out on his chair. pt. reports no cp or aicd firing, or palpitations in his chest before the incident. pt' s daughter who works for him was in the office. Pt. later went to his cardiologist dr. Peace office and his aicd was interrogated Torsade rhythm was noted .  Pt. was sent to the ER. pt. did not have urine / fecal loss during the incident, no tongue bite. no motor weakness in any parts of his body. no fall, no HA. pt. stated that he is on prednisone 5 mg po daily for his copd and his wbc count runs high and his pcp is aware. no sob, no wheeze in the chest reported. pt. on and off has some cough, no fever, no sick contact, will be admitted to step down unit for syncopy and cardiac arrythmia, pt. is on amiodarone drip.

## 2020-11-18 NOTE — DISCHARGE NOTE NURSING/CASE MANAGEMENT/SOCIAL WORK - NSDCVIVACCINE_GEN_ALL_CORE_FT
Influenza , 2019/1/14 09:18 , Trip Ramos (RN)  Influenza , 2019/11/3 12:19 , Gwendolyn Morton (RN)

## 2020-11-18 NOTE — DISCHARGE NOTE NURSING/CASE MANAGEMENT/SOCIAL WORK - PATIENT PORTAL LINK FT
You can access the FollowMyHealth Patient Portal offered by [facility] by registering at the following website: [email or web address]. By joining [facility]’s FollowMyHealth portal, you will also be able to view your health information using other applications (apps) compatible with our system.

## 2020-11-18 NOTE — PROGRESS NOTE ADULT - PROBLEM SELECTOR PLAN 1
- 2/2 torsades  - echo pending  - cardiology consult appreciated  - can go to tele  - dc step down order  - continue monitor for now  - OOB to chair

## 2020-11-18 NOTE — PROGRESS NOTE ADULT - ASSESSMENT
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

## 2020-11-18 NOTE — PROGRESS NOTE ADULT - SUBJECTIVE AND OBJECTIVE BOX
Pt admittted with polymorphic VT, no sig metabolic derangement as cause.  Arrhythmia c/w severe LV dysfunction  Physical exam c/w ? sig AS, recent echo suboptimal  Will switch to oral amio 200 BID in am  repeat echo to assess AS  maintain K > 4  Cont beta blocker   Add low dose ACEI if BP tolerates and AS not signifcant

## 2020-11-18 NOTE — PROGRESS NOTE ADULT - PROBLEM SELECTOR PLAN 2
- cardiology following  - continue amiodarone infusion  - change to PO tomorrow  - replace K as needed

## 2020-11-18 NOTE — PROGRESS NOTE ADULT - SUBJECTIVE AND OBJECTIVE BOX
[place] [facility], THE HEART CENTER                                   [address] [place] [address]                                                      PHONE: [phone]                                                         FAX: [phone]  [email or web address]  ---------------------------------------------------------------------------------------------------------------------------------    Overnight events/patient complaints:     Admitted from Office after AICD shock with VT   Hx of nonischemic CMP LVEF 35% S/P CardioMEMS chronic AF on AC ROCK , COPD S/P BARIATRIC SURGERY  presently on Amio drip    PAST MEDICAL & SURGICAL HISTORY:  AICD (automatic cardioverter/defibrillator) present    HFrEF (heart failure with reduced ejection fraction)    Asthma    Ejection fraction &lt; 50%  last echo 19  31%    Cardiomyopathy, ischemic  wearing life vest  19    CHF (congestive heart failure)    Afib    ROCK (obstructive sleep apnea)    COPD (chronic obstructive pulmonary disease)    S/P gastric bypass    H/O cardiac catheterization  5 years ago at [facility].    History of cardioversion    History of incision and drainage  right groin abcess  2019  [facility]    History of loop recorder  2017  [facility]      penicillins (Hives)  shellfish (Pruritus; Rash)    MEDICATIONS  (STANDING):  ALBUTerol    90 MICROgram(s) HFA Inhaler 2 Puff(s) Inhalation every 6 hours  aMIOdarone Infusion 1 mG/Min (33.3 mL/Hr) IV Continuous <Continuous>  digoxin     Tablet 0.125 milliGRAM(s) Oral daily  doxycycline IVPB 100 milliGRAM(s) IV Intermittent every 12 hours  doxycycline IVPB      gabapentin 600 milliGRAM(s) Oral three times a day  lactobacillus acidophilus 1 Tablet(s) Oral three times a day with meals  metoprolol tartrate 25 milliGRAM(s) Oral two times a day  potassium chloride    Tablet ER 40 milliEquivalent(s) Oral every 4 hours  predniSONE   Tablet 5 milliGRAM(s) Oral daily  rivaroxaban 20 milliGRAM(s) Oral with dinner  sodium chloride 0.45%. 500 milliLiter(s) (100 mL/Hr) IV Continuous <Continuous>  spironolactone 12.5 milliGRAM(s) Oral daily    MEDICATIONS  (PRN):      Vital Signs Last 24 Hrs  T(C): 37.1 (2020 11:12), Max: 37.3 (2020 15:23)  T(F): 98.8 (2020 11:12), Max: 99.1 (2020 15:23)  HR: 64 (:) (57 - 90)  BP: 97/45 (2020 11:12) (90/52 - 133/56)  BP(mean): --  RR: 20 (:12) (16 - 20)  SpO2: 93% (:) (93% - 98%)  ICU Vital Signs Last 24 Hrs  MAGGIE ELLIOTT  I&O's Detail    I&O's Summary    Drug Dosing Weight  MAGGIE ELLIOTT    REVIEW OF SYSTEMS:    Constitutional: No fever, weight loss or fatigue  Eyes: No eye pain, visual disturbances, or discharge  ENMT:  No difficulty hearing, tinnitus, vertigo; No sinus or throat pain  Neck: No pain or stiffness  Respiratory: No cough, wheezing, chills or hemoptysis  Cardiovascular: No chest pain, palpitations, shortness of breath, dizziness or leg swelling  Gastrointestinal: No abdominal or epigastric pain. No nausea, vomiting or hematemesis; No diarrhea or constipation. No melena or hematochezia.  Genitourinary: No dysuria, frequency, hematuria or incontinence  Rectal: No pain, hemorrhoids or incontinence  Neurological: No headaches, memory loss, loss of strength, numbness or tremors  Skin: No itching, burning, rashes or lesions   Lymph Nodes: No enlarged glands  Endocrine: No heat or cold intolerance; No hair loss  Musculoskeletal: No joint pain or swelling; No muscle, back or extremity pain  Psychiatric: No depression, anxiety, mood swings or difficulty sleeping  Heme/Lymph: No easy bruising or bleeding gums  Allergy and Immunologic: No hives or eczema    PHYSICAL EXAM:  General: Appears well developed, well nourished alert and cooperative.  HEENT: Head; normocephalic, atraumatic.  Eyes: Pupils reactive, cornea wnl.  Neck: Supple, no nodes adenopathy, no NVD or carotid bruit or thyromegaly.  CARDIOVASCULAR: Normal S1 and S2, No murmur, rub, gallop or lift.   LUNGS: No rales, rhonchi or wheeze. Normal breath sounds bilaterally.  ABDOMEN: Soft, nontender without mass or organomegaly. bowel sounds normoactive.  EXTREMITIES: No clubbing, cyanosis or edema. Distal pulses wnl.   SKIN: warm and dry with normal turgor.  NEURO: Alert/oriented x 3/normal motor exam. No pathologic reflexes.    PSYCH: normal affect.        LABS:                        12.6   13.66 )-----------( 285      ( 2020 04:16 )             39.1     11-18    136  |  93<L>  |  15.0  ----------------------------<  87  3.3<L>   |  28.0  |  1.13    Ca    8.8      2020 04:16  Mg     2.6     11-18    TPro  7.0  /  Alb  3.6  /  TBili  0.5  /  DBili  x   /  AST  23  /  ALT  16  /  AlkPhos  47  11-17    MAGGIE ELLIOTT  CARDIAC MARKERS ( 2020 04:16 )  x     / 0.03 ng/mL / x     / x     / x      CARDIAC MARKERS ( 2020 19:52 )  x     / 0.03 ng/mL / x     / x     / x      CARDIAC MARKERS ( 2020 13:42 )  x     / 0.03 ng/mL / x     / x     / x          PT/INR - ( 2020 19:51 )   PT: 20.9 sec;   INR: 1.86 ratio         PTT - ( 2020 19:51 )  PTT:32.9 sec  Urinalysis Basic - ( 2020 06:08 )    Color: Yellow / Appearance: Clear / S.010 / pH: x  Gluc: x / Ketone: Negative  / Bili: Negative / Urobili: Negative mg/dL   Blood: x / Protein: Negative mg/dL / Nitrite: Negative   Leuk Esterase: Negative / RBC: x / WBC x   Sq Epi: x / Non Sq Epi: x / Bacteria: x        RADIOLOGY & ADDITIONAL STUDIES:    INTERPRETATION OF TELEMETRY (personally reviewed):    ECG:    ECHO:   < from: TTE Echo Complete w/Doppler (18 @ 17:40) >      < end of copied text >    CARDIAC CATHETERIZATION: < from: Cardiac Cath Lab - Adult (19 @ 14:05) >      < from: Cardiac Cath Lab - Adult (19 @ 14:05) >  ENTRICLES: EF 25%-30% w/ mild MR.  CORONARY VESSELS: R dominant.  Large RCA w/ minimal disease.  large LAD w/ mild disease.  Moderate sized LCx w/ minimal disease.  COMPLICATIONS: No complications occurred during the cath lab visit.  DIAGNOSTIC IMPRESSIONS: Moderate pulmonary HTN.  Severe LV dysfunction.  Nonobstructive CAD.  L pulmonary angiogram.  Cardiomems implant to L PA.  DIAGNOSTIC RECOMMENDATIONS: Resume a/c in 48 hrs.  Followup 1 week at [facility].    < end of copied text >  < end of copied text >    ACTIVE PROBLEMS:  HEALTH ISSUES - PROBLEM Dx:  Obesity  Obesity    History of COPD  History of COPD    Leukocytosis, unspecified type  Leukocytosis, unspecified type    Chronic atrial fibrillation  Chronic atrial fibrillation    Other cardiac arrhythmia  Other cardiac arrhythmia    Syncope and collapse  Syncope and collapse

## 2020-11-18 NOTE — PROGRESS NOTE ADULT - SUBJECTIVE AND OBJECTIVE BOX
CC: syncopy (2020 11:17)    INTERVAL HPI/OVERNIGHT EVENTS:  no acute events overnight  patient without complaints  he took an extra bumex on  and has been having low potassium which may have been a trigger for torsades when he had his episode of syncope    Vital Signs Last 24 Hrs  T(C): 37.1 (2020 11:12), Max: 37.3 (2020 15:23)  T(F): 98.8 (2020 11:12), Max: 99.1 (2020 15:23)  HR: 64 (:12) (57 - 90)  BP: 97/45 (:12) (90/52 - 133/56)  BP(mean): --  RR: 20 (:) (16 - 20)  SpO2: 93% (:) (93% - 98%)    PHYSICAL EXAM:  General: Well developed; in no acute distress  Eyes: PERRLA, EOMI; conjunctiva and sclera clear  Head: Normocephalic; atraumatic  Neck: Supple; non tender; no masses  Respiratory: No wheezes, rales or rhonchi  Cardiovascular: Regular rate and rhythm. S1 and S2 Normal; No murmurs, gallops or rubs  Gastrointestinal: Soft non-tender non-distended; Normal bowel sounds  Genitourinary: No costovertebral angle tenderness  Extremities: Normal range of motion, No clubbing, cyanosis or edema  Vascular: Peripheral pulses palpable 2+ bilaterally  Skin: Warm and dry. No acute rash  Musculoskeletal: Normal gait, tone, without deformities  Psychiatric: Cooperative and appropriate    I&O's Detail      CARDIAC MARKERS ( 2020 04:16 )  x     / 0.03 ng/mL / x     / x     / x      CARDIAC MARKERS ( 2020 19:52 )  x     / 0.03 ng/mL / x     / x     / x      CARDIAC MARKERS ( 2020 13:42 )  x     / 0.03 ng/mL / x     / x     / x                                12.6   13.66 )-----------( 285      ( 2020 04:16 )             39.1     2020 04:16    136    |  93     |  15.0   ----------------------------<  87     3.3     |  28.0   |  1.13     Ca    8.8        2020 04:16  Mg     2.6       2020 04:16    TPro  7.0    /  Alb  3.6    /  TBili  0.5    /  DBili  x      /  AST  23     /  ALT  16     /  AlkPhos  47     2020 13:42    PT/INR - ( 2020 19:51 )   PT: 20.9 sec;   INR: 1.86 ratio         PTT - ( 2020 19:51 )  PTT:32.9 sec  CAPILLARY BLOOD GLUCOSE    LIVER FUNCTIONS - ( 2020 13:42 )  Alb: 3.6 g/dL / Pro: 7.0 g/dL / ALK PHOS: 47 U/L / ALT: 16 U/L / AST: 23 U/L / GGT: x           Urinalysis Basic - ( 2020 06:08 )    Color: Yellow / Appearance: Clear / S.010 / pH: x  Gluc: x / Ketone: Negative  / Bili: Negative / Urobili: Negative mg/dL   Blood: x / Protein: Negative mg/dL / Nitrite: Negative   Leuk Esterase: Negative / RBC: x / WBC x   Sq Epi: x / Non Sq Epi: x / Bacteria: x    MEDICATIONS  (STANDING):  ALBUTerol    90 MICROgram(s) HFA Inhaler 2 Puff(s) Inhalation every 6 hours  aMIOdarone Infusion 1 mG/Min (33.3 mL/Hr) IV Continuous <Continuous>  buMETAnide 1 milliGRAM(s) Oral two times a day  digoxin     Tablet 0.125 milliGRAM(s) Oral daily  doxycycline IVPB 100 milliGRAM(s) IV Intermittent every 12 hours  doxycycline IVPB      gabapentin 600 milliGRAM(s) Oral three times a day  lactobacillus acidophilus 1 Tablet(s) Oral three times a day with meals  metoprolol succinate ER 25 milliGRAM(s) Oral two times a day  potassium chloride    Tablet ER 40 milliEquivalent(s) Oral every 4 hours  predniSONE   Tablet 5 milliGRAM(s) Oral daily  rivaroxaban 20 milliGRAM(s) Oral with dinner  sodium chloride 0.45%. 500 milliLiter(s) (100 mL/Hr) IV Continuous <Continuous>  spironolactone 25 milliGRAM(s) Oral daily    MEDICATIONS  (PRN):      RADIOLOGY & ADDITIONAL TESTS:

## 2020-11-19 ENCOUNTER — TRANSCRIPTION ENCOUNTER (OUTPATIENT)
Age: 60
End: 2020-11-19

## 2020-11-19 VITALS — OXYGEN SATURATION: 100 %

## 2020-11-19 LAB
ANION GAP SERPL CALC-SCNC: 13 MMOL/L — SIGNIFICANT CHANGE UP (ref 5–17)
BUN SERPL-MCNC: 12 MG/DL — SIGNIFICANT CHANGE UP (ref 8–20)
CALCIUM SERPL-MCNC: 8.8 MG/DL — SIGNIFICANT CHANGE UP (ref 8.6–10.2)
CHLORIDE SERPL-SCNC: 96 MMOL/L — LOW (ref 98–107)
CO2 SERPL-SCNC: 28 MMOL/L — SIGNIFICANT CHANGE UP (ref 22–29)
CREAT SERPL-MCNC: 1.02 MG/DL — SIGNIFICANT CHANGE UP (ref 0.5–1.3)
GLUCOSE SERPL-MCNC: 103 MG/DL — HIGH (ref 70–99)
MAGNESIUM SERPL-MCNC: 2.4 MG/DL — SIGNIFICANT CHANGE UP (ref 1.6–2.6)
PHOSPHATE SERPL-MCNC: 3.6 MG/DL — SIGNIFICANT CHANGE UP (ref 2.4–4.7)
POTASSIUM SERPL-MCNC: 3.8 MMOL/L — SIGNIFICANT CHANGE UP (ref 3.5–5.3)
POTASSIUM SERPL-SCNC: 3.8 MMOL/L — SIGNIFICANT CHANGE UP (ref 3.5–5.3)
SARS-COV-2 IGG SERPL QL IA: NEGATIVE — SIGNIFICANT CHANGE UP
SARS-COV-2 IGM SERPL IA-ACNC: <0.1 INDEX — SIGNIFICANT CHANGE UP
SODIUM SERPL-SCNC: 137 MMOL/L — SIGNIFICANT CHANGE UP (ref 135–145)

## 2020-11-19 PROCEDURE — 87040 BLOOD CULTURE FOR BACTERIA: CPT

## 2020-11-19 PROCEDURE — 93880 EXTRACRANIAL BILAT STUDY: CPT

## 2020-11-19 PROCEDURE — 71045 X-RAY EXAM CHEST 1 VIEW: CPT

## 2020-11-19 PROCEDURE — 84480 ASSAY TRIIODOTHYRONINE (T3): CPT

## 2020-11-19 PROCEDURE — 85025 COMPLETE CBC W/AUTO DIFF WBC: CPT

## 2020-11-19 PROCEDURE — 93005 ELECTROCARDIOGRAM TRACING: CPT

## 2020-11-19 PROCEDURE — 96365 THER/PROPH/DIAG IV INF INIT: CPT

## 2020-11-19 PROCEDURE — 80053 COMPREHEN METABOLIC PANEL: CPT

## 2020-11-19 PROCEDURE — 36415 COLL VENOUS BLD VENIPUNCTURE: CPT

## 2020-11-19 PROCEDURE — 84443 ASSAY THYROID STIM HORMONE: CPT

## 2020-11-19 PROCEDURE — 84100 ASSAY OF PHOSPHORUS: CPT

## 2020-11-19 PROCEDURE — 96375 TX/PRO/DX INJ NEW DRUG ADDON: CPT

## 2020-11-19 PROCEDURE — 70450 CT HEAD/BRAIN W/O DYE: CPT

## 2020-11-19 PROCEDURE — 84436 ASSAY OF TOTAL THYROXINE: CPT

## 2020-11-19 PROCEDURE — 85730 THROMBOPLASTIN TIME PARTIAL: CPT

## 2020-11-19 PROCEDURE — 93306 TTE W/DOPPLER COMPLETE: CPT

## 2020-11-19 PROCEDURE — 94640 AIRWAY INHALATION TREATMENT: CPT

## 2020-11-19 PROCEDURE — 99239 HOSP IP/OBS DSCHRG MGMT >30: CPT

## 2020-11-19 PROCEDURE — 83735 ASSAY OF MAGNESIUM: CPT

## 2020-11-19 PROCEDURE — 80162 ASSAY OF DIGOXIN TOTAL: CPT

## 2020-11-19 PROCEDURE — 80048 BASIC METABOLIC PNL TOTAL CA: CPT

## 2020-11-19 PROCEDURE — 94760 N-INVAS EAR/PLS OXIMETRY 1: CPT

## 2020-11-19 PROCEDURE — 86769 SARS-COV-2 COVID-19 ANTIBODY: CPT

## 2020-11-19 PROCEDURE — 96376 TX/PRO/DX INJ SAME DRUG ADON: CPT

## 2020-11-19 PROCEDURE — 81003 URINALYSIS AUTO W/O SCOPE: CPT

## 2020-11-19 PROCEDURE — 99285 EMERGENCY DEPT VISIT HI MDM: CPT | Mod: 25

## 2020-11-19 PROCEDURE — U0003: CPT

## 2020-11-19 PROCEDURE — 84484 ASSAY OF TROPONIN QUANT: CPT

## 2020-11-19 PROCEDURE — 85610 PROTHROMBIN TIME: CPT

## 2020-11-19 RX ORDER — FLUTICASONE PROPIONATE 50 MCG
1 SPRAY, SUSPENSION NASAL
Qty: 0 | Refills: 0 | DISCHARGE
Start: 2020-11-19

## 2020-11-19 RX ORDER — AMIODARONE HYDROCHLORIDE 400 MG/1
1 TABLET ORAL
Qty: 60 | Refills: 0
Start: 2020-11-19 | End: 2020-12-18

## 2020-11-19 RX ORDER — POTASSIUM CHLORIDE 20 MEQ
40 PACKET (EA) ORAL ONCE
Refills: 0 | Status: COMPLETED | OUTPATIENT
Start: 2020-11-19 | End: 2020-11-19

## 2020-11-19 RX ORDER — METOPROLOL TARTRATE 50 MG
1 TABLET ORAL
Qty: 60 | Refills: 0
Start: 2020-11-19 | End: 2020-12-18

## 2020-11-19 RX ORDER — PANTOPRAZOLE SODIUM 20 MG/1
1 TABLET, DELAYED RELEASE ORAL
Qty: 0 | Refills: 0 | DISCHARGE
Start: 2020-11-19

## 2020-11-19 RX ORDER — BUMETANIDE 0.25 MG/ML
1 INJECTION INTRAMUSCULAR; INTRAVENOUS
Qty: 60 | Refills: 0
Start: 2020-11-19 | End: 2020-12-18

## 2020-11-19 RX ORDER — CARVEDILOL PHOSPHATE 80 MG/1
1 CAPSULE, EXTENDED RELEASE ORAL
Qty: 0 | Refills: 0 | DISCHARGE

## 2020-11-19 RX ADMIN — SPIRONOLACTONE 25 MILLIGRAM(S): 25 TABLET, FILM COATED ORAL at 05:26

## 2020-11-19 RX ADMIN — ALBUTEROL 2 PUFF(S): 90 AEROSOL, METERED ORAL at 08:31

## 2020-11-19 RX ADMIN — Medication 5 MILLIGRAM(S): at 05:25

## 2020-11-19 RX ADMIN — ALBUTEROL 2 PUFF(S): 90 AEROSOL, METERED ORAL at 03:52

## 2020-11-19 RX ADMIN — AMIODARONE HYDROCHLORIDE 200 MILLIGRAM(S): 400 TABLET ORAL at 05:25

## 2020-11-19 RX ADMIN — Medication 1 TABLET(S): at 08:27

## 2020-11-19 RX ADMIN — Medication 40 MILLIEQUIVALENT(S): at 05:25

## 2020-11-19 RX ADMIN — Medication 25 MILLIGRAM(S): at 05:25

## 2020-11-19 RX ADMIN — Medication 110 MILLIGRAM(S): at 05:26

## 2020-11-19 RX ADMIN — PANTOPRAZOLE SODIUM 40 MILLIGRAM(S): 20 TABLET, DELAYED RELEASE ORAL at 05:26

## 2020-11-19 RX ADMIN — Medication 0.12 MILLIGRAM(S): at 05:25

## 2020-11-19 RX ADMIN — ALBUTEROL 2 PUFF(S): 90 AEROSOL, METERED ORAL at 15:08

## 2020-11-19 RX ADMIN — GABAPENTIN 600 MILLIGRAM(S): 400 CAPSULE ORAL at 05:24

## 2020-11-19 RX ADMIN — BUMETANIDE 1 MILLIGRAM(S): 0.25 INJECTION INTRAMUSCULAR; INTRAVENOUS at 05:25

## 2020-11-19 NOTE — DISCHARGE NOTE PROVIDER - NSDCFUSCHEDAPPT_GEN_ALL_CORE_FT
MAGGIE ELLIOTT S ; 01/08/2021 ; NPP [facility] [address] [facility]  MAGGIE ELLIOTT ; 02/04/2021 ; NPP PulmMed [address] [place] [address]

## 2020-11-19 NOTE — DISCHARGE NOTE PROVIDER - CARE PROVIDER_API CALL
Elen Deng  INTERNAL MEDICINE  [address] [place] [id number]  Phone: [phone]  Fax: [phone]  Follow Up Time: 1-3 days

## 2020-11-19 NOTE — DISCHARGE NOTE PROVIDER - HOSPITAL COURSE
59 y/o male with h/o afib, s/p aicd, copd, cardiomyopathy , sleep apnea and chf was sent in from [place] cardiology office where he went after having a syncopal episode by history for about 16 seconds. As per pt. he was in his office , felt a little dizzy before he passed out on his chair. pt. reports no cp or aicd firing, or palpitations in his chest before the incident. pt' s daughter who works for him was in the office. Pt. later went to his cardiologist dr. Peace office and his aicd was interrogated Torsade rhythm was noted .  Pt. was sent to the ER. pt. did not have urine / fecal loss during the incident, no tongue bite. no motor weakness in any parts of his body. no fall, no HA. pt. stated that he is on prednisone 5 mg po daily for his copd and his wbc count runs high and his pcp is aware. no sob, no wheeze in the chest reported. pt. on and off has some cough, no fever, no sick contact. As per pt. his covid-19 was negative 2 weeks ago. no abd. pain. no n/v/d. pt. had gastric bypass , gastric sleeve in july 2020. pt. also stated that his bp is usually  95/50. pt. also stated that he is on metoprolol 25 mg po bid, in the system he appears to be on coreg.    Patient was admitted for further evaluation. Seen in consultation with cardiology who recommended amiodarone IV infusion which patient was loaded with. Completed and now on amiodarone 200mg bid. Had echo which showed low EF but unchanged as well as mild AS. Patient is ready for discharge    discharge time 34 minutes

## 2020-11-19 NOTE — DISCHARGE NOTE PROVIDER - NSDCMRMEDTOKEN_GEN_ALL_CORE_FT
amiodarone 200 mg oral tablet: 1 tab(s) orally 2 times a day  bumetanide 1 mg oral tablet: 1 tab(s) orally 2 times a day  digoxin 125 mcg (0.125 mg) oral tablet: 1 tab(s) orally once a day  fluticasone 50 mcg/inh nasal spray: 1 spray(s) nasal 2 times a day  fluticasone/umeclidinium/vilanterol 100 mcg-62.5 mcg-25 mcg/inh inhalation powder: 1 puff(s) inhaled once a day  gabapentin 300 mg oral capsule: 1 cap(s) orally 3 times a day  ipratropium-albuterol 0.5 mg-2.5 mg/3 mLinhalation solution: 3 milliliter(s) inhaled every 6 hours  metoprolol succinate 25 mg oral tablet, extended release: 1 tab(s) orally 2 times a day  pantoprazole 40 mg oral delayed release tablet: 1 tab(s) orally once a day (before a meal)  predniSONE 10 mg oral tablet: 1 tab(s) orally once a day  spironolactone 25 mg oral tablet: 0.5 tab(s) orally once a day  Trelegy Ellipta inhalation powder: 1 puff(s) inhaled once a day  Xarelto 20 mg oral tablet: 1 tab(s) orally once a day (in the evening)  please hold today, tomorrow and restart on March 8, 2019

## 2020-11-19 NOTE — PROGRESS NOTE ADULT - ASSESSMENT
In summary pt. is 59 y/o male with h/o afib, s/p aicd, copd, cardiomyopathy and chf was sent in from [place] cardiology office where he went after having a syncopal episode by history for about 16 seconds. found to have torsades episode on interrogation of AICD. Started on amiodarone on admission and now on oral. pending echo

## 2020-11-19 NOTE — PROGRESS NOTE ADULT - PROBLEM SELECTOR PLAN 2
- cardiology following  - now on PO amiodarone  - replace K as needed  - echo pending to rule out new valvular disease

## 2020-11-19 NOTE — PROGRESS NOTE ADULT - SUBJECTIVE AND OBJECTIVE BOX
[place] [facility]ARDIOVASCULAR - [facility], THE HEART CENTER                                   [address] [place] [address]                                                      PHONE: [phone]                                                         FAX: [phone]  [email or web address]  ---------------------------------------------------------------------------------------------------------------------------------    Overnight events/patient complaints:  NAD feeling well today no VT     penicillins (Hives)  shellfish (Pruritus; Rash)    MEDICATIONS  (STANDING):  ALBUTerol    90 MICROgram(s) HFA Inhaler 2 Puff(s) Inhalation every 6 hours  aMIOdarone    Tablet 200 milliGRAM(s) Oral two times a day  buMETAnide 1 milliGRAM(s) Oral two times a day  digoxin     Tablet 0.125 milliGRAM(s) Oral daily  doxycycline IVPB 100 milliGRAM(s) IV Intermittent every 12 hours  doxycycline IVPB      famotidine    Tablet 20 milliGRAM(s) Oral daily  fluticasone propionate 50 MICROgram(s)/spray Nasal Spray 1 Spray(s) Both Nostrils two times a day  gabapentin 600 milliGRAM(s) Oral three times a day  lactobacillus acidophilus 1 Tablet(s) Oral three times a day with meals  metoprolol succinate ER 25 milliGRAM(s) Oral two times a day  pantoprazole    Tablet 40 milliGRAM(s) Oral before breakfast  predniSONE   Tablet 5 milliGRAM(s) Oral daily  rivaroxaban 20 milliGRAM(s) Oral with dinner  spironolactone 25 milliGRAM(s) Oral daily    MEDICATIONS  (PRN):      Vital Signs Last 24 Hrs  T(C): 37.1 (2020 09:31), Max: 37.1 (2020 11:12)  T(F): 98.7 (2020 09:31), Max: 98.8 (2020 11:12)  HR: 73 (:31) (59 - 79)  BP: 103/51 (:31) (94/58 - 132/66)  BP(mean): --  RR: 18 (2020 09:31) (16 - 20)  SpO2: 98% (:31) (92% - 98%)  ICU Vital Signs Last 24 Hrs  MAGGIE DONALDSONMEENA  I&O's Detail    2020 07:01  -  2020 07:00  --------------------------------------------------------  IN:    Amiodarone: 83.5 mL    Oral Fluid: 960 mL  Total IN: 1043.5 mL    OUT:  Total OUT: 0 mL    Total NET: 1043.5 mL        I&O's Summary    2020 07:01  -  2020 07:00  --------------------------------------------------------  IN: 1043.5 mL / OUT: 0 mL / NET: 1043.5 mL      Drug Dosing Weight  MAGGIE DONALDSONMEENA      PHYSICAL EXAM:  General: Appears well developed, well nourished alert and cooperative.  HEENT: Head; normocephalic, atraumatic.  Eyes: Pupils reactive, cornea wnl.  Neck: Supple, no nodes adenopathy, no NVD or carotid bruit or thyromegaly.  CARDIOVASCULAR: Normal S1 and S2, 2/6 murmur, rub, gallop or lift.   LUNGS: No rales, rhonchi or wheeze. Normal breath sounds bilaterally.  ABDOMEN: Soft, nontender without mass or organomegaly. bowel sounds normoactive.  EXTREMITIES: No clubbing, cyanosis or edema. Distal pulses wnl.   SKIN: warm and dry with normal turgor.  NEURO: Alert/oriented x 3/normal motor exam. No pathologic reflexes.    PSYCH: normal affect.        LABS:                        12.6   13.66 )-----------( 285      ( 2020 04:16 )             39.1     11-    137  |  96<L>  |  12.0  ----------------------------<  103<H>  3.8   |  28.0  |  1.02    Ca    8.8      2020 04:15  Phos  3.6     -  Mg     2.4         TPro  7.0  /  Alb  3.6  /  TBili  0.5  /  DBili  x   /  AST  23  /  ALT  16  /  AlkPhos  47  -    MAGGIE ELLIOTT  CARDIAC MARKERS ( 2020 04:16 )  x     / 0.03 ng/mL / x     / x     / x      CARDIAC MARKERS ( 2020 19:52 )  x     / 0.03 ng/mL / x     / x     / x      CARDIAC MARKERS ( 2020 13:42 )  x     / 0.03 ng/mL / x     / x     / x          PT/INR - ( 2020 19:51 )   PT: 20.9 sec;   INR: 1.86 ratio         PTT - ( 2020 19:51 )  PTT:32.9 sec  Urinalysis Basic - ( 2020 06:08 )    Color: Yellow / Appearance: Clear / S.010 / pH: x  Gluc: x / Ketone: Negative  / Bili: Negative / Urobili: Negative mg/dL   Blood: x / Protein: Negative mg/dL / Nitrite: Negative   Leuk Esterase: Negative / RBC: x / WBC x   Sq Epi: x / Non Sq Epi: x / Bacteria: x        RADIOLOGY & ADDITIONAL STUDIES:    INTERPRETATION OF TELEMETRY (personally reviewed):      ASSESSMENT AND PLAN:  In summary, MAGGIE ELLIOTT is an 60y Male with past medical history significant for nonischemic CMP LVEF 35% S/P CardioMEMS chronic AF on AC ROCK , COPD S/P BARIATRIC SURGERY s/p shock VT negative for AMI      Plan  1.  Awaiting TTE to re-evaluate LV EF and AS   2.  Continue Amiodarone therapy 200 mg po BID   3.  K > 4 and Mag > 2.0

## 2020-11-19 NOTE — PROGRESS NOTE ADULT - SUBJECTIVE AND OBJECTIVE BOX
CC: syncopy (18 Nov 2020 11:17)    INTERVAL HPI/OVERNIGHT EVENTS:  no acute events overnight  a-fib with pvc but no other arrythmia  awaiting echo    ICU Vital Signs Last 24 Hrs  T(C): 37.1 (19 Nov 2020 09:31), Max: 37.1 (19 Nov 2020 09:31)  T(F): 98.7 (19 Nov 2020 09:31), Max: 98.7 (19 Nov 2020 09:31)  HR: 73 (19 Nov 2020 09:31) (59 - 79)  BP: 103/51 (19 Nov 2020 09:31) (94/58 - 132/66)  BP(mean): --  ABP: --  ABP(mean): --  RR: 18 (19 Nov 2020 09:31) (16 - 18)  SpO2: 98% (19 Nov 2020 09:31) (92% - 98%)    PHYSICAL EXAM:  General: Well developed; in no acute distress; obese  Eyes: PERRLA, EOMI; conjunctiva and sclera clear  Head: Normocephalic; atraumatic  Neck: Supple; non tender; no masses  Respiratory: No wheezes, rales or rhonchi  Cardiovascular: Regular rate and rhythm. S1 and S2 Normal; No murmurs, gallops or rubs  Gastrointestinal: Soft non-tender non-distended; Normal bowel sounds  Genitourinary: No costovertebral angle tenderness  Extremities: Normal range of motion, No clubbing, cyanosis or edema  Vascular: Peripheral pulses palpable 2+ bilaterally  Skin: Warm and dry. No acute rash  Musculoskeletal: Normal gait, tone, without deformities  Psychiatric: Cooperative and appropriate                          12.6   13.66 )-----------( 285      ( 18 Nov 2020 04:16 )             39.1     11-19    137  |  96<L>  |  12.0  ----------------------------<  103<H>  3.8   |  28.0  |  1.02    Ca    8.8      19 Nov 2020 04:15  Phos  3.6     11-19  Mg     2.4     11-19    TPro  7.0  /  Alb  3.6  /  TBili  0.5  /  DBili  x   /  AST  23  /  ALT  16  /  AlkPhos  47  11-17               MEDICATIONS  (STANDING):  ALBUTerol    90 MICROgram(s) HFA Inhaler 2 Puff(s) Inhalation every 6 hours  aMIOdarone Infusion 1 mG/Min (33.3 mL/Hr) IV Continuous <Continuous>  buMETAnide 1 milliGRAM(s) Oral two times a day  digoxin     Tablet 0.125 milliGRAM(s) Oral daily  doxycycline IVPB 100 milliGRAM(s) IV Intermittent every 12 hours  doxycycline IVPB      gabapentin 600 milliGRAM(s) Oral three times a day  lactobacillus acidophilus 1 Tablet(s) Oral three times a day with meals  metoprolol succinate ER 25 milliGRAM(s) Oral two times a day  potassium chloride    Tablet ER 40 milliEquivalent(s) Oral every 4 hours  predniSONE   Tablet 5 milliGRAM(s) Oral daily  rivaroxaban 20 milliGRAM(s) Oral with dinner  sodium chloride 0.45%. 500 milliLiter(s) (100 mL/Hr) IV Continuous <Continuous>  spironolactone 25 milliGRAM(s) Oral daily    MEDICATIONS  (PRN):      RADIOLOGY & ADDITIONAL TESTS:

## 2020-11-19 NOTE — DISCHARGE NOTE PROVIDER - NSDCCPCAREPLAN_GEN_ALL_CORE_FT
PRINCIPAL DISCHARGE DIAGNOSIS  Diagnosis: Other cardiac arrhythmia  Assessment and Plan of Treatment: Patient with torsades which resulted in syncopal event at home. now stable. on amiodarone, no new structural heart disease. follow up with your cardiologist. if any chest pain or lightheadeness or discharge from AICD return to the hospital immediately.

## 2020-12-22 RX ORDER — IPRATROPIUM BROMIDE AND ALBUTEROL SULFATE 2.5; .5 MG/3ML; MG/3ML
0.5-2.5 (3) SOLUTION RESPIRATORY (INHALATION)
Qty: 1080 | Refills: 1 | Status: ACTIVE | COMMUNITY
Start: 2019-01-22 | End: 1900-01-01

## 2021-01-08 ENCOUNTER — APPOINTMENT (OUTPATIENT)
Dept: ORTHOPEDIC SURGERY | Facility: CLINIC | Age: 61
End: 2021-01-08
Payer: COMMERCIAL

## 2021-01-08 VITALS
HEIGHT: 66 IN | HEART RATE: 82 BPM | DIASTOLIC BLOOD PRESSURE: 68 MMHG | SYSTOLIC BLOOD PRESSURE: 98 MMHG | BODY MASS INDEX: 41.78 KG/M2 | WEIGHT: 260 LBS

## 2021-01-08 PROBLEM — Z95.810 PRESENCE OF AUTOMATIC (IMPLANTABLE) CARDIAC DEFIBRILLATOR: Chronic | Status: ACTIVE | Noted: 2020-11-17

## 2021-01-08 PROCEDURE — 99214 OFFICE O/P EST MOD 30 MIN: CPT | Mod: 25

## 2021-01-08 PROCEDURE — 20610 DRAIN/INJ JOINT/BURSA W/O US: CPT | Mod: 50

## 2021-01-08 PROCEDURE — 99072 ADDL SUPL MATRL&STAF TM PHE: CPT

## 2021-01-08 NOTE — HISTORY OF PRESENT ILLNESS
[Pain Location] : pain [Stable] : stable [3] : a current pain level of 3/10 [de-identified] : 60 year old male presents for follow up of bilateral knee pain, left worse than right, secondary to end-stage OA. \par pt report an incidence of severe pain in the left knee while walking at [facility]. he could not walk and troubled to walk back to car.\par he has been getting good pain relief with cortisone injection.\par He has a history of CHF with exacerbation resulting in hospitalizations at [facility] in the past. He has an oxygen tank at home but does not use it. \par he recently had PPM insertion. seeing cardiologist. pt is on Xarelto. pt is working\par he had gastric bypass and lost 70 lbs

## 2021-01-08 NOTE — END OF VISIT
[FreeTextEntry3] : I, Yunier Martinez, acted solely as a scribe for Dr. Bassem Rao on this date 01/08/2021.

## 2021-01-08 NOTE — PHYSICAL EXAM
[Antalgic] : antalgic [LE] : Sensory: Intact in bilateral lower extremities [ALL] : dorsalis pedis, posterior tibial, femoral, popliteal, and radial 2+ and symmetric bilaterally [Normal] : Alert and in no acute distress [Poor Appearance] : well-appearing [de-identified] : GENERAL APPEARANCE:   Well nourished and hydrated, pleasant, alert, and oriented x 3.  Appears their stated age.  \par Respiratory: No wheeezing, breath sounds clear. No cyanosis, no us of accesory musculature \par CARDIOVASCULAR:   No apparent abnormalities.  No lower leg edema.  No varicosities.  Pedal pulses are palpable.\par NEUROLOGIC:   Sensation is normal, no muscle weakness in the upper or lower extremities.\par DERMATOLOGIC:   No apparent skin lesions, moist, warm, no rash.\par SPINE:   Cervical spine appears normal and moves freely; thoracic spine appears normal and moves freely; lumbosacral spine appears normal and moves freely, normal, nontender.\par MUSCULOSKELETAL:   Hands, wrists, and elbows are normal and move freely, shoulders are normal and move freely. \par  [de-identified] : Both knees have marked valgus deformity he has an antalgic gait his range of motion is preserved 0-115

## 2021-01-08 NOTE — DISCUSSION/SUMMARY
[de-identified] : 60 year old male presents for follow up of bilateral knee pain, left worse than right, secondary to end-stage OA. He is a future candidate for staged bilateral total knee arthroplasty.His pain is stable. He has been getting good pain relief with cortisone injection. He has a history of CHF with exacerbation resulting in hospitalizations at [facility] in the past. He has an oxygen tank at home but does not use it. He now has PPM and seeing a cardiologist. Pt is on Xarelto. Pt opted to receive a cortisone injection in the b/l knee today and tolerated it well. F/u with us in three months for repeat cortisone injections if needed. \par \par \par The patient is a 60 year old individual with end stage arthritis of their bilateral knee joint. Based upon the patient's continued symptoms and failure to respond to conservative treatment I have recommended a staged bilateral total knee arthroplasty for this patient. A long discussion took place with the patient describing what a total joint replacement is and what the procedure would entail. A total knee arthroplasty model, similar to the implant that will be used during the operation, was utilized to demonstrate and to discuss the various bearing surfaces of the implants. The hospitalization and post-operative care and rehabilitation were also discussed. The use of perioperative antibiotics and DVT prophylaxis were discussed. The risk, benefits and alternatives to a surgical intervention were discussed at length with the patient.  The patient was also advised of risks related to the medical comorbidities, elevated body mass index (BMI),  and smoking where applicable.  We discussed how to reduce modifiable risk factors and encouraged smoking cessation were applicable.. A lengthy discussion took place to review the most common complications including but not limited to: deep vein thrombosis, pulmonary embolus, heart attack, stroke, infection, wound breakdown, numbness, damage to nerves, tendon, muscles, arteries or other blood vessels, death and other possible complications from anesthesia. The patient was told that we will take steps to minimize these risks by using sterile technique, antibiotics and DVT prophylaxis when appropriate and follow the patient postoperatively in the office setting to monitor progress. The possibility of recurrent pain, no improvement in pain and actual worsening of pain were also discussed with the patient.\par The discharge plan of care focused on the patient going home following surgery. The patient was encouraged to make the necessary arrangements to have someone stay with them when they are discharged home. Following discharge, a home care nurse will visit the patient. The home care nurse will open your home care case and request home physical therapy services. Home physical therapy will commence following discharge provided it is appropriate and covered by the health insurance benefit plan. \par The benefits of surgery were discussed with the patient including the potential for improving his/her current clinical condition through operative intervention. Alternatives to surgical intervention including continued conservative management were also discussed in detail. All questions were answered to the satisfaction of the patient. The treatment plan of care, as well as a model of a total knee arthroplasty equivalent to the one that will be used for their total joint replacement, was shared with the patient. The patient agreed to the plan of care as well as the use of implants in their total joint replacement.

## 2021-01-27 NOTE — PATIENT PROFILE ADULT - [id number]_A_NUR
Detail Level: Generalized Quality 226: Preventive Care And Screening: Tobacco Use: Screening And Cessation Intervention: Patient screened for tobacco use and is an ex/non-smoker none

## 2021-02-04 ENCOUNTER — APPOINTMENT (OUTPATIENT)
Dept: PULMONOLOGY | Facility: CLINIC | Age: 61
End: 2021-02-04
Payer: COMMERCIAL

## 2021-02-04 VITALS
BODY MASS INDEX: 40.34 KG/M2 | WEIGHT: 257 LBS | HEIGHT: 67 IN | HEART RATE: 98 BPM | OXYGEN SATURATION: 95 % | TEMPERATURE: 98.7 F | DIASTOLIC BLOOD PRESSURE: 60 MMHG | RESPIRATION RATE: 15 BRPM | SYSTOLIC BLOOD PRESSURE: 100 MMHG

## 2021-02-04 PROCEDURE — 99214 OFFICE O/P EST MOD 30 MIN: CPT

## 2021-02-04 PROCEDURE — 99072 ADDL SUPL MATRL&STAF TM PHE: CPT

## 2021-02-04 NOTE — ASSESSMENT
[FreeTextEntry1] : Patient with COPD with occasional congestion and shortness of breath. He remains on prednisone 5 mg daily. We will repeat pulmonary function studies to see if there is anything active going on.\par \par Severe sleep apnea doing better with weight loss. Medium pressure still above the minimum set pressure.\par \par Concerned regarding elevated LFTs and bilirubin. GI workup is pending.\par \par Followup in 3 months. [Follow-Up Visit] : a follow-up visit for [Morbid Obesity (BMI>40)] : morbid obesity (bmi>40) [S/P Bariatric Surgery] : s/p bariatric surgery

## 2021-02-04 NOTE — PHYSICAL EXAM
[Normal Conjunctiva] : the conjunctiva exhibited no abnormalities [Eyelids - No Xanthelasma] : the eyelids demonstrated no xanthelasmas [Low Lying Soft Palate] : low lying soft palate [Elongated Uvula] : elongated uvula [Enlarged Base of the Tongue] : enlargement of the base of the tongue [IV] : IV [Neck Appearance] : the appearance of the neck was normal [Neck Cervical Mass (___cm)] : no neck mass was observed [Jugular Venous Distention Increased] : there was no jugular-venous distention [Thyroid Diffuse Enlargement] : the thyroid was not enlarged [Thyroid Nodule] : there were no palpable thyroid nodules [Neck Circumference: ___] : neck circumference is [unfilled] [Heart Rate And Rhythm] : heart rate and rhythm were normal [Heart Sounds] : normal S1 and S2 [Murmurs] : no murmurs present [Respiration, Rhythm And Depth] : normal respiratory rhythm and effort [Exaggerated Use Of Accessory Muscles For Inspiration] : no accessory muscle use [Auscultation Breath Sounds / Voice Sounds] : lungs were clear to auscultation bilaterally [Chest Palpation] : palpation of the chest revealed no abnormalities [Lungs Percussion] : the lungs were normal to percussion [FreeTextEntry1] : few rhonchi [Abdomen Soft] : soft [Abdomen Tenderness] : non-tender [Abdomen Mass (___ Cm)] : no abdominal mass palpated [Abnormal Walk] : normal gait [Gait - Sufficient For Exercise Testing] : the gait was sufficient for exercise testing [1+ Pitting] : 1+  pitting [Deep Tendon Reflexes (DTR)] : deep tendon reflexes were 2+ and symmetric [Sensation] : the sensory exam was normal to light touch and pinprick [No Focal Deficits] : no focal deficits [Impaired Insight] : insight and judgment were intact [Oriented To Time, Place, And Person] : oriented to person, place, and time [Affect] : the affect was normal [Skin Color & Pigmentation] : normal skin color and pigmentation [Skin Turgor] : normal skin turgor [] : no rash

## 2021-02-04 NOTE — HISTORY OF PRESENT ILLNESS
[TextBox_4] : Patient has lost another 10 pounds since the last visit. His LFTs are up including bilirubin. He has been referred to GI for this. Continues to be compliant with CPAP. Has had some increased sputum in the mornings. He saw ENT who ordered a barium swallow. Denies any increase in shortness of breath. [Obstructive Sleep Apnea] : obstructive sleep apnea [APAP:] : APAP [TextBox_125] : 8-20 [TextBox_127] : 1/21 [TextBox_129] : 2/21 [TextBox_133] : 100 [TextBox_137] : 93 [TextBox_141] : 5 [TextBox_143] : 55 [TextBox_147] : 3.4 [TextBox_165] : Median pressure 8.3, max 10.4

## 2021-02-16 ENCOUNTER — APPOINTMENT (OUTPATIENT)
Dept: DISASTER EMERGENCY | Facility: CLINIC | Age: 61
End: 2021-02-16

## 2021-02-19 ENCOUNTER — APPOINTMENT (OUTPATIENT)
Dept: PULMONOLOGY | Facility: CLINIC | Age: 61
End: 2021-02-19

## 2021-04-07 ENCOUNTER — APPOINTMENT (OUTPATIENT)
Dept: ORTHOPEDIC SURGERY | Facility: CLINIC | Age: 61
End: 2021-04-07
Payer: COMMERCIAL

## 2021-04-07 VITALS
WEIGHT: 257 LBS | SYSTOLIC BLOOD PRESSURE: 100 MMHG | HEIGHT: 67 IN | HEART RATE: 99 BPM | DIASTOLIC BLOOD PRESSURE: 67 MMHG | BODY MASS INDEX: 40.34 KG/M2 | TEMPERATURE: 98.3 F

## 2021-04-07 PROCEDURE — 99214 OFFICE O/P EST MOD 30 MIN: CPT | Mod: 25

## 2021-04-07 PROCEDURE — 20610 DRAIN/INJ JOINT/BURSA W/O US: CPT | Mod: 50

## 2021-04-07 PROCEDURE — 99072 ADDL SUPL MATRL&STAF TM PHE: CPT

## 2021-04-07 NOTE — PROCEDURE
[de-identified] : Received bilateral knee cortisone injections.\par \par I discussed at length with the patient the planned steroid and lidocaine injection. The risks, benefits, convalescence and alternatives were reviewed. The possible side effects discussed included but were not limited to: pain, swelling, heat, bleeding, and redness. Symptoms are generally mild but if they are extensive then contact the office. Giving pain relievers by mouth such as NSAIDs or Tylenol can generally treat the reactions to steroid and lidocaine. Rare cases of infection have been noted. Rash, hives and itching may occur post injection. If you have muscle pain or cramps, flushing and or swelling of the face, rapid heart beat, nausea, dizziness, fever, chills, headache, difficulty breathing, swelling in the arms or legs, or have a prickly feeling of your skin, contact a health care provider immediately. Following this discussion, the knee was prepped with Alcohol and under sterile condition the 80 mg Depo-Medrol and 6 cc Lidocaine injection was performed with a 20 gauge needle through a superolateral injection site. The needle was introduced into the joint, aspiration was performed to ensure intra-articular placement and the medication was injected. Upon withdrawal of the needle the site was cleaned with alcohol and a band aid applied. The patient tolerated the injection well and there were no adverse effects. Post injection instructions included no strenuous activity for 24 hours, cryotherapy and if there are any adverse effects to contact the office.

## 2021-04-07 NOTE — HISTORY OF PRESENT ILLNESS
[Pain Location] : pain [Stable] : stable [6] : a current pain level of 6/10 [2] : a minimum pain level of 2/10 [8] : a maximum pain level of 8/10 [Bending] : worsened by bending [Walking] : worsened by walking [Knee Flexion] : worsened with knee flexion [Rest] : relieved by rest [de-identified] : 60 year old male presents for follow up of bilateral knee pain, left worse than right, secondary to end-stage OA. The initial incidence occurred while he was walking at [employer]. Most recently, he banged his knees due to a fall that occurred after he had his gallstones removed. He notes difficulty with walking due to the pain. He has been getting good pain relief with cortisone injection. He has a history of CHF with exacerbation resulting in hospitalizations at [facility] in the past. He has an oxygen tank at home but does not use it. He recently had PPM insertion. seeing cardiologist. pt is on Xarelto. Pt is working. He had gastric bypass. [de-identified] : cortisone injections

## 2021-04-07 NOTE — DISCUSSION/SUMMARY
[de-identified] : 60 year old male presents for follow up of bilateral knee pain, left worse than right, secondary to end-stage osteoarthritis. He is a candidate for staged bilateral total knee arthroplasty when he fails conservative therapies. Currently, his pain has been stable due to intermittent cortisone injections. He has a history of CHF with exacerbation resulting in hospitalizations at [facility] in the past. He has an oxygen tank at home but does not use it. He now has PPM and seeing a cardiologist. Pt is on Xarelto. Pt opted to receive a cortisone injection in the b/l knee today and tolerated it well. F/u with us in three months for repeat cortisone injections if needed. \par \par \par The patient has been counseled regarding the elevated risks associated with surgical complications in patients with a BMI> 35. The patient demonstrates an understanding of the increased risk. After a lengthy discussion, the patient agreed to make a coordinated effort at weight loss prior to surgical intervention. The patient understands our BMI policy and they will make a conscious effort to improve their BMI. \par \par The patient is a 60 year old individual with end stage arthritis of their bilateral knee joint. Based upon the patient's continued symptoms and failure to respond to conservative treatment I have recommended a staged bilateral total knee arthroplasty for this patient. A long discussion took place with the patient describing what a total joint replacement is and what the procedure would entail. A total knee arthroplasty model, similar to the implant that will be used during the operation, was utilized to demonstrate and to discuss the various bearing surfaces of the implants. The hospitalization and post-operative care and rehabilitation were also discussed. The use of perioperative antibiotics and DVT prophylaxis were discussed. The risk, benefits and alternatives to a surgical intervention were discussed at length with the patient.  The patient was also advised of risks related to the medical comorbidities, elevated body mass index (BMI),  and smoking where applicable.  We discussed how to reduce modifiable risk factors and encouraged smoking cessation were applicable.. A lengthy discussion took place to review the most common complications including but not limited to: deep vein thrombosis, pulmonary embolus, heart attack, stroke, infection, wound breakdown, numbness, damage to nerves, tendon, muscles, arteries or other blood vessels, death and other possible complications from anesthesia. The patient was told that we will take steps to minimize these risks by using sterile technique, antibiotics and DVT prophylaxis when appropriate and follow the patient postoperatively in the office setting to monitor progress. The possibility of recurrent pain, no improvement in pain and actual worsening of pain were also discussed with the patient.\par The discharge plan of care focused on the patient going home following surgery. The patient was encouraged to make the necessary arrangements to have someone stay with them when they are discharged home. Following discharge, a home care nurse will visit the patient. The home care nurse will open your home care case and request home physical therapy services. Home physical therapy will commence following discharge provided it is appropriate and covered by the health insurance benefit plan. \par The benefits of surgery were discussed with the patient including the potential for improving his/her current clinical condition through operative intervention. Alternatives to surgical intervention including continued conservative management were also discussed in detail. All questions were answered to the satisfaction of the patient. The treatment plan of care, as well as a model of a total knee arthroplasty equivalent to the one that will be used for their total joint replacement, was shared with the patient. The patient agreed to the plan of care as well as the use of implants in their total joint replacement.

## 2021-04-07 NOTE — PHYSICAL EXAM
[Antalgic] : antalgic [LE] : Sensory: Intact in bilateral lower extremities [ALL] : dorsalis pedis, posterior tibial, femoral, popliteal, and radial 2+ and symmetric bilaterally [Normal] : Alert and in no acute distress [Obese] : obese [Poor Appearance] : well-appearing [de-identified] : GENERAL APPEARANCE:   Well nourished and hydrated, pleasant, alert, and oriented x 3.  Appears their stated age.  \par Respiratory: No wheeezing, breath sounds clear. No cyanosis, no us of accesory musculature \par CARDIOVASCULAR:   No apparent abnormalities.  No lower leg edema.  No varicosities.  Pedal pulses are palpable.\par NEUROLOGIC:   Sensation is normal, no muscle weakness in the upper or lower extremities.\par DERMATOLOGIC:   No apparent skin lesions, moist, warm, no rash.\par SPINE:   Cervical spine appears normal and moves freely; thoracic spine appears normal and moves freely; lumbosacral spine appears normal and moves freely, normal, nontender.\par MUSCULOSKELETAL:   Hands, wrists, and elbows are normal and move freely, shoulders are normal and move freely. \par  [de-identified] : Both knees have marked valgus deformity he has an antalgic gait his range of motion is preserved 0-115

## 2021-04-07 NOTE — END OF VISIT
[FreeTextEntry3] : I, Yunier Martinez, acted solely as a scribe for Dr. Bassem Rao on this date 04/07/2021.

## 2021-05-04 ENCOUNTER — APPOINTMENT (OUTPATIENT)
Dept: PULMONOLOGY | Facility: CLINIC | Age: 61
End: 2021-05-04
Payer: COMMERCIAL

## 2021-05-04 VITALS
BODY MASS INDEX: 39.47 KG/M2 | SYSTOLIC BLOOD PRESSURE: 115 MMHG | WEIGHT: 252 LBS | DIASTOLIC BLOOD PRESSURE: 70 MMHG | RESPIRATION RATE: 16 BRPM | OXYGEN SATURATION: 98 % | HEART RATE: 86 BPM

## 2021-05-04 PROCEDURE — 99214 OFFICE O/P EST MOD 30 MIN: CPT

## 2021-05-04 PROCEDURE — 99072 ADDL SUPL MATRL&STAF TM PHE: CPT

## 2021-05-04 NOTE — HISTORY OF PRESENT ILLNESS
[TextBox_4] : Patient was hospitalized with cholecystitis and required cholecystectomy. He has been continued to be faithful with his CPAP machine. He does not feel over pressure. Has been some increased shortness of breath recently and he has a rescue inhaler that he's used once or twice daily. His weight loss has plateaued for a while at about 120 pounds.

## 2021-05-04 NOTE — ASSESSMENT
[FreeTextEntry1] : Patient with COPD/asthma with some increased symptomatology although his lungs are clear and his saturations are good. We will get spirometry on him to see if anything else is going on.\par \par Severe sleep apnea remains on CPAP. Good compliance and benefit. The patient has lost significant weight after bariatric surgery but now status post cholecystectomy as well. Followup in 6 weeks after the pulmonary function studies.

## 2021-05-04 NOTE — PHYSICAL EXAM
[Normal Conjunctiva] : the conjunctiva exhibited no abnormalities [Eyelids - No Xanthelasma] : the eyelids demonstrated no xanthelasmas [Low Lying Soft Palate] : low lying soft palate [Elongated Uvula] : elongated uvula [Enlarged Base of the Tongue] : enlargement of the base of the tongue [IV] : IV [Neck Appearance] : the appearance of the neck was normal [Neck Cervical Mass (___cm)] : no neck mass was observed [Jugular Venous Distention Increased] : there was no jugular-venous distention [Thyroid Diffuse Enlargement] : the thyroid was not enlarged [Thyroid Nodule] : there were no palpable thyroid nodules [Neck Circumference: ___] : neck circumference is [unfilled] [Heart Rate And Rhythm] : heart rate and rhythm were normal [Heart Sounds] : normal S1 and S2 [Murmurs] : no murmurs present [Respiration, Rhythm And Depth] : normal respiratory rhythm and effort [Exaggerated Use Of Accessory Muscles For Inspiration] : no accessory muscle use [Auscultation Breath Sounds / Voice Sounds] : lungs were clear to auscultation bilaterally [Chest Palpation] : palpation of the chest revealed no abnormalities [Lungs Percussion] : the lungs were normal to percussion [FreeTextEntry1] : few rhonchi [Abdomen Tenderness] : non-tender [Abdomen Soft] : soft [Abdomen Mass (___ Cm)] : no abdominal mass palpated [Abnormal Walk] : normal gait [Gait - Sufficient For Exercise Testing] : the gait was sufficient for exercise testing [1+ Pitting] : 1+  pitting [Deep Tendon Reflexes (DTR)] : deep tendon reflexes were 2+ and symmetric [Sensation] : the sensory exam was normal to light touch and pinprick [No Focal Deficits] : no focal deficits [Oriented To Time, Place, And Person] : oriented to person, place, and time [Impaired Insight] : insight and judgment were intact [Affect] : the affect was normal [Skin Color & Pigmentation] : normal skin color and pigmentation [Skin Turgor] : normal skin turgor [] : no rash

## 2021-06-05 ENCOUNTER — APPOINTMENT (OUTPATIENT)
Dept: DISASTER EMERGENCY | Facility: CLINIC | Age: 61
End: 2021-06-05

## 2021-06-07 ENCOUNTER — APPOINTMENT (OUTPATIENT)
Dept: ORTHOPEDIC SURGERY | Facility: CLINIC | Age: 61
End: 2021-06-07
Payer: COMMERCIAL

## 2021-06-07 VITALS
HEART RATE: 84 BPM | DIASTOLIC BLOOD PRESSURE: 57 MMHG | HEIGHT: 67 IN | SYSTOLIC BLOOD PRESSURE: 89 MMHG | WEIGHT: 252 LBS | BODY MASS INDEX: 39.55 KG/M2

## 2021-06-07 DIAGNOSIS — M17.11 UNILATERAL PRIMARY OSTEOARTHRITIS, RIGHT KNEE: ICD-10-CM

## 2021-06-07 DIAGNOSIS — M17.12 UNILATERAL PRIMARY OSTEOARTHRITIS, LEFT KNEE: ICD-10-CM

## 2021-06-07 LAB — SARS-COV-2 N GENE NPH QL NAA+PROBE: NOT DETECTED

## 2021-06-07 PROCEDURE — 99214 OFFICE O/P EST MOD 30 MIN: CPT | Mod: 25

## 2021-06-07 PROCEDURE — 99072 ADDL SUPL MATRL&STAF TM PHE: CPT

## 2021-06-07 PROCEDURE — 20610 DRAIN/INJ JOINT/BURSA W/O US: CPT

## 2021-06-07 NOTE — DISCUSSION/SUMMARY
[Medication Risks Reviewed] : Medication risks reviewed [Surgical risks reviewed] : Surgical risks reviewed [de-identified] : 61 year old male presents for follow up of bilateral knee pain, left worse than right, secondary to end-stage osteoarthritis. He is a candidate for staged bilateral total knee arthroplasty when he fails conservative therapies. His pain fluctuates. He had a lot of pain several weeks ago, but has minimal pain today. He is interested in pursuing the left TKA in the Fall of 2021. He is aware and understands the risk of infection and developing a foot drop post-op due to his valgus deformity. He has a history of CHF with exacerbation resulting in hospitalizations at [facility] in the past. He has an oxygen tank at home, but does not use it. He now has PPM and is seeing a cardiologist. Pt is on Xarelto. He states that he received cardiac clearance. Pt opted to receive a cortisone injection in the b/l knee today and tolerated it well. He understands that he must wait at least three months after receiving a left knee cortisone injection to have the left TKA. F/u with us in three months for repeat cortisone injections if needed. He will schedule the left TKA today.\par \par \par The patient has been counseled regarding the elevated risks associated with surgical complications in patients with a BMI> 35. The patient demonstrates an understanding of the increased risk. After a lengthy discussion, the patient agreed to make a coordinated effort at weight loss prior to surgical intervention. The patient understands our BMI policy and they will make a conscious effort to improve their BMI. \par \par The patient is a 61 year individual with end stage arthritis of their b/l knee joint. Based upon the patient's continued symptoms and failure to respond to conservative treatment I have recommended a staged b/l total knee arthroplasty for this patient. A long discussion took place with the patient describing what a total joint replacement is and what the procedure would entail. A total knee arthroplasty model, similar to the implant that was used during the operation, was utilized to demonstrate and to discuss the various bearing surfaces of the implants. The hospitalization and post-operative care and rehabilitation were also discussed. The use of perioperative antibiotics and DVT prophylaxis were discussed. The risk, benefits and alternatives to a surgical intervention were discussed at length with the patient. The patient was also advised of risks related to the medical comorbidities, elevated body mass index (BMI), and smoking where applicable. We discussed how to reduce modifiable risk factors and encouraged smoking cessation were applicable.. A lengthy discussion took place to review the most common complications including but not limited to: deep vein thrombosis, pulmonary embolus, heart attack, stroke, infection, wound breakdown, numbness, damage to nerves, tendon, muscles, arteries or other blood vessels, death and other possible complications from anesthesia. The patient was told that we will take steps to minimize these risks by using sterile technique, antibiotics and DVT prophylaxis when appropriate and follow the patient postoperatively in the office setting to monitor progress. The possibility of recurrent pain, no improvement in pain and actual worsening of pain were also discussed with the patient.\par The discharge plan of care focused on the patient going home following surgery. The patient was encouraged to make the necessary arrangements to have someone stay with them when they are discharged home. Following discharge, a home care nurse was to the patient. The home care nurse would open the patient’s home care case and request home physical therapy services. Home physical therapy was to commence following discharge provided it was appropriate and covered by the health insurance benefit plan. \par The benefits of surgery were discussed with the patient including the potential for improving his current clinical condition through operative intervention. Alternatives to surgical intervention including continued conservative management were also discussed in detail. All questions were answered to the satisfaction of the patient. The treatment plan of care, as well as a model of a total knee arthroplasty equivalent to the one that will be used for their total joint replacement, was shared with the patient. The patient agreed to the plan of care as well as the use of implants in their total joint replacement.

## 2021-06-07 NOTE — PHYSICAL EXAM
[Antalgic] : antalgic [LE] : Sensory: Intact in bilateral lower extremities [ALL] : dorsalis pedis, posterior tibial, femoral, popliteal, and radial 2+ and symmetric bilaterally [Normal] : Alert and in no acute distress [Obese] : obese [Poor Appearance] : well-appearing [de-identified] : GENERAL APPEARANCE: Well nourished and hydrated, pleasant, alert, and oriented x 3. Appears their stated age. \par HEENT: Normocephalic, extraocular eye motion intact. Nasal septum midline. Oral cavity clear. External auditory canal clear. \par RESPIRATORY: Breath sounds clear and audible in all lobes. No wheezing, No accessory muscle use.\par CARDIOVASCULAR: No apparent abnormalities. No lower leg edema. No varicosities. Pedal pulses are palpable.\par NEUROLOGIC: Sensation is normal, no muscle weakness in the upper or lower extremities.\par DERMATOLOGIC: No apparent skin lesions, moist, warm, no rash.\par SPINE: Cervical spine appears normal and moves freely; thoracic spine appears normal and moves freely; lumbosacral spine appears normal and moves freely, normal, nontender.\par MUSCULOSKELETAL: Hands, wrists, and elbows are normal and move freely, shoulders are normal and move freely.  [de-identified] : Both knees have marked valgus deformity he has an antalgic gait his range of motion is preserved 0-115

## 2021-06-07 NOTE — HISTORY OF PRESENT ILLNESS
[Pain Location] : pain [Stable] : stable [6] : a current pain level of 6/10 [2] : a minimum pain level of 2/10 [8] : a maximum pain level of 8/10 [Bending] : worsened by bending [Walking] : worsened by walking [Knee Flexion] : worsened with knee flexion [Rest] : relieved by rest [de-identified] : 61 year old male presents for follow up of bilateral knee pain, left worse than right, secondary to end-stage OA. The initial incidence occurred while he was walking at 's. About 3-4 weeks ago, he started to experience pain in the knee which caused the knee to buckle. He has fallen because of it. He notes that about 1.5 weeks ago, his pain stopped. He has been getting good pain relief with cortisone injection. He has a history of CHF with exacerbation resulting in hospitalizations at [facility] in the past. He has an oxygen tank at home but does not use it. He recently had PPM insertion. He is seeing cardiologist and has clearance. Pt is on Xarelto. Pt is working. He had gastric bypass. He is getting his teeth removed and dentures placed on 7/18/2021. [de-identified] : cortisone injections

## 2021-06-07 NOTE — PROCEDURE
[de-identified] : Received bilateral knee cortisone injections.\par \par I discussed at length with the patient the planned steroid and lidocaine injection. The risks, benefits, convalescence and alternatives were reviewed. The possible side effects discussed included but were not limited to: pain, swelling, heat, bleeding, and redness. Symptoms are generally mild but if they are extensive then contact the office. Giving pain relievers by mouth such as NSAIDs or Tylenol can generally treat the reactions to steroid and lidocaine. Rare cases of infection have been noted. Rash, hives and itching may occur post injection. If you have muscle pain or cramps, flushing and or swelling of the face, rapid heart beat, nausea, dizziness, fever, chills, headache, difficulty breathing, swelling in the arms or legs, or have a prickly feeling of your skin, contact a health care provider immediately. Following this discussion, the knee was prepped with Alcohol and under sterile condition the 80 mg Depo-Medrol and 6 cc Lidocaine injection was performed with a 20 gauge needle through a superolateral injection site. The needle was introduced into the joint, aspiration was performed to ensure intra-articular placement and the medication was injected. Upon withdrawal of the needle the site was cleaned with alcohol and a band aid applied. The patient tolerated the injection well and there were no adverse effects. Post injection instructions included no strenuous activity for 24 hours, cryotherapy and if there are any adverse effects to contact the office.

## 2021-06-08 ENCOUNTER — APPOINTMENT (OUTPATIENT)
Dept: PULMONOLOGY | Facility: CLINIC | Age: 61
End: 2021-06-08
Payer: COMMERCIAL

## 2021-06-08 VITALS — BODY MASS INDEX: 41.3 KG/M2 | WEIGHT: 257 LBS | HEIGHT: 66 IN | TEMPERATURE: 98.8 F

## 2021-06-08 VITALS
DIASTOLIC BLOOD PRESSURE: 60 MMHG | OXYGEN SATURATION: 97 % | RESPIRATION RATE: 16 BRPM | SYSTOLIC BLOOD PRESSURE: 100 MMHG | HEART RATE: 88 BPM

## 2021-06-08 PROCEDURE — 99214 OFFICE O/P EST MOD 30 MIN: CPT | Mod: 25

## 2021-06-08 PROCEDURE — 99072 ADDL SUPL MATRL&STAF TM PHE: CPT

## 2021-06-08 PROCEDURE — 94010 BREATHING CAPACITY TEST: CPT

## 2021-06-08 NOTE — PROCEDURE
[FreeTextEntry1] : Spirometry remains at baseline with FEV1/FVC diminished but flows and volumes in the normal range.

## 2021-06-08 NOTE — PHYSICAL EXAM
[Normal Conjunctiva] : the conjunctiva exhibited no abnormalities [Eyelids - No Xanthelasma] : the eyelids demonstrated no xanthelasmas [Low Lying Soft Palate] : low lying soft palate [Elongated Uvula] : elongated uvula [Enlarged Base of the Tongue] : enlargement of the base of the tongue [IV] : IV [Neck Appearance] : the appearance of the neck was normal [Neck Cervical Mass (___cm)] : no neck mass was observed [Jugular Venous Distention Increased] : there was no jugular-venous distention [Thyroid Diffuse Enlargement] : the thyroid was not enlarged [Thyroid Nodule] : there were no palpable thyroid nodules [Neck Circumference: ___] : neck circumference is [unfilled] [Heart Rate And Rhythm] : heart rate and rhythm were normal [Heart Sounds] : normal S1 and S2 [Murmurs] : no murmurs present [Respiration, Rhythm And Depth] : normal respiratory rhythm and effort [Exaggerated Use Of Accessory Muscles For Inspiration] : no accessory muscle use [Auscultation Breath Sounds / Voice Sounds] : lungs were clear to auscultation bilaterally [Chest Palpation] : palpation of the chest revealed no abnormalities [Lungs Percussion] : the lungs were normal to percussion [FreeTextEntry1] : few rhonchi [Abdomen Soft] : soft [Abdomen Tenderness] : non-tender [Abdomen Mass (___ Cm)] : no abdominal mass palpated [Abnormal Walk] : normal gait [Gait - Sufficient For Exercise Testing] : the gait was sufficient for exercise testing [1+ Pitting] : 1+  pitting [Deep Tendon Reflexes (DTR)] : deep tendon reflexes were 2+ and symmetric [Sensation] : the sensory exam was normal to light touch and pinprick [No Focal Deficits] : no focal deficits [Oriented To Time, Place, And Person] : oriented to person, place, and time [Impaired Insight] : insight and judgment were intact [Affect] : the affect was normal [Skin Color & Pigmentation] : normal skin color and pigmentation [Skin Turgor] : normal skin turgor [] : no rash

## 2021-06-08 NOTE — HISTORY OF PRESENT ILLNESS
[TextBox_4] : Generally feeling well without shortness of breath. He remains on prednisone 5 mg a day. He like to come off it. He remains on Trelegy as well. [Obstructive Sleep Apnea] : obstructive sleep apnea [APAP:] : APAP [TextBox_125] : 8-20 [TextBox_127] : 5/21 [TextBox_129] : 6/21 [TextBox_133] : 100 [TextBox_137] : 80 [TextBox_141] : 5 [TextBox_143] : 18 [TextBox_165] : remains alert on CPAP

## 2021-06-08 NOTE — ASSESSMENT
[FreeTextEntry1] : Asthma doing well. We will take him off prednisone. If he has any steroid withdrawal symptoms he should go back on it. Trelegy will be continued.\par \par Obstructive sleep apnea doing well on CPAP.\par \par Patient is status post bariatric surgery with loss of 120 pounds. His weight has plateaued however. He is going back to the support groups and restarting his diet.

## 2021-06-14 ENCOUNTER — RX RENEWAL (OUTPATIENT)
Age: 61
End: 2021-06-14

## 2021-06-22 ENCOUNTER — INPATIENT (INPATIENT)
Facility: HOSPITAL | Age: 61
LOS: 3 days | Discharge: HOME | DRG: 871 | End: 2021-06-26
Attending: HOSPITALIST | Admitting: HOSPITALIST
Payer: COMMERCIAL

## 2021-06-22 VITALS
RESPIRATION RATE: 22 BRPM | HEIGHT: 67 IN | TEMPERATURE: 100 F | OXYGEN SATURATION: 84 % | DIASTOLIC BLOOD PRESSURE: 48 MMHG | HEART RATE: 100 BPM | SYSTOLIC BLOOD PRESSURE: 70 MMHG | WEIGHT: 250 LBS

## 2021-06-22 DIAGNOSIS — Z98.890 OTHER SPECIFIED POSTPROCEDURAL STATES: Chronic | ICD-10-CM

## 2021-06-22 DIAGNOSIS — J18.9 PNEUMONIA, UNSPECIFIED ORGANISM: ICD-10-CM

## 2021-06-22 DIAGNOSIS — Z98.84 BARIATRIC SURGERY STATUS: Chronic | ICD-10-CM

## 2021-06-22 LAB
ALBUMIN SERPL ELPH-MCNC: 3.2 G/DL — LOW (ref 3.3–5.2)
ALP SERPL-CCNC: 51 U/L — SIGNIFICANT CHANGE UP (ref 40–120)
ALT FLD-CCNC: 14 U/L — SIGNIFICANT CHANGE UP
ANION GAP SERPL CALC-SCNC: 14 MMOL/L — SIGNIFICANT CHANGE UP (ref 5–17)
AST SERPL-CCNC: 24 U/L — SIGNIFICANT CHANGE UP
BASE EXCESS BLDV CALC-SCNC: 0.9 MMOL/L — SIGNIFICANT CHANGE UP (ref -2–3)
BASOPHILS # BLD AUTO: 0.04 K/UL — SIGNIFICANT CHANGE UP (ref 0–0.2)
BASOPHILS NFR BLD AUTO: 0.3 % — SIGNIFICANT CHANGE UP (ref 0–2)
BILIRUB SERPL-MCNC: 0.6 MG/DL — SIGNIFICANT CHANGE UP (ref 0.4–2)
BUN SERPL-MCNC: 28.3 MG/DL — HIGH (ref 8–20)
CA-I SERPL-SCNC: 1.07 MMOL/L — LOW (ref 1.15–1.33)
CALCIUM SERPL-MCNC: 8.9 MG/DL — SIGNIFICANT CHANGE UP (ref 8.6–10.2)
CHLORIDE BLDV-SCNC: 95 MMOL/L — LOW (ref 98–107)
CHLORIDE SERPL-SCNC: 94 MMOL/L — LOW (ref 98–107)
CO2 SERPL-SCNC: 23 MMOL/L — SIGNIFICANT CHANGE UP (ref 22–29)
CREAT SERPL-MCNC: 1.91 MG/DL — HIGH (ref 0.5–1.3)
EOSINOPHIL # BLD AUTO: 0.16 K/UL — SIGNIFICANT CHANGE UP (ref 0–0.5)
EOSINOPHIL NFR BLD AUTO: 1.1 % — SIGNIFICANT CHANGE UP (ref 0–6)
GAS PNL BLDV: 128 MMOL/L — LOW (ref 136–145)
GAS PNL BLDV: SIGNIFICANT CHANGE UP
GLUCOSE BLDV-MCNC: 86 MG/DL — SIGNIFICANT CHANGE UP (ref 70–99)
GLUCOSE SERPL-MCNC: 87 MG/DL — SIGNIFICANT CHANGE UP (ref 70–99)
HCO3 BLDV-SCNC: 26 MMOL/L — SIGNIFICANT CHANGE UP (ref 22–29)
HCT VFR BLD CALC: 36.2 % — LOW (ref 39–50)
HCT VFR BLDA CALC: 36 % — LOW (ref 39–51)
HGB BLD CALC-MCNC: 12 G/DL — LOW (ref 12.6–17.4)
HGB BLD-MCNC: 11.5 G/DL — LOW (ref 13–17)
IMM GRANULOCYTES NFR BLD AUTO: 0.5 % — SIGNIFICANT CHANGE UP (ref 0–1.5)
LACTATE BLDV-MCNC: 2.3 MMOL/L — HIGH (ref 0.5–2)
LACTATE SERPL-SCNC: 2.8 MMOL/L — HIGH (ref 0.5–2)
LYMPHOCYTES # BLD AUTO: 1.16 K/UL — SIGNIFICANT CHANGE UP (ref 1–3.3)
LYMPHOCYTES # BLD AUTO: 8.1 % — LOW (ref 13–44)
MAGNESIUM SERPL-MCNC: 2.4 MG/DL — SIGNIFICANT CHANGE UP (ref 1.6–2.6)
MCHC RBC-ENTMCNC: 27.4 PG — SIGNIFICANT CHANGE UP (ref 27–34)
MCHC RBC-ENTMCNC: 31.8 GM/DL — LOW (ref 32–36)
MCV RBC AUTO: 86.4 FL — SIGNIFICANT CHANGE UP (ref 80–100)
MONOCYTES # BLD AUTO: 1.09 K/UL — HIGH (ref 0–0.9)
MONOCYTES NFR BLD AUTO: 7.6 % — SIGNIFICANT CHANGE UP (ref 2–14)
NEUTROPHILS # BLD AUTO: 11.87 K/UL — HIGH (ref 1.8–7.4)
NEUTROPHILS NFR BLD AUTO: 82.4 % — HIGH (ref 43–77)
NT-PROBNP SERPL-SCNC: 5963 PG/ML — HIGH (ref 0–300)
PCO2 BLDV: 48 MMHG — SIGNIFICANT CHANGE UP (ref 42–55)
PH BLDV: 7.35 — SIGNIFICANT CHANGE UP (ref 7.32–7.43)
PLATELET # BLD AUTO: 266 K/UL — SIGNIFICANT CHANGE UP (ref 150–400)
PO2 BLDV: 57 MMHG — HIGH (ref 25–45)
POTASSIUM BLDV-SCNC: 5 MMOL/L — SIGNIFICANT CHANGE UP (ref 3.5–5.1)
POTASSIUM SERPL-MCNC: 5 MMOL/L — SIGNIFICANT CHANGE UP (ref 3.5–5.3)
POTASSIUM SERPL-SCNC: 5 MMOL/L — SIGNIFICANT CHANGE UP (ref 3.5–5.3)
PROCALCITONIN SERPL-MCNC: 0.38 NG/ML — HIGH (ref 0.02–0.1)
PROT SERPL-MCNC: 6.7 G/DL — SIGNIFICANT CHANGE UP (ref 6.6–8.7)
RBC # BLD: 4.19 M/UL — LOW (ref 4.2–5.8)
RBC # FLD: 17.1 % — HIGH (ref 10.3–14.5)
SAO2 % BLDV: 85.6 % — SIGNIFICANT CHANGE UP
SARS-COV-2 RNA SPEC QL NAA+PROBE: SIGNIFICANT CHANGE UP
SODIUM SERPL-SCNC: 131 MMOL/L — LOW (ref 135–145)
TROPONIN T SERPL-MCNC: 0.03 NG/ML — SIGNIFICANT CHANGE UP (ref 0–0.06)
WBC # BLD: 14.39 K/UL — HIGH (ref 3.8–10.5)
WBC # FLD AUTO: 14.39 K/UL — HIGH (ref 3.8–10.5)

## 2021-06-22 PROCEDURE — 99222 1ST HOSP IP/OBS MODERATE 55: CPT

## 2021-06-22 PROCEDURE — 71045 X-RAY EXAM CHEST 1 VIEW: CPT | Mod: 26

## 2021-06-22 PROCEDURE — 99223 1ST HOSP IP/OBS HIGH 75: CPT

## 2021-06-22 PROCEDURE — 93010 ELECTROCARDIOGRAM REPORT: CPT

## 2021-06-22 PROCEDURE — 71250 CT THORAX DX C-: CPT | Mod: 26,MC

## 2021-06-22 PROCEDURE — 99291 CRITICAL CARE FIRST HOUR: CPT

## 2021-06-22 RX ORDER — ACETAMINOPHEN 500 MG
975 TABLET ORAL ONCE
Refills: 0 | Status: COMPLETED | OUTPATIENT
Start: 2021-06-22 | End: 2021-06-22

## 2021-06-22 RX ORDER — METOPROLOL TARTRATE 50 MG
12.5 TABLET ORAL EVERY 12 HOURS
Refills: 0 | Status: DISCONTINUED | OUTPATIENT
Start: 2021-06-22 | End: 2021-06-26

## 2021-06-22 RX ORDER — MEROPENEM 1 G/30ML
1000 INJECTION INTRAVENOUS ONCE
Refills: 0 | Status: COMPLETED | OUTPATIENT
Start: 2021-06-22 | End: 2021-06-22

## 2021-06-22 RX ORDER — SODIUM CHLORIDE 9 MG/ML
1000 INJECTION INTRAMUSCULAR; INTRAVENOUS; SUBCUTANEOUS ONCE
Refills: 0 | Status: DISCONTINUED | OUTPATIENT
Start: 2021-06-22 | End: 2021-06-22

## 2021-06-22 RX ORDER — GABAPENTIN 400 MG/1
600 CAPSULE ORAL THREE TIMES A DAY
Refills: 0 | Status: DISCONTINUED | OUTPATIENT
Start: 2021-06-22 | End: 2021-06-26

## 2021-06-22 RX ORDER — PANTOPRAZOLE SODIUM 20 MG/1
40 TABLET, DELAYED RELEASE ORAL
Refills: 0 | Status: DISCONTINUED | OUTPATIENT
Start: 2021-06-22 | End: 2021-06-26

## 2021-06-22 RX ORDER — RIVAROXABAN 15 MG-20MG
15 KIT ORAL
Refills: 0 | Status: DISCONTINUED | OUTPATIENT
Start: 2021-06-22 | End: 2021-06-26

## 2021-06-22 RX ORDER — IPRATROPIUM/ALBUTEROL SULFATE 18-103MCG
3 AEROSOL WITH ADAPTER (GRAM) INHALATION EVERY 6 HOURS
Refills: 0 | Status: DISCONTINUED | OUTPATIENT
Start: 2021-06-22 | End: 2021-06-26

## 2021-06-22 RX ORDER — MEROPENEM 1 G/30ML
1000 INJECTION INTRAVENOUS EVERY 12 HOURS
Refills: 0 | Status: DISCONTINUED | OUTPATIENT
Start: 2021-06-22 | End: 2021-06-23

## 2021-06-22 RX ORDER — SODIUM CHLORIDE 9 MG/ML
1000 INJECTION INTRAMUSCULAR; INTRAVENOUS; SUBCUTANEOUS ONCE
Refills: 0 | Status: COMPLETED | OUTPATIENT
Start: 2021-06-22 | End: 2021-06-22

## 2021-06-22 RX ORDER — AMIODARONE HYDROCHLORIDE 400 MG/1
200 TABLET ORAL
Refills: 0 | Status: DISCONTINUED | OUTPATIENT
Start: 2021-06-22 | End: 2021-06-26

## 2021-06-22 RX ADMIN — GABAPENTIN 600 MILLIGRAM(S): 400 CAPSULE ORAL at 20:30

## 2021-06-22 RX ADMIN — AMIODARONE HYDROCHLORIDE 200 MILLIGRAM(S): 400 TABLET ORAL at 18:24

## 2021-06-22 RX ADMIN — MEROPENEM 1000 MILLIGRAM(S): 1 INJECTION INTRAVENOUS at 14:28

## 2021-06-22 RX ADMIN — Medication 975 MILLIGRAM(S): at 15:45

## 2021-06-22 RX ADMIN — SODIUM CHLORIDE 1000 MILLILITER(S): 9 INJECTION INTRAMUSCULAR; INTRAVENOUS; SUBCUTANEOUS at 14:28

## 2021-06-22 RX ADMIN — MEROPENEM 100 MILLIGRAM(S): 1 INJECTION INTRAVENOUS at 23:34

## 2021-06-22 RX ADMIN — RIVAROXABAN 15 MILLIGRAM(S): KIT at 18:25

## 2021-06-22 RX ADMIN — MEROPENEM 100 MILLIGRAM(S): 1 INJECTION INTRAVENOUS at 12:42

## 2021-06-22 RX ADMIN — SODIUM CHLORIDE 1000 MILLILITER(S): 9 INJECTION INTRAMUSCULAR; INTRAVENOUS; SUBCUTANEOUS at 12:05

## 2021-06-22 RX ADMIN — Medication 3 MILLILITER(S): at 20:09

## 2021-06-22 NOTE — ED ADULT TRIAGE NOTE - CHIEF COMPLAINT QUOTE
pt states had dental surgery friday developed fever and chills sunday and was started on abx. pt states today was SOB and dr salgado told to come to ed. pt hypotensive and hypoxic on arrival. pt was taken to critical care

## 2021-06-22 NOTE — H&P ADULT - ASSESSMENT
61yrold morbidly obese male with PMH of HFrEF s/p AICD, cardio MEMs, Pulm HTN, , ROCK on CPAP, Afib s/p pacemaker on xarelto , hx gastric bypass in july 2020, lost 120lbs since then presented to ER with  worsening shortness of breath and cough admitted with sepsis 2/2 Pneumonia after recent teeth extraction.     # Sepsis 2/2 RLL Pneumonia   - Telemetry , ct O2 - wean off as tolerated   Blood pressure is bordeline low - received 1lit iv Fluids - ct to monitor closely  Ct meropenem. - has good anerobic coverage  f/u RVP, legionella   NEbs q6, ct prednisone   ID consult placed   f/u blood cultures     # EVARISTO , lactic acidosis - Pre-renal - cautious use of ivfluids - repeat labs - avoid nephrotoxic drugs     # CHFrEF - EF 30-35% - hold lisinopril, Spironolactone, Bumex in setting of sepsis with low BP   monitor I/O - resume meds once styable     # COPD - nebs, steroids - Pulm consulted - appreciate recs - follows with [facility]     # Afib on Xarelto - resume xarelto -0 rate control with metoprolol - resume with holding parameters    # ROCK on CPAP - resume   # Morbid obesity - will benefit from weight reduction, recommendation dietary eval on outpatient - soft diet for now - 2/2 recent dentures   # DVT px - on full dose AC

## 2021-06-22 NOTE — ED PROVIDER NOTE - CLINICAL SUMMARY MEDICAL DECISION MAKING FREE TEXT BOX
62 y/o M hx of afib on xarelto (has defbrillator/pacemaker), CHF on bumex presents with shortness of breath for the past 4 days.  given vital signs, will r/o pneumonia  sepsis workup  CXR  IVF normal saline for hypotension, O2 for hypoxia   cards consult

## 2021-06-22 NOTE — H&P ADULT - NSICDXPASTSURGICALHX_GEN_ALL_CORE_FT
PAST SURGICAL HISTORY:  H/O cardiac catheterization 5 years ago at [facility].    History of cardioversion     History of incision and drainage right groin abcess  jan 2019  [facility]    History of loop recorder 2017  [facility]    S/P gastric bypass

## 2021-06-22 NOTE — ED PROVIDER NOTE - OBJECTIVE STATEMENT
60 y/o M hx of afib on xarelto (has defbrillator/pacemaker), CHF on bumex presents with shortness of breath for the past 4 days. States he got his teeth removed 6 days ago and has been on antibiotics (cefdinir and clindamycin per patient). Endorses worsening shortness of breath with movement/exertion and is not positional. Endorses Tmax fever of 102 four days ago. Endorsing productive cough that is "yellow" in color. Denies chest pain. Denies nausea or vomiting. Denies recent travel. Denies hemoptysis. Denies abdominal pain. 62 y/o M hx of afib on xarelto (has defbrillator/pacemaker), CHF on bumex presents with shortness of breath for the past 4 days. States he got his teeth removed 6 days ago and has been on antibiotics (cefdinir and clindamycin per patient). Endorses worsening shortness of breath with movement/exertion and is not positional. Endorses Tmax fever of 102 four days ago. Endorsing productive cough that is "yellow" in color. States he had "pus" coming from the gumlines and was switched to cefdinir from clindamycin a few days ago. Denies chest pain. Denies nausea or vomiting. Denies recent travel. Denies hemoptysis. Denies abdominal pain.

## 2021-06-22 NOTE — H&P ADULT - HISTORY OF PRESENT ILLNESS
61yrold morbidly obese male with PMH of HFrEF s/p AICD, cardio MEMs, Pulm HTN, , ROCK on CPAP, Afib s/p pacemaker on xarelto , hx gastric bypass in july 2020, lost 120lbs since then presented to ER with  worsening shortness of breath and cough that started 4days ago, had fever 102F at home, with productive yellow sputum, difficulty ambulating 2/2 SOB. He reports he had teeth extraction 1week ago and was started on Clindamycin and switched to Cefnidir po 2/2 purulent gum disease and got dentures., He had held Xarelto 5days before for the dental extraction and resumed xarelto 3 days ago.   In ER, he was found to be hypoxic, tachycardic, hypotensive, meeting sepsis criteria. CXR- showing Rt lower lobe consolidation, He was started on Meropenem Restricted ivfluid use 2/2 known CHF and 5lit NC oxygen. He feels some better however continues to have some sob.   He reports he stopped his chronic prednisone 5mg daily therapy 2weeks ago, buyt restarted himself on prednisone 20mg 4days ago. he was seen by cardio, Pulmonary in ER.  admitted for further care.,   he also reports he had gall stone 2mths ago or so and had a laparoscopic Cholecystectomy at [facility] recently.

## 2021-06-22 NOTE — ED PROVIDER NOTE - PROGRESS NOTE DETAILS
Isabel Cortes, Resident: IVF held, well perfused, euvolemic. Cards recommending holding IVF and giving pressors if hypotensive.

## 2021-06-22 NOTE — ED PROVIDER NOTE - PHYSICAL EXAMINATION
General: not in acute distress, hypoxic to high 80s on room air, hypotensive, tachycardic, 100.4 rectal   HEENT: Normocephalic, atraumatic. Moist mucous membranes. Oropharynx clear. No lymphadenopathy.  Eyes: No scleral icterus. EOMI. CARRIE.  Neck:. Soft and supple. Full ROM without pain. No midline tenderness  Cardiac: Regular rate and regular rhythm. No murmurs, rubs, gallops. Peripheral pulses 2+ and symmetric. No LE edema.  Resp: Lungs CTAB. Speaking in full sentences. No wheezes, rales or rhonchi.  Abd: Soft, non-tender, non-distended. No guarding or rebound. No scars, masses, or lesions.  Back: Spine midline and non-tender. No CVA tenderness.    Skin: No rashes, abrasions, or lacerations.  Neuro: AO x 3. Moves all extremities symmetrically. Motor strength and sensation grossly intact.

## 2021-06-22 NOTE — ED PROVIDER NOTE - NS ED ROS FT
General: Denies fever, chills  HEENT: Denies sensory changes, sore throat  Neck: Denies neck pain, neck stiffness  Resp: Endorses coughing, SOB  Cardiovascular: Denies CP, palpitations, LE edema  GI: Denies nausea, vomiting, abdominal pain, diarrhea, constipation, blood in stool  : Denies dysuria, hematuria, frequency, incontinence  MSK: Denies back pain  Neuro: Denies HA, dizziness, numbness, weakness  Skin: Denies rashes.

## 2021-06-22 NOTE — ED PROVIDER NOTE - ATTENDING CONTRIBUTION TO CARE
The patient seen immediately due to critical conditions and required multiple re-visits to stabilize the patient

## 2021-06-22 NOTE — CONSULT NOTE ADULT - TIME BILLING
Review of notes, orders, labs and images in [place], [employer] and EPIC  Coordination with medicine, nursing, cardiology and respiratory

## 2021-06-22 NOTE — H&P ADULT - NSHPPHYSICALEXAM_GEN_ALL_CORE
PHYSICAL EXAM:    GENERAL: NAD, well-groomed, obese, appears comfortable, talking in full sentences  HEAD:  Atraumatic, Normocephalic  EYES: EOMI, PERRLA, conjunctiva and sclera clear  ENMT: dry mucous membranes, dentures , No lesions  NECK: Supple, No JVD, Normal thyroid  NERVOUS SYSTEM:  Alert & Oriented X3, Good concentration  CHEST/LUNG: Clear to percussion bilaterally; No rales, rhonchi  HEART: Regular rate and rhythm; No murmur  ABDOMEN: Soft, Nontender, Nondistended; Bowel sounds present. healed surgical scars of laparoscopic surgery   EXTREMITIES:  No clubbing, cyanosis, or edema  SKIN: No rashes or lesions

## 2021-06-23 LAB
ANION GAP SERPL CALC-SCNC: 14 MMOL/L — SIGNIFICANT CHANGE UP (ref 5–17)
BUN SERPL-MCNC: 27.2 MG/DL — HIGH (ref 8–20)
CALCIUM SERPL-MCNC: 8.3 MG/DL — LOW (ref 8.6–10.2)
CHLORIDE SERPL-SCNC: 95 MMOL/L — LOW (ref 98–107)
CO2 SERPL-SCNC: 22 MMOL/L — SIGNIFICANT CHANGE UP (ref 22–29)
COVID-19 SPIKE DOMAIN AB INTERP: POSITIVE
COVID-19 SPIKE DOMAIN ANTIBODY RESULT: 106 U/ML — HIGH
CREAT SERPL-MCNC: 1.58 MG/DL — HIGH (ref 0.5–1.3)
GLUCOSE SERPL-MCNC: 89 MG/DL — SIGNIFICANT CHANGE UP (ref 70–99)
HCT VFR BLD CALC: 35.2 % — LOW (ref 39–50)
HGB BLD-MCNC: 11.3 G/DL — LOW (ref 13–17)
LACTATE SERPL-SCNC: 1.1 MMOL/L — SIGNIFICANT CHANGE UP (ref 0.5–2)
MAGNESIUM SERPL-MCNC: 2.3 MG/DL — SIGNIFICANT CHANGE UP (ref 1.8–2.6)
MCHC RBC-ENTMCNC: 27.6 PG — SIGNIFICANT CHANGE UP (ref 27–34)
MCHC RBC-ENTMCNC: 32.1 GM/DL — SIGNIFICANT CHANGE UP (ref 32–36)
MCV RBC AUTO: 85.9 FL — SIGNIFICANT CHANGE UP (ref 80–100)
PLATELET # BLD AUTO: 276 K/UL — SIGNIFICANT CHANGE UP (ref 150–400)
POTASSIUM SERPL-MCNC: 4.5 MMOL/L — SIGNIFICANT CHANGE UP (ref 3.5–5.3)
POTASSIUM SERPL-SCNC: 4.5 MMOL/L — SIGNIFICANT CHANGE UP (ref 3.5–5.3)
RAPID RVP RESULT: SIGNIFICANT CHANGE UP
RBC # BLD: 4.1 M/UL — LOW (ref 4.2–5.8)
RBC # FLD: 17.2 % — HIGH (ref 10.3–14.5)
SARS-COV-2 IGG+IGM SERPL QL IA: 106 U/ML — HIGH
SARS-COV-2 IGG+IGM SERPL QL IA: POSITIVE
SARS-COV-2 RNA SPEC QL NAA+PROBE: SIGNIFICANT CHANGE UP
SODIUM SERPL-SCNC: 131 MMOL/L — LOW (ref 135–145)
WBC # BLD: 11.93 K/UL — HIGH (ref 3.8–10.5)
WBC # FLD AUTO: 11.93 K/UL — HIGH (ref 3.8–10.5)

## 2021-06-23 PROCEDURE — 99233 SBSQ HOSP IP/OBS HIGH 50: CPT

## 2021-06-23 PROCEDURE — 99222 1ST HOSP IP/OBS MODERATE 55: CPT

## 2021-06-23 RX ORDER — BUDESONIDE, MICRONIZED 100 %
0.25 POWDER (GRAM) MISCELLANEOUS EVERY 12 HOURS
Refills: 0 | Status: DISCONTINUED | OUTPATIENT
Start: 2021-06-23 | End: 2021-06-26

## 2021-06-23 RX ORDER — LACTOBACILLUS ACIDOPHILUS 100MM CELL
1 CAPSULE ORAL
Refills: 0 | Status: DISCONTINUED | OUTPATIENT
Start: 2021-06-23 | End: 2021-06-26

## 2021-06-23 RX ORDER — DIPHENHYDRAMINE HCL 50 MG
25 CAPSULE ORAL EVERY 8 HOURS
Refills: 0 | Status: DISCONTINUED | OUTPATIENT
Start: 2021-06-23 | End: 2021-06-26

## 2021-06-23 RX ORDER — ACETAMINOPHEN 500 MG
650 TABLET ORAL EVERY 6 HOURS
Refills: 0 | Status: DISCONTINUED | OUTPATIENT
Start: 2021-06-23 | End: 2021-06-26

## 2021-06-23 RX ORDER — PIPERACILLIN AND TAZOBACTAM 4; .5 G/20ML; G/20ML
3.38 INJECTION, POWDER, LYOPHILIZED, FOR SOLUTION INTRAVENOUS EVERY 8 HOURS
Refills: 0 | Status: DISCONTINUED | OUTPATIENT
Start: 2021-06-23 | End: 2021-06-26

## 2021-06-23 RX ORDER — PIPERACILLIN AND TAZOBACTAM 4; .5 G/20ML; G/20ML
3.38 INJECTION, POWDER, LYOPHILIZED, FOR SOLUTION INTRAVENOUS ONCE
Refills: 0 | Status: COMPLETED | OUTPATIENT
Start: 2021-06-23 | End: 2021-06-23

## 2021-06-23 RX ADMIN — Medication 3 MILLILITER(S): at 03:09

## 2021-06-23 RX ADMIN — AMIODARONE HYDROCHLORIDE 200 MILLIGRAM(S): 400 TABLET ORAL at 05:22

## 2021-06-23 RX ADMIN — RIVAROXABAN 15 MILLIGRAM(S): KIT at 16:47

## 2021-06-23 RX ADMIN — Medication 0.25 MILLIGRAM(S): at 20:36

## 2021-06-23 RX ADMIN — Medication 650 MILLIGRAM(S): at 22:49

## 2021-06-23 RX ADMIN — Medication 650 MILLIGRAM(S): at 11:24

## 2021-06-23 RX ADMIN — GABAPENTIN 600 MILLIGRAM(S): 400 CAPSULE ORAL at 05:22

## 2021-06-23 RX ADMIN — Medication 20 MILLIGRAM(S): at 05:22

## 2021-06-23 RX ADMIN — Medication 3 MILLILITER(S): at 08:40

## 2021-06-23 RX ADMIN — PANTOPRAZOLE SODIUM 40 MILLIGRAM(S): 20 TABLET, DELAYED RELEASE ORAL at 05:22

## 2021-06-23 RX ADMIN — AMIODARONE HYDROCHLORIDE 200 MILLIGRAM(S): 400 TABLET ORAL at 16:48

## 2021-06-23 RX ADMIN — PIPERACILLIN AND TAZOBACTAM 200 GRAM(S): 4; .5 INJECTION, POWDER, LYOPHILIZED, FOR SOLUTION INTRAVENOUS at 15:25

## 2021-06-23 RX ADMIN — Medication 1 TABLET(S): at 16:47

## 2021-06-23 RX ADMIN — Medication 3 MILLILITER(S): at 20:36

## 2021-06-23 RX ADMIN — MEROPENEM 100 MILLIGRAM(S): 1 INJECTION INTRAVENOUS at 11:23

## 2021-06-23 RX ADMIN — GABAPENTIN 600 MILLIGRAM(S): 400 CAPSULE ORAL at 22:38

## 2021-06-23 RX ADMIN — PIPERACILLIN AND TAZOBACTAM 25 GRAM(S): 4; .5 INJECTION, POWDER, LYOPHILIZED, FOR SOLUTION INTRAVENOUS at 22:38

## 2021-06-23 RX ADMIN — GABAPENTIN 600 MILLIGRAM(S): 400 CAPSULE ORAL at 15:16

## 2021-06-23 RX ADMIN — Medication 3 MILLILITER(S): at 15:48

## 2021-06-23 NOTE — PROGRESS NOTE ADULT - SUBJECTIVE AND OBJECTIVE BOX
[facility]                                   [address] [place] [address]                                                      PHONE: [phone]                                                         FAX: [phone]  [email or web address]  ---------------------------------------------------------------------------------------------------------------------------------    Overnight events/patient complaints:    INTERPRETATION OF TELEMETRY (personally reviewed):    PAST MEDICAL & SURGICAL HISTORY:  COPD (chronic obstructive pulmonary disease)  ROCK (obstructive sleep apnea)  Afib  CHF (congestive heart failure)  Cardiomyopathy, ischemic  wearing life vest  2-28-19  Ejection fraction &lt; 50%  last echo 2-13-19  31%  Asthma  HFrEF (heart failure with reduced ejection fraction)  AICD (automatic cardioverter/defibrillator) present  History of loop recorder  History of incision and drainage  right groin abcess  jan 2019  [facility]  History of cardioversion  H/O cardiac catheterization  5 years ago at [facility].  S/P gastric bypass      Allergies   penicillins (Hives)  shellfish (Pruritus; Rash)    MEDICATIONS  (STANDING):  albuterol/ipratropium for Nebulization 3 milliLiter(s) Nebulizer every 6 hours  aMIOdarone    Tablet 200 milliGRAM(s) Oral two times a day  gabapentin 600 milliGRAM(s) Oral three times a day  meropenem  IVPB 1000 milliGRAM(s) IV Intermittent every 12 hours  metoprolol tartrate 12.5 milliGRAM(s) Oral every 12 hours  pantoprazole    Tablet 40 milliGRAM(s) Oral before breakfast  predniSONE   Tablet 20 milliGRAM(s) Oral daily  rivaroxaban 15 milliGRAM(s) Oral with dinner    MEDICATIONS  (PRN):    Vital Signs Last 24 Hrs  T(C): 36.7 (23 Jun 2021 07:50), Max: 38 (22 Jun 2021 11:59)  T(F): 98.1 (23 Jun 2021 07:50), Max: 100.4 (22 Jun 2021 11:59)  HR: 81 (23 Jun 2021 07:50) (81 - 100)  BP: 101/60 (23 Jun 2021 07:50) (70/48 - 111/55)  BP(mean): 65 (22 Jun 2021 12:00) (65 - 65)  RR: 22 (23 Jun 2021 07:50) (22 - 28)  SpO2: 97% (23 Jun 2021 07:50) (84% - 97%)  ICU Vital Signs Last 24 Hrs    PHYSICAL EXAM:  General: Appears well developed, well nourished alert and cooperative.  HEENT: Head; normocephalic, atraumatic.Pupils reactive, cornea wnl. Neck supple, no nodes adenopathy, no JVD  CARDIOVASCULAR: Normal S1 and S2, 1/6 JEFF, no rub, gallop or lift.   LUNGS: No rales, rhonchi or wheeze. Normal breath sounds bilaterally.  ABDOMEN: Soft, nontender without mass or organomegaly. bowel sounds normoactive.  EXTREMITIES: No clubbing, cyanosis or edema.   SKIN: warm and dry with normal turgor.  NEURO: Alert/oriented x 3/normal motor exam.   PSYCH: normal affect.        LABS:                        11.3   11.93 )-----------( 276      ( 23 Jun 2021 07:02 )             35.2     06-23    131<L>  |  95<L>  |  27.2<H>  ----------------------------<  89  4.5   |  22.0  |  1.58<H>    Ca    8.3<L>      23 Jun 2021 07:02  Mg     2.3     06-23    TPro  6.7  /  Alb  3.2<L>  /  TBili  0.6  /  DBili  x   /  AST  24  /  ALT  14  /  AlkPhos  51  06-22    MAGGIE ELLIOTT  CARDIAC MARKERS ( 22 Jun 2021 11:52 )  x     / 0.03 ng/mL / x     / x     / x        CT chest 6/22/21: *  Bilateral multifocal pneumonia with evidence of endobronchial spread.  *  Fluid distention to the upper esophagus. Correlate for aspiration.      Assessment and Plan   Patient is a 62 y/o morbidly obese man now s/p bariatric surgery 7/2020, non-ischemic cardiomyopathy with systolic dysfunction, LVEF now 40-45%, NYHA class III, AHA stage C, s/p ICD, paroxsymal polymorphic VT, s/p CardioMEMs, permanent AF on Xarelto, ROCK on CPAP, prior GIB, and COPD who has been undergoing outpatient dental evaluation now s/p multiple tooth extractions at which time noted to have possible abscess initiated on antibiotic therapy however continued to be concerned about fever to 102F and dyspnea. CardioMEMs readings now with dPA 33, up from 27.  ProBNP 5963  In the ER, he was noted to have hypotension and hypoxia and is s/p IVF and currently on 6L NC.     Hypoxic respiratory failure/hypotension/severe sepsis  - hypoxic respiratory failure is multifactorial including acute HFrecEF in the setting of severe sepsis 2/2 PNA. CXR with vascular congestion AND new PNA.   - O2 support  - would avoid further IVF  - strict I/O and daily weights, and close monitoring of renal indices   - recommend NIV  - renal indices noted, hold lisinopril and spironolactone  - will need resumption of diuretics, however likely with pressor support, particularly if work of breathing increases  - antibiotics per primary team    Permanent AF  - continue systemic AC and amiodarone   - tele       Thank you for allowing [facility] to participate in the care of this patient.  Please feel free to call with any questions or concerns.

## 2021-06-23 NOTE — SWALLOW BEDSIDE ASSESSMENT ADULT - SWALLOW EVAL: DIAGNOSIS
Oral and pharyngeal stages of swallow functional for mechanical soft solids and thin liquids. Pt declined soft/regular trials 2* c/o gum pain and "wanting to stick to really soft, mushy foods"

## 2021-06-23 NOTE — SWALLOW BEDSIDE ASSESSMENT ADULT - SLP PERTINENT HISTORY OF CURRENT PROBLEM
1yrold morbidly obese male with PMH of HFrEF s/p AICD, cardio MEMs, Pulm HTN, , ROCK on CPAP, Afib s/p pacemaker on xarelto , hx gastric bypass in july 2020, lost 120lbs since then presented to ER with  worsening shortness of breath and cough admitted with sepsis 2/2 Pneumonia after recent teeth extraction.

## 2021-06-23 NOTE — SWALLOW BEDSIDE ASSESSMENT ADULT - DIET PRIOR TO ADMI
pt reports he has been eating only "very soft foods" following tooth extraction last Friday/thin liquids

## 2021-06-23 NOTE — PROGRESS NOTE ADULT - SUBJECTIVE AND OBJECTIVE BOX
Patient is a 61y old  Male who presents with a chief complaint of SOB, cough, fever (23 Jun 2021 10:34)    Pt seen and exam. Pt is sitting on bed,getting breathing treatment. Pt wants to eat now. per respiratory pt was desating early am,will keep the cpap and will reeavl after treatment. Pt was getting angry even after education by me and RT. Pt wants to eat now. Explained risk of aspiration   Pt states his breathing is better now, denies fever,chill,cp.      REVIEW OF SYSTEMS: All systems are reviewed and found to be negative except above    MEDICATIONS  (STANDING):  albuterol/ipratropium for Nebulization 3 milliLiter(s) Nebulizer every 6 hours  aMIOdarone    Tablet 200 milliGRAM(s) Oral two times a day  gabapentin 600 milliGRAM(s) Oral three times a day  lactobacillus acidophilus 1 Tablet(s) Oral two times a day with meals  meropenem  IVPB 1000 milliGRAM(s) IV Intermittent every 12 hours  metoprolol tartrate 12.5 milliGRAM(s) Oral every 12 hours  pantoprazole    Tablet 40 milliGRAM(s) Oral before breakfast  predniSONE   Tablet 20 milliGRAM(s) Oral daily  rivaroxaban 15 milliGRAM(s) Oral with dinner    MEDICATIONS  (PRN):  acetaminophen   Tablet .. 650 milliGRAM(s) Oral every 6 hours PRN Severe Pain (7 - 10)      CAPILLARY BLOOD GLUCOSE        I&O's Summary      PHYSICAL EXAM:  Vital Signs Last 24 Hrs  T(C): 36.7 (23 Jun 2021 07:50), Max: 38 (22 Jun 2021 11:59)  T(F): 98.1 (23 Jun 2021 07:50), Max: 100.4 (22 Jun 2021 11:59)  HR: 96 (23 Jun 2021 08:51) (81 - 100)  BP: 101/60 (23 Jun 2021 07:50) (70/48 - 111/55)  BP(mean): 65 (22 Jun 2021 12:00) (65 - 65)  RR: 22 (23 Jun 2021 07:50) (22 - 28)  SpO2: 91% (23 Jun 2021 08:51) (84% - 97%)    CONSTITUTIONAL: NAD,  EYES: PERRLA; conjunctiva and sclera clear  ENMT: Moist oral mucosa,   RESPIRATORY: Normal respiratory effort; crackle  to auscultation bilaterally, no wheeze  CARDIOVASCULAR: IRR, normal S1 and S2, no murmur   EXTS: No lower extremity edema; Peripheral pulses are 2+ bilaterally  ABDOMEN: Nontender to palpation, normoactive bowel sounds, no rebound/guarding;   MUSCLOSKELETAL: no joint swelling or tenderness to palpation  PSYCH:CURRENTLY NON COOP,ANGRY,RUDE  NEUROLOGY: A+O to person, place, and time;  no gross deficits;       LABS:                        11.3   11.93 )-----------( 276      ( 23 Jun 2021 07:02 )             35.2     06-23    131<L>  |  95<L>  |  27.2<H>  ----------------------------<  89  4.5   |  22.0  |  1.58<H>    Ca    8.3<L>      23 Jun 2021 07:02  Mg     2.3     06-23    TPro  6.7  /  Alb  3.2<L>  /  TBili  0.6  /  DBili  x   /  AST  24  /  ALT  14  /  AlkPhos  51  06-22      CARDIAC MARKERS ( 22 Jun 2021 11:52 )  x     / 0.03 ng/mL / x     / x     / x                RADIOLOGY & ADDITIONAL TESTS:  Results Reviewed:   Imaging Personally Reviewed:  Electrocardiogram Personally Reviewed:    COORDINATION OF CARE:  Care Discussed with Consultants/Other Providers [Y/N]:  Prior or Outpatient Records Reviewed [Y/N]:

## 2021-06-23 NOTE — CONSULT NOTE ADULT - SUBJECTIVE AND OBJECTIVE BOX
[facility]  INFECTIOUS DISEASES AND INTERNAL MEDICINE at [place]  =======================================================  Omari Cantrell MD  Diplomates American Board of Internal Medicine and Infectious Diseases  Tel  [phone]  Fax [phone]  =======================================================    [id number]  MAGGIE MENDOZAMEENA   HPI:  This 61 yr old morbidly obese male with PMH of HFrEF s/p AICD, cardio MEMs, Pulm HTN, , ROCK on CPAP, Afib s/p pacemaker on xarelto , hx gastric bypass in july 2020, lost 120lbs since then presented to ER with  worsening shortness of breath and cough that started 4days ago, had fever 102F at home, with productive yellow sputum, difficulty ambulating 2/2 SOB. He reports he had teeth extraction 1week ago and was started on Clindamycin and switched to Cefdinir po 2/2 purulent gum disease and got dentures., He had held Xarelto 5days before for the dental extraction and resumed xarelto 3 days ago.   In ER, he was found to be hypoxic, tachycardic, hypotensive, meeting sepsis criteria. CXR- showing Rt lower lobe consolidation, He was started on Meropenem Restricted iv fluid use 2/2 known CHF and 5lit NC oxygen. He feels some better however continues to have some sob.   He reports he stopped his chronic prednisone 5mg daily therapy 2weeks ago, but restarted himself on prednisone 20mg 4days ago. he was seen by cardio, Pulmonary in ER.  Admitted for further care.,   he also reports he had gall stone 2mths ago or so and had a laparoscopic Cholecystectomy at [facility] recently.  (22 Jun 2021 15:12)    patient reports having fevers on Sunday AM 7/20/21, called his doctor, and told to take some Cefdinir. still with fevers, and presented here.     reports of penicillin allergy as a child, but recently had skin testing which was negative.   CT scan report reviewed with family. fluid in esophagus found, in additional to bilateral PNA.     I have personally reviewed the labs and data; pertinent labs and data are listed in this note; please see below.   =======================================================  Past Medical & Surgical Hx:  =====================  PAST MEDICAL & SURGICAL HISTORY:  COPD (chronic obstructive pulmonary disease)  ROCK (obstructive sleep apnea)  Afib  CHF (congestive heart failure)  Cardiomyopathy, ischemic  wearing life vest  2-28-19  Ejection fraction <  50%  last echo 2-13-19  31%    Asthma  HFrEF (heart failure with reduced ejection fraction)  AICD (automatic cardioverter/defibrillator) present  History of loop recorder  2017  [facility]  History of incision and drainage  right groin abscess  jan 2019  [facility]  History of cardioversion  H/O cardiac catheterization  5 years ago at [facility]  S/P gastric bypass    Problem List:  ==========  HEALTH ISSUES - PROBLEM Dx:       Social Hx:  =======  no toxic habits currently    FAMILY HISTORY:  Family history of CHF (congestive heart failure)    no significant family history of immunosuppressive disorders in mother or father   =======================================================    REVIEW OF SYSTEMS:  CONSTITUTIONAL:  No Fever or chills  HEENT:  No diplopia or blurred vision.  No earache, sore throat or runny nose.  CARDIOVASCULAR:  No pressure, squeezing, strangling, tightness, heaviness or aching about the chest, neck, axilla or epigastrium.  RESPIRATORY:   as above  GASTROINTESTINAL:  No nausea, vomiting or diarrhea.  GENITOURINARY:  No dysuria, frequency or urgency. No Blood in urine  MUSCULOSKELETAL:  no joint aches, no muscle pain  SKIN:  No change in skin, hair or nails.  NEUROLOGIC:  No Headaches, seizures or weakness.  PSYCHIATRIC:  No disorder of thought or mood.  ENDOCRINE:  No heat or cold intolerance  HEMATOLOGICAL:  No easy bruising or bleeding.    =======================================================  Allergies  No Known Drug Allergies  shellfish (Pruritus; Rash)    Antibiotics:  piperacillin/tazobactam IVPB. 3.375 Gram(s) IV Intermittent once  piperacillin/tazobactam IVPB.. 3.375 Gram(s) IV Intermittent every 8 hours    Other medications:  albuterol/ipratropium for Nebulization 3 milliLiter(s) Nebulizer every 6 hours  aMIOdarone    Tablet 200 milliGRAM(s) Oral two times a day  buDESOnide    Inhalation Suspension 0.25 milliGRAM(s) Inhalation every 12 hours  gabapentin 600 milliGRAM(s) Oral three times a day  lactobacillus acidophilus 1 Tablet(s) Oral two times a day with meals  metoprolol tartrate 12.5 milliGRAM(s) Oral every 12 hours  pantoprazole    Tablet 40 milliGRAM(s) Oral before breakfast  predniSONE   Tablet 20 milliGRAM(s) Oral daily  rivaroxaban 15 milliGRAM(s) Oral with dinner     meropenem  IVPB   100 mL/Hr IV Intermittent (06-22-21 @ 23:34)   100 mL/Hr IV Intermittent (06-23-21 @ 11:23)    meropenem  IVPB   100 mL/Hr IV Intermittent (06-22-21 @ 12:42)      ======================================================  Physical Exam:  ============  T(F): 98.2 (23 Jun 2021 11:35), Max: 99.6 (22 Jun 2021 15:40)  HR: 86 (23 Jun 2021 11:35)  BP: 93/56 (23 Jun 2021 11:35)  RR: 22 (23 Jun 2021 11:35)  SpO2: 93% (23 Jun 2021 11:35) (91% - 97%)  temp max in last 48H T(F): , Max: 100.4 (06-22-21 @ 11:59)    General:  No acute distress.  OBESE  Eye: Pupils are equal, round and reactive to light, Extraocular movements are intact, Normal conjunctiva.  HENT: Normocephalic, Oral mucosa is moist, No pharyngeal erythema, No sinus tenderness.  Neck: Supple, No lymphadenopathy.  Respiratory: Lungs with fair air entry  Cardiovascular: Normal rate, Regular rhythm,   trace edema  Gastrointestinal: Soft, Non-tender, Non-distended, Normal bowel sounds.  OBESE ABD  Genitourinary: No costovertebral angle tenderness.  Lymphatics: No lymphadenopathy neck,   Musculoskeletal: Normal range of motion, Normal strength.  Integumentary: No rash.  Neurologic: Alert, Oriented, No focal deficits, Cranial Nerves II-XII are grossly intact.  Psychiatric: Appropriate mood & affect.    =======================================================  Labs:                        11.3   11.93 )-----------( 276      ( 23 Jun 2021 07:02 )             35.2     WBC Count: 11.93 K/uL (06-23-21 @ 07:02)  WBC Count: 14.39 K/uL (06-22-21 @ 11:52)      06-23    131<L>  |  95<L>  |  27.2<H>  ----------------------------<  89  4.5   |  22.0  |  1.58<H>    Ca    8.3<L>      23 Jun 2021 07:02  Mg     2.3     06-23    TPro  6.7  /  Alb  3.2<L>  /  TBili  0.6  /  DBili  x   /  AST  24  /  ALT  14  /  AlkPhos  51  06-22      Creatinine, Serum: 1.58 mg/dL (06-23-21 @ 07:02)  Creatinine, Serum: 1.91 mg/dL (06-22-21 @ 11:52)    Procalcitonin, Serum: 0.38 ng/mL (06-22-21 @ 18:40)    SARS-CoV-2: NotDetec (06-23-21 @ 05:47)  Rapid RVP Result: NotDetec (06-23-21 @ 05:47)    COVID-19 PCR: NotDetec (06-22-21 @ 12:35) 
[facility]                [address] [place] [address]                                     PHONE: [phone]                                       FAX: [phone]  [email or web address]      Reason for Consult: chest pain     HPI:  Patient is a 60 y/o morbidly obese man now s/p bariatric surgery 7/2020, non-ischemic cardiomyopathy with systolic dysfunction, LVEF now 40-45%, NYHA class III, AHA stage C, s/p ICD, paroxsymal polymorphic VT, s/p CardioMEMs, permanent AF on Xarelto, ROCK on CPAP, prior GIB, and COPD who has been undergoing outpatient dental evaluation now s/p multiple tooth extractions at which time noted to have possible abscess initiated on antibiotic therapy however continued to be concerned about fever to 102F and dyspnea. CardioMEMs readings now with dPA 33, up from 27. In the ER, he was noted to have hypotension and hypoxia and is s/p IVF and currently on 6L NC.     PAST MEDICAL & SURGICAL HISTORY:  COPD (chronic obstructive pulmonary disease)  ROCK (obstructive sleep apnea)  Afib  CHF (congestive heart failure)  Cardiomyopathy, ischemic  wearing life vest  2-28-19  Ejection fraction &lt; 50%, last echo 2-13-19  31%  Asthma  HFrEF (heart failure with reduced ejection fraction)  AICD (automatic cardioverter/defibrillator) present  History of loop recorder  History of incision and drainage  right groin abcess  jan 2019  [facility]  History of cardioversion  H/O cardiac catheterization  5 years ago at [facility].  S/P gastric bypass    PREVIOUS DIAGNOSTIC TESTING:      EKG: ecec< from: 12 Lead ECG (11.17.20 @ 13:30) >  Atrial fibrillation  Left axis deviation  Inferior infarct , age undetermined  Anteroseptal infarct , age undetermined    < end of copied text >      ECHO FINDINGS:  < from: TTE Echo Complete w/o Contrast w/ Doppler (11.19.20 @ 10:52) >   1. Left ventricular ejection fraction, by visual estimation, is 30 to 35%.   2. Moderately to severely decreased global left ventricular systolic function.   3. Mildly enlarged left atrium.   4. Mildly enlarged right atrium.   5. There is no evidence of pericardial effusion.   6. Mild mitral valve regurgitation.   7. Mild thickening of the anterior and posterior mitral valve leaflets.   8. Mild tricuspid regurgitation.   9. Mild aortic valve stenosis.  10. Mild to moderate aortic regurgitation.  11. Dilatation of the aortic root.  12. Endocardial visualization was enhanced with intravenous echo contrast.  13. Peak transaortic gradient equals 22.3 mmHg, mean transaortic gradient equals 13.4 mmHg, the calculated aortic valve area equals 1.51 cm² by the continuity equation consistent with mild aortic stenosis.    STRESS FINDINGS:    CATHETERIZATION FINDINGS:    MEDICATIONS  (STANDING):    MEDICATIONS  (PRN):      FAMILY HISTORY:  Family history of CHF (congestive heart failure)    SOCIAL HISTORY:  CIGARETTES: denies   ALCOHOL:    ROS: Negative other than as mentioned in HPI.    Vital Signs Last 24 Hrs  T(C): 38 (22 Jun 2021 11:59), Max: 38 (22 Jun 2021 11:59)  T(F): 100.4 (22 Jun 2021 11:59), Max: 100.4 (22 Jun 2021 11:59)  HR: 94 (22 Jun 2021 12:35) (94 - 100)  BP: 99/53 (22 Jun 2021 12:35) (70/48 - 99/53)  BP(mean): 65 (22 Jun 2021 12:00) (65 - 65)  RR: 24 (22 Jun 2021 12:35) (22 - 28)  SpO2: 96% (22 Jun 2021 12:35) (84% - 96%)    PHYSICAL EXAM:  General: Appears well developed, well nourished alert and cooperative.  HEENT: Head; normocephalic, atraumatic. sclera anicteric, MMM, no JVD, neck is supple   CARDIOVASCULAR; 1/6 JEFF, no rub, gallop or lift. Normal S1 and S2.  LUNGS; No rales, rhonchi or wheeze. Normal breath sounds bilaterally.  ABDOMEN ; Soft, nontender without mass or organomegaly. bowel sounds normoactive.  EXTREMITIES; No clubbing, cyanosis or edema. Distal pulses wnl. ROM normal.  SKIN; warm and dry with normal turgor.  NEURO; Alert/oriented x 3/normal motor exam.     PSYCH; normal affect.        I&O's Detail      LABS:                        11.5   14.39 )-----------( 266      ( 22 Jun 2021 11:52 )             36.2     06-22    131<L>  |  94<L>  |  28.3<H>  ----------------------------<  87  5.0   |  23.0  |  1.91<H>    Ca    8.9      22 Jun 2021 11:52  Mg     2.4     06-22    TPro  6.7  /  Alb  3.2<L>  /  TBili  0.6  /  DBili  x   /  AST  24  /  ALT  14  /  AlkPhos  51  06-22    CARDIAC MARKERS ( 22 Jun 2021 11:52 )  x     / 0.03 ng/mL / x     / x     / x              I&O's Summary      RADIOLOGY & ADDITIONAL STUDIES:  < from: Xray Chest 1 View- PORTABLE-Urgent (Xray Chest 1 View- PORTABLE-Urgent .) (06.22.21 @ 12:18) >  Mild pulmonary venous congestion, new.    Right perihilar pneumonia, new    < end of copied text >  
PULMONARY CONSULT NOTE      EARL HANHRN-[id number]    Patient is a 61y old  Male who presents with a chief complaint of     HISTORY OF PRESENT ILLNESS:    · Chief Complaint: The patient is a 61y Male complaining of shortness of breath.  · HPI Objective Statement: 60 y/o M hx of afib on xarelto (has defbrillator/pacemaker), CHF on bumex presents with shortness of breath for the past 4 days. States he got his teeth removed 6 days ago and has been on antibiotics (cefdinir and clindamycin per patient). Endorses worsening shortness of breath with movement/exertion and is not positional. Endorses Tmax fever of 102 four days ago. Endorsing productive cough that is "yellow" in color. States he had "pus" coming from the gumlines and was switched to cefdinir from clindamycin a few days ago. Denies chest pain. Denies nausea or vomiting. Denies recent travel. Denies hemoptysis. Denies abdominal pain.  Post bariatric surgery with 120 lb weight loss  H/O ROCK - therapeutic pressure was about 10 cm H20 in February (on APAP via nasal mask)  H/O heavy smoking in past.  Diffusion normal in 2016.  [place] on 6/8/21 completely normal  Dr Tamayo has patient on Trelegy    MEDICATIONS  (STANDING):      MEDICATIONS  (PRN):      Allergies    penicillins (Hives)  shellfish (Pruritus; Rash)    Intolerances        PAST MEDICAL & SURGICAL HISTORY:  COPD (chronic obstructive pulmonary disease)    ROCK (obstructive sleep apnea)    Afib    CHF (congestive heart failure)    Cardiomyopathy, ischemic  wearing life vest  2-28-19    Ejection fraction &lt; 50%  last echo 2-13-19  31%    Asthma    HFrEF (heart failure with reduced ejection fraction)    AICD (automatic cardioverter/defibrillator) present    History of loop recorder  2017  [facility]    History of incision and drainage  right groin abcess  jan 2019  [facility]    History of cardioversion    H/O cardiac catheterization  5 years ago at [facility].    S/P gastric bypass        FAMILY HISTORY:  Family history of CHF (congestive heart failure)        SOCIAL HISTORY  Smoking History:     REVIEW OF SYSTEMS:    CONSTITUTIONAL:  No fevers, chills, sweats    HEENT:  Eyes:  No diplopia or blurred vision. ENT:  No earache, sore throat or runny nose.    CARDIOVASCULAR:  No pressure, squeezing, tightness, or heaviness about the chest; no palpitations.    RESPIRATORY:  No PND or orthopnea. Mild SOBOE    GASTROINTESTINAL:  No abdominal pain, nausea, vomiting or diarrhea.    GENITOURINARY:  No dysuria, frequency or urgency.    NEUROLOGIC:  No paresthesias, fasciculations, seizures or weakness.    PSYCHIATRIC:  No disorder of thought or mood.    Vital Signs Last 24 Hrs  T(C): 38 (22 Jun 2021 11:59), Max: 38 (22 Jun 2021 11:59)  T(F): 100.4 (22 Jun 2021 11:59), Max: 100.4 (22 Jun 2021 11:59)  HR: 95 (22 Jun 2021 14:25) (94 - 100)  BP: 99/55 (22 Jun 2021 14:25) (70/48 - 99/55)  BP(mean): 65 (22 Jun 2021 12:00) (65 - 65)  RR: 22 (22 Jun 2021 14:25) (22 - 28)  SpO2: 95% (22 Jun 2021 14:25) (84% - 96%)    PHYSICAL EXAMINATION:    GENERAL: The patient is a well-developed, well-nourished _____in no apparent distress.     HEENT: Head is normocephalic and atraumatic. Extraocular muscles are intact. Mucous membranes are moist.     NECK: Supple.     LUNGS: Clear to auscultation without wheezing, rales, or rhonchi. Respirations unlabored    HEART: Regular rate and rhythm without murmur.    ABDOMEN: Soft, nontender, and nondistended.  No hepatosplenomegaly is noted.    EXTREMITIES: Without any cyanosis, clubbing, rash, lesions or edema.    NEUROLOGIC: Grossly intact.      LABS:                        11.5   14.39 )-----------( 266      ( 22 Jun 2021 11:52 )             36.2     06-22    131<L>  |  94<L>  |  28.3<H>  ----------------------------<  87  5.0   |  23.0  |  1.91<H>    Ca    8.9      22 Jun 2021 11:52  Mg     2.4     06-22    TPro  6.7  /  Alb  3.2<L>  /  TBili  0.6  /  DBili  x   /  AST  24  /  ALT  14  /  AlkPhos  51  06-22          CARDIAC MARKERS ( 22 Jun 2021 11:52 )  x     / 0.03 ng/mL / x     / x     / x            Serum Pro-Brain Natriuretic Peptide: 5963 pg/mL (06-22-21 @ 11:52)          MICROBIOLOGY: COVID PCR neg    RADIOLOGY & ADDITIONAL STUDIES:    CT on 6/22/21   EXAM:  CT CHEST                          PROCEDURE DATE:  06/22/2021      INTERPRETATION:  CLINICAL INFORMATION: Pneumonia with hypoxia    COMPARISON: Same day chest x-ray    CONTRAST/COMPLICATIONS:  IV Contrast: NONE  Oral Contrast: NONE  Complications: None reported at time of study completion    PROCEDURE:  CT of the Chest was performed.  Sagittal and coronal reformats were performed.    FINDINGS:    LUNGS AND AIRWAYS: The central airways are patent. Background emphysema. Bilateral, right greater than left groundglass opacities with additional diffuse peribronchial/tree-in-bud opacities.  PLEURA: No pleural abnormality.  VESSELS: Normal caliber aorta.  HEART: Left multi lead cardiac device. Mild cardiomegaly. No pericardial effusion. Coronary artery calcifications are present.  MEDIASTINUM AND NICKI: No adenopathy.  CHEST WALL AND LOWER NECK: No masses.  VISUALIZED UPPER ABDOMEN: Status post sleeve gastrectomy. Mild fluid distention to the upper esophagus.  BONES: No acute bony abnormality.    IMPRESSION:  *  Bilateral multifocal pneumonia with evidence of endobronchial spread.  *  Fluid distention to the upper esophagus. Correlate for aspiration.    REKHA TIM MD; Attending Radiologist  This document has been electronically signed. Jun 22 2021  2:29PM

## 2021-06-23 NOTE — SWALLOW BEDSIDE ASSESSMENT ADULT - H & P REVIEW
[facility]  [address]  [address] [facility] [address]  Phone: [phone]               May 28, 2021    Patient: Jamison Sandoval   YOB: 1959   Date of Visit: 5/28/2021       To Whom It May Concern:    Margarita Dykes was seen and treated in our emergency department on 5/28/2021. She was absent from work today due to illness.       Sincerely,       Paula Persaud, APRN - CNP         Signature:__________________________________ yes Xray Chest 1 View AP/PA

## 2021-06-23 NOTE — CONSULT NOTE ADULT - ASSESSMENT
Patient is a 60 y/o morbidly obese man now s/p bariatric surgery 7/2020, non-ischemic cardiomyopathy with systolic dysfunction, LVEF now 40-45%, NYHA class III, AHA stage C, s/p ICD, paroxsymal polymorphic VT, s/p CardioMEMs, permanent AF on Xarelto, ROCK on CPAP, prior GIB, and COPD who has been undergoing outpatient dental evaluation now s/p multiple tooth extractions at which time noted to have possible abscess initiated on antibiotic therapy however continued to be concerned about fever to 102F and dyspnea. CardioMEMs readings now with dPA 33, up from 27.  In the ER, he was noted to have hypotension and hypoxia and is s/p IVF and currently on 6L NC.     Hypoxic respiratory failure/hypotension/severe sepsis  - hypoxic respiratory failure is multifactorial including acute HFrecEF in the setting of severe sepsis 2/2 PNA. CXR with vascular congestion AND new PNA.   - O2 support  - recommend CT chest   - would avoid further IVF and start pressors for BP support  - strict I/O and daily weights, and close monitoring of renal indices   - recommend NIV  - check proBNP  - renal indices noted, hold lisinopril and spironolactone  - will need resumption of diuretics, however likely with pressor support, particularly if work of breathing increases  - recommend step down   - antibiotics per primary team/blood cultures pending     Permanent AF  - continue systemic AC and amiodarone   - tele     Discussed with Dr. Floyd       
 This 61 yr old morbidly obese male with PMH of HFrEF s/p AICD, cardio MEMs, Pulm HTN, , ROCK on CPAP, Afib s/p pacemaker on xarelto , hx gastric bypass in july 2020, lost 120lbs since then presented to ER with  worsening shortness of breath and cough that started 4days ago, had fever 102F at home, with productive yellow sputum, difficulty ambulating 2/2 SOB. He reports he had teeth extraction 1week ago and was started on Clindamycin and switched to Cefdinir po 2/2 purulent gum disease and got dentures., He had held Xarelto 5days before for the dental extraction and resumed xarelto 3 days ago.   In ER, he was found to be hypoxic, tachycardic, hypotensive, meeting sepsis criteria. CXR- showing Rt lower lobe consolidation, He was started on Meropenem Restricted iv fluid use 2/2 known CHF and 5lit NC oxygen. He feels some better however continues to have some sob.   He reports he stopped his chronic prednisone 5mg daily therapy 2weeks ago, but restarted himself on prednisone 20mg 4days ago. he was seen by cardio, Pulmonary in ER.  Admitted for further care.,   he also reports he had gall stone 2mths ago or so and had a laparoscopic Cholecystectomy at [facility] recently.  (22 Jun 2021 15:12)    patient reports having fevers on Sunday AM 7/20/21, called his doctor, and told to take some Cefdinir. still with fevers, and presented here.     reports of penicillin allergy as a child, but recently had skin testing which was negative.   CT scan report reviewed with family. fluid in esophagus found, in additional to bilateral PNA.       Impression:  bilateral PNA  acute febrile illness  obesity  COPD  WBC elevation      plan:  on merrem  - remote hx of penicillin allergy as a child, per his mother.   - negative skin testing as outpatient.     stop merrem  start Zosyn 3.375 grams Q8H  - likely aspiration PNA  check sputum cx   check legionella Ag      WBC elevation is reactive  - will follow and trend    - follow up all outstanding cultures  - trend temperature and WBC curve  - repeat cultures from blood and all sources if febrile.      copy of CT provided to pt  and family.   
Assess    Multifocal Pneumonia - mouth source likely  Cardiomyopathy with CHF  ROCK  Atrial fibrilation    Rec    DuoNeb  Steroid taper  Nocturnal CPAP at 8 cm H20  Meropenum is adequate  ID consult  Cardiology FU   Usual AC

## 2021-06-23 NOTE — PROGRESS NOTE ADULT - ASSESSMENT
62y/o male    1-multifocal pneumonia with  ? aspiration   2- underlying  chronic lung disease  3- zaida  4-afib  5- cardiomyopathy  with chf  6- diarrhea    - merrem  - prednisone 20 mg po qd  - add  budesonide q 12  - duonebs  - PPI  -     Please  feel free to reach out with any questions or suggestions    KAREEN SCHILLING    PULMONARY and CRITICAL CARE     [phone]

## 2021-06-23 NOTE — PROGRESS NOTE ADULT - ASSESSMENT
61yrold morbidly obese male with PMH of HFrEF s/p AICD, cardio MEMs, Pulm HTN, , ROCK on CPAP, Afib s/p pacemaker on xarelto , hx gastric bypass in july 2020, lost 120lbs since then presented to ER with  worsening shortness of breath and cough admitted with sepsis 2/2 Pneumonia after recent teeth extraction.      Sepsis 2/2 b/l Multilobe  Pneumonia likely GN  -  O2  wean off as tolerated. RT at BEDSIDE   - continue  meropenem. ,f/u ID REC  - f/u RVP, legionella   - Nebs, tapering  prednisone   - ID consult placed   - f/u blood cultures   -  lactic acidosis trending down  - Swallow eval. dilated esophagus on ct chest    CT Chest No Cont (06.22.21 @ 13:45) >  *  Bilateral multifocal pneumonia with evidence of endobronchial spread.  *  Fluid distention to the upper esophagus. Correlate for aspiration.      EVARISTO ,- Pre-renal - cautious use of ivf  avoid nephrotoxic drugs   - monitor bmp, hold diuretic,ACEI    CHFrEF - EF 30-35% - hold lisinopril, Spironolactone, Bumex in setting of sepsis with low BP   monitor I/O - resume meds once stable cardio rec    COPD exacrbation  - nebs, steroids - Pulm consulted - appreciate recs - follows with [facility]     Afib on Xarelto -Monitor rate,continue  Xarelto  amiodarone metoprolol     ROCK on CPAP  Morbid obesity - will benefit from weight reduction, recommendation dietary eval on outpatient   # DVT px - on full dose AC     CARE OF PLAN DW PT

## 2021-06-23 NOTE — PROGRESS NOTE ADULT - SUBJECTIVE AND OBJECTIVE BOX
PULMONARY PROGRESS NOTE      EARL [id number]    Patient is a 61y old  Male who presents with a chief complaint of SOB, cough, fever (23 Jun 2021 08:32)      BRIEF HOSPITAL COURSE: **· HPI Objective Statement: 60 y/o M hx of afib on xarelto (has defbrillator/pacemaker), CHF on bumex presents with shortness of breath for the past 4 days. States he got his teeth removed 6 days ago and has been on antibiotics (cefdinir and clindamycin per patient). Endorses worsening shortness of breath with movement/exertion and is not positional. Endorses Tmax fever of 102 four days ago. Endorsing productive cough that is "yellow" in color. States he had "pus" coming from the gumlines and was switched to cefdinir from clindamycin a few days ago. Denies chest pain. Denies nausea or vomiting. Denies recent travel. Denies hemoptysis. Denies abdominal pain.  h/o rock  h/o heavy smoker -  followed by Dr Tamayo  on  [facility]   Post bariatric surgery with 120 lb weight loss  *    Events last 24 hours: * feeling less sob   some  diarrhea    REVIEW OF SYSTEMS     CONSTITUTIONAL   no fevers  no loss of appetite  no weight loss   HEENT  no sore throat   no ringing in ears  NECK   no pain   RESPIRATORY  see HPI   CARDIOVASCULAR  no chest  pain no palpitations   GASTROINTESTINAL   + diarrhea MUSCULOSKELETAL  no joint pains    no  back pain   SKIN   no rash   no itchiness   GENITOURINARY  no dysuria   HEME    no bleeding or bruising   ENDOCRINE     no   warmth   no  sweating   no  cold intolerance   NEUROLOGIC  no tremors  no seizures  no    weakness    PSYCHIATRIC   no mood disorder    no delirium   **    PAST MEDICAL & SURGICAL HISTORY:  COPD (chronic obstructive pulmonary disease)    ROCK (obstructive sleep apnea)    Afib    CHF (congestive heart failure)    Cardiomyopathy, ischemic  wearing life vest  2-28-19    Ejection fraction &lt; 50%  last echo 2-13-19  31%    Asthma    HFrEF (heart failure with reduced ejection fraction)    AICD (automatic cardioverter/defibrillator) present    History of loop recorder  2017  [facility]    History of incision and drainage  right groin abcess  jan 2019  [facility]    History of cardioversion    H/O cardiac catheterization  5 years ago at [facility].    S/P gastric bypass          Medications:  meropenem  IVPB 1000 milliGRAM(s) IV Intermittent every 12 hours    aMIOdarone    Tablet 200 milliGRAM(s) Oral two times a day  metoprolol tartrate 12.5 milliGRAM(s) Oral every 12 hours    albuterol/ipratropium for Nebulization 3 milliLiter(s) Nebulizer every 6 hours    acetaminophen   Tablet .. 650 milliGRAM(s) Oral every 6 hours PRN  gabapentin 600 milliGRAM(s) Oral three times a day      rivaroxaban 15 milliGRAM(s) Oral with dinner    pantoprazole    Tablet 40 milliGRAM(s) Oral before breakfast      predniSONE   Tablet 20 milliGRAM(s) Oral daily          lactobacillus acidophilus 1 Tablet(s) Oral two times a day with meals          ICU Vital Signs Last 24 Hrs  T(C): 36.7 (23 Jun 2021 07:50), Max: 38 (22 Jun 2021 11:59)  T(F): 98.1 (23 Jun 2021 07:50), Max: 100.4 (22 Jun 2021 11:59)  HR: 96 (23 Jun 2021 08:51) (81 - 100)  BP: 101/60 (23 Jun 2021 07:50) (70/48 - 111/55)  BP(mean): 65 (22 Jun 2021 12:00) (65 - 65)  ABP: --  ABP(mean): --  RR: 22 (23 Jun 2021 07:50) (22 - 28)  SpO2: 91% (23 Jun 2021 08:51) (84% - 97%)          I&O's Detail        LABS:                        11.3   11.93 )-----------( 276      ( 23 Jun 2021 07:02 )             35.2     06-23    131<L>  |  95<L>  |  27.2<H>  ----------------------------<  89  4.5   |  22.0  |  1.58<H>    Ca    8.3<L>      23 Jun 2021 07:02  Mg     2.3     06-23    TPro  6.7  /  Alb  3.2<L>  /  TBili  0.6  /  DBili  x   /  AST  24  /  ALT  14  /  AlkPhos  51  06-22      CARDIAC MARKERS ( 22 Jun 2021 11:52 )  x     / 0.03 ng/mL / x     / x     / x          CAPILLARY BLOOD GLUCOSE            CULTURES:  Rapid RVP Result: Miguel (06-23 @ 05:47)      Physical Examination:    General:  obese male    no distress speaking full sentences  HEENT: Pupils equal, reactive to light.  Symmetric. no thrush     PULM: mild expiratory wheezing     no rhonchi   NECK: Supple, no lymphadenopathy, trachea midline    CVS: Regular rate and rhythm, no murmurs, rubs, or gallops    ABD: Soft, nondistended, nontender, normoactive bowel sounds, no masses    EXT: mild edema   SKIN: Warm and well perfused, no rashes noted.    NEURO: Alert, oriented, interactive, nonfocal    DEVICES:     RADIOLOGY: **reviewed  IMPRESSION:  *  Bilateral multifocal pneumonia with evidence of endobronchial spread.  *  Fluid distention to the upper esophagus. Correlate for aspiration.    REKHA TIM MD; Attending Radiologist  This document has been electronically signed. Jun 22 2021  2:29PM  *

## 2021-06-23 NOTE — SWALLOW BEDSIDE ASSESSMENT ADULT - SLP GENERAL OBSERVATIONS
Pt received seated at edge of stretcher in ED, A&A, 0x3, good historian, +02 via nc, reporting "breathing better," +gum discomfort with placement of temporary dentition on Friday with need for very soft foods per pt report, pain 0/10 pre/post eval

## 2021-06-24 LAB
ANION GAP SERPL CALC-SCNC: 14 MMOL/L — SIGNIFICANT CHANGE UP (ref 5–17)
BASOPHILS # BLD AUTO: 0.03 K/UL — SIGNIFICANT CHANGE UP (ref 0–0.2)
BASOPHILS NFR BLD AUTO: 0.3 % — SIGNIFICANT CHANGE UP (ref 0–2)
BUN SERPL-MCNC: 22.3 MG/DL — HIGH (ref 8–20)
CALCIUM SERPL-MCNC: 8.6 MG/DL — SIGNIFICANT CHANGE UP (ref 8.6–10.2)
CHLORIDE SERPL-SCNC: 98 MMOL/L — SIGNIFICANT CHANGE UP (ref 98–107)
CO2 SERPL-SCNC: 23 MMOL/L — SIGNIFICANT CHANGE UP (ref 22–29)
CREAT SERPL-MCNC: 1.34 MG/DL — HIGH (ref 0.5–1.3)
EOSINOPHIL # BLD AUTO: 0.46 K/UL — SIGNIFICANT CHANGE UP (ref 0–0.5)
EOSINOPHIL NFR BLD AUTO: 5 % — SIGNIFICANT CHANGE UP (ref 0–6)
GLUCOSE SERPL-MCNC: 102 MG/DL — HIGH (ref 70–99)
GRAM STN FLD: SIGNIFICANT CHANGE UP
HCT VFR BLD CALC: 36.6 % — LOW (ref 39–50)
HGB BLD-MCNC: 11.3 G/DL — LOW (ref 13–17)
IMM GRANULOCYTES NFR BLD AUTO: 0.4 % — SIGNIFICANT CHANGE UP (ref 0–1.5)
LEGIONELLA AG UR QL: NEGATIVE — SIGNIFICANT CHANGE UP
LYMPHOCYTES # BLD AUTO: 1.39 K/UL — SIGNIFICANT CHANGE UP (ref 1–3.3)
LYMPHOCYTES # BLD AUTO: 15.2 % — SIGNIFICANT CHANGE UP (ref 13–44)
MCHC RBC-ENTMCNC: 27.3 PG — SIGNIFICANT CHANGE UP (ref 27–34)
MCHC RBC-ENTMCNC: 30.9 GM/DL — LOW (ref 32–36)
MCV RBC AUTO: 88.4 FL — SIGNIFICANT CHANGE UP (ref 80–100)
MONOCYTES # BLD AUTO: 0.56 K/UL — SIGNIFICANT CHANGE UP (ref 0–0.9)
MONOCYTES NFR BLD AUTO: 6.1 % — SIGNIFICANT CHANGE UP (ref 2–14)
NEUTROPHILS # BLD AUTO: 6.68 K/UL — SIGNIFICANT CHANGE UP (ref 1.8–7.4)
NEUTROPHILS NFR BLD AUTO: 73 % — SIGNIFICANT CHANGE UP (ref 43–77)
PLATELET # BLD AUTO: 240 K/UL — SIGNIFICANT CHANGE UP (ref 150–400)
POTASSIUM SERPL-MCNC: 4 MMOL/L — SIGNIFICANT CHANGE UP (ref 3.5–5.3)
POTASSIUM SERPL-SCNC: 4 MMOL/L — SIGNIFICANT CHANGE UP (ref 3.5–5.3)
PROCALCITONIN SERPL-MCNC: 0.16 NG/ML — HIGH (ref 0.02–0.1)
RBC # BLD: 4.14 M/UL — LOW (ref 4.2–5.8)
RBC # FLD: 16.7 % — HIGH (ref 10.3–14.5)
SODIUM SERPL-SCNC: 135 MMOL/L — SIGNIFICANT CHANGE UP (ref 135–145)
SPECIMEN SOURCE: SIGNIFICANT CHANGE UP
WBC # BLD: 9.16 K/UL — SIGNIFICANT CHANGE UP (ref 3.8–10.5)
WBC # FLD AUTO: 9.16 K/UL — SIGNIFICANT CHANGE UP (ref 3.8–10.5)

## 2021-06-24 PROCEDURE — 99233 SBSQ HOSP IP/OBS HIGH 50: CPT

## 2021-06-24 PROCEDURE — 99232 SBSQ HOSP IP/OBS MODERATE 35: CPT

## 2021-06-24 RX ADMIN — Medication 20 MILLIGRAM(S): at 06:04

## 2021-06-24 RX ADMIN — Medication 1 TABLET(S): at 09:57

## 2021-06-24 RX ADMIN — Medication 650 MILLIGRAM(S): at 21:02

## 2021-06-24 RX ADMIN — Medication 3 MILLILITER(S): at 21:18

## 2021-06-24 RX ADMIN — PIPERACILLIN AND TAZOBACTAM 25 GRAM(S): 4; .5 INJECTION, POWDER, LYOPHILIZED, FOR SOLUTION INTRAVENOUS at 21:02

## 2021-06-24 RX ADMIN — Medication 0.25 MILLIGRAM(S): at 08:17

## 2021-06-24 RX ADMIN — GABAPENTIN 600 MILLIGRAM(S): 400 CAPSULE ORAL at 06:04

## 2021-06-24 RX ADMIN — RIVAROXABAN 15 MILLIGRAM(S): KIT at 17:16

## 2021-06-24 RX ADMIN — GABAPENTIN 600 MILLIGRAM(S): 400 CAPSULE ORAL at 21:02

## 2021-06-24 RX ADMIN — AMIODARONE HYDROCHLORIDE 200 MILLIGRAM(S): 400 TABLET ORAL at 06:04

## 2021-06-24 RX ADMIN — PIPERACILLIN AND TAZOBACTAM 25 GRAM(S): 4; .5 INJECTION, POWDER, LYOPHILIZED, FOR SOLUTION INTRAVENOUS at 13:50

## 2021-06-24 RX ADMIN — Medication 3 MILLILITER(S): at 15:44

## 2021-06-24 RX ADMIN — Medication 650 MILLIGRAM(S): at 09:56

## 2021-06-24 RX ADMIN — AMIODARONE HYDROCHLORIDE 200 MILLIGRAM(S): 400 TABLET ORAL at 17:16

## 2021-06-24 RX ADMIN — PIPERACILLIN AND TAZOBACTAM 25 GRAM(S): 4; .5 INJECTION, POWDER, LYOPHILIZED, FOR SOLUTION INTRAVENOUS at 06:04

## 2021-06-24 RX ADMIN — Medication 1 TABLET(S): at 17:16

## 2021-06-24 RX ADMIN — Medication 3 MILLILITER(S): at 04:21

## 2021-06-24 RX ADMIN — GABAPENTIN 600 MILLIGRAM(S): 400 CAPSULE ORAL at 13:50

## 2021-06-24 RX ADMIN — Medication 650 MILLIGRAM(S): at 22:00

## 2021-06-24 RX ADMIN — Medication 0.25 MILLIGRAM(S): at 21:19

## 2021-06-24 RX ADMIN — PANTOPRAZOLE SODIUM 40 MILLIGRAM(S): 20 TABLET, DELAYED RELEASE ORAL at 06:04

## 2021-06-24 RX ADMIN — Medication 3 MILLILITER(S): at 08:17

## 2021-06-24 NOTE — PROGRESS NOTE ADULT - ASSESSMENT
61yrold morbidly obese male with PMH of HFrEF s/p AICD, cardio MEMs, Pulm HTN, , ROCK on CPAP, Afib s/p pacemaker on xarelto , hx gastric bypass in july 2020, lost 120lbs since then presented to ER with  worsening shortness of breath and cough admitted with sepsis 2/2 Pneumonia after recent teeth extraction.      Sepsis 2/2 b/l Multilobe  Pneumonia likely GN  -  O2 downgraded to 3L  - Stop Meropenem and start Zosyn  - Nebs, tapering  prednisone    - RVP is negative,   - Order legionella   - ID consult placed   - f/u blood cultures   -  lactate normal   - Swallow eval. dilated esophagus on ct chest       EVARISTO ,- Pre-renal   - Cautious use of IVF    - Avoid nephrotoxic drugs.  - Hold diuretic and ACEI  - Monitor bmp    CHFrEF   - EF 30-35%   - hold lisinopril, Spironolactone, Bumex in setting of sepsis with low BP   - monitor I/O   - resume meds once stable cardio rec    COPD exacrbation  - nebs, steroids - Pulm consulted - appreciate recs - follows with Belkis     Afib on Xarelto -Monitor rate,continue  Xarelto  amiodarone metoprolol     ROCK on CPAP  Morbid obesity - will benefit from weight reduction, recommendation dietary eval on outpatient   # DVT px - on full dose AC     CARE OF PLAN DW PT         61yrold morbidly obese male with PMH of HFrEF s/p AICD, cardio MEMs, Pulm HTN, , ROCK on CPAP, Afib s/p pacemaker on xarelto , hx gastric bypass in july 2020, lost 120lbs since then presented to ER with  worsening shortness of breath and cough admitted with sepsis 2/2 Pneumonia after recent teeth extraction.      Sepsis 2/2 b/l Multilobe  Pneumonia likely GN  -  cont to wean off supplemental O2, current down to 3LNC  - ID following-  Stop Meropenem and start Zosyn  - Nebs, tapering  prednisone    - RVP is negative,   - legionella pending   - f/u blood cultures   -  lactate normal   - CT chest noted with dilated esophagus  -  S/S rec Mech soft with thin liquids      EVARISTO ,- Pre-renal   - Cautious use of IVF    - Avoid nephrotoxic drugs.  - Hold diuretic and ACEI  - Monitor bmp    CHFrEF   - EF 30-35%   - hold lisinopril, Spironolactone, Bumex in setting of sepsis with low BP   - monitor I/O   - resume meds once stable cardio rec    COPD exacrbation  - nebs, steroids - Pulm consulted - appreciate recs - follows with Belkis     Afib on Xarelto -Monitor rate,continue  Xarelto  amiodarone metoprolol     ROCK on CPAP  Morbid obesity - will benefit from weight reduction, recommendation dietary eval on outpatient   # DVT px - on full dose AC     CARE OF PLAN DW PT         61yrold morbidly obese male with PMH of HFrEF s/p AICD, cardio MEMs, Pulm HTN, , ROCK on CPAP, Afib s/p pacemaker on xarelto , hx gastric bypass in july 2020, lost 120lbs since then presented to ER with  worsening shortness of breath and cough admitted with sepsis 2/2 Pneumonia after recent teeth extraction.      Sepsis 2/2 b/l Multilobe  Pneumonia likely GN  -s/p 2L NS in ED, WBC, and lactate normalized. sepsis resolved  - Cont to wean off supplemental O2, current down to 3LNC  - ID following-  Stop Meropenem and start Zosyn  - Nebs, tapering  prednisone    - RVP is negative,   - Legionella pending   - Blood cultures pending  - CT chest noted with dilated esophagus  - S/S rec Mech soft with thin liquids      EVARISTO- Pre-renal   - Cautious use of IVF given hx of HF  - Avoid nephrotoxic drugs.  - Hold diuretic and ACEI  - Monitor bmp    CHFrEF   - EF 30-35%   - hold lisinopril, Spironolactone, Bumex in setting of sepsis with low BP  - cardiology following  - monitor I/O   - resume meds once stable cardio rec    COPD exacerbation    - c/w nebs, steroids   - Pulm consulted - appreciate recs      Afib   - c/q amiodarone, metoprolol  - c/w Xarelto      ROCK  - c/w nocturnal CPAP 8    Morbid obesity - will benefit from weight reduction, recommendation dietary eval on outpatient   # DVT px - on xarelto         61yr old morbidly obese male with PMH of HFrEF s/p AICD, cardio MEMs, Pulm HTN, , ROCK on CPAP, Afib s/p pacemaker on xarelto , hx gastric bypass in july 2020, lost 120lbs since then presented to ER with  worsening shortness of breath and cough admitted with sepsis 2/2 Pneumonia after recent teeth extraction.      acute hypoxic respiratory failure w/Sepsis 2/2 b/l Multilobe  Pneumonia likely GN  -s/p 2L NS in ED, WBC, and lactate normalized. sepsis resolved but pt required 4 l nc yesterday for sob.  - Cont to wean off supplemental O2, current down to 3LNC  - ID following-  Stop Meropenem and start Zosyn  - Nebs, tapering  prednisone    - RVP is negative,   - Legionella pending   - Blood cultures pending  - CT chest noted with dilated esophagus  - S/S rec Mech soft with thin liquids      EVARISTO- Pre-renal   - Cautious use of IVF given hx of HF  - Avoid nephrotoxic drugs.  - Hold diuretic and ACEI  - Monitor bmp    CHFrEF   - EF 30-35%   - hold lisinopril, Spironolactone, Bumex in setting of sepsis with low BP  - cardiology following  - monitor I/O   - resume meds once stable cardio rec    COPD exacerbation    - c/w nebs, steroids   - Pulm consulted - appreciate recs      Afib   - c/q amiodarone, metoprolol  - c/w Xarelto      ROCK  - c/w nocturnal CPAP 8    Morbid obesity - will benefit from weight reduction, recommendation dietary eval on outpatient   # DVT px - on xarelto

## 2021-06-24 NOTE — PROGRESS NOTE ADULT - ASSESSMENT
This 61 yr old morbidly obese male with PMH of HFrEF s/p AICD, cardio MEMs, Pulm HTN, , ROCK on CPAP, Afib s/p pacemaker on xarelto , hx gastric bypass in july 2020, lost 120lbs since then presented to ER with  worsening shortness of breath and cough that started 4days ago, had fever 102F at home, with productive yellow sputum, difficulty ambulating 2/2 SOB. He reports he had teeth extraction 1week ago and was started on Clindamycin and switched to Cefdinir po 2/2 purulent gum disease and got dentures., He had held Xarelto 5days before for the dental extraction and resumed xarelto 3 days ago.   In ER, he was found to be hypoxic, tachycardic, hypotensive, meeting sepsis criteria. CXR- showing Rt lower lobe consolidation, He was started on Meropenem Restricted iv fluid use 2/2 known CHF and 5lit NC oxygen. He feels some better however continues to have some sob.   He reports he stopped his chronic prednisone 5mg daily therapy 2weeks ago, but restarted himself on prednisone 20mg 4days ago. he was seen by cardio, Pulmonary in ER.  Admitted for further care.,   he also reports he had gall stone 2mths ago or so and had a laparoscopic Cholecystectomy at [facility] recently.  (22 Jun 2021 15:12)    patient reports having fevers on Sunday AM 7/20/21, called his doctor, and told to take some Cefdinir. still with fevers, and presented here.     reports of penicillin allergy as a child, but recently had skin testing which was negative.   CT scan report reviewed with family. fluid in esophagus found, in additional to bilateral PNA.       Impression:  bilateral PNA  acute febrile illness  obesity  COPD  WBC elevation      plan:  was on merrem  - remote hx of penicillin allergy as a child, per his mother.   - negative skin testing as outpatient.       CLINICALLY better  continue Zosyn 3.375 grams Q8H  no adverse reactions to penicillin  - likely aspiration PNA  check sputum cx   check legionella Ag      WBC elevation is reactive  - will follow and trend    - follow up all outstanding cultures  - trend temperature and WBC curve  - repeat cultures from blood and all sources if febrile.

## 2021-06-24 NOTE — PROGRESS NOTE ADULT - SUBJECTIVE AND OBJECTIVE BOX
[facility]  INFECTIOUS DISEASES AND INTERNAL MEDICINE at [place]  =======================================================  Omari Cantrell MD  Diplomates American Board of Internal Medicine and Infectious Diseases  Tel  [phone]  Fax [phone]  =======================================================    [facility]-[id number]  MAGGIE DONALDSONMEENA   follow up:  Bilateral PNA    breathing better   no fevers      I have personally reviewed the labs and data; pertinent labs and data are listed in this note; please see below.   =======================================================  Past Medical & Surgical Hx:  =====================  PAST MEDICAL & SURGICAL HISTORY:  COPD (chronic obstructive pulmonary disease)  ROCK (obstructive sleep apnea)  Afib  CHF (congestive heart failure)  Cardiomyopathy, ischemic  wearing life vest  2-28-19  Ejection fraction <  50%  last echo 2-13-19  31%    Asthma  HFrEF (heart failure with reduced ejection fraction)  AICD (automatic cardioverter/defibrillator) present  History of loop recorder  2017  [facility]  History of incision and drainage  right groin abscess  jan 2019  [facility]  History of cardioversion  H/O cardiac catheterization  5 years ago at [facility]  S/P gastric bypass    Problem List:  ==========  HEALTH ISSUES - PROBLEM Dx:       Social Hx:  =======  no toxic habits currently    FAMILY HISTORY:  Family history of CHF (congestive heart failure)    no significant family history of immunosuppressive disorders in mother or father   =======================================================    REVIEW OF SYSTEMS:  CONSTITUTIONAL:  No Fever or chills  HEENT:  No diplopia or blurred vision.  No earache, sore throat or runny nose.  CARDIOVASCULAR:  No pressure, squeezing, strangling, tightness, heaviness or aching about the chest, neck, axilla or epigastrium.  RESPIRATORY:   as above  GASTROINTESTINAL:  No nausea, vomiting or diarrhea.  GENITOURINARY:  No dysuria, frequency or urgency. No Blood in urine  MUSCULOSKELETAL:  no joint aches, no muscle pain  SKIN:  No change in skin, hair or nails.  NEUROLOGIC:  No Headaches, seizures or weakness.  PSYCHIATRIC:  No disorder of thought or mood.  ENDOCRINE:  No heat or cold intolerance  HEMATOLOGICAL:  No easy bruising or bleeding.    =======================================================  Allergies  No Known Drug Allergies  shellfish (Pruritus; Rash)    ======================================================  Physical Exam:  ============     General:  No acute distress.  OBESE  Eye: Pupils are equal, round and reactive to light, Extraocular movements are intact, Normal conjunctiva.  HENT: Normocephalic, Oral mucosa is moist, No pharyngeal erythema, No sinus tenderness.  Neck: Supple, No lymphadenopathy.  Respiratory: Lungs with fair air entry  Cardiovascular: Normal rate, Regular rhythm,   trace edema  Gastrointestinal: Soft, Non-tender, Non-distended, Normal bowel sounds.  OBESE ABD  Genitourinary: No costovertebral angle tenderness.  Lymphatics: No lymphadenopathy neck,   Musculoskeletal: Normal range of motion, Normal strength.  Integumentary: No rash.  Neurologic: Alert, Oriented, No focal deficits, Cranial Nerves II-XII are grossly intact.  Psychiatric: Appropriate mood & affect.    =======================================================     Vitals:  ============  T(F): 98.6 (24 Jun 2021 06:00), Max: 99.4 (23 Jun 2021 22:00)  HR: 75 (24 Jun 2021 08:18)  BP: 98/55 (24 Jun 2021 08:00)  RR: 20 (24 Jun 2021 08:00)  SpO2: 98% (24 Jun 2021 08:18) (93% - 100%)  temp max in last 48H T(F): , Max: 100.4 (06-22-21 @ 11:59)    =======================================================  Current Antibiotics:  piperacillin/tazobactam IVPB.. 3.375 Gram(s) IV Intermittent every 8 hours    Other medications:  albuterol/ipratropium for Nebulization 3 milliLiter(s) Nebulizer every 6 hours  aMIOdarone    Tablet 200 milliGRAM(s) Oral two times a day  buDESOnide    Inhalation Suspension 0.25 milliGRAM(s) Inhalation every 12 hours  gabapentin 600 milliGRAM(s) Oral three times a day  lactobacillus acidophilus 1 Tablet(s) Oral two times a day with meals  metoprolol tartrate 12.5 milliGRAM(s) Oral every 12 hours  pantoprazole    Tablet 40 milliGRAM(s) Oral before breakfast  predniSONE   Tablet 20 milliGRAM(s) Oral daily  rivaroxaban 15 milliGRAM(s) Oral with dinner      =======================================================  Labs:                        11.3   9.16  )-----------( 240      ( 24 Jun 2021 06:24 )             36.6     06-24    135  |  98  |  22.3<H>  ----------------------------<  102<H>  4.0   |  23.0  |  1.34<H>    Ca    8.6      24 Jun 2021 06:24  Mg     2.3     06-23    TPro  6.7  /  Alb  3.2<L>  /  TBili  0.6  /  DBili  x   /  AST  24  /  ALT  14  /  AlkPhos  51  06-22      Culture - Sputum (collected 06-24-21 @ 03:32)  Source: .Sputum Sputum  Gram Stain (06-24-21 @ 07:31):    Few polymorphonuclear leukocytes per low power field    Few Squamous epithelial cells per low power field    No organisms seen         Procalcitonin, Serum: 0.16 ng/mL (06-24-21 @ 06:24)  Procalcitonin, Serum: 0.38 ng/mL (06-22-21 @ 18:40)    SARS-CoV-2: NotDetec (06-23-21 @ 05:47)  Rapid RVP Result: NotDetec (06-23-21 @ 05:47)    COVID-19 PCR: NotDetec (06-22-21 @ 12:35)

## 2021-06-24 NOTE — PROGRESS NOTE ADULT - SUBJECTIVE AND OBJECTIVE BOX
[facility]                                   [address] [place] [address]                                                      PHONE: [phone]                                                         FAX: [phone]  [email or web address]  ---------------------------------------------------------------------------------------------------------------------------------    Overnight events/patient complaints: reports feeling markedly improved, O2 now to 3L     INTERPRETATION OF TELEMETRY (personally reviewed): AF      I&O's Summary    23 Jun 2021 07:01 - 24 Jun 2021 07:00  --------------------------------------------------------  IN: 100 mL / OUT: 0 mL / NET: 100 mL    24 Jun 2021 07:01  -  24 Jun 2021 12:30  --------------------------------------------------------  IN: 240 mL / OUT: 0 mL / NET: 240 mL        PAST MEDICAL & SURGICAL HISTORY:  COPD (chronic obstructive pulmonary disease)    ROCK (obstructive sleep apnea)    Afib    CHF (congestive heart failure)    Cardiomyopathy, ischemic  wearing life vest  2-28-19    Ejection fraction &lt; 50%  last echo 2-13-19  31%    Asthma    HFrEF (heart failure with reduced ejection fraction)    AICD (automatic cardioverter/defibrillator) present    History of loop recorder  2017  [facility]    History of incision and drainage  right groin abcess  jan 2019  [facility]    History of cardioversion    H/O cardiac catheterization  5 years ago at [facility].    S/P gastric bypass        No Known Drug Allergies  shellfish (Pruritus; Rash)    MEDICATIONS  (STANDING):  albuterol/ipratropium for Nebulization 3 milliLiter(s) Nebulizer every 6 hours  aMIOdarone    Tablet 200 milliGRAM(s) Oral two times a day  buDESOnide    Inhalation Suspension 0.25 milliGRAM(s) Inhalation every 12 hours  gabapentin 600 milliGRAM(s) Oral three times a day  lactobacillus acidophilus 1 Tablet(s) Oral two times a day with meals  metoprolol tartrate 12.5 milliGRAM(s) Oral every 12 hours  pantoprazole    Tablet 40 milliGRAM(s) Oral before breakfast  piperacillin/tazobactam IVPB.. 3.375 Gram(s) IV Intermittent every 8 hours  predniSONE   Tablet 20 milliGRAM(s) Oral daily  rivaroxaban 15 milliGRAM(s) Oral with dinner    MEDICATIONS  (PRN):  acetaminophen   Tablet .. 650 milliGRAM(s) Oral every 6 hours PRN Severe Pain (7 - 10)  diphenhydrAMINE   Injectable 25 milliGRAM(s) IV Push every 8 hours PRN Rash and/or Itching      Vital Signs Last 24 Hrs  T(C): 37 (24 Jun 2021 06:00), Max: 37.4 (23 Jun 2021 19:51)  T(F): 98.6 (24 Jun 2021 06:00), Max: 99.4 (23 Jun 2021 22:00)  HR: 75 (24 Jun 2021 08:18) (73 - 97)  BP: 98/55 (24 Jun 2021 08:00) (92/43 - 109/64)  BP(mean): 65 (24 Jun 2021 06:00) (56 - 72)  RR: 20 (24 Jun 2021 08:00) (20 - 24)  SpO2: 98% (24 Jun 2021 08:18) (93% - 100%)  ICU Vital Signs Last 24 Hrs    PHYSICAL EXAM:  General: Appears well developed, well nourished alert and cooperative.  HEENT: Head; normocephalic, atraumatic.Pupils reactive, cornea wnl. Neck supple, no nodes adenopathy, no JVD  CARDIOVASCULAR: Normal S1 and S2, 1/6 JEFF, no rub, gallop or lift.   LUNGS: No rales, rhonchi or wheeze. Normal breath sounds bilaterally.  ABDOMEN: Soft, nontender without mass or organomegaly. bowel sounds normoactive.  EXTREMITIES: No clubbing, cyanosis, trivial edema.   SKIN: warm and dry with normal turgor.  NEURO: Alert/oriented x 3/normal motor exam.   PSYCH: normal affect.        LABS:                        11.3   9.16  )-----------( 240      ( 24 Jun 2021 06:24 )             36.6     06-24    135  |  98  |  22.3<H>  ----------------------------<  102<H>  4.0   |  23.0  |  1.34<H>    Ca    8.6      24 Jun 2021 06:24  Mg     2.3     06-23      ASSESSMENT AND PLAN:  Patient is a 62 y/o morbidly obese man now s/p bariatric surgery 7/2020, non-ischemic cardiomyopathy with systolic dysfunction, LVEF now 40-45%, NYHA class III, AHA stage C, s/p ICD, paroxsymal polymorphic VT, s/p CardioMEMs, permanent AF on Xarelto, ROCK on CPAP, prior GIB, and COPD who has been undergoing outpatient dental evaluation now s/p multiple tooth extractions at which time noted to have possible abscess initiated on antibiotic therapy however continued to be concerned about fever to 102F and dyspnea. CardioMEMs readings now with dPA 33, up from 27.  ProBNP 5963  In the ER, he was noted to have hypotension and hypoxia and is s/p IVF and currently on 6L NC.     Hypoxic respiratory failure/hypotension/severe sepsis  - hypoxic respiratory failure is multifactorial including acute HFrecEF in the setting of severe sepsis 2/2 PNA. CXR with vascular congestion AND new PNA.   - O2 support, wean as tolerated  - would avoid further IVF  - strict I/O and daily weights, and close monitoring of renal indices   - nocturnal NIV  - borderline BP and renal indices noted, continue to hold lisinopril and spironolactone  - will need eventual resumption of diuretics, hold for today   - antibiotics per primary team    Permanent AF  - continue systemic AC and amiodarone   - tele     Thank you for allowing [facility] to participate in the care of this patient.  Please feel free to call with any questions or concerns.

## 2021-06-24 NOTE — PROVIDER CONTACT NOTE (OTHER) - RECOMMENDATIONS
yes patient maintained BIPAP for 2 hours. now refusing. 2L NC placed on pt.  monitor in place as per order. will continue to monitor. Karen CAMARENA made aware.

## 2021-06-24 NOTE — PROGRESS NOTE ADULT - ATTENDING COMMENTS
pt seen and exam. Agreed with above. Pt is following with bariatric surgery, has dilated esophagus since bariatric procedure.  rec to f/u out pt. will titrate oxy as tolerate. pass swallow eval   Above care of plan dw pt.

## 2021-06-24 NOTE — PROGRESS NOTE ADULT - SUBJECTIVE AND OBJECTIVE BOX
MAGGIE ELLIOTT  61y  Male    Interval/overnight events/pt complaints:  The patient seen and examine at bed side. The patient feels better. No event last night. The has no complaints. The patient denies CP, dizziness, palpitation, headache, abdominal pain, or N&V       MEDICATIONS  (STANDING):  albuterol/ipratropium for Nebulization 3 milliLiter(s) Nebulizer every 6 hours  aMIOdarone    Tablet 200 milliGRAM(s) Oral two times a day  buDESOnide    Inhalation Suspension 0.25 milliGRAM(s) Inhalation every 12 hours  gabapentin 600 milliGRAM(s) Oral three times a day  lactobacillus acidophilus 1 Tablet(s) Oral two times a day with meals  metoprolol tartrate 12.5 milliGRAM(s) Oral every 12 hours  pantoprazole    Tablet 40 milliGRAM(s) Oral before breakfast  piperacillin/tazobactam IVPB.. 3.375 Gram(s) IV Intermittent every 8 hours  predniSONE   Tablet 20 milliGRAM(s) Oral daily  rivaroxaban 15 milliGRAM(s) Oral with dinner    MEDICATIONS  (PRN):  acetaminophen   Tablet .. 650 milliGRAM(s) Oral every 6 hours PRN Severe Pain (7 - 10)  diphenhydrAMINE   Injectable 25 milliGRAM(s) IV Push every 8 hours PRN Rash and/or Itching      Vital Signs Last 24 Hrs  T(C): 37 (24 Jun 2021 06:00), Max: 37.4 (23 Jun 2021 19:51)  T(F): 98.6 (24 Jun 2021 06:00), Max: 99.4 (23 Jun 2021 22:00)  HR: 75 (24 Jun 2021 08:18) (73 - 97)  BP: 98/55 (24 Jun 2021 08:00) (92/43 - 109/64)  BP(mean): 65 (24 Jun 2021 06:00) (56 - 72)  RR: 20 (24 Jun 2021 08:00) (20 - 24)  SpO2: 98% (24 Jun 2021 08:18) (93% - 100%)      PHYSICAL EXAM:  GENERAL: NAD, nontoxic appearing, on NC   NECK: Supple, No JVD  CHEST/LUNG: Clear to auscultation bilaterally; No rales, rhonchi, wheezing  HEART: Normal S1S2. Regular rate and rhythm; No murmurs, rubs, or gallops  ABDOMEN: Soft, Nontender, Nondistended; Bowel sounds present  EXTREMITIES: No edema, or calf tenderness  NEURO:  AOx3.  SKIN: Warm, dry    CAPILLARY BLOOD GLUCOSE          LABS    (06-24 @ 06:24)                      11.3  9.16 )-----------( 240                 36.6    Neutrophils = 6.68 (73.0%)  Lymphocytes = 1.39 (15.2%)  Eosinophils = 0.46 (5.0%)  Basophils = 0.03 (0.3%)  Monocytes = 0.56 (6.1%)  Bands = --%    135  |  98  |  22.3<H>  ----------------------------<  102<H>  4.0   |  23.0  |  1.34<H>    Ca    8.6      24 Jun 2021 06:24  Mg     2.3     06-23    TPro  6.7  /  Alb  3.2<L>  /  TBili  0.6  /  DBili  x   /  AST  24  /  ALT  14  /  AlkPhos  51  06-22        Consultant(s) Notes Reviewed:  [X] YES  [ ] NO  Care Discussed with Consultants/Other Providers [X] YES  [ ] NO     MAGGIE ELLIOTT  61y  Male    Interval/overnight events/pt complaints:  The patient seen and examine at bed side. Patient endorses his breathing is much better today. No  overnight event. patient does not report any other complaints. The patient denies CP, dizziness, palpitation, headache, abdominal pain, or N&V     MEDICATIONS  (STANDING):  albuterol/ipratropium for Nebulization 3 milliLiter(s) Nebulizer every 6 hours  aMIOdarone    Tablet 200 milliGRAM(s) Oral two times a day  buDESOnide    Inhalation Suspension 0.25 milliGRAM(s) Inhalation every 12 hours  gabapentin 600 milliGRAM(s) Oral three times a day  lactobacillus acidophilus 1 Tablet(s) Oral two times a day with meals  metoprolol tartrate 12.5 milliGRAM(s) Oral every 12 hours  pantoprazole    Tablet 40 milliGRAM(s) Oral before breakfast  piperacillin/tazobactam IVPB.. 3.375 Gram(s) IV Intermittent every 8 hours  predniSONE   Tablet 20 milliGRAM(s) Oral daily  rivaroxaban 15 milliGRAM(s) Oral with dinner    MEDICATIONS  (PRN):  acetaminophen   Tablet .. 650 milliGRAM(s) Oral every 6 hours PRN Severe Pain (7 - 10)  diphenhydrAMINE   Injectable 25 milliGRAM(s) IV Push every 8 hours PRN Rash and/or Itching      Vital Signs Last 24 Hrs  T(C): 37 (24 Jun 2021 06:00), Max: 37.4 (23 Jun 2021 19:51)  T(F): 98.6 (24 Jun 2021 06:00), Max: 99.4 (23 Jun 2021 22:00)  HR: 75 (24 Jun 2021 08:18) (73 - 97)  BP: 98/55 (24 Jun 2021 08:00) (92/43 - 109/64)  BP(mean): 65 (24 Jun 2021 06:00) (56 - 72)  RR: 20 (24 Jun 2021 08:00) (20 - 24)  SpO2: 98% (24 Jun 2021 08:18) (93% - 100%)    PHYSICAL EXAM:  GENERAL: NAD, nontoxic appearing, on 3LNC   NECK: Supple, No JVD  CHEST/LUNG: Clear to auscultation bilaterally; No rales, rhonchi, wheezing  HEART: Normal S1S2. Regular rate and rhythm; No murmurs, rubs, or gallops  ABDOMEN: Soft, Nontender, Nondistended; Bowel sounds present  EXTREMITIES: No edema, or calf tenderness  NEURO:  AOx3.  SKIN: Warm, dry    LABS    (06-24 @ 06:24)                      11.3  9.16 )-----------( 240                 36.6    Neutrophils = 6.68 (73.0%)  Lymphocytes = 1.39 (15.2%)  Eosinophils = 0.46 (5.0%)  Basophils = 0.03 (0.3%)  Monocytes = 0.56 (6.1%)  Bands = --%    135  |  98  |  22.3<H>  ----------------------------<  102<H>  4.0   |  23.0  |  1.34<H>    Ca    8.6      24 Jun 2021 06:24  Mg     2.3     06-23    TPro  6.7  /  Alb  3.2<L>  /  TBili  0.6  /  DBili  x   /  AST  24  /  ALT  14  /  AlkPhos  51  06-22    Consultant(s) Notes Reviewed:  [X] YES  [ ] NO       MAGGIE ELLIOTT  61y  Male    Interval/overnight events/pt complaints:  The patient seen and examine at bed side. Patient endorses his breathing is much better today. No  overnight event. patient does not report any other complaints. The patient denies CP, dizziness, palpitation, headache, abdominal pain, or N&V       ros: all system are reviewed and found to be negative except above  MEDICATIONS  (STANDING):  albuterol/ipratropium for Nebulization 3 milliLiter(s) Nebulizer every 6 hours  aMIOdarone    Tablet 200 milliGRAM(s) Oral two times a day  buDESOnide    Inhalation Suspension 0.25 milliGRAM(s) Inhalation every 12 hours  gabapentin 600 milliGRAM(s) Oral three times a day  lactobacillus acidophilus 1 Tablet(s) Oral two times a day with meals  metoprolol tartrate 12.5 milliGRAM(s) Oral every 12 hours  pantoprazole    Tablet 40 milliGRAM(s) Oral before breakfast  piperacillin/tazobactam IVPB.. 3.375 Gram(s) IV Intermittent every 8 hours  predniSONE   Tablet 20 milliGRAM(s) Oral daily  rivaroxaban 15 milliGRAM(s) Oral with dinner    MEDICATIONS  (PRN):  acetaminophen   Tablet .. 650 milliGRAM(s) Oral every 6 hours PRN Severe Pain (7 - 10)  diphenhydrAMINE   Injectable 25 milliGRAM(s) IV Push every 8 hours PRN Rash and/or Itching      Vital Signs Last 24 Hrs  T(C): 37 (24 Jun 2021 06:00), Max: 37.4 (23 Jun 2021 19:51)  T(F): 98.6 (24 Jun 2021 06:00), Max: 99.4 (23 Jun 2021 22:00)  HR: 75 (24 Jun 2021 08:18) (73 - 97)  BP: 98/55 (24 Jun 2021 08:00) (92/43 - 109/64)  BP(mean): 65 (24 Jun 2021 06:00) (56 - 72)  RR: 20 (24 Jun 2021 08:00) (20 - 24)  SpO2: 98% (24 Jun 2021 08:18) (93% - 100%)    PHYSICAL EXAM:  GENERAL: NAD, nontoxic appearing, on 3LNC   NECK: Supple, No JVD  CHEST/LUNG: Clear to auscultation bilaterally; No rales, rhonchi, wheezing  HEART: Normal S1S2. Regular rate and rhythm; No murmurs, rubs, or gallops  ABDOMEN: Soft, Nontender, Nondistended; Bowel sounds present  EXTREMITIES: No edema, or calf tenderness  NEURO:  AOx3.  SKIN: Warm, dry    LABS    (06-24 @ 06:24)                      11.3  9.16 )-----------( 240                 36.6    Neutrophils = 6.68 (73.0%)  Lymphocytes = 1.39 (15.2%)  Eosinophils = 0.46 (5.0%)  Basophils = 0.03 (0.3%)  Monocytes = 0.56 (6.1%)  Bands = --%    135  |  98  |  22.3<H>  ----------------------------<  102<H>  4.0   |  23.0  |  1.34<H>    Ca    8.6      24 Jun 2021 06:24  Mg     2.3     06-23    TPro  6.7  /  Alb  3.2<L>  /  TBili  0.6  /  DBili  x   /  AST  24  /  ALT  14  /  AlkPhos  51  06-22    Consultant(s) Notes Reviewed:  [X] YES  [ ] NO

## 2021-06-25 ENCOUNTER — TRANSCRIPTION ENCOUNTER (OUTPATIENT)
Age: 61
End: 2021-06-25

## 2021-06-25 LAB
ANION GAP SERPL CALC-SCNC: 13 MMOL/L — SIGNIFICANT CHANGE UP (ref 5–17)
BUN SERPL-MCNC: 18.4 MG/DL — SIGNIFICANT CHANGE UP (ref 8–20)
CALCIUM SERPL-MCNC: 8.8 MG/DL — SIGNIFICANT CHANGE UP (ref 8.6–10.2)
CHLORIDE SERPL-SCNC: 98 MMOL/L — SIGNIFICANT CHANGE UP (ref 98–107)
CO2 SERPL-SCNC: 22 MMOL/L — SIGNIFICANT CHANGE UP (ref 22–29)
CREAT SERPL-MCNC: 0.94 MG/DL — SIGNIFICANT CHANGE UP (ref 0.5–1.3)
GLUCOSE SERPL-MCNC: 127 MG/DL — HIGH (ref 70–99)
HCT VFR BLD CALC: 35.7 % — LOW (ref 39–50)
HGB BLD-MCNC: 10.8 G/DL — LOW (ref 13–17)
MCHC RBC-ENTMCNC: 26.9 PG — LOW (ref 27–34)
MCHC RBC-ENTMCNC: 30.3 GM/DL — LOW (ref 32–36)
MCV RBC AUTO: 89 FL — SIGNIFICANT CHANGE UP (ref 80–100)
PLATELET # BLD AUTO: 272 K/UL — SIGNIFICANT CHANGE UP (ref 150–400)
POTASSIUM SERPL-MCNC: 4.6 MMOL/L — SIGNIFICANT CHANGE UP (ref 3.5–5.3)
POTASSIUM SERPL-SCNC: 4.6 MMOL/L — SIGNIFICANT CHANGE UP (ref 3.5–5.3)
RBC # BLD: 4.01 M/UL — LOW (ref 4.2–5.8)
RBC # FLD: 16.2 % — HIGH (ref 10.3–14.5)
SODIUM SERPL-SCNC: 133 MMOL/L — LOW (ref 135–145)
WBC # BLD: 9.23 K/UL — SIGNIFICANT CHANGE UP (ref 3.8–10.5)
WBC # FLD AUTO: 9.23 K/UL — SIGNIFICANT CHANGE UP (ref 3.8–10.5)

## 2021-06-25 PROCEDURE — 99232 SBSQ HOSP IP/OBS MODERATE 35: CPT

## 2021-06-25 PROCEDURE — 93306 TTE W/DOPPLER COMPLETE: CPT | Mod: 26

## 2021-06-25 RX ORDER — LISINOPRIL 2.5 MG/1
2.5 TABLET ORAL DAILY
Refills: 0 | Status: DISCONTINUED | OUTPATIENT
Start: 2021-06-25 | End: 2021-06-26

## 2021-06-25 RX ORDER — BUMETANIDE 0.25 MG/ML
1 INJECTION INTRAMUSCULAR; INTRAVENOUS
Refills: 0 | Status: DISCONTINUED | OUTPATIENT
Start: 2021-06-25 | End: 2021-06-26

## 2021-06-25 RX ADMIN — AMIODARONE HYDROCHLORIDE 200 MILLIGRAM(S): 400 TABLET ORAL at 05:11

## 2021-06-25 RX ADMIN — Medication 12.5 MILLIGRAM(S): at 05:11

## 2021-06-25 RX ADMIN — Medication 650 MILLIGRAM(S): at 14:41

## 2021-06-25 RX ADMIN — Medication 3 MILLILITER(S): at 09:37

## 2021-06-25 RX ADMIN — PANTOPRAZOLE SODIUM 40 MILLIGRAM(S): 20 TABLET, DELAYED RELEASE ORAL at 05:11

## 2021-06-25 RX ADMIN — GABAPENTIN 600 MILLIGRAM(S): 400 CAPSULE ORAL at 05:11

## 2021-06-25 RX ADMIN — RIVAROXABAN 15 MILLIGRAM(S): KIT at 17:19

## 2021-06-25 RX ADMIN — PIPERACILLIN AND TAZOBACTAM 25 GRAM(S): 4; .5 INJECTION, POWDER, LYOPHILIZED, FOR SOLUTION INTRAVENOUS at 14:28

## 2021-06-25 RX ADMIN — PIPERACILLIN AND TAZOBACTAM 25 GRAM(S): 4; .5 INJECTION, POWDER, LYOPHILIZED, FOR SOLUTION INTRAVENOUS at 05:11

## 2021-06-25 RX ADMIN — GABAPENTIN 600 MILLIGRAM(S): 400 CAPSULE ORAL at 14:28

## 2021-06-25 RX ADMIN — Medication 20 MILLIGRAM(S): at 05:11

## 2021-06-25 RX ADMIN — Medication 1 TABLET(S): at 08:03

## 2021-06-25 RX ADMIN — PIPERACILLIN AND TAZOBACTAM 25 GRAM(S): 4; .5 INJECTION, POWDER, LYOPHILIZED, FOR SOLUTION INTRAVENOUS at 21:23

## 2021-06-25 RX ADMIN — Medication 1 TABLET(S): at 17:18

## 2021-06-25 RX ADMIN — Medication 650 MILLIGRAM(S): at 21:25

## 2021-06-25 RX ADMIN — Medication 3 MILLILITER(S): at 21:45

## 2021-06-25 RX ADMIN — Medication 3 MILLILITER(S): at 15:35

## 2021-06-25 RX ADMIN — GABAPENTIN 600 MILLIGRAM(S): 400 CAPSULE ORAL at 21:23

## 2021-06-25 RX ADMIN — BUMETANIDE 1 MILLIGRAM(S): 0.25 INJECTION INTRAMUSCULAR; INTRAVENOUS at 17:17

## 2021-06-25 RX ADMIN — Medication 0.25 MILLIGRAM(S): at 21:47

## 2021-06-25 RX ADMIN — Medication 3 MILLILITER(S): at 05:23

## 2021-06-25 RX ADMIN — Medication 0.25 MILLIGRAM(S): at 09:37

## 2021-06-25 RX ADMIN — AMIODARONE HYDROCHLORIDE 200 MILLIGRAM(S): 400 TABLET ORAL at 17:17

## 2021-06-25 RX ADMIN — Medication 650 MILLIGRAM(S): at 15:41

## 2021-06-25 NOTE — DISCHARGE NOTE PROVIDER - HOSPITAL COURSE
This 61 yr old morbidly obese male with PMH of HFrEF s/p AICD, cardio MEMs, Pulm HTN, , ROCK on CPAP, Afib s/p pacemaker on xarelto , hx gastric bypass in july 2020, lost 120lbs since then presented to ER with  worsening shortness of breath and cough that started 4days ago, had fever 102F at home, with productive yellow sputum, difficulty ambulating 2/2 SOB. He reports he had teeth extraction 1week ago and was started on Clindamycin and switched to Cefdinir po 2/2 purulent gum disease and got dentures., He had held Xarelto 5days before for the dental extraction and resumed xarelto 3 days ago.   In ER, he was found to be hypoxic, tachycardic, hypotensive, meeting sepsis criteria. CXR- showing Rt lower lobe consolidation, He was started on Meropenem , later he required 5 L oxygen. ct chest multilobe PNA.  He reports he stopped his chronic prednisone 5mg daily therapy 2weeks ago, but restarted himself on prednisone 20mg 4days ago. Currently on 20 mg and plan to taper to 10 mg po qd and continue until further recommendation by pulmonary. trilogy on discharge for ROCK, Currently on Cpap.  f/u pulmonary out patient. ID recommends to discharged with po augmentin. he was seen by cardio, Pulmonary, ID. ACEI,DIURETIC ,ACEI WAS ON HOLD FOR LOW BP. F/u batriatic surgery on discharge. currently on [facility] soft thin liquid. culures negative til to date. This 61 yr old morbidly obese male with PMH of HFrEF s/p AICD, cardio MEMs, Pulm HTN, , ROCK on CPAP, Afib s/p pacemaker on xarelto , hx gastric bypass in july 2020, lost 120lbs since then presented to ER with  worsening shortness of breath and cough that started 4days ago, had fever 102F at home, with productive yellow sputum, difficulty ambulating 2/2 SOB. He reports he had teeth extraction 1week ago and was started on Clindamycin and switched to Cefdinir po 2/2 purulent gum disease and got dentures., He had held Xarelto 5days before for the dental extraction and resumed xarelto 3 days ago.   In ER, he was found to be hypoxic, tachycardic, hypotensive, meeting sepsis criteria. CXR- showing Rt lower lobe consolidation, He was started on Meropenem , later he required 5 L oxygen. ct chest multilobe PNA.  He reports he stopped his chronic prednisone 5mg daily therapy 2weeks ago, but restarted himself on prednisone 20mg 4days ago. Currently on 20 mg and plan to taper to 10 mg po qd and continue until further recommendation by pulmonary. ROCK on Cpap.  f/u pulmonary out patient. ID recommends to discharged with po augmentin. he was seen by cardio, Pulmonary, ID. ACEI,DIURETIC ,ACEI WAS ON HOLD FOR LOW BP. F/u batriatic surgery on discharge. currently on [facility] soft thin liquid. cultures negative till to date. This 61 yr old morbidly obese male with PMH of HFrEF s/p AICD, cardio MEMs, Pulm HTN, , ROCK on CPAP, Afib s/p pacemaker on xarelto , hx gastric bypass in july 2020, lost 120lbs since then presented to ER with  worsening shortness of breath and cough that started 4days ago, had fever 102F at home, with productive yellow sputum, difficulty ambulating 2/2 SOB. He reports he had teeth extraction 1week ago and was started on Clindamycin and switched to Cefdinir po 2/2 purulent gum disease and got dentures., He had held Xarelto 5days before for the dental extraction and resumed xarelto 3 days ago.   In ER, he was found to be hypoxic, tachycardic, hypotensive, meeting sepsis criteria. CXR- showing Rt lower lobe consolidation, He was started on Meropenem , later he required 5 L oxygen. ct chest multilobe PNA. now titrated off supplemental O2.   He reports he stopped his chronic prednisone 5mg daily therapy 2weeks ago, but restarted himself on prednisone 20mg 4days ago. Currently on 20 mg and plan to taper to 10 mg po qd and continue until further recommendation by pulmonary. ROCK on Cpap.  f/u pulmonary out patient. ID recommends to discharged with po augmentin. he was seen by cardio, Pulmonary, ID. ACEI,DIURETIC ,ACEI WAS ON HOLD FOR LOW BP. F/u batriatic surgery on discharge. [facility] cultures negative till to date.     Vital Signs Last 24 Hrs  T(C): 36.7 (26 Jun 2021 11:00), Max: 36.7 (25 Jun 2021 17:07)  T(F): 98.1 (26 Jun 2021 11:00), Max: 98.1 (26 Jun 2021 11:00)  HR: 78 (26 Jun 2021 11:00) (73 - 102)  BP: 125/70 (26 Jun 2021 11:00) (87/49 - 125/70)  BP(mean): --  RR: 18 (26 Jun 2021 11:00) (17 - 18)  SpO2: 97% (26 Jun 2021 11:00) (93% - 97%)    CONSTITUTIONAL: NAD  EYES: +EOMI  CARDIAC: irregularly irregular,  normal +S1, S2.    RESPIRATORY: Clear to auscultation bilaterally, no wheezes noted  GASTROINTESTINAL: Abdomen soft, non-tender, no guarding.  NEUROLOGICAL: Alert and oriented, no focal deficits  SKIN: warm, dry, no rashes noted    36min spent on discharge

## 2021-06-25 NOTE — PROGRESS NOTE ADULT - SUBJECTIVE AND OBJECTIVE BOX
Patient is a 61y old  Male who presents with a chief complaint of SOB, cough, fever (25 Jun 2021 10:42)  Pt seen and exam. sob improving, current on 2 l nc so2 100%. c/o mild cough    REVIEW OF SYSTEMS: All systems are reviewed and found to be negative except above    MEDICATIONS  (STANDING):  albuterol/ipratropium for Nebulization 3 milliLiter(s) Nebulizer every 6 hours  aMIOdarone    Tablet 200 milliGRAM(s) Oral two times a day  buDESOnide    Inhalation Suspension 0.25 milliGRAM(s) Inhalation every 12 hours  gabapentin 600 milliGRAM(s) Oral three times a day  lactobacillus acidophilus 1 Tablet(s) Oral two times a day with meals  metoprolol tartrate 12.5 milliGRAM(s) Oral every 12 hours  pantoprazole    Tablet 40 milliGRAM(s) Oral before breakfast  piperacillin/tazobactam IVPB.. 3.375 Gram(s) IV Intermittent every 8 hours  predniSONE   Tablet 20 milliGRAM(s) Oral daily  rivaroxaban 15 milliGRAM(s) Oral with dinner    MEDICATIONS  (PRN):  acetaminophen   Tablet .. 650 milliGRAM(s) Oral every 6 hours PRN Severe Pain (7 - 10)  diphenhydrAMINE   Injectable 25 milliGRAM(s) IV Push every 8 hours PRN Rash and/or Itching      CAPILLARY BLOOD GLUCOSE        I&O's Summary    24 Jun 2021 07:01  -  25 Jun 2021 07:00  --------------------------------------------------------  IN: 1300 mL / OUT: 0 mL / NET: 1300 mL        PHYSICAL EXAM:  Vital Signs Last 24 Hrs  T(C): 36.7 (25 Jun 2021 04:08), Max: 36.9 (24 Jun 2021 17:02)  T(F): 98.1 (25 Jun 2021 04:08), Max: 98.4 (24 Jun 2021 17:02)  HR: 70 (25 Jun 2021 09:40) (70 - 95)  BP: 100/61 (25 Jun 2021 04:08) (95/55 - 100/61)  BP(mean): --  RR: 19 (25 Jun 2021 04:08) (19 - 20)  SpO2: 95% (25 Jun 2021 09:40) (94% - 97%) RA    CONSTITUTIONAL: NAD,  EYES: PERRLA; conjunctiva and sclera clear  ENMT: Moist oral mucosa,   RESPIRATORY: Normal respiratory effort; MILD CRACKLE  to auscultation bilaterally  CARDIOVASCULAR:, normal S1 and S2, no murmur   EXTS: No lower extremity edema; Peripheral pulses are 2+ bilaterally  ABDOMEN: Nontender to palpation, normoactive bowel sounds, no rebound/guarding;   MUSCLOSKELETAL:   no clubbing or cyanosis of digits; no joint swelling or tenderness to palpation  PSYCH: affect appropriate  NEUROLOGY: A+O to person, place, and time; no gross deficits;       LABS:                        11.3   9.16  )-----------( 240      ( 24 Jun 2021 06:24 )             36.6     06-24    135  |  98  |  22.3<H>  ----------------------------<  102<H>  4.0   |  23.0  |  1.34<H>    Ca    8.6      24 Jun 2021 06:24                Culture - Sputum (collected 24 Jun 2021 03:32)  Source: .Sputum Sputum  Gram Stain (24 Jun 2021 07:31):    Few polymorphonuclear leukocytes per low power field    Few Squamous epithelial cells per low power field    No organisms seen  Preliminary Report (25 Jun 2021 09:56):    Normal Respiratory Jenn present    Culture - Blood (collected 22 Jun 2021 12:31)  Source: .Blood Blood-Peripheral  Preliminary Report (24 Jun 2021 13:00):    No growth at 48 hours    Culture - Blood (collected 22 Jun 2021 12:29)  Source: .Blood Blood-Peripheral  Preliminary Report (24 Jun 2021 13:00):    No growth at 48 hours        RADIOLOGY & ADDITIONAL TESTS:  Results Reviewed:   Imaging Personally Reviewed:  Electrocardiogram Personally Reviewed:    COORDINATION OF CARE:  Care Discussed with Consultants/Other Providers [Y/N]:  Prior or Outpatient Records Reviewed [Y/N]:

## 2021-06-25 NOTE — DISCHARGE NOTE NURSING/CASE MANAGEMENT/SOCIAL WORK - NSDCVIVACCINE_GEN_ALL_CORE_FT
influenza, injectable, quadrivalent, preservative free; 14-Jan-2019 09:18; Trip Ramos (RN); [employer]; [id number] (Exp. Date: 30-Jun-2019); IntraMuscular; Deltoid Right.; 0.5 milliLiter(s); VIS (VIS Published: 07-Aug-2015, VIS Presented: 14-Jan-2019);   influenza, injectable, quadrivalent, preservative free; 03-Nov-2019 12:19; Gwendolyn Morton (RN); [employer]; [id number] (Exp. Date: 30-Jun-2020); IntraMuscular; Deltoid Right.; 0.5 milliLiter(s); VIS (VIS Published: 15-Aug-2019, VIS Presented: 03-Nov-2019);

## 2021-06-25 NOTE — DISCHARGE NOTE PROVIDER - CARE PROVIDERS DIRECT ADDRESSES
,[email or web address],DirectAddress_Unknown,DirectAddress_Unknown

## 2021-06-25 NOTE — DISCHARGE NOTE NURSING/CASE MANAGEMENT/SOCIAL WORK - NSDCFUADDAPPT_GEN_ALL_CORE_FT
*** ADVANCED ILLNESS PNEUMONIA PATIENT: DISCHARGING HOME WITH [facility] FOR VISITING NURSE PNEUMONIA  FOLLOW-UP. A FOLLOW-UP PULMONOLOGIST APPOINTMENT HAS BEEN SCHEDULED FOR JULY 9, 2021 AT 12:15PM WITH DR. YUMIKO JOYNER, [address] [place] [address], PHONE: [phone]. PATIENT STATES SHE  UNDERSTANDS HOW TO TAKE HER MEDICATIONS AS PRESCRIBED AND CAN AFFORD TO PAY FOR THEM; DECLINED MEDS  TO BED, PREFERS TO USE HER OWN [facility] ([facility]) ***

## 2021-06-25 NOTE — DISCHARGE NOTE NURSING/CASE MANAGEMENT/SOCIAL WORK - PATIENT PORTAL LINK FT
You can access the FollowMyHealth Patient Portal offered by [facility] by registering at the following website: [email or web address]. By joining [employer]’s FollowMyHealth portal, you will also be able to view your health information using other applications (apps) compatible with our system.

## 2021-06-25 NOTE — DISCHARGE NOTE NURSING/CASE MANAGEMENT/SOCIAL WORK - NSDCPEFALRISK_GEN_ALL_CORE
Body Location Override (Optional - Billing Will Still Be Based On Selected Body Map Location If Applicable): right chest Patient information on fall and injury prevention Size Of Lesion In Cm (Optional): 0 Morphology Per Location (Optional): BCC treated at outside facilities 2009 Detail Level: Detailed

## 2021-06-25 NOTE — DISCHARGE NOTE PROVIDER - CARE PROVIDER_API CALL
Willow Tamayo)  Internal Medicine; Pulmonary Disease  [address], [address]  [place] [address]  Phone: [phone]  Fax: [phone]  Follow Up Time: 1-3 days    Elen Deng)  Internal Medicine  [address] [place] [id number]  Phone: [phone]  Fax: [phone]  Follow Up Time:     Haim Melara)  Family Medicine  [address] [place] [address]  Phone: [phone]  Fax: [phone]  Follow Up Time:

## 2021-06-25 NOTE — PROGRESS NOTE ADULT - ASSESSMENT
This 61 yr old morbidly obese male with PMH of HFrEF s/p AICD, cardio MEMs, Pulm HTN, , ROCK on CPAP, Afib s/p pacemaker on xarelto , hx gastric bypass in july 2020, lost 120lbs since then presented to ER with  worsening shortness of breath and cough that started 4days ago, had fever 102F at home, with productive yellow sputum, difficulty ambulating 2/2 SOB. He reports he had teeth extraction 1week ago and was started on Clindamycin and switched to Cefdinir po 2/2 purulent gum disease and got dentures., He had held Xarelto 5days before for the dental extraction and resumed xarelto 3 days ago.   In ER, he was found to be hypoxic, tachycardic, hypotensive, meeting sepsis criteria. CXR- showing Rt lower lobe consolidation, He was started on Meropenem Restricted iv fluid use 2/2 known CHF and 5lit NC oxygen. He feels some better however continues to have some sob.   He reports he stopped his chronic prednisone 5mg daily therapy 2weeks ago, but restarted himself on prednisone 20mg 4days ago. he was seen by cardio, Pulmonary in ER.  Admitted for further care.,   he also reports he had gall stone 2mths ago or so and had a laparoscopic Cholecystectomy at [facility] recently.  (22 Jun 2021 15:12)    patient reports having fevers on Sunday AM 7/20/21, called his doctor, and told to take some Cefdinir. still with fevers, and presented here.     reports of penicillin allergy as a child, but recently had skin testing which was negative.   CT scan report reviewed with family. fluid in esophagus found, in additional to bilateral PNA.       Impression:  bilateral PNA  acute febrile illness  obesity  COPD  WBC elevation      plan:  CLINICALLY better  on Zosyn    - will change to Oral Augmentin 875mg PO BID      watch overnight    if better  consider d/c over weekend.     No further specific infectious disease recommendations. Will be available as needed.    Thank you for allowing me to participate in the care of your patient.   Feel free to call me back for any new concerns.        d/w primary team.

## 2021-06-25 NOTE — PROGRESS NOTE ADULT - SUBJECTIVE AND OBJECTIVE BOX
[facility]  INFECTIOUS DISEASES AND INTERNAL MEDICINE at [place]  =======================================================  Omari Cantrell MD  Diplomates American Board of Internal Medicine and Infectious Diseases  Tel  [phone]  Fax [phone]  =======================================================    [facility]-[id number]  MAGGIE DONALDSONMEENA   follow up:  Bilateral PNA    breathing better   no fevers  legionella negative  blood cx negative as well.     I have personally reviewed the labs and data; pertinent labs and data are listed in this note; please see below.   =======================================================  Past Medical & Surgical Hx:  =====================  PAST MEDICAL & SURGICAL HISTORY:  COPD (chronic obstructive pulmonary disease)  ROCK (obstructive sleep apnea)  Afib  CHF (congestive heart failure)  Cardiomyopathy, ischemic  wearing life vest  2-28-19  Ejection fraction <  50%  last echo 2-13-19  31%  Asthma  HFrEF (heart failure with reduced ejection fraction)  AICD (automatic cardioverter/defibrillator) present  History of loop recorder  2017  gsh  History of incision and drainage  right groin abscess  jan 2019  [facility]  History of cardioversion  H/O cardiac catheterization  5 years ago at [facility]  S/P gastric bypass    Problem List:  ==========  HEALTH ISSUES - PROBLEM Dx:     Social Hx:  =======  no toxic habits currently    FAMILY HISTORY:  Family history of CHF (congestive heart failure)    no significant family history of immunosuppressive disorders in mother or father   =======================================================    REVIEW OF SYSTEMS:  CONSTITUTIONAL:  No Fever or chills  HEENT:  No diplopia or blurred vision.  No earache, sore throat or runny nose.  CARDIOVASCULAR:  No pressure, squeezing, strangling, tightness, heaviness or aching about the chest, neck, axilla or epigastrium.  RESPIRATORY:   as above  GASTROINTESTINAL:  No nausea, vomiting or diarrhea.  GENITOURINARY:  No dysuria, frequency or urgency. No Blood in urine  MUSCULOSKELETAL:  no joint aches, no muscle pain  SKIN:  No change in skin, hair or nails.  NEUROLOGIC:  No Headaches, seizures or weakness.  PSYCHIATRIC:  No disorder of thought or mood.  ENDOCRINE:  No heat or cold intolerance  HEMATOLOGICAL:  No easy bruising or bleeding.    =======================================================  Allergies  No Known Drug Allergies  shellfish (Pruritus; Rash)    ======================================================  Physical Exam:  ============     General:  No acute distress.  OBESE  Eye: Pupils are equal, round and reactive to light, Extraocular movements are intact, Normal conjunctiva.  HENT: Normocephalic, Oral mucosa is moist, No pharyngeal erythema, No sinus tenderness.  Neck: Supple, No lymphadenopathy.  Respiratory: Lungs with fair air entry  Cardiovascular: Normal rate, Regular rhythm,   trace edema  Gastrointestinal: Soft, Non-tender, Non-distended, Normal bowel sounds.  OBESE ABD  Genitourinary: No costovertebral angle tenderness.  Lymphatics: No lymphadenopathy neck,   Musculoskeletal: Normal range of motion, Normal strength.  Integumentary: No rash.  Neurologic: Alert, Oriented, No focal deficits, Cranial Nerves II-XII are grossly intact.  Psychiatric: Appropriate mood & affect.    =======================================================     Vitals:  ============  T(F): 98.1 (25 Jun 2021 04:08), Max: 98.4 (24 Jun 2021 17:02)  HR: 70 (25 Jun 2021 09:40)  BP: 100/61 (25 Jun 2021 04:08)  RR: 19 (25 Jun 2021 04:08)  SpO2: 95% (25 Jun 2021 09:40) (94% - 97%)  temp max in last 48H T(F): , Max: 99.4 (06-23-21 @ 22:00)    =======================================================  Current Antibiotics:  piperacillin/tazobactam IVPB.. 3.375 Gram(s) IV Intermittent every 8 hours    Other medications:  albuterol/ipratropium for Nebulization 3 milliLiter(s) Nebulizer every 6 hours  aMIOdarone    Tablet 200 milliGRAM(s) Oral two times a day  buDESOnide    Inhalation Suspension 0.25 milliGRAM(s) Inhalation every 12 hours  gabapentin 600 milliGRAM(s) Oral three times a day  lactobacillus acidophilus 1 Tablet(s) Oral two times a day with meals  metoprolol tartrate 12.5 milliGRAM(s) Oral every 12 hours  pantoprazole    Tablet 40 milliGRAM(s) Oral before breakfast  predniSONE   Tablet 20 milliGRAM(s) Oral daily  rivaroxaban 15 milliGRAM(s) Oral with dinner      =======================================================  Labs:                        11.3   9.16  )-----------( 240      ( 24 Jun 2021 06:24 )             36.6     06-24    135  |  98  |  22.3<H>  ----------------------------<  102<H>  4.0   |  23.0  |  1.34<H>    Ca    8.6      24 Jun 2021 06:24      Culture - Sputum (collected 06-24-21 @ 03:32)  Source: .Sputum Sputum  Gram Stain (06-24-21 @ 07:31):    Few polymorphonuclear leukocytes per low power field    Few Squamous epithelial cells per low power field    No organisms seen    Culture - Blood (collected 06-22-21 @ 12:31)  Source: .Blood Blood-Peripheral    Culture - Blood (collected 06-22-21 @ 12:29)  Source: .Blood Blood-Peripheral      Procalcitonin, Serum: 0.16 ng/mL (06-24-21 @ 06:24)  Procalcitonin, Serum: 0.38 ng/mL (06-22-21 @ 18:40)    SARS-CoV-2: NotDetec (06-23-21 @ 05:47)  Rapid RVP Result: NotDetec (06-23-21 @ 05:47)    COVID-19 PCR: NotDetec (06-22-21 @ 12:35)

## 2021-06-25 NOTE — DISCHARGE NOTE PROVIDER - PROVIDER TOKENS
PROVIDER:[TOKEN:[[id number]],FOLLOWUP:[1-3 days]],PROVIDER:[TOKEN:[[id number]]],PROVIDER:[TOKEN:[[id number]]]

## 2021-06-25 NOTE — DISCHARGE NOTE PROVIDER - NSDCCPCAREPLAN_GEN_ALL_CORE_FT
PRINCIPAL DISCHARGE DIAGNOSIS  Diagnosis: Pneumonia  Assessment and Plan of Treatment:       SECONDARY DISCHARGE DIAGNOSES  Diagnosis: Hypoxia  Assessment and Plan of Treatment:

## 2021-06-25 NOTE — PROGRESS NOTE ADULT - SUBJECTIVE AND OBJECTIVE BOX
[facility]                                   [address] [place] [address]                                                      PHONE: [phone]                                                         FAX: [phone]  [email or web address]  ---------------------------------------------------------------------------------------------------------------------------------    Overnight events/patient complaints: improved, on nasal cannula, CT chest c/w PNA prob aspiration, pt does report extensive dental work prior with intermittent nightly fevers on oral AB      No Known Drug Allergies  shellfish (Pruritus; Rash)    MEDICATIONS  (STANDING):  albuterol/ipratropium for Nebulization 3 milliLiter(s) Nebulizer every 6 hours  aMIOdarone    Tablet 200 milliGRAM(s) Oral two times a day  buDESOnide    Inhalation Suspension 0.25 milliGRAM(s) Inhalation every 12 hours  gabapentin 600 milliGRAM(s) Oral three times a day  lactobacillus acidophilus 1 Tablet(s) Oral two times a day with meals  metoprolol tartrate 12.5 milliGRAM(s) Oral every 12 hours  pantoprazole    Tablet 40 milliGRAM(s) Oral before breakfast  piperacillin/tazobactam IVPB.. 3.375 Gram(s) IV Intermittent every 8 hours  predniSONE   Tablet 20 milliGRAM(s) Oral daily  rivaroxaban 15 milliGRAM(s) Oral with dinner    MEDICATIONS  (PRN):  acetaminophen   Tablet .. 650 milliGRAM(s) Oral every 6 hours PRN Severe Pain (7 - 10)  diphenhydrAMINE   Injectable 25 milliGRAM(s) IV Push every 8 hours PRN Rash and/or Itching      Vital Signs Last 24 Hrs  T(C): 36.7 (25 Jun 2021 04:08), Max: 36.9 (24 Jun 2021 17:02)  T(F): 98.1 (25 Jun 2021 04:08), Max: 98.4 (24 Jun 2021 17:02)  HR: 70 (25 Jun 2021 05:25) (70 - 95)  BP: 100/61 (25 Jun 2021 04:08) (95/55 - 100/61)  BP(mean): --  RR: 19 (25 Jun 2021 04:08) (19 - 20)  SpO2: 97% (25 Jun 2021 05:25) (94% - 97%)  Daily     Daily   ICU Vital Signs Last 24 Hrs  MAGGIE DONALDSONMEENA  I&O's Detail    24 Jun 2021 07:01  -  25 Jun 2021 07:00  --------------------------------------------------------  IN:    IV PiggyBack: 700 mL    Oral Fluid: 600 mL  Total IN: 1300 mL    OUT:  Total OUT: 0 mL    Total NET: 1300 mL        I&O's Summary    24 Jun 2021 07:01  -  25 Jun 2021 07:00  --------------------------------------------------------  IN: 1300 mL / OUT: 0 mL / NET: 1300 mL      Drug Dosing Weight  MAGGIE MENDOZAALEX      PHYSICAL EXAM:  General: Appears well developed, well nourished alert and cooperative.  HEENT: Head; normocephalic, atraumatic.  Eyes: Pupils reactive, cornea wnl.  Neck: Supple, no nodes adenopathy, no NVD or carotid bruit or thyromegaly.  CARDIOVASCULAR: Normal S1 and S2, No murmur, rub, gallop or lift.   LUNGS: No rales, rhonchi or wheeze. Normal breath sounds bilaterally.  ABDOMEN: Soft, nontender without mass or organomegaly. bowel sounds normoactive.  EXTREMITIES: No clubbing, cyanosis or edema. Distal pulses wnl.   SKIN: warm and dry with normal turgor.  NEURO: Alert/oriented x 3/normal motor exam. No pathologic reflexes.    PSYCH: normal affect.        LABS:                        11.3   9.16  )-----------( 240      ( 24 Jun 2021 06:24 )             36.6     06-24    135  |  98  |  22.3<H>  ----------------------------<  102<H>  4.0   |  23.0  |  1.34<H>    Ca    8.6      24 Jun 2021 06:24      MAGGIE ELLIOTT            RADIOLOGY & ADDITIONAL STUDIES:    INTERPRETATION OF TELEMETRY (personally reviewed):    ECG: < from: 12 Lead ECG (06.22.21 @ 11:44) >  Diagnosis Line Atrial fibrillation with rapid ventricular response  Left axis deviation  Left bundle branch block  Abnormal ECG    Confirmed by EDA JONES ([id number]) on 6/22/2021 3:16:11 PM    < end of copied text >      ECHO:< from: TTE Echo Complete w/o Contrast w/ Doppler (11.19.20 @ 10:52) >  Summary:   1. Left ventricular ejection fraction, by visual estimation, is 30 to 35%.   2. Moderately to severely decreased global left ventricular systolic function.   3. Mildly enlarged left atrium.   4. Mildly enlarged right atrium.   5. There is no evidence of pericardial effusion.   6. Mild mitral valve regurgitation.   7. Mild thickening of the anterior and posterior mitral valve leaflets.   8. Mild tricuspid regurgitation.   9. Mild aortic valve stenosis.  10. Mild to moderate aortic regurgitation.  11. Dilatation of the aortic root.  12. Endocardial visualization was enhanced with intravenous echo contrast.  13. Peak transaortic gradient equals 22.3 mmHg, mean transaortic gradient equals 13.4 mmHg, the calculated aortic valve area equals 1.51 cm² by the continuity equation consistent with mild aortic stenosis.    MD Sergei Electronically signed on 11/19/2020 at 2:57:22 PM            *** Final ***              SHAKIRA

## 2021-06-25 NOTE — DISCHARGE NOTE PROVIDER - NSDCMRMEDTOKEN_GEN_ALL_CORE_FT
amiodarone 200 mg oral tablet: 1 tab(s) orally 2 times a day  bumetanide 1 mg oral tablet: 1 tab(s) orally 2 times a day  fluticasone 50 mcg/inh nasal spray: 1 spray(s) nasal 2 times a day  fluticasone/umeclidinium/vilanterol 100 mcg-62.5 mcg-25 mcg/inh inhalation powder: 1 puff(s) inhaled once a day  gabapentin 600 mg oral tablet: 1 tab(s) orally 3 times a day  ipratropium-albuterol 0.5 mg-2.5 mg/3 mLinhalation solution: 3 milliliter(s) inhaled every 6 hours  lisinopril 2.5 mg oral tablet: 1 tab(s) orally once a day  metoprolol succinate 25 mg oral tablet, extended release: 0.5 tab(s) orally once a day  pantoprazole 40 mg oral delayed release tablet: 1 tab(s) orally once a day (before a meal)  predniSONE 10 mg oral tablet: 1 tab(s) orally once a day  spironolactone 25 mg oral tablet: 1 tab(s) orally 2 times a day  Trelegy Ellipta inhalation powder: 1 puff(s) inhaled once a day  Xarelto 20 mg oral tablet: 1 tab(s) orally once a day (in the evening)  please hold today, tomorrow and restart on March 8, 2019    amiodarone 200 mg oral tablet: 1 tab(s) orally 2 times a day  Augmentin 875 mg-125 mg oral tablet: 1 tab(s) orally 2 times a day   bumetanide 1 mg oral tablet: 1 tab(s) orally 2 times a day  fluticasone 50 mcg/inh nasal spray: 1 spray(s) nasal 2 times a day  fluticasone/umeclidinium/vilanterol 100 mcg-62.5 mcg-25 mcg/inh inhalation powder: 1 puff(s) inhaled once a day  gabapentin 600 mg oral tablet: 1 tab(s) orally 3 times a day  ipratropium-albuterol 0.5 mg-2.5 mg/3 mLinhalation solution: 3 milliliter(s) inhaled every 6 hours  lactobacillus acidophilus oral capsule: 1 tab(s) orally 2 times a day   lisinopril 2.5 mg oral tablet: 1 tab(s) orally once a day  metoprolol succinate 25 mg oral tablet, extended release: 0.5 tab(s) orally once a day  pantoprazole 40 mg oral delayed release tablet: 1 tab(s) orally once a day (before a meal)  predniSONE 10 mg oral tablet: 1 tab(s) orally once a day  Trelegy Ellipta inhalation powder: 1 puff(s) inhaled once a day  Xarelto 20 mg oral tablet: 1 tab(s) orally once a day (in the evening)     amiodarone 200 mg oral tablet: 1 tab(s) orally 2 times a day  Augmentin 875 mg-125 mg oral tablet: 1 tab(s) orally 2 times a day   Augmentin 875 mg-125 mg oral tablet: 1 tab(s) orally 2 times a day   bumetanide 1 mg oral tablet: 1 tab(s) orally 2 times a day  fluticasone 50 mcg/inh nasal spray: 1 spray(s) nasal 2 times a day  fluticasone/umeclidinium/vilanterol 100 mcg-62.5 mcg-25 mcg/inh inhalation powder: 1 puff(s) inhaled once a day  gabapentin 600 mg oral tablet: 1 tab(s) orally 3 times a day  ipratropium-albuterol 0.5 mg-2.5 mg/3 mLinhalation solution: 3 milliliter(s) inhaled every 6 hours   lactobacillus acidophilus oral capsule: 1 tab(s) orally 2 times a day   lisinopril 2.5 mg oral tablet: 1 tab(s) orally once a day  metoprolol succinate 25 mg oral tablet, extended release: 0.5 tab(s) orally once a day  pantoprazole 40 mg oral delayed release tablet: 1 tab(s) orally once a day (before a meal)  predniSONE 10 mg oral tablet: 1 tab(s) orally once a day  Trelegy Ellipta inhalation powder: 1 puff(s) inhaled once a day  Xarelto 20 mg oral tablet: 1 tab(s) orally once a day (in the evening)

## 2021-06-25 NOTE — DISCHARGE NOTE PROVIDER - NSDCFUSCHEDAPPT_GEN_ALL_CORE_FT
MAGGIE ELLIOTT S ; 07/23/2021 ; [facility] [address] [facility]  MAGGIE ELLIOTT S ; 09/16/2021 ; NPP Ortho [facility] [address] [place] MAGGIE ELLIOTT S ; 07/09/2021 ; NPP PulmMed [address]  MAGGIE ELLIOTT ; 07/23/2021 ; [facility] [address]  MAGGIE ELLIOTT ; 09/16/2021 ; NPP Ortho [facility] [address] [place]

## 2021-06-25 NOTE — PROGRESS NOTE ADULT - ASSESSMENT
Assessment  PNA likley aspiration  dental caries  NICM  single chamber ICD  chronic AF  obesity s/p gastric sleeve      Rec  cont current meds  resume oral bumex 1 mg BID with low dose ACEI  echo to assess ICD lead doubt SBE

## 2021-06-25 NOTE — PROGRESS NOTE ADULT - ASSESSMENT
Assess  62y/o male    1-multifocal pneumonia with  ? aspiration   2- underlying  chronic lung disease  3- zaida  4-afib  5- cardiomyopathy  with chf  6- diarrhea    Rec  - merrem=> Augmentin per ID  - prednisone 20 mg po qd => 10 mg until seen in office by Dr Tamayo  - budesonide q 12  - duonebs  - To Trelegy on DC  - PPI  - echo per cardio to assess ICD lead`  - hopes to be home by Sunday for [employer] - no objection from me     Please  feel free to reach out with any questions or suggestions    DI Lora MD    PULMONARY

## 2021-06-25 NOTE — PROGRESS NOTE ADULT - ASSESSMENT
61yr old morbidly obese male with PMH of HFrEF s/p AICD, cardio MEMs, Pulm HTN, , ROCK on CPAP, Afib s/p pacemaker on xarelto , hx gastric bypass in july 2020, lost 120lbs since then presented to ER with  worsening shortness of breath and cough admitted with sepsis 2/2 Pneumonia after recent teeth extraction.      acute hypoxic respiratory failure w/Sepsis 2/2 b/l Multilobe Pneumonia likely GN  - WBC, and lactate normalized. sepsis resolved   - Cont to wean off supplemental O2, currently on 2 l. checked off oxygen at rest 96%  - ID following-  Stop Meropenem and start Zosyn  - Nebs, tapering  prednisone    - RVP is negative,   - Legionella/sputum c/s no growth yet  - Blood cultures no growth yet  - spoke with ID REC TO CHANGE TO AUGMENTIN 06/27/21  - CT chest noted with dilated esophagus, Following bariatric surgery, per pt,he has chronic eso dilatation since procedure.   - S/S rec Mech soft with thin liquids  -Check ambulatory so2      EVARISTO- Pre-renal   - Cautious use of IVF given hx of HF  - Avoid nephrotoxic drugs.  - Hold diuretic and ACEI  - Monitor bmp    CHFrEF   - EF 30-35%   - hold Spironolactone.  BP soft, resume when appropitate  - cardiology following REC resume oral bumex 1 mg BID with low dose ACEI,  - echo   - monitor I/O   - resume meds once stable cardio rec    COPD exacerbation    - c/w nebs, steroids   - prednisone 20 mg po qd => 10 mg until seen in office by Dr Tamayo  - budesonide q 12  - Pulm consulted - appreciate recs ,REC TRILOGY ON DISCHARGE    Afib   - c/q amiodarone, metoprolol  - c/w Xarelto    -ECHO    ROCK  - c/w nocturnal CPAP 8  -per PULM trilogy on discharge    Morbid obesity - will benefit from weight reduction, recommendation dietary eval on outpatient   # DVT px - on xarelto  care of plan dw pt       61yr old morbidly obese male with PMH of HFrEF s/p AICD, cardio MEMs, Pulm HTN, , ROCK on CPAP, Afib s/p pacemaker on xarelto , hx gastric bypass in july 2020, lost 120lbs since then presented to ER with  worsening shortness of breath and cough admitted with sepsis 2/2 Pneumonia after recent teeth extraction.      acute hypoxic respiratory failure w/Sepsis 2/2 b/l Multilobe Pneumonia likely GN  - WBC, and lactate normalized. sepsis resolved   - Cont to wean off supplemental O2, currently on 2 l. checked off oxygen at rest 96%  - ID following-  Stop Meropenem and start Zosyn  - Nebs, tapering  prednisone    - RVP is negative,   - Legionella/sputum c/s no growth yet  - Blood cultures no growth yet  - spoke with ID REC TO CHANGE TO AUGMENTIN 06/27/21  - CT chest noted with dilated esophagus, Following bariatric surgery, per pt,he has chronic eso dilatation since procedure.   - S/S rec Mech soft with thin liquids  -Check ambulatory so2      EVARISTO- Pre-renal   - Cautious use of IVF given hx of HF  - Avoid nephrotoxic drugs.  - Hold diuretic and ACEI  - Monitor bmp    CHFrEF   - EF 30-35%   - hold Spironolactone.  BP soft, resume when appropitate  - cardiology following REC resume oral bumex 1 mg BID with low dose ACEI,  - echo   - monitor I/O   - resume meds once stable cardio rec    COPD exacerbation    - c/w nebs, steroids   - prednisone 20 mg po qd => 10 mg until seen in office by Dr Tamayo  - budesonide q 12  - Pulm consulted - appreciate recs    Afib   - c/q amiodarone, metoprolol  - c/w Xarelto    -ECHO    ROCK  - c/w nocturnal CPAP 8  -per PULM trilogy on discharge    Morbid obesity - will benefit from weight reduction, recommendation dietary eval on outpatient   # DVT px - on xarelto  care of plan dw pt

## 2021-06-25 NOTE — PROGRESS NOTE ADULT - SUBJECTIVE AND OBJECTIVE BOX
PULMONARY PROGRESS NOTE      EARL HANHRKEDAR-[id number]    Patient is a 61y old  Male who presents with a chief complaint of SOB, cough, fever (23 Jun 2021 08:32)      BRIEF HOSPITAL COURSE: **· HPI Objective Statement: 62 y/o M hx of afib on xarelto (has defbrillator/pacemaker), CHF on bumex presents with shortness of breath for the past 4 days. States he got his teeth removed 6 days ago and has been on antibiotics (cefdinir and clindamycin per patient). Endorses worsening shortness of breath with movement/exertion and is not positional. Endorses Tmax fever of 102 four days ago. Endorsing productive cough that is "yellow" in color. States he had "pus" coming from the gumlines and was switched to cefdinir from clindamycin a few days ago. Denies chest pain. Denies nausea or vomiting. Denies recent travel. Denies hemoptysis. Denies abdominal pain.  h/o rock  h/o heavy smoker -  followed by Dr Tamayo  on  trele   Post bariatric surgery with 120 lb weight loss      Events last 24 hours: * feeling less sob   some  diarrhea  off 02  Awaiting Echo     REVIEW OF SYSTEMS     CONSTITUTIONAL   no fevers  no loss of appetite  no weight loss   HEENT  no sore throat   no ringing in ears  NECK   no pain   RESPIRATORY  see HPI   CARDIOVASCULAR  no chest  pain no palpitations   GASTROINTESTINAL   + diarrhea MUSCULOSKELETAL  no joint pains    no  back pain   SKIN   no rash   no itchiness   GENITOURINARY  no dysuria   HEME    no bleeding or bruising   ENDOCRINE     no   warmth   no  sweating   no  cold intolerance   NEUROLOGIC  no tremors  no seizures  no    weakness    PSYCHIATRIC   no mood disorder    no delirium   **    PAST MEDICAL & SURGICAL HISTORY:  COPD (chronic obstructive pulmonary disease)    ROCK (obstructive sleep apnea)    Afib    CHF (congestive heart failure)    Cardiomyopathy, ischemic  wearing life vest  2-28-19    Ejection fraction &lt; 50%  last echo 2-13-19  31%    Asthma    HFrEF (heart failure with reduced ejection fraction)    AICD (automatic cardioverter/defibrillator) present    History of loop recorder  2017  gsh    History of incision and drainage  right groin abcess  jan 2019  [facility]    History of cardioversion    H/O cardiac catheterization  5 years ago at [facility].    S/P gastric bypass          Medications:  meropenem  IVPB 1000 milliGRAM(s) IV Intermittent every 12 hours    aMIOdarone    Tablet 200 milliGRAM(s) Oral two times a day  metoprolol tartrate 12.5 milliGRAM(s) Oral every 12 hours    albuterol/ipratropium for Nebulization 3 milliLiter(s) Nebulizer every 6 hours    acetaminophen   Tablet .. 650 milliGRAM(s) Oral every 6 hours PRN  gabapentin 600 milliGRAM(s) Oral three times a day      rivaroxaban 15 milliGRAM(s) Oral with dinner    pantoprazole    Tablet 40 milliGRAM(s) Oral before breakfast      predniSONE   Tablet 20 milliGRAM(s) Oral daily          lactobacillus acidophilus 1 Tablet(s) Oral two times a day with meals          ICU Vital Signs Last 24 Hrs  T(C): 36.7 (23 Jun 2021 07:50), Max: 38 (22 Jun 2021 11:59)  T(F): 98.1 (23 Jun 2021 07:50), Max: 100.4 (22 Jun 2021 11:59)  HR: 96 (23 Jun 2021 08:51) (81 - 100)  BP: 101/60 (23 Jun 2021 07:50) (70/48 - 111/55)  BP(mean): 65 (22 Jun 2021 12:00) (65 - 65)  ABP: --  ABP(mean): --  RR: 22 (23 Jun 2021 07:50) (22 - 28)  SpO2: 91% (23 Jun 2021 08:51) (84% - 97%)          I&O's Detail        LABS:                        11.3   11.93 )-----------( 276      ( 23 Jun 2021 07:02 )             35.2     06-23    131<L>  |  95<L>  |  27.2<H>  ----------------------------<  89  4.5   |  22.0  |  1.58<H>    Ca    8.3<L>      23 Jun 2021 07:02  Mg     2.3     06-23    TPro  6.7  /  Alb  3.2<L>  /  TBili  0.6  /  DBili  x   /  AST  24  /  ALT  14  /  AlkPhos  51  06-22      CARDIAC MARKERS ( 22 Jun 2021 11:52 )  x     / 0.03 ng/mL / x     / x     / x          CAPILLARY BLOOD GLUCOSE            CULTURES:  Rapid RVP Result: NotDetec (06-23 @ 05:47)      Physical Examination:    General:  obese male    no distress speaking full sentences  HEENT: Pupils equal, reactive to light.  Symmetric. no thrush     PULM: mild expiratory wheezing     no rhonchi   NECK: Supple, no lymphadenopathy, trachea midline    CVS: Regular rate and rhythm, no murmurs, rubs, or gallops    ABD: Soft, nondistended, nontender, normoactive bowel sounds, no masses    EXT: mild edema   SKIN: Warm and well perfused, no rashes noted.    NEURO: Alert, oriented, interactive, nonfocal    DEVICES:     RADIOLOGY: **reviewed  IMPRESSION:  *  Bilateral multifocal pneumonia with evidence of endobronchial spread.  *  Fluid distention to the upper esophagus. Correlate for aspiration.    REKHA TIM MD; Attending Radiologist  This document has been electronically signed. Jun 22 2021  2:29PM  *

## 2021-06-26 VITALS
OXYGEN SATURATION: 96 % | RESPIRATION RATE: 18 BRPM | SYSTOLIC BLOOD PRESSURE: 118 MMHG | DIASTOLIC BLOOD PRESSURE: 61 MMHG | TEMPERATURE: 99 F | HEART RATE: 88 BPM

## 2021-06-26 LAB
ANION GAP SERPL CALC-SCNC: 13 MMOL/L — SIGNIFICANT CHANGE UP (ref 5–17)
BASOPHILS # BLD AUTO: 0.04 K/UL — SIGNIFICANT CHANGE UP (ref 0–0.2)
BASOPHILS NFR BLD AUTO: 0.4 % — SIGNIFICANT CHANGE UP (ref 0–2)
BUN SERPL-MCNC: 19.7 MG/DL — SIGNIFICANT CHANGE UP (ref 8–20)
CALCIUM SERPL-MCNC: 8.5 MG/DL — LOW (ref 8.6–10.2)
CHLORIDE SERPL-SCNC: 101 MMOL/L — SIGNIFICANT CHANGE UP (ref 98–107)
CO2 SERPL-SCNC: 22 MMOL/L — SIGNIFICANT CHANGE UP (ref 22–29)
CREAT SERPL-MCNC: 1.11 MG/DL — SIGNIFICANT CHANGE UP (ref 0.5–1.3)
CULTURE RESULTS: SIGNIFICANT CHANGE UP
EOSINOPHIL # BLD AUTO: 0.39 K/UL — SIGNIFICANT CHANGE UP (ref 0–0.5)
EOSINOPHIL NFR BLD AUTO: 3.9 % — SIGNIFICANT CHANGE UP (ref 0–6)
GLUCOSE SERPL-MCNC: 112 MG/DL — HIGH (ref 70–99)
HCT VFR BLD CALC: 34.2 % — LOW (ref 39–50)
HGB BLD-MCNC: 10.6 G/DL — LOW (ref 13–17)
IMM GRANULOCYTES NFR BLD AUTO: 0.9 % — SIGNIFICANT CHANGE UP (ref 0–1.5)
LYMPHOCYTES # BLD AUTO: 2.21 K/UL — SIGNIFICANT CHANGE UP (ref 1–3.3)
LYMPHOCYTES # BLD AUTO: 21.9 % — SIGNIFICANT CHANGE UP (ref 13–44)
MCHC RBC-ENTMCNC: 27.2 PG — SIGNIFICANT CHANGE UP (ref 27–34)
MCHC RBC-ENTMCNC: 31 GM/DL — LOW (ref 32–36)
MCV RBC AUTO: 87.9 FL — SIGNIFICANT CHANGE UP (ref 80–100)
MONOCYTES # BLD AUTO: 0.71 K/UL — SIGNIFICANT CHANGE UP (ref 0–0.9)
MONOCYTES NFR BLD AUTO: 7.1 % — SIGNIFICANT CHANGE UP (ref 2–14)
NEUTROPHILS # BLD AUTO: 6.63 K/UL — SIGNIFICANT CHANGE UP (ref 1.8–7.4)
NEUTROPHILS NFR BLD AUTO: 65.8 % — SIGNIFICANT CHANGE UP (ref 43–77)
PLATELET # BLD AUTO: 275 K/UL — SIGNIFICANT CHANGE UP (ref 150–400)
POTASSIUM SERPL-MCNC: 3.6 MMOL/L — SIGNIFICANT CHANGE UP (ref 3.5–5.3)
POTASSIUM SERPL-SCNC: 3.6 MMOL/L — SIGNIFICANT CHANGE UP (ref 3.5–5.3)
RBC # BLD: 3.89 M/UL — LOW (ref 4.2–5.8)
RBC # FLD: 16.3 % — HIGH (ref 10.3–14.5)
SODIUM SERPL-SCNC: 135 MMOL/L — SIGNIFICANT CHANGE UP (ref 135–145)
SPECIMEN SOURCE: SIGNIFICANT CHANGE UP
WBC # BLD: 10.07 K/UL — SIGNIFICANT CHANGE UP (ref 3.8–10.5)
WBC # FLD AUTO: 10.07 K/UL — SIGNIFICANT CHANGE UP (ref 3.8–10.5)

## 2021-06-26 PROCEDURE — 92610 EVALUATE SWALLOWING FUNCTION: CPT

## 2021-06-26 PROCEDURE — 83880 ASSAY OF NATRIURETIC PEPTIDE: CPT

## 2021-06-26 PROCEDURE — 99239 HOSP IP/OBS DSCHRG MGMT >30: CPT

## 2021-06-26 PROCEDURE — 85027 COMPLETE CBC AUTOMATED: CPT

## 2021-06-26 PROCEDURE — 94660 CPAP INITIATION&MGMT: CPT

## 2021-06-26 PROCEDURE — 94640 AIRWAY INHALATION TREATMENT: CPT

## 2021-06-26 PROCEDURE — 36415 COLL VENOUS BLD VENIPUNCTURE: CPT

## 2021-06-26 PROCEDURE — 93005 ELECTROCARDIOGRAM TRACING: CPT

## 2021-06-26 PROCEDURE — 82435 ASSAY OF BLOOD CHLORIDE: CPT

## 2021-06-26 PROCEDURE — 93306 TTE W/DOPPLER COMPLETE: CPT

## 2021-06-26 PROCEDURE — 87040 BLOOD CULTURE FOR BACTERIA: CPT

## 2021-06-26 PROCEDURE — 83605 ASSAY OF LACTIC ACID: CPT

## 2021-06-26 PROCEDURE — 87449 NOS EACH ORGANISM AG IA: CPT

## 2021-06-26 PROCEDURE — 82330 ASSAY OF CALCIUM: CPT

## 2021-06-26 PROCEDURE — 85014 HEMATOCRIT: CPT

## 2021-06-26 PROCEDURE — 84484 ASSAY OF TROPONIN QUANT: CPT

## 2021-06-26 PROCEDURE — 80053 COMPREHEN METABOLIC PANEL: CPT

## 2021-06-26 PROCEDURE — 87635 SARS-COV-2 COVID-19 AMP PRB: CPT

## 2021-06-26 PROCEDURE — 84145 PROCALCITONIN (PCT): CPT

## 2021-06-26 PROCEDURE — 80048 BASIC METABOLIC PNL TOTAL CA: CPT

## 2021-06-26 PROCEDURE — 96365 THER/PROPH/DIAG IV INF INIT: CPT

## 2021-06-26 PROCEDURE — 82947 ASSAY GLUCOSE BLOOD QUANT: CPT

## 2021-06-26 PROCEDURE — 82803 BLOOD GASES ANY COMBINATION: CPT

## 2021-06-26 PROCEDURE — 85025 COMPLETE CBC W/AUTO DIFF WBC: CPT

## 2021-06-26 PROCEDURE — 94760 N-INVAS EAR/PLS OXIMETRY 1: CPT

## 2021-06-26 PROCEDURE — 99232 SBSQ HOSP IP/OBS MODERATE 35: CPT

## 2021-06-26 PROCEDURE — 86769 SARS-COV-2 COVID-19 ANTIBODY: CPT

## 2021-06-26 PROCEDURE — 85018 HEMOGLOBIN: CPT

## 2021-06-26 PROCEDURE — 83735 ASSAY OF MAGNESIUM: CPT

## 2021-06-26 PROCEDURE — 71250 CT THORAX DX C-: CPT

## 2021-06-26 PROCEDURE — 87070 CULTURE OTHR SPECIMN AEROBIC: CPT

## 2021-06-26 PROCEDURE — 0225U NFCT DS DNA&RNA 21 SARSCOV2: CPT

## 2021-06-26 PROCEDURE — 71045 X-RAY EXAM CHEST 1 VIEW: CPT

## 2021-06-26 PROCEDURE — 96366 THER/PROPH/DIAG IV INF ADDON: CPT

## 2021-06-26 PROCEDURE — 99291 CRITICAL CARE FIRST HOUR: CPT | Mod: 25

## 2021-06-26 PROCEDURE — 84132 ASSAY OF SERUM POTASSIUM: CPT

## 2021-06-26 PROCEDURE — 84295 ASSAY OF SERUM SODIUM: CPT

## 2021-06-26 RX ORDER — RIVAROXABAN 15 MG-20MG
1 KIT ORAL
Qty: 0 | Refills: 0 | DISCHARGE

## 2021-06-26 RX ORDER — AMIODARONE HYDROCHLORIDE 400 MG/1
1 TABLET ORAL
Qty: 60 | Refills: 0
Start: 2021-06-26 | End: 2021-07-25

## 2021-06-26 RX ORDER — BUMETANIDE 0.25 MG/ML
1 INJECTION INTRAMUSCULAR; INTRAVENOUS
Qty: 60 | Refills: 0
Start: 2021-06-26 | End: 2021-07-25

## 2021-06-26 RX ORDER — LACTOBACILLUS ACIDOPHILUS 100MM CELL
1 CAPSULE ORAL
Qty: 8 | Refills: 0
Start: 2021-06-26 | End: 2021-06-29

## 2021-06-26 RX ORDER — LISINOPRIL 2.5 MG/1
1 TABLET ORAL
Qty: 0 | Refills: 0 | DISCHARGE

## 2021-06-26 RX ORDER — METOPROLOL TARTRATE 50 MG
0.5 TABLET ORAL
Qty: 0 | Refills: 0 | DISCHARGE

## 2021-06-26 RX ORDER — GABAPENTIN 400 MG/1
1 CAPSULE ORAL
Qty: 0 | Refills: 0 | DISCHARGE

## 2021-06-26 RX ORDER — IPRATROPIUM/ALBUTEROL SULFATE 18-103MCG
3 AEROSOL WITH ADAPTER (GRAM) INHALATION
Qty: 320 | Refills: 0
Start: 2021-06-26 | End: 2021-07-25

## 2021-06-26 RX ADMIN — AMIODARONE HYDROCHLORIDE 200 MILLIGRAM(S): 400 TABLET ORAL at 05:25

## 2021-06-26 RX ADMIN — Medication 1 TABLET(S): at 08:15

## 2021-06-26 RX ADMIN — PANTOPRAZOLE SODIUM 40 MILLIGRAM(S): 20 TABLET, DELAYED RELEASE ORAL at 05:26

## 2021-06-26 RX ADMIN — GABAPENTIN 600 MILLIGRAM(S): 400 CAPSULE ORAL at 05:25

## 2021-06-26 RX ADMIN — GABAPENTIN 600 MILLIGRAM(S): 400 CAPSULE ORAL at 12:52

## 2021-06-26 RX ADMIN — Medication 20 MILLIGRAM(S): at 05:25

## 2021-06-26 RX ADMIN — Medication 650 MILLIGRAM(S): at 05:26

## 2021-06-26 RX ADMIN — Medication 3 MILLILITER(S): at 15:35

## 2021-06-26 RX ADMIN — PIPERACILLIN AND TAZOBACTAM 25 GRAM(S): 4; .5 INJECTION, POWDER, LYOPHILIZED, FOR SOLUTION INTRAVENOUS at 05:25

## 2021-06-26 RX ADMIN — PIPERACILLIN AND TAZOBACTAM 25 GRAM(S): 4; .5 INJECTION, POWDER, LYOPHILIZED, FOR SOLUTION INTRAVENOUS at 12:37

## 2021-06-26 NOTE — PROGRESS NOTE ADULT - NSICDXPILOT_GEN_ALL_CORE
[place]

## 2021-06-26 NOTE — PROGRESS NOTE ADULT - ASSESSMENT
Assess  62y/o male    1-multifocal pneumonia with  ? aspiration   2- underlying  chronic lung disease  3- zaida  4-afib  5- cardiomyopathy  with chf  6- diarrhea    Rec  - merrem=> Augmentin per ID  - prednisone 20 mg po qd => 10 mg until seen in office by Dr Tamayo  - budesonide q 12  - duonebs  - To Trelegy on DC  - PPI  - Concur with discharge  - echo per cardio to assess ICD lead`  - hopes to be home by Sunday for [place] [place] - no objection from me     Please  feel free to reach out with any questions or suggestions    DI Lora MD    PULMONARY

## 2021-06-26 NOTE — PROGRESS NOTE ADULT - SUBJECTIVE AND OBJECTIVE BOX
[place] [facility], THE HEART CENTER                                   [address] [place] [address]                                                      PHONE: [phone]                                                         FAX: [phone]  [email or web address]  ---------------------------------------------------------------------------------------------------------------------------------    Overnight events/patient complaints:      PAST MEDICAL & SURGICAL HISTORY:  COPD (chronic obstructive pulmonary disease)    ROCK (obstructive sleep apnea)    Afib    CHF (congestive heart failure)    Cardiomyopathy, ischemic  wearing life vest  2-28-19    Ejection fraction &lt; 50%  last echo 2-13-19  31%    Asthma    HFrEF (heart failure with reduced ejection fraction)    AICD (automatic cardioverter/defibrillator) present    History of loop recorder  2017  [facility]    History of incision and drainage  right groin abcess  jan 2019  [facility]    History of cardioversion    H/O cardiac catheterization  5 years ago at [facility].    S/P gastric bypass        No Known Drug Allergies  shellfish (Pruritus; Rash)    MEDICATIONS  (STANDING):  albuterol/ipratropium for Nebulization 3 milliLiter(s) Nebulizer every 6 hours  aMIOdarone    Tablet 200 milliGRAM(s) Oral two times a day  buDESOnide    Inhalation Suspension 0.25 milliGRAM(s) Inhalation every 12 hours  buMETAnide 1 milliGRAM(s) Oral two times a day  gabapentin 600 milliGRAM(s) Oral three times a day  lactobacillus acidophilus 1 Tablet(s) Oral two times a day with meals  lisinopril 2.5 milliGRAM(s) Oral daily  metoprolol tartrate 12.5 milliGRAM(s) Oral every 12 hours  pantoprazole    Tablet 40 milliGRAM(s) Oral before breakfast  piperacillin/tazobactam IVPB.. 3.375 Gram(s) IV Intermittent every 8 hours  predniSONE   Tablet 20 milliGRAM(s) Oral daily  rivaroxaban 15 milliGRAM(s) Oral with dinner    MEDICATIONS  (PRN):  acetaminophen   Tablet .. 650 milliGRAM(s) Oral every 6 hours PRN Severe Pain (7 - 10)  diphenhydrAMINE   Injectable 25 milliGRAM(s) IV Push every 8 hours PRN Rash and/or Itching      Vital Signs Last 24 Hrs  T(C): 36.6 (26 Jun 2021 04:11), Max: 36.7 (25 Jun 2021 17:07)  T(F): 97.8 (26 Jun 2021 04:11), Max: 98 (25 Jun 2021 17:07)  HR: 79 (26 Jun 2021 04:11) (73 - 102)  BP: 87/49 (26 Jun 2021 04:11) (87/49 - 102/62)  BP(mean): --  RR: 18 (26 Jun 2021 04:11) (17 - 18)  SpO2: 93% (26 Jun 2021 04:11) (93% - 96%)  ICU Vital Signs Last 24 Hrs  MAGGIE EARL  I&O's Detail    I&O's Summary    Drug Dosing Weight  MAGGIE ELLIOTT      PHYSICAL EXAM:  General: Appears well developed, well nourished alert and cooperative.  HEENT: Head; normocephalic, atraumatic.  Eyes: Pupils reactive, cornea wnl.  Neck: Supple, no nodes adenopathy, no NVD or carotid bruit or thyromegaly.  CARDIOVASCULAR: Normal S1 and S2, No murmur, rub, gallop or lift.   LUNGS: No rales, rhonchi or wheeze. Normal breath sounds bilaterally.  ABDOMEN: Soft, nontender without mass or organomegaly. bowel sounds normoactive.  EXTREMITIES: No clubbing, cyanosis or edema. Distal pulses wnl.   SKIN: warm and dry with normal turgor.  NEURO: Alert/oriented x 3/normal motor exam. No pathologic reflexes.    PSYCH: normal affect.        LABS:                        10.6   10.07 )-----------( 275      ( 26 Jun 2021 07:37 )             34.2     06-26    135  |  101  |  19.7  ----------------------------<  112<H>  3.6   |  22.0  |  1.11    Ca    8.5<L>      26 Jun 2021 07:37      MAGGIE ELLIOTT         ASSESSMENT AND PLAN:  In summary, Patient is a 60 y/o morbidly obese man now s/p bariatric surgery 7/2020, non-ischemic cardiomyopathy with systolic dysfunction, LVEF now 40-45%, NYHA class III, AHA stage C, s/p ICD, paroxsymal polymorphic VT, s/p CardioMEMs, permanent AF on Xarelto, ROCK on CPAP, prior GIB, and COPD who has been undergoing outpatient dental evaluation now s/p multiple tooth extractions at which time noted to have possible abscess initiated on antibiotic therapy however continued to be concerned about fever to 102F and dyspnea. CardioMEMs readings now with dPA 33, up from 27.  ProBNP 5963  In the ER, he was noted to have hypotension and hypoxia and is s/p IVF and currently on 6L NC.     Hypoxic respiratory failure/hypotension/sepsis  - Abx as per primary team/ID  -echo ef unchanged, no evidence of endocarditis    Permanent AF  - continue systemic AC and amiodarone   - tele     -continue current meds/dosages  -plan being planned for d/c home   -will arrange outpt f/u  -recall if change in clinical status.     Thank you for allowing [facility] to participate in the care of this patient.  Please feel free to call with any questions or concerns.                  [place] [facility], THE HEART CENTER                                   [address] [place] [address]                                                      PHONE: [phone]                                                         FAX: [phone]  [email or web address]  ---------------------------------------------------------------------------------------------------------------------------------    Overnight events/patient complaints:  no events     PAST MEDICAL & SURGICAL HISTORY:  COPD (chronic obstructive pulmonary disease)    ROCK (obstructive sleep apnea)    Afib    CHF (congestive heart failure)    Cardiomyopathy, ischemic  wearing life vest  2-28-19    Ejection fraction &lt; 50%  last echo 2-13-19  31%    Asthma    HFrEF (heart failure with reduced ejection fraction)    AICD (automatic cardioverter/defibrillator) present    History of loop recorder  2017  [facility]    History of incision and drainage  right groin abcess  jan 2019  [facility]    History of cardioversion    H/O cardiac catheterization  5 years ago at [facility].    S/P gastric bypass        No Known Drug Allergies  shellfish (Pruritus; Rash)    MEDICATIONS  (STANDING):  albuterol/ipratropium for Nebulization 3 milliLiter(s) Nebulizer every 6 hours  aMIOdarone    Tablet 200 milliGRAM(s) Oral two times a day  buDESOnide    Inhalation Suspension 0.25 milliGRAM(s) Inhalation every 12 hours  buMETAnide 1 milliGRAM(s) Oral two times a day  gabapentin 600 milliGRAM(s) Oral three times a day  lactobacillus acidophilus 1 Tablet(s) Oral two times a day with meals  lisinopril 2.5 milliGRAM(s) Oral daily  metoprolol tartrate 12.5 milliGRAM(s) Oral every 12 hours  pantoprazole    Tablet 40 milliGRAM(s) Oral before breakfast  piperacillin/tazobactam IVPB.. 3.375 Gram(s) IV Intermittent every 8 hours  predniSONE   Tablet 20 milliGRAM(s) Oral daily  rivaroxaban 15 milliGRAM(s) Oral with dinner    MEDICATIONS  (PRN):  acetaminophen   Tablet .. 650 milliGRAM(s) Oral every 6 hours PRN Severe Pain (7 - 10)  diphenhydrAMINE   Injectable 25 milliGRAM(s) IV Push every 8 hours PRN Rash and/or Itching      Vital Signs Last 24 Hrs  T(C): 36.6 (26 Jun 2021 04:11), Max: 36.7 (25 Jun 2021 17:07)  T(F): 97.8 (26 Jun 2021 04:11), Max: 98 (25 Jun 2021 17:07)  HR: 79 (26 Jun 2021 04:11) (73 - 102)  BP: 87/49 (26 Jun 2021 04:11) (87/49 - 102/62)  BP(mean): --  RR: 18 (26 Jun 2021 04:11) (17 - 18)  SpO2: 93% (26 Jun 2021 04:11) (93% - 96%)  ICU Vital Signs Last 24 Hrs  MAGGIE EARL  I&O's Detail    I&O's Summary    Drug Dosing Weight  MAGGIE MENDOZAALEX      PHYSICAL EXAM:  General: Appears well developed, well nourished alert and cooperative.  HEENT: Head; normocephalic, atraumatic.  Eyes: Pupils reactive, cornea wnl.  Neck: Supple, no nodes adenopathy, no NVD or carotid bruit or thyromegaly.  CARDIOVASCULAR: Normal S1 and S2, No murmur, rub, gallop or lift.   LUNGS: No rales, rhonchi or wheeze. Normal breath sounds bilaterally.  ABDOMEN: Soft, nontender without mass or organomegaly. bowel sounds normoactive.  EXTREMITIES: No clubbing, cyanosis or edema. Distal pulses wnl.   SKIN: warm and dry with normal turgor.  NEURO: Alert/oriented x 3/normal motor exam. No pathologic reflexes.    PSYCH: normal affect.        LABS:                        10.6   10.07 )-----------( 275      ( 26 Jun 2021 07:37 )             34.2     06-26    135  |  101  |  19.7  ----------------------------<  112<H>  3.6   |  22.0  |  1.11    Ca    8.5<L>      26 Jun 2021 07:37      MAGGIE ELLIOTT         ASSESSMENT AND PLAN:  In summary, Patient is a 62 y/o morbidly obese man now s/p bariatric surgery 7/2020, non-ischemic cardiomyopathy with systolic dysfunction, LVEF now 40-45%, NYHA class III, AHA stage C, s/p ICD, paroxsymal polymorphic VT, s/p CardioMEMs, permanent AF on Xarelto, ROCK on CPAP, prior GIB, and COPD who has been undergoing outpatient dental evaluation now s/p multiple tooth extractions at which time noted to have possible abscess initiated on antibiotic therapy however continued to be concerned about fever to 102F and dyspnea. CardioMEMs readings now with dPA 33, up from 27.  ProBNP 5963  In the ER, he was noted to have hypotension and hypoxia and is s/p IVF and currently on 6L NC.     Hypoxic respiratory failure/hypotension/sepsis  - Abx as per primary team/ID  -echo ef unchanged, no evidence of endocarditis    Permanent AF  - continue systemic AC and amiodarone   - tele     -continue current meds/dosages  -plan being planned for d/c home   -will arrange outpt f/u  -recall if change in clinical status.     Thank you for allowing [facility] to participate in the care of this patient.  Please feel free to call with any questions or concerns.

## 2021-06-26 NOTE — PROGRESS NOTE ADULT - SUBJECTIVE AND OBJECTIVE BOX
PULMONARY PROGRESS NOTE      EARL HANHRKEDAR-[id number]    Patient is a 61y old  Male who presents with a chief complaint of SOB, cough, fever (23 Jun 2021 08:32)      BRIEF HOSPITAL COURSE: **· HPI Objective Statement: 60 y/o M hx of afib on xarelto (has defbrillator/pacemaker), CHF on bumex presents with shortness of breath for the past 4 days. States he got his teeth removed 6 days ago and has been on antibiotics (cefdinir and clindamycin per patient). Endorses worsening shortness of breath with movement/exertion and is not positional. Endorses Tmax fever of 102 four days ago. Endorsing productive cough that is "yellow" in color. States he had "pus" coming from the gumlines and was switched to cefdinir from clindamycin a few days ago. Denies chest pain. Denies nausea or vomiting. Denies recent travel. Denies hemoptysis. Denies abdominal pain.  h/o rock  h/o heavy smoker -  followed by Dr Tamayo  on  [facility]   Post bariatric surgery with 120 lb weight loss      Events last 24 hours: * feeling at baseline       REVIEW OF SYSTEMS     CONSTITUTIONAL   no fevers  no loss of appetite  no weight loss   HEENT  no sore throat   no ringing in ears  NECK   no pain   RESPIRATORY  see HPI   CARDIOVASCULAR  no chest  pain no palpitations   GASTROINTESTINAL   + diarrhea MUSCULOSKELETAL  no joint pains    no  back pain   SKIN   no rash   no itchiness   GENITOURINARY  no dysuria   HEME    no bleeding or bruising   ENDOCRINE     no   warmth   no  sweating   no  cold intolerance   NEUROLOGIC  no tremors  no seizures  no    weakness    PSYCHIATRIC   no mood disorder    no delirium   **    PAST MEDICAL & SURGICAL HISTORY:  COPD (chronic obstructive pulmonary disease)    ROCK (obstructive sleep apnea)    Afib    CHF (congestive heart failure)    Cardiomyopathy, ischemic  wearing life vest  2-28-19    Ejection fraction &lt; 50%  last echo 2-13-19  31%    Asthma    HFrEF (heart failure with reduced ejection fraction)    AICD (automatic cardioverter/defibrillator) present    History of loop recorder  2017  [facility]    History of incision and drainage  right groin abcess  jan 2019  [facility]    History of cardioversion    H/O cardiac catheterization  5 years ago at [facility].    S/P gastric bypass          Medications:  meropenem  IVPB 1000 milliGRAM(s) IV Intermittent every 12 hours    aMIOdarone    Tablet 200 milliGRAM(s) Oral two times a day  metoprolol tartrate 12.5 milliGRAM(s) Oral every 12 hours    albuterol/ipratropium for Nebulization 3 milliLiter(s) Nebulizer every 6 hours    acetaminophen   Tablet .. 650 milliGRAM(s) Oral every 6 hours PRN  gabapentin 600 milliGRAM(s) Oral three times a day      rivaroxaban 15 milliGRAM(s) Oral with dinner    pantoprazole    Tablet 40 milliGRAM(s) Oral before breakfast      predniSONE   Tablet 20 milliGRAM(s) Oral daily          lactobacillus acidophilus 1 Tablet(s) Oral two times a day with meals          ICU Vital Signs Last 24 Hrs  T(C): 36.7 (23 Jun 2021 07:50), Max: 38 (22 Jun 2021 11:59)  T(F): 98.1 (23 Jun 2021 07:50), Max: 100.4 (22 Jun 2021 11:59)  HR: 96 (23 Jun 2021 08:51) (81 - 100)  BP: 101/60 (23 Jun 2021 07:50) (70/48 - 111/55)  BP(mean): 65 (22 Jun 2021 12:00) (65 - 65)  ABP: --  ABP(mean): --  RR: 22 (23 Jun 2021 07:50) (22 - 28)  SpO2: 91% (23 Jun 2021 08:51) (84% - 97%)          I&O's Detail        LABS:                        11.3   11.93 )-----------( 276      ( 23 Jun 2021 07:02 )             35.2     06-23    131<L>  |  95<L>  |  27.2<H>  ----------------------------<  89  4.5   |  22.0  |  1.58<H>    Ca    8.3<L>      23 Jun 2021 07:02  Mg     2.3     06-23    TPro  6.7  /  Alb  3.2<L>  /  TBili  0.6  /  DBili  x   /  AST  24  /  ALT  14  /  AlkPhos  51  06-22      CARDIAC MARKERS ( 22 Jun 2021 11:52 )  x     / 0.03 ng/mL / x     / x     / x          CAPILLARY BLOOD GLUCOSE            CULTURES:  Rapid RVP Result: Miguel (06-23 @ 05:47)      Physical Examination:    General:  obese male    no distress speaking full sentences  HEENT: Pupils equal, reactive to light.  Symmetric. no thrush     PULM: mild expiratory wheezing     no rhonchi   NECK: Supple, no lymphadenopathy, trachea midline    CVS: Regular rate and rhythm, no murmurs, rubs, or gallops    ABD: Soft, nondistended, nontender, normoactive bowel sounds, no masses    EXT: mild edema   SKIN: Warm and well perfused, no rashes noted.    NEURO: Alert, oriented, interactive, nonfocal    DEVICES:     RADIOLOGY: **reviewed  IMPRESSION:  *  Bilateral multifocal pneumonia with evidence of endobronchial spread.  *  Fluid distention to the upper esophagus. Correlate for aspiration.    REKHA TIM MD; Attending Radiologist  This document has been electronically signed. Jun 22 2021  2:29PM  *  Echo on 6/25/21   1. Technically limited study due to patient's body habitus.   2. Endocardial visualization was enhanced with intravenous echo contrast.   3. Left ventricular ejection fraction, by visual estimation, is 30 to 35%.   4. Moderately to severely decreased global left ventricular systolic function.   5. Normal right ventricular size and function.   6. Bilateral atria are normal in size.   7. No significant valvular disease is seen. Pulmonic valve was not well visualized.   8. Compared with prior study dated 11/2020, aortic valve gradients are decreased and calculated PETER is within normal range.    SHELLEY Miller. Electronically signed on 6/25/2021 at 11:53:15 AM

## 2021-06-26 NOTE — PROGRESS NOTE ADULT - TIME BILLING
chart review     discussed with   patient/ team
chart review     discussed with   patient / team
chart review     discussed with   patient/ team

## 2021-06-26 NOTE — PROGRESS NOTE ADULT - REASON FOR ADMISSION
SOB, cough, fever

## 2021-06-26 NOTE — PROGRESS NOTE ADULT - PROVIDER SPECIALTY LIST ADULT
Cardiology
Hospitalist
Infectious Disease
Pulmonology
Cardiology
Hospitalist
Cardiology
Infectious Disease
Cardiology
Hospitalist
Pulmonology
Pulmonology

## 2021-07-07 ENCOUNTER — APPOINTMENT (OUTPATIENT)
Dept: PULMONOLOGY | Facility: CLINIC | Age: 61
End: 2021-07-07
Payer: COMMERCIAL

## 2021-07-07 VITALS
DIASTOLIC BLOOD PRESSURE: 68 MMHG | SYSTOLIC BLOOD PRESSURE: 130 MMHG | WEIGHT: 249 LBS | OXYGEN SATURATION: 96 % | BODY MASS INDEX: 40.19 KG/M2 | HEART RATE: 100 BPM

## 2021-07-07 PROCEDURE — 99072 ADDL SUPL MATRL&STAF TM PHE: CPT

## 2021-07-07 PROCEDURE — 99495 TRANSJ CARE MGMT MOD F2F 14D: CPT

## 2021-07-07 NOTE — REASON FOR VISIT
[Follow-Up - From Hospitalization] : a follow-up visit after a recent hospitalization [Abnormal CXR/ Chest CT] : an abnormal CXR/ chest CT [Cough] : cough [COPD] : COPD

## 2021-07-07 NOTE — ASSESSMENT
[FreeTextEntry1] : Patient is status post multifocal pneumonia probably aspiration pneumonia based upon reflux and recent dental work. He is clinically improving. He is mildly more short of breath than he had been but this could be residual from the pneumonia. A followup CT scan has been ordered for next week which will give us a three-week span and I will follow that up with him by phone. Follow up here in a few weeks. She remained short of breath we will repeat pulmonary function studies. I do not see any reason to raise his prednisone from the current 5 mg daily, and he will continue on Trelegy.

## 2021-07-07 NOTE — HISTORY OF PRESENT ILLNESS
[TextBox_4] : Patient was hospitalized from June 22 or 26 at [facility] with shortness of breath cough and fever and was found to have bilateral infiltrates. He had dental work. He's been bothered by reflux from his bariatric surgery. He was treated with meropenem and was sent home on Augmentin. He required oxygen in the hospital but did not require oxygen at home. He still has some cough with white sputum at times. Mild dyspnea. He remains on prednisone 5 mg daily along with Trelegy. He remains compliant with CPAP.

## 2021-07-09 ENCOUNTER — APPOINTMENT (OUTPATIENT)
Dept: PULMONOLOGY | Facility: CLINIC | Age: 61
End: 2021-07-09

## 2021-07-26 ENCOUNTER — APPOINTMENT (OUTPATIENT)
Dept: PULMONOLOGY | Facility: CLINIC | Age: 61
End: 2021-07-26
Payer: COMMERCIAL

## 2021-07-26 VITALS — BODY MASS INDEX: 40.35 KG/M2 | WEIGHT: 250 LBS

## 2021-07-26 VITALS — OXYGEN SATURATION: 94 % | SYSTOLIC BLOOD PRESSURE: 102 MMHG | DIASTOLIC BLOOD PRESSURE: 58 MMHG | HEART RATE: 87 BPM

## 2021-07-26 DIAGNOSIS — E66.01 MORBID (SEVERE) OBESITY DUE TO EXCESS CALORIES: ICD-10-CM

## 2021-07-26 PROCEDURE — 99072 ADDL SUPL MATRL&STAF TM PHE: CPT

## 2021-07-26 PROCEDURE — 99214 OFFICE O/P EST MOD 30 MIN: CPT

## 2021-07-26 RX ORDER — CEFDINIR 300 MG/1
300 CAPSULE ORAL
Qty: 20 | Refills: 0 | Status: DISCONTINUED | COMMUNITY
Start: 2021-04-13 | End: 2021-07-26

## 2021-07-26 RX ORDER — METRONIDAZOLE 500 MG/1
500 TABLET ORAL
Qty: 15 | Refills: 0 | Status: DISCONTINUED | COMMUNITY
Start: 2021-02-09 | End: 2021-07-26

## 2021-07-26 RX ORDER — ALBUTEROL SULFATE 90 UG/1
108 (90 BASE) AEROSOL, METERED RESPIRATORY (INHALATION)
Qty: 1 | Refills: 5 | Status: DISCONTINUED | COMMUNITY
Start: 2020-11-10 | End: 2021-07-26

## 2021-07-26 RX ORDER — DIAZEPAM 2 MG/1
2 TABLET ORAL
Refills: 0 | Status: DISCONTINUED | COMMUNITY
End: 2021-07-26

## 2021-07-26 RX ORDER — OXYCODONE AND ACETAMINOPHEN 5; 325 MG/1; MG/1
5-325 TABLET ORAL
Qty: 25 | Refills: 0 | Status: DISCONTINUED | COMMUNITY
Start: 2021-03-01 | End: 2021-07-26

## 2021-07-26 RX ORDER — CIPROFLOXACIN HYDROCHLORIDE 500 MG/1
500 TABLET, FILM COATED ORAL
Qty: 14 | Refills: 0 | Status: DISCONTINUED | COMMUNITY
Start: 2021-05-24 | End: 2021-07-26

## 2021-07-26 RX ORDER — METHYLPREDNISOLONE 4 MG/1
4 TABLET ORAL
Qty: 21 | Refills: 0 | Status: DISCONTINUED | COMMUNITY
Start: 2021-02-19 | End: 2021-07-26

## 2021-07-26 RX ORDER — ALBUTEROL SULFATE 108 UG/1
108 (90 BASE) AEROSOL, METERED RESPIRATORY (INHALATION)
Qty: 3 | Refills: 1 | Status: DISCONTINUED | COMMUNITY
Start: 2020-04-09 | End: 2021-07-26

## 2021-07-26 RX ORDER — DOXYCYCLINE HYCLATE 100 MG/1
100 CAPSULE ORAL
Qty: 10 | Refills: 0 | Status: DISCONTINUED | COMMUNITY
Start: 2021-02-19 | End: 2021-07-26

## 2021-07-26 RX ORDER — MONTELUKAST 10 MG/1
10 TABLET, FILM COATED ORAL
Qty: 90 | Refills: 3 | Status: ACTIVE | COMMUNITY
Start: 2021-04-06 | End: 1900-01-01

## 2021-07-26 RX ORDER — DIAZEPAM 5 MG/1
5 TABLET ORAL
Qty: 30 | Refills: 0 | Status: DISCONTINUED | COMMUNITY
Start: 2020-05-05 | End: 2021-07-26

## 2021-07-26 RX ORDER — GABAPENTIN 100 MG
100 TABLET ORAL
Refills: 0 | Status: DISCONTINUED | COMMUNITY
End: 2021-07-26

## 2021-07-26 RX ORDER — BUMETANIDE 1 MG/1
1 TABLET ORAL
Qty: 90 | Refills: 0 | Status: DISCONTINUED | COMMUNITY
Start: 2021-02-03 | End: 2021-07-26

## 2021-07-26 NOTE — ASSESSMENT
[FreeTextEntry1] : Asked the worsening of COPD. I told him to increase his prednisone to 10 mg daily for the next 5 days. Other medications will be continued. His followup CT scan has not yet been scheduled. Awaits approval. Pulmonary function studies have been scheduled. Followup here in 6 weeks.

## 2021-07-26 NOTE — HISTORY OF PRESENT ILLNESS
[TextBox_4] : Patient had an episode of fever after a dental procedure last week. She was given amoxicillin. He's been complaining of some increased shortness of breath recently though without cough or wheeze. Saturations have been lower than normal but still acceptable. He reports good CPAP compliance. He's been on prednisone 5 mg along with his Trelegy.

## 2021-07-28 ENCOUNTER — APPOINTMENT (OUTPATIENT)
Dept: PULMONOLOGY | Facility: CLINIC | Age: 61
End: 2021-07-28

## 2021-07-28 RX ORDER — AMOXICILLIN AND CLAVULANATE POTASSIUM 875; 125 MG/1; MG/1
875-125 TABLET, COATED ORAL
Qty: 14 | Refills: 0 | Status: DISCONTINUED | COMMUNITY
Start: 2021-06-29 | End: 2021-07-28

## 2021-07-30 ENCOUNTER — APPOINTMENT (OUTPATIENT)
Dept: PULMONOLOGY | Facility: CLINIC | Age: 61
End: 2021-07-30

## 2021-08-12 ENCOUNTER — APPOINTMENT (OUTPATIENT)
Dept: CT IMAGING | Facility: CLINIC | Age: 61
End: 2021-08-12
Payer: COMMERCIAL

## 2021-08-12 ENCOUNTER — OUTPATIENT (OUTPATIENT)
Dept: OUTPATIENT SERVICES | Facility: HOSPITAL | Age: 61
LOS: 1 days | End: 2021-08-12
Payer: COMMERCIAL

## 2021-08-12 DIAGNOSIS — Z98.890 OTHER SPECIFIED POSTPROCEDURAL STATES: Chronic | ICD-10-CM

## 2021-08-12 DIAGNOSIS — Z01.818 ENCOUNTER FOR OTHER PREPROCEDURAL EXAMINATION: ICD-10-CM

## 2021-08-12 DIAGNOSIS — J18.9 PNEUMONIA, UNSPECIFIED ORGANISM: ICD-10-CM

## 2021-08-12 DIAGNOSIS — Z98.84 BARIATRIC SURGERY STATUS: Chronic | ICD-10-CM

## 2021-08-12 PROCEDURE — 71250 CT THORAX DX C-: CPT | Mod: 26

## 2021-08-12 PROCEDURE — 71250 CT THORAX DX C-: CPT

## 2021-08-13 ENCOUNTER — APPOINTMENT (OUTPATIENT)
Dept: DISASTER EMERGENCY | Facility: CLINIC | Age: 61
End: 2021-08-13

## 2021-08-14 LAB — SARS-COV-2 N GENE NPH QL NAA+PROBE: NOT DETECTED

## 2021-08-16 ENCOUNTER — INPATIENT (INPATIENT)
Facility: HOSPITAL | Age: 61
LOS: 2 days | Discharge: HOME | DRG: 178 | End: 2021-08-19
Attending: STUDENT IN AN ORGANIZED HEALTH CARE EDUCATION/TRAINING PROGRAM | Admitting: STUDENT IN AN ORGANIZED HEALTH CARE EDUCATION/TRAINING PROGRAM
Payer: COMMERCIAL

## 2021-08-16 VITALS
SYSTOLIC BLOOD PRESSURE: 90 MMHG | RESPIRATION RATE: 20 BRPM | OXYGEN SATURATION: 90 % | TEMPERATURE: 99 F | HEART RATE: 93 BPM | HEIGHT: 67 IN | DIASTOLIC BLOOD PRESSURE: 55 MMHG | WEIGHT: 251.99 LBS

## 2021-08-16 DIAGNOSIS — E87.1 HYPO-OSMOLALITY AND HYPONATREMIA: ICD-10-CM

## 2021-08-16 DIAGNOSIS — Z98.84 BARIATRIC SURGERY STATUS: Chronic | ICD-10-CM

## 2021-08-16 DIAGNOSIS — D72.829 ELEVATED WHITE BLOOD CELL COUNT, UNSPECIFIED: ICD-10-CM

## 2021-08-16 DIAGNOSIS — Z98.890 OTHER SPECIFIED POSTPROCEDURAL STATES: Chronic | ICD-10-CM

## 2021-08-16 DIAGNOSIS — Z29.9 ENCOUNTER FOR PROPHYLACTIC MEASURES, UNSPECIFIED: ICD-10-CM

## 2021-08-16 DIAGNOSIS — J44.1 CHRONIC OBSTRUCTIVE PULMONARY DISEASE WITH (ACUTE) EXACERBATION: ICD-10-CM

## 2021-08-16 DIAGNOSIS — G47.33 OBSTRUCTIVE SLEEP APNEA (ADULT) (PEDIATRIC): ICD-10-CM

## 2021-08-16 DIAGNOSIS — I48.20 CHRONIC ATRIAL FIBRILLATION, UNSPECIFIED: ICD-10-CM

## 2021-08-16 DIAGNOSIS — I50.22 CHRONIC SYSTOLIC (CONGESTIVE) HEART FAILURE: ICD-10-CM

## 2021-08-16 DIAGNOSIS — R06.02 SHORTNESS OF BREATH: ICD-10-CM

## 2021-08-16 LAB
ALBUMIN SERPL ELPH-MCNC: 3.1 G/DL — LOW (ref 3.3–5.2)
ALP SERPL-CCNC: 58 U/L — SIGNIFICANT CHANGE UP (ref 40–120)
ALT FLD-CCNC: 30 U/L — SIGNIFICANT CHANGE UP
ANION GAP SERPL CALC-SCNC: 14 MMOL/L — SIGNIFICANT CHANGE UP (ref 5–17)
APPEARANCE UR: CLEAR — SIGNIFICANT CHANGE UP
APTT BLD: 33.3 SEC — SIGNIFICANT CHANGE UP (ref 27.5–35.5)
AST SERPL-CCNC: 29 U/L — SIGNIFICANT CHANGE UP
BASOPHILS # BLD AUTO: 0 K/UL — SIGNIFICANT CHANGE UP (ref 0–0.2)
BASOPHILS NFR BLD AUTO: 0 % — SIGNIFICANT CHANGE UP (ref 0–2)
BILIRUB SERPL-MCNC: 0.7 MG/DL — SIGNIFICANT CHANGE UP (ref 0.4–2)
BILIRUB UR-MCNC: NEGATIVE — SIGNIFICANT CHANGE UP
BUN SERPL-MCNC: 25.1 MG/DL — HIGH (ref 8–20)
CALCIUM SERPL-MCNC: 8.7 MG/DL — SIGNIFICANT CHANGE UP (ref 8.6–10.2)
CHLORIDE SERPL-SCNC: 89 MMOL/L — LOW (ref 98–107)
CO2 SERPL-SCNC: 22 MMOL/L — SIGNIFICANT CHANGE UP (ref 22–29)
COLOR SPEC: YELLOW — SIGNIFICANT CHANGE UP
CREAT SERPL-MCNC: 1.15 MG/DL — SIGNIFICANT CHANGE UP (ref 0.5–1.3)
DIFF PNL FLD: NEGATIVE — SIGNIFICANT CHANGE UP
ELLIPTOCYTES BLD QL SMEAR: SLIGHT — SIGNIFICANT CHANGE UP
EOSINOPHIL # BLD AUTO: 0 K/UL — SIGNIFICANT CHANGE UP (ref 0–0.5)
EOSINOPHIL NFR BLD AUTO: 0 % — SIGNIFICANT CHANGE UP (ref 0–6)
GIANT PLATELETS BLD QL SMEAR: PRESENT — SIGNIFICANT CHANGE UP
GLUCOSE SERPL-MCNC: 96 MG/DL — SIGNIFICANT CHANGE UP (ref 70–99)
GLUCOSE UR QL: NEGATIVE MG/DL — SIGNIFICANT CHANGE UP
HCT VFR BLD CALC: 35.9 % — LOW (ref 39–50)
HGB BLD-MCNC: 11.4 G/DL — LOW (ref 13–17)
INR BLD: 2.13 RATIO — HIGH (ref 0.88–1.16)
KETONES UR-MCNC: NEGATIVE — SIGNIFICANT CHANGE UP
LACTATE BLDV-MCNC: 2.5 MMOL/L — HIGH (ref 0.5–2)
LEUKOCYTE ESTERASE UR-ACNC: NEGATIVE — SIGNIFICANT CHANGE UP
LYMPHOCYTES # BLD AUTO: 1.75 K/UL — SIGNIFICANT CHANGE UP (ref 1–3.3)
LYMPHOCYTES # BLD AUTO: 7 % — LOW (ref 13–44)
MANUAL SMEAR VERIFICATION: SIGNIFICANT CHANGE UP
MCHC RBC-ENTMCNC: 28.1 PG — SIGNIFICANT CHANGE UP (ref 27–34)
MCHC RBC-ENTMCNC: 31.8 GM/DL — LOW (ref 32–36)
MCV RBC AUTO: 88.4 FL — SIGNIFICANT CHANGE UP (ref 80–100)
MICROCYTES BLD QL: SLIGHT — SIGNIFICANT CHANGE UP
MONOCYTES # BLD AUTO: 0.88 K/UL — SIGNIFICANT CHANGE UP (ref 0–0.9)
MONOCYTES NFR BLD AUTO: 3.5 % — SIGNIFICANT CHANGE UP (ref 2–14)
NEUTROPHILS # BLD AUTO: 21.73 K/UL — HIGH (ref 1.8–7.4)
NEUTROPHILS NFR BLD AUTO: 85.2 % — HIGH (ref 43–77)
NEUTS BAND # BLD: 1.7 % — SIGNIFICANT CHANGE UP (ref 0–8)
NITRITE UR-MCNC: NEGATIVE — SIGNIFICANT CHANGE UP
NT-PROBNP SERPL-SCNC: 3646 PG/ML — HIGH (ref 0–300)
OSMOLALITY UR: 292 MOSM/KG — LOW (ref 300–1000)
OVALOCYTES BLD QL SMEAR: SLIGHT — SIGNIFICANT CHANGE UP
PH UR: 6 — SIGNIFICANT CHANGE UP (ref 5–8)
PLAT MORPH BLD: NORMAL — SIGNIFICANT CHANGE UP
PLATELET # BLD AUTO: 261 K/UL — SIGNIFICANT CHANGE UP (ref 150–400)
POIKILOCYTOSIS BLD QL AUTO: SLIGHT — SIGNIFICANT CHANGE UP
POLYCHROMASIA BLD QL SMEAR: SLIGHT — SIGNIFICANT CHANGE UP
POTASSIUM SERPL-MCNC: 4.3 MMOL/L — SIGNIFICANT CHANGE UP (ref 3.5–5.3)
POTASSIUM SERPL-SCNC: 4.3 MMOL/L — SIGNIFICANT CHANGE UP (ref 3.5–5.3)
PROT SERPL-MCNC: 6.5 G/DL — LOW (ref 6.6–8.7)
PROT UR-MCNC: NEGATIVE MG/DL — SIGNIFICANT CHANGE UP
PROTHROM AB SERPL-ACNC: 23.8 SEC — HIGH (ref 10.6–13.6)
RBC # BLD: 4.06 M/UL — LOW (ref 4.2–5.8)
RBC # FLD: 15.9 % — HIGH (ref 10.3–14.5)
RBC BLD AUTO: ABNORMAL
SARS-COV-2 RNA SPEC QL NAA+PROBE: SIGNIFICANT CHANGE UP
SODIUM SERPL-SCNC: 125 MMOL/L — LOW (ref 135–145)
SODIUM UR-SCNC: <30 MMOL/L — SIGNIFICANT CHANGE UP
SP GR SPEC: 1.01 — SIGNIFICANT CHANGE UP (ref 1.01–1.02)
TROPONIN T SERPL-MCNC: 0.02 NG/ML — SIGNIFICANT CHANGE UP (ref 0–0.06)
TSH SERPL-MCNC: 7.07 UIU/ML — HIGH (ref 0.27–4.2)
UROBILINOGEN FLD QL: NEGATIVE MG/DL — SIGNIFICANT CHANGE UP
VARIANT LYMPHS # BLD: 2.6 % — SIGNIFICANT CHANGE UP (ref 0–6)
WBC # BLD: 25 K/UL — HIGH (ref 3.8–10.5)
WBC # FLD AUTO: 25 K/UL — HIGH (ref 3.8–10.5)

## 2021-08-16 PROCEDURE — 99285 EMERGENCY DEPT VISIT HI MDM: CPT

## 2021-08-16 PROCEDURE — 93306 TTE W/DOPPLER COMPLETE: CPT | Mod: 26

## 2021-08-16 PROCEDURE — 99223 1ST HOSP IP/OBS HIGH 75: CPT

## 2021-08-16 PROCEDURE — 71046 X-RAY EXAM CHEST 2 VIEWS: CPT | Mod: 26

## 2021-08-16 RX ORDER — ACETAMINOPHEN 500 MG
650 TABLET ORAL EVERY 6 HOURS
Refills: 0 | Status: DISCONTINUED | OUTPATIENT
Start: 2021-08-16 | End: 2021-08-19

## 2021-08-16 RX ORDER — BUMETANIDE 0.25 MG/ML
1 INJECTION INTRAMUSCULAR; INTRAVENOUS
Refills: 0 | Status: DISCONTINUED | OUTPATIENT
Start: 2021-08-16 | End: 2021-08-19

## 2021-08-16 RX ORDER — AMIODARONE HYDROCHLORIDE 400 MG/1
200 TABLET ORAL DAILY
Refills: 0 | Status: DISCONTINUED | OUTPATIENT
Start: 2021-08-16 | End: 2021-08-19

## 2021-08-16 RX ORDER — PIPERACILLIN AND TAZOBACTAM 4; .5 G/20ML; G/20ML
3.38 INJECTION, POWDER, LYOPHILIZED, FOR SOLUTION INTRAVENOUS ONCE
Refills: 0 | Status: COMPLETED | OUTPATIENT
Start: 2021-08-16 | End: 2021-08-16

## 2021-08-16 RX ORDER — ALBUTEROL 90 UG/1
2 AEROSOL, METERED ORAL EVERY 6 HOURS
Refills: 0 | Status: DISCONTINUED | OUTPATIENT
Start: 2021-08-16 | End: 2021-08-19

## 2021-08-16 RX ORDER — VANCOMYCIN HCL 1 G
1250 VIAL (EA) INTRAVENOUS ONCE
Refills: 0 | Status: COMPLETED | OUTPATIENT
Start: 2021-08-16 | End: 2021-08-16

## 2021-08-16 RX ORDER — LISINOPRIL 2.5 MG/1
2.5 TABLET ORAL DAILY
Refills: 0 | Status: DISCONTINUED | OUTPATIENT
Start: 2021-08-16 | End: 2021-08-17

## 2021-08-16 RX ORDER — METOPROLOL TARTRATE 50 MG
25 TABLET ORAL DAILY
Refills: 0 | Status: DISCONTINUED | OUTPATIENT
Start: 2021-08-16 | End: 2021-08-19

## 2021-08-16 RX ORDER — VANCOMYCIN HCL 1 G
1250 VIAL (EA) INTRAVENOUS EVERY 12 HOURS
Refills: 0 | Status: DISCONTINUED | OUTPATIENT
Start: 2021-08-17 | End: 2021-08-17

## 2021-08-16 RX ORDER — ACETAMINOPHEN 500 MG
650 TABLET ORAL ONCE
Refills: 0 | Status: COMPLETED | OUTPATIENT
Start: 2021-08-16 | End: 2021-08-16

## 2021-08-16 RX ORDER — PANTOPRAZOLE SODIUM 20 MG/1
40 TABLET, DELAYED RELEASE ORAL
Refills: 0 | Status: DISCONTINUED | OUTPATIENT
Start: 2021-08-16 | End: 2021-08-19

## 2021-08-16 RX ORDER — VANCOMYCIN HCL 1 G
VIAL (EA) INTRAVENOUS
Refills: 0 | Status: DISCONTINUED | OUTPATIENT
Start: 2021-08-16 | End: 2021-08-17

## 2021-08-16 RX ORDER — GABAPENTIN 400 MG/1
600 CAPSULE ORAL THREE TIMES A DAY
Refills: 0 | Status: DISCONTINUED | OUTPATIENT
Start: 2021-08-16 | End: 2021-08-19

## 2021-08-16 RX ORDER — VANCOMYCIN HCL 1 G
VIAL (EA) INTRAVENOUS
Refills: 0 | Status: DISCONTINUED | OUTPATIENT
Start: 2021-08-16 | End: 2021-08-16

## 2021-08-16 RX ORDER — SODIUM CHLORIDE 9 MG/ML
500 INJECTION INTRAMUSCULAR; INTRAVENOUS; SUBCUTANEOUS ONCE
Refills: 0 | Status: COMPLETED | OUTPATIENT
Start: 2021-08-16 | End: 2021-08-16

## 2021-08-16 RX ORDER — RIVAROXABAN 15 MG-20MG
20 KIT ORAL
Refills: 0 | Status: DISCONTINUED | OUTPATIENT
Start: 2021-08-16 | End: 2021-08-19

## 2021-08-16 RX ORDER — VANCOMYCIN HCL 1 G
1000 VIAL (EA) INTRAVENOUS ONCE
Refills: 0 | Status: COMPLETED | OUTPATIENT
Start: 2021-08-16 | End: 2021-08-16

## 2021-08-16 RX ORDER — PIPERACILLIN AND TAZOBACTAM 4; .5 G/20ML; G/20ML
3.38 INJECTION, POWDER, LYOPHILIZED, FOR SOLUTION INTRAVENOUS EVERY 8 HOURS
Refills: 0 | Status: DISCONTINUED | OUTPATIENT
Start: 2021-08-16 | End: 2021-08-19

## 2021-08-16 RX ADMIN — PIPERACILLIN AND TAZOBACTAM 3.38 GRAM(S): 4; .5 INJECTION, POWDER, LYOPHILIZED, FOR SOLUTION INTRAVENOUS at 11:35

## 2021-08-16 RX ADMIN — PIPERACILLIN AND TAZOBACTAM 200 GRAM(S): 4; .5 INJECTION, POWDER, LYOPHILIZED, FOR SOLUTION INTRAVENOUS at 11:02

## 2021-08-16 RX ADMIN — Medication 650 MILLIGRAM(S): at 11:35

## 2021-08-16 RX ADMIN — SODIUM CHLORIDE 500 MILLILITER(S): 9 INJECTION INTRAMUSCULAR; INTRAVENOUS; SUBCUTANEOUS at 11:35

## 2021-08-16 RX ADMIN — ALBUTEROL 2 PUFF(S): 90 AEROSOL, METERED ORAL at 20:12

## 2021-08-16 RX ADMIN — GABAPENTIN 600 MILLIGRAM(S): 400 CAPSULE ORAL at 18:55

## 2021-08-16 RX ADMIN — Medication 200 MILLIGRAM(S): at 23:19

## 2021-08-16 RX ADMIN — LISINOPRIL 2.5 MILLIGRAM(S): 2.5 TABLET ORAL at 18:39

## 2021-08-16 RX ADMIN — Medication 650 MILLIGRAM(S): at 11:02

## 2021-08-16 RX ADMIN — Medication 20 MILLIGRAM(S): at 14:28

## 2021-08-16 RX ADMIN — RIVAROXABAN 20 MILLIGRAM(S): KIT at 18:39

## 2021-08-16 RX ADMIN — BUMETANIDE 1 MILLIGRAM(S): 0.25 INJECTION INTRAMUSCULAR; INTRAVENOUS at 18:40

## 2021-08-16 RX ADMIN — Medication 250 MILLIGRAM(S): at 12:00

## 2021-08-16 RX ADMIN — PIPERACILLIN AND TAZOBACTAM 25 GRAM(S): 4; .5 INJECTION, POWDER, LYOPHILIZED, FOR SOLUTION INTRAVENOUS at 18:40

## 2021-08-16 NOTE — H&P ADULT - HISTORY OF PRESENT ILLNESS
62 yo male with PMHs of  HFrEF s/p AICD, afib on Xarelto s/p pacemaker, COPD, ROCK on BiPAP, gastric bypass surgery presenting with SOB and fevers/chills. Per patient, he has worsening SOB for the past couple of day. The fever and chill for the past month or more. Low grade fever which comes and goes. Pt had alot of dental work done recently and since not feeling well.   For the past couple of days he also noted increase in his weight despite on being Furosemide.   He also reports dizziness and palpitation. He denies headache, chest pain, nausea, vomiting, abdominal pain, change in urinary or bowel habits.

## 2021-08-16 NOTE — ED ADULT NURSE NOTE - NS ED NURSE RECORD ANOTHER HT AND WT
Pt dcd to home with family, discharge instruction given and voices understanding, prescription sent to preferred pharmacy, denies any concern Yes

## 2021-08-16 NOTE — ED PROVIDER NOTE - OBJECTIVE STATEMENT
Patient is a 62 yo male PMHx HFrEF s/p AICD, a fib on Xarelto s/p pacemaker, COPD, ROCK on BiPAP, gastric bypass surgery presenting with SOB and fevers/chills. Patient reports SOB, palpitations, dizziness, lightheadedness, and presyncope last night but no LOC and he did not hit his head. Used a duo neb treatment last night with mild relief of his sx. He reports gaining 8 pounds since last night. Patient had leg swelling last Thursday for which he received Lasix on Friday but currently denies swelling. He has had fevers and chills for approximately 2 months since a June admission for PNA after a dental extraction. Also reporting dysuria and lower back pain currently but no hematuria. Patient is followed by [facility]. Denies syncope, chest pain, sick contacts, recent travel, abdominal pain, diarrhea, melena, hematochezia, n/v, hematemesis.

## 2021-08-16 NOTE — ED PROVIDER NOTE - PROGRESS NOTE DETAILS
JK - patient O2 sat now 90%. Placed on 2 L NC. Now comfortable, stable. CT from 8/12 shows new consolidation without effusion. CXR with upper lobe consolidation, no pleural effusion. Patient started on IV abx. [facility] consulted. c/f endocarditis vs. PNA. Plan to admit to medicine. JK - sepsis start time 10:30 Am with WBC 25. In the setting of respiratory symptoms with c/f fluid overload in a patient with PMHx HF, only 500mL IVF given. JK - Na 125. Urine and serum osm, Urine Na ordered. JK - Na 125. Urine and serum osm, Urine Na ordered. Patient admitted to medicine. Cardiology and Pulmonology following.

## 2021-08-16 NOTE — H&P ADULT - NSHPPHYSICALEXAM_GEN_ALL_CORE
General: obese male, sitting on th bed, in moderate respiratory distress while talking   Head and neck: normocephalic, no JVD   Cardio: Irregular rate and rhythm   Pulm: rales and crackles on the right   GI: abdomen soft   Extr: no edema   Neuro: AOx3, no focal weakness

## 2021-08-16 NOTE — ED PROVIDER NOTE - CLINICAL SUMMARY MEDICAL DECISION MAKING FREE TEXT BOX
Patient is a 62 yo male PMHx HFrEF s/p AICD, a fib on Xarelto s/p pacemaker, COPD, ROCK on BiPAP, gastric bypass surgery presenting with SOB and fevers/chills. History of 2 months of fevers, dental extraction, rectal temperature 100.4 here. BNP, TSH, troponin, ECG to r/o ACS vs. HF. CXR, UA, BCx ordered. Given IVF, Vanc/Zosyn for endocarditis vs. new PNA. Patient is a 62 yo male PMHx HFrEF s/p AICD, a fib on Xarelto s/p pacemaker, COPD, ROCK on BiPAP, gastric bypass surgery presenting with SOB and fevers/chills. History of 2 months of fevers, dental extraction, rectal temperature 100.4 here. BNP, TSH, troponin, ECG to r/o ACS vs. HF. CXR, UA, UCx, BCx, lactate ordered as per sepsis criteria. Given IVF, Vanc/Zosyn for endocarditis vs. new PNA.

## 2021-08-16 NOTE — H&P ADULT - PROBLEM SELECTOR PLAN 1
Fever and leukocytosis likely secondary to new developing gram negative PNA and strong concern for bacteremia   Admit to medicine  On admission low grade fever and leukocytosis  CXR new developing right sided infiltrate  BC sent and pending results   Start Vancomycin and Zosyn   Tylenol for fever   Pulmonary consult noted

## 2021-08-16 NOTE — H&P ADULT - PROBLEM SELECTOR PLAN 3
Does not look to be in fluid overload   Resume Bumex 1 mg po twice daily, Lisinopril 2.5 mg po daily and Metoprolol 25 mg po daily Does not look to be in fluid overload   Hold BUmex for now in the setting of the infection   Lisinopril 2.5 mg po daily and Metoprolol 25 mg po daily

## 2021-08-16 NOTE — H&P ADULT - NSICDXPASTSURGICALHX_GEN_ALL_CORE_FT
PAST SURGICAL HISTORY:  H/O cardiac catheterization 5 years ago at [facility].    History of cardioversion     History of incision and drainage right groin abcess  jan 2019  [facility]    History of loop recorder 2017  [facility]    S/P gastric bypass

## 2021-08-16 NOTE — ED ADULT NURSE NOTE - NSICDXPASTSURGICALHX_GEN_ALL_CORE_FT
PAST SURGICAL HISTORY:  H/O cardiac catheterization 5 years ago at [facility].    History of cardioversion     History of incision and drainage right groin abcess  jan 2019  [facility]    History of loop recorder 2017  [facility]    S/P gastric bypass

## 2021-08-16 NOTE — ED ADULT NURSE NOTE - OBJECTIVE STATEMENT
61y male AOx4 c/o worsening SOB x 1 week. PMHX of CHF, AICD, afib, asthma, cardiac cath. RR elevated, O2 sat 90% on room air, pt placed on 2L NC, O2 sat improved to 93% with relief from SOB. pt reports increased in swelling in LEs as well over past week. pt denies chest discomfort. pt recently seen at primary care MD, given IV lasix with relief, but SOB returned. pt instructed to come to ED today and skip lasix dose due to low BP.

## 2021-08-16 NOTE — ED PROVIDER NOTE - ATTENDING CONTRIBUTION TO CARE
obese male with multiple medical problems, recurrent pneumonias, persistent fevers for several weeks; now with continued fever and worsening SOB; on exam, mildly dyspnic, but non toxic; obese; improved on nasal canula; +rhonchi and faint exp wheezing; +mild b/l low ext edema; imaging c/w persistent pneumonia; antibx's provided empereic treatment for pneumonia, endocarditis a consideration given history and persistence of fever; cards and pulm consults appreciated, admitted to medicine

## 2021-08-16 NOTE — ED PROVIDER NOTE - NSICDXPASTSURGICALHX_GEN_ALL_CORE_FT
PAST SURGICAL HISTORY:  H/O cardiac catheterization 5 years ago at [facility].    History of cardioversion     History of incision and drainage right groin abcess  jan 2019  [facility]    History of loop recorder 2017  [facility]    S/P gastric bypass

## 2021-08-16 NOTE — CONSULT NOTE ADULT - SUBJECTIVE AND OBJECTIVE BOX
[place] [facility], THE [facility]                                   [address] [place] [address]                                                      PHONE: [phone]                                                         FAX: [phone]  [email or web address]  ---------------------------------------------------------------------------------------------------------------------------------    Reason for Consult: dyspnea    HPI:  MAGGIE ELLIOTT is an 61y Male with obesity, s/p gastric sleeve, COPD ROCK long standing NICM with EF as low as 20%, improved EF on GDMT most recent EF 30-35%, chronic AF, VT s/p ICD, HFrEF with cardiomems monitoring, came to Er c/o fever, chills, dyspnea and weight gain.  Pt had recent GIB and has been evaluated for watchman device recently by Dr Ramos    PAST MEDICAL & SURGICAL HISTORY:  COPD (chronic obstructive pulmonary disease)    ROCK (obstructive sleep apnea)    Afib    CHF (congestive heart failure)    Cardiomyopathy, ischemic  wearing life vest  2-28-19    Ejection fraction &lt; 50%  last echo 2-13-19  31%    Asthma    HFrEF (heart failure with reduced ejection fraction)    AICD (automatic cardioverter/defibrillator) present    History of loop recorder  2017  [facility]    History of incision and drainage  right groin abcess  jan 2019  [facility]    History of cardioversion    H/O cardiac catheterization  5 years ago at [facility].    S/P gastric bypass        No Known Drug Allergies  shellfish (Pruritus; Rash)      MEDICATIONS  (STANDING):  vancomycin  IVPB. 1000 milliGRAM(s) IV Intermittent once    MEDICATIONS  (PRN):      Social History:  Cigarettes:           neg         Alchohol:       neg          Illicit Drug Abuse:  neg  neg FH CAD    ROS: Negative other than as mentioned in HPI.    Vital Signs Last 24 Hrs  T(C): 38 (16 Aug 2021 10:36), Max: 38 (16 Aug 2021 10:36)  T(F): 100.4 (16 Aug 2021 10:36), Max: 100.4 (16 Aug 2021 10:36)  HR: 85 (16 Aug 2021 10:36) (85 - 100)  BP: 99/59 (16 Aug 2021 10:36) (90/55 - 162/60)  BP(mean): --  RR: 18 (16 Aug 2021 10:36) (18 - 20)  SpO2: 94% (16 Aug 2021 10:36) (90% - 95%)  ICU Vital Signs Last 24 Hrs  MAGGIE ELLIOTT  I&O's Detail    I&O's Summary    Drug Dosing Weight  MAGGIE MENDOZAMEENA      PHYSICAL EXAM:  General: Appears well developed, well nourished alert and cooperative.  HEENT: Head; normocephalic, atraumatic.  Eyes: Pupils reactive, cornea wnl.  Neck: Supple, no nodes adenopathy, no NVD or carotid bruit or thyromegaly.  CARDIOVASCULAR: Normal S1 and S2, No murmur, rub, gallop or lift.   LUNGS: No rales, rhonchi or wheeze. Normal breath sounds bilaterally.  ABDOMEN: Soft, nontender without mass or organomegaly. bowel sounds normoactive.  EXTREMITIES: No clubbing, cyanosis or edema. Distal pulses wnl.   SKIN: warm and dry with normal turgor.  NEURO: Alert/oriented x 3/normal motor exam. No pathologic reflexes.    PSYCH: normal affect.        LABS:                        11.4   25.00 )-----------( 261      ( 16 Aug 2021 10:26 )             35.9     08-16    125<L>  |  89<L>  |  25.1<H>  ----------------------------<  96  4.3   |  22.0  |  1.15    Ca    8.7      16 Aug 2021 10:26    TPro  6.5<L>  /  Alb  3.1<L>  /  TBili  0.7  /  DBili  x   /  AST  29  /  ALT  30  /  AlkPhos  58  08-16    MAGGIE GARGUILO  CARDIAC MARKERS ( 16 Aug 2021 10:26 )  x     / 0.02 ng/mL / x     / x     / x          PT/INR - ( 16 Aug 2021 10:26 )   PT: 23.8 sec;   INR: 2.13 ratio         PTT - ( 16 Aug 2021 10:26 )  PTT:33.3 sec      RADIOLOGY & ADDITIONAL STUDIES:< from: Xray Chest 2 Views PA/Lat (08.16.21 @ 10:44) >  IMPRESSION:  Moderate right and minimal left perihilar interstitial infiltrates, no lobar consolidation, grossly unchanged    --- End of Report ---            SY TRINH DO; Attending Radiologist  This document has been electronically signed. Aug16 2021 10:49AM    < end of copied text >      INTERPRETATION OF TELEMETRY (personally reviewed):    ECG:< from: 12 Lead ECG (08.16.21 @ 09:12) >    Diagnosis Line *** Poor data quality, interpretation may be adversely affected  Atrial fibrillation  Left axis deviation  Left bundle branch block  Abnormal ECG    Confirmed by LINSEY SPENCE ([id number]) on 8/16/2021 10:23:07 AM    < end of copied text >      ECHO:< from: TTE Echo Complete w/o Contrast w/ Doppler (06.25.21 @ 11:06) >    PHYSICIAN INTERPRETATION:  Left Ventricle: Endocardial visualization was enhanced with intravenous echo contrast. The left ventricular internal cavity size is normal. Left ventricular wall thickness is normal.  Global LV systolic function was moderately to severely decreased. Left ventricular ejection fraction, by visual estimation, is 30 to 35%. Abnormal (paradoxical) septal motion, consistent with RV pacemaker.  Right Ventricle: Normal right ventricular size and function.  Left Atrium: The left atrium is normal in size.  Right Atrium: The right atrium is normal in size.  Pericardium:There is no evidence of pericardial effusion.  Mitral Valve: Structurally normal mitral valve, with normal leaflet excursion. Trace mitral valve regurgitation is seen.  Tricuspid Valve: Structurally normal tricuspid valve, with normal leaflet excursion. Trivial tricuspid regurgitation is visualized. Adequate TR velocity was not obtained to accurately assess RVSP.  Aortic Valve: Peak transaortic gradient equals 10.2 mmHg, mean transaortic gradient equals 5.1 mmHg, the calculated aortic valve area equals 2.91 cm² by the continuity equation consistent with normally opening aortic valve.  Pulmonic Valve: The pulmonic valve was not well visualized.  Aorta: The aortic root is normal in size and structure.  Pulmonary Artery: The main pulmonary artery is normal in size.  Venous: The inferior vena cava is normal. The inferior vena cava was normal sized, with respiratory size variation greater than 50%. The inferior vena cava and the hepatic vein show a normal flow pattern.  Additional Comments: A pacer wire is visualized in the right atrium and right ventricle.  In comparison to the previous echocardiogram(s): Prior examinations are available and were reviewed for comparison purposes. Compared with prior study dated 11/2020, aortic valve gradients are decreased and calculated PETER is within normal range.      Summary:   1. Technically limited study due to patient's body habitus.   2. Endocardial visualization was enhanced with intravenous echo contrast.   3. Left ventricular ejection fraction, by visual estimation, is 30 to 35%.   4. Moderately to severely decreased global left ventricular systolic function.   5. Normal right ventricular size and function.   6. Bilateral atria are normal in size.   7. No significant valvular disease is seen. Pulmonic valve was not well visualized.   8. Compared with prior study dated 11/2020, aortic valve gradients are decreased and calculated PETER is within normal range.    YOSELIN Miller Electronically signed on 6/25/2021 at 11:53:15 AM            *** Final ***            < end of copied text >      STRESS TEST:    CARDIAC CATHETERIZATION:    Assessment and Plan:  In summary, MAGGIE ELLIOTT is an 61y Male with past medical history significant for                      [place] [facility], [facility]                                   [address] [place] [address]                                                      PHONE: [phone]                                                         FAX: [phone]  [email or web address]  ---------------------------------------------------------------------------------------------------------------------------------    Reason for Consult: dyspnea    HPI:  MAGGIE ELLIOTT is an 61y Male with obesity, s/p gastric sleeve, COPD ROCK long standing NICM with EF as low as 20%, improved EF on GDMT most recent EF 30-35%, chronic AF, VT s/p ICD, HFrEF with cardiomems monitoring, came to Er c/o fever, chills, dyspnea and weight gain.  Pt had recent GIB and has been evaluated for watchman device recently by Dr Ramos.  Pt came to ER c/o fever, chills, and dyspnea.  Pt has had asp PNA in past post gastric sleeve as well as extensive dental work.  Recent cardiomems #s stable at goal.    PAST MEDICAL & SURGICAL HISTORY:  COPD (chronic obstructive pulmonary disease)    ROCK (obstructive sleep apnea)    Afib    CHF (congestive heart failure)    Cardiomyopathy, ischemic  wearing life vest  2-28-19    Ejection fraction &lt; 50%  last echo 2-13-19  31%    Asthma    HFrEF (heart failure with reduced ejection fraction)    AICD (automatic cardioverter/defibrillator) present    History of loop recorder  2017  [facility]    History of incision and drainage  right groin abcess  jan 2019  [facility]    History of cardioversion    H/O cardiac catheterization  5 years ago at [facility].    S/P gastric bypass        No Known Drug Allergies  shellfish (Pruritus; Rash)      MEDICATIONS  (STANDING):  vancomycin  IVPB. 1000 milliGRAM(s) IV Intermittent once    MEDICATIONS  (PRN):      Social History:  Cigarettes:           neg         Alchohol:       neg          Illicit Drug Abuse:  neg  neg FH CAD    ROS: Negative other than as mentioned in HPI.    Vital Signs Last 24 Hrs  T(C): 38 (16 Aug 2021 10:36), Max: 38 (16 Aug 2021 10:36)  T(F): 100.4 (16 Aug 2021 10:36), Max: 100.4 (16 Aug 2021 10:36)  HR: 85 (16 Aug 2021 10:36) (85 - 100)  BP: 99/59 (16 Aug 2021 10:36) (90/55 - 162/60)  BP(mean): --  RR: 18 (16 Aug 2021 10:36) (18 - 20)  SpO2: 94% (16 Aug 2021 10:36) (90% - 95%)  ICU Vital Signs Last 24 Hrs  MAGGIE ELLIOTT  I&O's Detail    I&O's Summary    Drug Dosing Weight  MAGGIE MENDOZAALEX      PHYSICAL EXAM:  General: Appears well developed, well nourished alert and cooperative.  HEENT: Head; normocephalic, atraumatic.  Eyes: Pupils reactive, cornea wnl.  Neck: Supple, no nodes adenopathy, no NVD or carotid bruit or thyromegaly.  CARDIOVASCULAR: Normal S1 and S2, No murmur, rub, gallop or lift.   LUNGS: No rales, rhonchi or wheeze. Normal breath sounds bilaterally.  ABDOMEN: Soft, nontender without mass or organomegaly. bowel sounds normoactive.  EXTREMITIES: No clubbing, cyanosis or edema. Distal pulses wnl.   SKIN: warm and dry with normal turgor.  NEURO: Alert/oriented x 3/normal motor exam. No pathologic reflexes.    PSYCH: normal affect.        LABS:                        11.4   25.00 )-----------( 261      ( 16 Aug 2021 10:26 )             35.9     08-16    125<L>  |  89<L>  |  25.1<H>  ----------------------------<  96  4.3   |  22.0  |  1.15    Ca    8.7      16 Aug 2021 10:26    TPro  6.5<L>  /  Alb  3.1<L>  /  TBili  0.7  /  DBili  x   /  AST  29  /  ALT  30  /  AlkPhos  58  08-16    MAGGIE ELLIOTT  CARDIAC MARKERS ( 16 Aug 2021 10:26 )  x     / 0.02 ng/mL / x     / x     / x          PT/INR - ( 16 Aug 2021 10:26 )   PT: 23.8 sec;   INR: 2.13 ratio         PTT - ( 16 Aug 2021 10:26 )  PTT:33.3 sec      RADIOLOGY & ADDITIONAL STUDIES:< from: Xray Chest 2 Views PA/Lat (08.16.21 @ 10:44) >  IMPRESSION:  Moderate right and minimal left perihilar interstitial infiltrates, no lobar consolidation, grossly unchanged    --- End of Report ---            SY TRINH DO; Attending Radiologist  This document has been electronically signed. Aug16 2021 10:49AM    < end of copied text >      INTERPRETATION OF TELEMETRY (personally reviewed):    ECG:< from: 12 Lead ECG (08.16.21 @ 09:12) >    Diagnosis Line *** Poor data quality, interpretation may be adversely affected  Atrial fibrillation  Left axis deviation  Left bundle branch block  Abnormal ECG    Confirmed by LINSEY SPENCE ([id number]) on 8/16/2021 10:23:07 AM    < end of copied text >      ECHO:< from: TTE Echo Complete w/o Contrast w/ Doppler (06.25.21 @ 11:06) >    PHYSICIAN INTERPRETATION:  Left Ventricle: Endocardial visualization was enhanced with intravenous echo contrast. The left ventricular internal cavity size is normal. Left ventricular wall thickness is normal.  Global LV systolic function was moderately to severely decreased. Left ventricular ejection fraction, by visual estimation, is 30 to 35%. Abnormal (paradoxical) septal motion, consistent with RV pacemaker.  Right Ventricle: Normal right ventricular size and function.  Left Atrium: The left atrium is normal in size.  Right Atrium: The right atrium is normal in size.  Pericardium:There is no evidence of pericardial effusion.  Mitral Valve: Structurally normal mitral valve, with normal leaflet excursion. Trace mitral valve regurgitation is seen.  Tricuspid Valve: Structurally normal tricuspid valve, with normal leaflet excursion. Trivial tricuspid regurgitation is visualized. Adequate TR velocity was not obtained to accurately assess RVSP.  Aortic Valve: Peak transaortic gradient equals 10.2 mmHg, mean transaortic gradient equals 5.1 mmHg, the calculated aortic valve area equals 2.91 cm² by the continuity equation consistent with normally opening aortic valve.  Pulmonic Valve: The pulmonic valve was not well visualized.  Aorta: The aortic root is normal in size and structure.  Pulmonary Artery: The main pulmonary artery is normal in size.  Venous: The inferior vena cava is normal. The inferior vena cava was normal sized, with respiratory size variation greater than 50%. The inferior vena cava and the hepatic vein show a normal flow pattern.  Additional Comments: A pacer wire is visualized in the right atrium and right ventricle.  In comparison to the previous echocardiogram(s): Prior examinations are available and were reviewed for comparison purposes. Compared with prior study dated 11/2020, aortic valve gradients are decreased and calculated PETER is within normal range.      Summary:   1. Technically limited study due to patient's body habitus.   2. Endocardial visualization was enhanced with intravenous echo contrast.   3. Left ventricular ejection fraction, by visual estimation, is 30 to 35%.   4. Moderately to severely decreased global left ventricular systolic function.   5. Normal right ventricular size and function.   6. Bilateral atria are normal in size.   7. No significant valvular disease is seen. Pulmonic valve was not well visualized.   8. Compared with prior study dated 11/2020, aortic valve gradients are decreased and calculated PETER is within normal range.    SHELLEY Miller. Electronically signed on 6/25/2021 at 11:53:15 AM            *** Final ***            < end of copied text >         seroquel seroquel seroquel seroquel seroquel seroquel seroquel seroquel seroquel seroquel seroquel

## 2021-08-16 NOTE — CONSULT NOTE ADULT - SUBJECTIVE AND OBJECTIVE BOX
PULMONARY CONSULT NOTE      HANH ELLIOTTRN-[id number]    Patient is a 61y old  Male who presents with a chief complaint of SOB    INTERVAL HPI/OVERNIGHT EVENTS:· HPI Objective Statement: Patient is a 60 yo male PMHx HFrEF s/p AICD, a fib on Xarelto s/p pacemaker, COPD, ROCK on BiPAP, gastric bypass surgery presenting with SOB and fevers/chills. Patient reports SOB, palpitations, dizziness, lightheadedness, and presyncope last night but no LOC and he did not hit his head. Used a duo neb treatment last night with mild relief of his sx. He reports gaining 8 pounds since last night. Patient had leg swelling last Thursday for which he received Lasix on Friday but currently denies swelling. He has had fevers and chills for approximately 2 months since a June admission for PNA after a dental extraction. Also reporting dysuria and lower back pain currently but no hematuria. Patient is followed by [facility]. Denies syncope, chest pain, sick contacts, recent travel, abdominal pain, diarrhea, melena, hematochezia, n/v, hematemesis.    Pt known to me with asthma on Trelegy, severe ROCK on CPAP 10.  Has been on increased steroids for SOB.  Was to come in for PFT's this week.      MEDICATIONS  (STANDING):  vancomycin  IVPB. 1000 milliGRAM(s) IV Intermittent once      MEDICATIONS  (PRN):      Allergies    No Known Drug Allergies  shellfish (Pruritus; Rash)    Intolerances        PAST MEDICAL & SURGICAL HISTORY:  COPD (chronic obstructive pulmonary disease)    ROCK (obstructive sleep apnea)    Afib    CHF (congestive heart failure)    Cardiomyopathy, ischemic  wearing life vest  2-28-19    Ejection fraction &lt; 50%  last echo 2-13-19  31%    Asthma    HFrEF (heart failure with reduced ejection fraction)    AICD (automatic cardioverter/defibrillator) present    History of loop recorder  2017  [facility]    History of incision and drainage  right groin abcess  jan 2019  [facility]    History of cardioversion    H/O cardiac catheterization  5 years ago at [facility].    S/P gastric bypass        FAMILY HISTORY:  Family history of CHF (congestive heart failure)        SOCIAL HISTORY  Smoking History:     REVIEW OF SYSTEMS:    CONSTITUTIONAL:  As per HPI.    HEENT:  Eyes:  No diplopia or blurred vision. ENT:  No earache, sore throat or runny nose.    CARDIOVASCULAR:  No pressure, squeezing, tightness, heaviness or aching about the chest; no palpitations.    RESPIRATORY:  Per HPI    GASTROINTESTINAL:  No nausea, vomiting or diarrhea.    GENITOURINARY:  No dysuria, frequency or urgency.    MUSCULOSKELETAL:  No joint pains    SKIN:  No new lesions.    NEUROLOGIC:  No paresthesias, fasciculations, seizures or weakness.    PSYCHIATRIC:  No disorder of thought or mood.    ENDOCRINE:  No heat or cold intolerance, polyuria or polydipsia.    HEMATOLOGICAL:  No easy bruising or bleeding.     Vital Signs Last 24 Hrs  T(C): 38 (16 Aug 2021 10:36), Max: 38 (16 Aug 2021 10:36)  T(F): 100.4 (16 Aug 2021 10:36), Max: 100.4 (16 Aug 2021 10:36)  HR: 85 (16 Aug 2021 10:36) (85 - 100)  BP: 99/59 (16 Aug 2021 10:36) (90/55 - 162/60)  BP(mean): --  RR: 18 (16 Aug 2021 10:36) (18 - 20)  SpO2: 94% (16 Aug 2021 10:36) (90% - 95%)    PHYSICAL EXAMINATION:    GENERAL: The patient is a well-developed, Obese WM_in no apparent distress.     HEENT: Head is normocephalic and atraumatic. Extraocular muscles are intact. Mucous membranes are moist.     NECK: Supple.     LUNGS: diminished bs clear    HEART: Regular rate and rhythm without murmur.    ABDOMEN: Soft, nontender, and nondistended.  No hepatosplenomegaly is noted.    EXTREMITIES: Without any cyanosis, clubbing, rash, lesions or edema.    NEUROLOGIC: Grossly intact.    SKIN: No ulceration or induration present.      LABS:                        11.4   25.00 )-----------( 261      ( 16 Aug 2021 10:26 )             35.9     08-16    125<L>  |  89<L>  |  25.1<H>  ----------------------------<  96  4.3   |  22.0  |  1.15    Ca    8.7      16 Aug 2021 10:26    TPro  6.5<L>  /  Alb  3.1<L>  /  TBili  0.7  /  DBili  x   /  AST  29  /  ALT  30  /  AlkPhos  58  08-16    PT/INR - ( 16 Aug 2021 10:26 )   PT: 23.8 sec;   INR: 2.13 ratio         PTT - ( 16 Aug 2021 10:26 )  PTT:33.3 sec      CARDIAC MARKERS ( 16 Aug 2021 10:26 )  x     / 0.02 ng/mL / x     / x     / x            Serum Pro-Brain Natriuretic Peptide: 3646 pg/mL (08-16-21 @ 10:26)          MICROBIOLOGY:    RADIOLOGY & ADDITIONAL STUDIES:< from: CT Chest No Cont (08.12.21 @ 14:34) >  IMPRESSION:  Near-complete resolution prior bilateral patchy lung opacities with new patchy opacity superior right lower lobe and posterior right upper lobe. Continued follow-up CT recommended.    < end of copied text >  < from: Xray Chest 2 Views PA/Lat (08.16.21 @ 10:44) >  IMPRESSION:  Moderate right and minimal left perihilar interstitial infiltrates, no lobar consolidation, grossly unchanged      < end of copied text >

## 2021-08-16 NOTE — H&P ADULT - NSHPLABSRESULTS_GEN_ALL_CORE
CBC Full  -  ( 16 Aug 2021 10:26 )  WBC Count : 25.00 K/uL  RBC Count : 4.06 M/uL  Hemoglobin : 11.4 g/dL  Hematocrit : 35.9 %  Platelet Count - Automated : 261 K/uL  Mean Cell Volume : 88.4 fl  Mean Cell Hemoglobin : 28.1 pg  Mean Cell Hemoglobin Concentration : 31.8 gm/dL  Auto Neutrophil # : 21.73 K/uL  Auto Lymphocyte # : 1.75 K/uL  Auto Monocyte # : 0.88 K/uL  Auto Eosinophil # : 0.00 K/uL  Auto Basophil # : 0.00 K/uL  Auto Neutrophil % : 85.2 %  Auto Lymphocyte % : 7.0 %  Auto Monocyte % : 3.5 %  Auto Eosinophil % : 0.0 %  Auto Basophil % : 0.0 %      08-16    125<L>  |  89<L>  |  25.1<H>  ----------------------------<  96  4.3   |  22.0  |  1.15    Ca    8.7      16 Aug 2021 10:26    TPro  6.5<L>  /  Alb  3.1<L>  /  TBili  0.7  /  DBili  x   /  AST  29  /  ALT  30  /  AlkPhos  58  08-16

## 2021-08-16 NOTE — ED PROVIDER NOTE - PHYSICAL EXAMINATION
Gen: no acute distress  Head: normocephalic, atraumatic  Lung: no respiratory distress; satting 95% on room air, rales, rhonchi. bilateral wheezing in the upper lobes appreciated  CV: normal s1/s2, no chest wall tenderness  Abd: soft, non-tender, non-distended  MSK: No edema, no visible deformities, full range of motion in all 4 extremities. No peripheral edema. 2+ pulses  : No CVA tenderness  Neuro: No focal neurologic deficits  Skin: No rash   Psych: normal affect

## 2021-08-17 ENCOUNTER — APPOINTMENT (OUTPATIENT)
Dept: PULMONOLOGY | Facility: CLINIC | Age: 61
End: 2021-08-17

## 2021-08-17 DIAGNOSIS — R79.89 OTHER SPECIFIED ABNORMAL FINDINGS OF BLOOD CHEMISTRY: ICD-10-CM

## 2021-08-17 DIAGNOSIS — D72.829 ELEVATED WHITE BLOOD CELL COUNT, UNSPECIFIED: ICD-10-CM

## 2021-08-17 LAB
ANION GAP SERPL CALC-SCNC: 12 MMOL/L — SIGNIFICANT CHANGE UP (ref 5–17)
BUN SERPL-MCNC: 18.6 MG/DL — SIGNIFICANT CHANGE UP (ref 8–20)
CALCIUM SERPL-MCNC: 8.4 MG/DL — LOW (ref 8.6–10.2)
CHLORIDE SERPL-SCNC: 93 MMOL/L — LOW (ref 98–107)
CO2 SERPL-SCNC: 22 MMOL/L — SIGNIFICANT CHANGE UP (ref 22–29)
COVID-19 SPIKE DOMAIN AB INTERP: POSITIVE
COVID-19 SPIKE DOMAIN ANTIBODY RESULT: 63.4 U/ML — HIGH
CREAT SERPL-MCNC: 1.09 MG/DL — SIGNIFICANT CHANGE UP (ref 0.5–1.3)
GLUCOSE SERPL-MCNC: 135 MG/DL — HIGH (ref 70–99)
HCT VFR BLD CALC: 34.3 % — LOW (ref 39–50)
HGB BLD-MCNC: 10.9 G/DL — LOW (ref 13–17)
MCHC RBC-ENTMCNC: 27.7 PG — SIGNIFICANT CHANGE UP (ref 27–34)
MCHC RBC-ENTMCNC: 31.8 GM/DL — LOW (ref 32–36)
MCV RBC AUTO: 87.1 FL — SIGNIFICANT CHANGE UP (ref 80–100)
PLATELET # BLD AUTO: 275 K/UL — SIGNIFICANT CHANGE UP (ref 150–400)
POTASSIUM SERPL-MCNC: 4.7 MMOL/L — SIGNIFICANT CHANGE UP (ref 3.5–5.3)
POTASSIUM SERPL-SCNC: 4.7 MMOL/L — SIGNIFICANT CHANGE UP (ref 3.5–5.3)
RBC # BLD: 3.94 M/UL — LOW (ref 4.2–5.8)
RBC # FLD: 15.8 % — HIGH (ref 10.3–14.5)
SARS-COV-2 IGG+IGM SERPL QL IA: 63.4 U/ML — HIGH
SARS-COV-2 IGG+IGM SERPL QL IA: POSITIVE
SODIUM SERPL-SCNC: 127 MMOL/L — LOW (ref 135–145)
WBC # BLD: 14 K/UL — HIGH (ref 3.8–10.5)
WBC # FLD AUTO: 14 K/UL — HIGH (ref 3.8–10.5)

## 2021-08-17 PROCEDURE — 99233 SBSQ HOSP IP/OBS HIGH 50: CPT

## 2021-08-17 PROCEDURE — 99232 SBSQ HOSP IP/OBS MODERATE 35: CPT

## 2021-08-17 PROCEDURE — 99222 1ST HOSP IP/OBS MODERATE 55: CPT

## 2021-08-17 RX ORDER — LEVOTHYROXINE SODIUM 125 MCG
50 TABLET ORAL DAILY
Refills: 0 | Status: DISCONTINUED | OUTPATIENT
Start: 2021-08-17 | End: 2021-08-17

## 2021-08-17 RX ORDER — FLUTICASONE PROPIONATE 50 MCG
1 SPRAY, SUSPENSION NASAL
Refills: 0 | Status: DISCONTINUED | OUTPATIENT
Start: 2021-08-17 | End: 2021-08-19

## 2021-08-17 RX ORDER — VANCOMYCIN HCL 1 G
1000 VIAL (EA) INTRAVENOUS EVERY 12 HOURS
Refills: 0 | Status: DISCONTINUED | OUTPATIENT
Start: 2021-08-17 | End: 2021-08-19

## 2021-08-17 RX ORDER — LACTOBACILLUS ACIDOPHILUS 100MM CELL
1 CAPSULE ORAL
Refills: 0 | Status: DISCONTINUED | OUTPATIENT
Start: 2021-08-17 | End: 2021-08-19

## 2021-08-17 RX ADMIN — Medication 166.67 MILLIGRAM(S): at 05:19

## 2021-08-17 RX ADMIN — ALBUTEROL 2 PUFF(S): 90 AEROSOL, METERED ORAL at 13:57

## 2021-08-17 RX ADMIN — ALBUTEROL 2 PUFF(S): 90 AEROSOL, METERED ORAL at 08:33

## 2021-08-17 RX ADMIN — PIPERACILLIN AND TAZOBACTAM 25 GRAM(S): 4; .5 INJECTION, POWDER, LYOPHILIZED, FOR SOLUTION INTRAVENOUS at 21:21

## 2021-08-17 RX ADMIN — Medication 100 MILLIGRAM(S): at 11:13

## 2021-08-17 RX ADMIN — RIVAROXABAN 20 MILLIGRAM(S): KIT at 21:22

## 2021-08-17 RX ADMIN — GABAPENTIN 600 MILLIGRAM(S): 400 CAPSULE ORAL at 13:20

## 2021-08-17 RX ADMIN — PANTOPRAZOLE SODIUM 40 MILLIGRAM(S): 20 TABLET, DELAYED RELEASE ORAL at 06:44

## 2021-08-17 RX ADMIN — Medication 250 MILLIGRAM(S): at 17:40

## 2021-08-17 RX ADMIN — ALBUTEROL 2 PUFF(S): 90 AEROSOL, METERED ORAL at 03:19

## 2021-08-17 RX ADMIN — AMIODARONE HYDROCHLORIDE 200 MILLIGRAM(S): 400 TABLET ORAL at 06:42

## 2021-08-17 RX ADMIN — Medication 1 SPRAY(S): at 17:38

## 2021-08-17 RX ADMIN — GABAPENTIN 600 MILLIGRAM(S): 400 CAPSULE ORAL at 21:21

## 2021-08-17 RX ADMIN — BUMETANIDE 1 MILLIGRAM(S): 0.25 INJECTION INTRAMUSCULAR; INTRAVENOUS at 13:20

## 2021-08-17 RX ADMIN — Medication 20 MILLIGRAM(S): at 06:44

## 2021-08-17 RX ADMIN — PIPERACILLIN AND TAZOBACTAM 25 GRAM(S): 4; .5 INJECTION, POWDER, LYOPHILIZED, FOR SOLUTION INTRAVENOUS at 07:33

## 2021-08-17 RX ADMIN — Medication 1 TABLET(S): at 13:20

## 2021-08-17 RX ADMIN — ALBUTEROL 2 PUFF(S): 90 AEROSOL, METERED ORAL at 20:09

## 2021-08-17 RX ADMIN — GABAPENTIN 600 MILLIGRAM(S): 400 CAPSULE ORAL at 06:42

## 2021-08-17 RX ADMIN — PIPERACILLIN AND TAZOBACTAM 25 GRAM(S): 4; .5 INJECTION, POWDER, LYOPHILIZED, FOR SOLUTION INTRAVENOUS at 13:21

## 2021-08-17 NOTE — PROGRESS NOTE ADULT - SUBJECTIVE AND OBJECTIVE BOX
Patient is a 61y old  Male who presents with a chief complaint of SOB and fever (17 Aug 2021 09:02)    Patient seen and examined at bedside.    ALLERGIES:  No Known Drug Allergies  shellfish (Pruritus; Rash)    MEDICATIONS  (STANDING):  ALBUTerol    90 MICROgram(s) HFA Inhaler 2 Puff(s) Inhalation every 6 hours  aMIOdarone    Tablet 200 milliGRAM(s) Oral daily  buMETAnide 1 milliGRAM(s) Oral two times a day  gabapentin 600 milliGRAM(s) Oral three times a day  lactobacillus acidophilus 1 Tablet(s) Oral two times a day  metoprolol succinate ER 25 milliGRAM(s) Oral daily  pantoprazole    Tablet 40 milliGRAM(s) Oral before breakfast  piperacillin/tazobactam IVPB.. 3.375 Gram(s) IV Intermittent every 8 hours  predniSONE   Tablet 20 milliGRAM(s) Oral daily  rivaroxaban 20 milliGRAM(s) Oral with dinner  vancomycin  IVPB      vancomycin  IVPB 1250 milliGRAM(s) IV Intermittent every 12 hours    MEDICATIONS  (PRN):  acetaminophen   Tablet .. 650 milliGRAM(s) Oral every 6 hours PRN Temp greater or equal to 38C (100.4F), Moderate Pain (4 - 6)  guaiFENesin Oral Liquid (Sugar-Free) 100 milliGRAM(s) Oral every 6 hours PRN Cough    Vital Signs Last 24 Hrs  T(F): 98.8 (17 Aug 2021 11:34), Max: 98.8 (16 Aug 2021 15:34)  HR: 90 (17 Aug 2021 11:34) (80 - 90)  BP: 98/58 (17 Aug 2021 11:34) (86/45 - 945/58)  RR: 20 (17 Aug 2021 11:34) (20 - 20)  SpO2: 95% (17 Aug 2021 11:34) (92% - 98%)  I&O's Summary    PHYSICAL EXAM:  General: NAD, aaox3;obese  ENT: MMM, no thrush  Neck: Supple, No JVD  Lungs: +rhonchi and decreased breath sounds b/l bases  Cardio: +s1/s2  Abdomen: Soft, Nontender, Nondistended; Bowel sounds present  Extremities: No calf tenderness    LABS:                        11.4   25.00 )-----------( 261      ( 16 Aug 2021 10:26 )             35.9         125  |  89  |  25.1  ----------------------------<  96  4.3   |  22.0  |  1.15    Ca    8.7      16 Aug 2021 10:26    TPro  6.5  /  Alb  3.1  /  TBili  0.7  /  DBili  x   /  AST  29  /  ALT  30  /  AlkPhos  58      eGFR if Non African American: 68 mL/min/1.73M2 (21 @ 10:26)  eGFR if : 79 mL/min/1.73M2 (21 @ 10:26)    PT/INR - ( 16 Aug 2021 10:26 )   PT: 23.8 sec;   INR: 2.13 ratio    PTT - ( 16 Aug 2021 10:26 )  PTT:33.3 sec    CARDIAC MARKERS ( 16 Aug 2021 10:26 )  x     / 0.02 ng/mL / x     / x     / x        TSH 7.07   TSH with FT4 reflex --  Total T3 --    11:08 - VBG - pH:       | pCO2:       | pO2:       | Lactate: 2.50     Urinalysis Basic - ( 16 Aug 2021 12:45 )  Color: Yellow / Appearance: Clear / S.010 / pH: x  Gluc: x / Ketone: Negative  / Bili: Negative / Urobili: Negative mg/dL   Blood: x / Protein: Negative mg/dL / Nitrite: Negative   Leuk Esterase: Negative / RBC: x / WBC x   Sq Epi: x / Non Sq Epi: x / Bacteria: x    COVID-19 PCR: NotDetec (21 @ 15:07)    RADIOLOGY & ADDITIONAL TESTS:  < from: Xray Chest 2 Views PA/Lat (21 @ 10:44) >  IMPRESSION:  Moderate right and minimal left perihilar interstitial infiltrates, no lobar consolidation, grossly unchanged  < end of copied text >    Care Discussed with Consultants/Other Providers:   Cardio  ID

## 2021-08-17 NOTE — PROGRESS NOTE ADULT - ASSESSMENT
Imp--CM, Asthma, noct CPAP  Poss CHF--s/p diuresis--    Hyponatremia after diuresis.  Plan--will discuss d/c diuretics with Cardio.  Await echo results  f/u WBC  Continue CPAP, nebs, stress dose pred.

## 2021-08-17 NOTE — PROGRESS NOTE ADULT - ASSESSMENT
Assessment  Fever/cough with CT evidence of PNA ? aspiration  NICM/HFrEF rpesently compensated on GDMT  cardiomems monitoring with recent PAD at goal  Chronic AF with CVR on NOAC  siungle chamber ICD  h/o VT suppressed on amio  obesity s/p gastric sleeve  recent extensive dental work      Rec  Cont IV AB for PNA  cont HFrEF meds  if BC positive or if persisitent elevated WBC despite treatment discussed SARIKA with pulmonary to exclude less likely SBE Assessment  Fever/cough with CT evidence of PNA ? aspiration  NICM/HFrEF presently compensated on GDMT  cardiomems monitoring with recent PAD at goal  Chronic AF with CVR on NOAC  single chamber ICD  h/o VT suppressed on amio  obesity s/p gastric sleeve  recent extensive dental work  AB assoc diarrhea      Rec  dc lisinopril with low BP  Cont IV AB for PNA  cont HFrEF meds incl diuretics, beta blocker  cont NOAC  if BC positive or if persistent elevated WBC despite treatment discussed SARIKA with pulmonary to exclude less likely SBE  probiotic Assessment  Fever/cough with CT evidence of PNA ? aspiration  NICM/HFrEF presently compensated on GDMT  cardiomems monitoring with recent PAD at goal  Chronic AF with CVR on NOAC  single chamber ICD  h/o VT suppressed on amio  obesity s/p gastric sleeve  recent extensive dental work  AB assoc diarrhea      Rec  dc lisinopril with low BP  Cont IV AB for PNA  cont HFrEF meds incl diuretics, beta blocker, amio  will review echo  cont NOAC  if BC positive / abnormal echo or if persistent elevated WBC despite treatment discussed SARIKA with pulmonary to exclude less likely SBE  probiotic

## 2021-08-17 NOTE — PROGRESS NOTE ADULT - SUBJECTIVE AND OBJECTIVE BOX
[facility] - [facility], THE [facility]ART CENTER                                   [address] [place] [address]                                                      PHONE: [phone]                                                         FAX: [phone]  [email or web address]  ---------------------------------------------------------------------------------------------------------------------------------    Overnight events/patient complaints: afebrile since admission, diuresing, tele AF with CVR      No Known Drug Allergies  shellfish (Pruritus; Rash)    MEDICATIONS  (STANDING):  ALBUTerol    90 MICROgram(s) HFA Inhaler 2 Puff(s) Inhalation every 6 hours  aMIOdarone    Tablet 200 milliGRAM(s) Oral daily  buMETAnide 1 milliGRAM(s) Oral two times a day  gabapentin 600 milliGRAM(s) Oral three times a day  lisinopril Oral Tab/Cap - Peds 2.5 milliGRAM(s) Oral daily  metoprolol succinate ER 25 milliGRAM(s) Oral daily  pantoprazole    Tablet 40 milliGRAM(s) Oral before breakfast  piperacillin/tazobactam IVPB.. 3.375 Gram(s) IV Intermittent every 8 hours  predniSONE   Tablet 20 milliGRAM(s) Oral daily  rivaroxaban 20 milliGRAM(s) Oral with dinner  vancomycin  IVPB      vancomycin  IVPB 1250 milliGRAM(s) IV Intermittent every 12 hours    MEDICATIONS  (PRN):  acetaminophen   Tablet .. 650 milliGRAM(s) Oral every 6 hours PRN Temp greater or equal to 38C (100.4F), Moderate Pain (4 - 6)      Vital Signs Last 24 Hrs  T(C): 37.1 (17 Aug 2021 07:24), Max: 38 (16 Aug 2021 10:36)  T(F): 98.7 (17 Aug 2021 07:24), Max: 100.4 (16 Aug 2021 10:36)  HR: 84 (17 Aug 2021 07:24) (80 - 100)  BP: 86/45 (17 Aug 2021 07:24) (86/45 - 945/58)  BP(mean): --  RR: 20 (17 Aug 2021 07:24) (18 - 20)  SpO2: 93% (17 Aug 2021 07:24) (90% - 98%)  Daily Height in cm: 170.18 (16 Aug 2021 09:15)    Daily   ICU Vital Signs Last 24 Hrs  MAGGIE ELLIOTT  I&O's Detail    I&O's Summary    Drug Dosing Weight  MAGGIERONY MENDOZAALEX      PHYSICAL EXAM:  General: Appears well developed, well nourished alert and cooperative.  HEENT: Head; normocephalic, atraumatic.  Eyes: Pupils reactive, cornea wnl.  Neck: Supple, no nodes adenopathy, no NVD or carotid bruit or thyromegaly.  CARDIOVASCULAR: Normal S1 and S2, No murmur, rub, gallop or lift.   LUNGS: No rales, rhonchi or wheeze. Normal breath sounds bilaterally.  ABDOMEN: Soft, nontender without mass or organomegaly. bowel sounds normoactive.  EXTREMITIES: No clubbing, cyanosis or edema. Distal pulses wnl.   SKIN: warm and dry with normal turgor.  NEURO: Alert/oriented x 3/normal motor exam. No pathologic reflexes.    PSYCH: normal affect.        LABS:                        11.4   25.00 )-----------( 261      ( 16 Aug 2021 10:26 )             35.9     08-16    125<L>  |  89<L>  |  25.1<H>  ----------------------------<  96  4.3   |  22.0  |  1.15    Ca    8.7      16 Aug 2021 10:26    TPro  6.5<L>  /  Alb  3.1<L>  /  TBili  0.7  /  DBili  x   /  AST  29  /  ALT  30  /  AlkPhos  58  08-16    MAGGIE ELLIOTT  CARDIAC MARKERS ( 16 Aug 2021 10:26 )  x     / 0.02 ng/mL / x     / x     / x          PT/INR - ( 16 Aug 2021 10:26 )   PT: 23.8 sec;   INR: 2.13 ratio         PTT - ( 16 Aug 2021 10:26 )  PTT:33.3 sec  Urinalysis Basic - ( 16 Aug 2021 12:45 )    Color: Yellow / Appearance: Clear / S.010 / pH: x  Gluc: x / Ketone: Negative  / Bili: Negative / Urobili: Negative mg/dL   Blood: x / Protein: Negative mg/dL / Nitrite: Negative   Leuk Esterase: Negative / RBC: x / WBC x   Sq Epi: x / Non Sq Epi: x / Bacteria: x        RADIOLOGY & ADDITIONAL STUDIES:    INTERPRETATION OF TELEMETRY (personally reviewed):    ECG:< from: 12 Lead ECG (21 @ 09:12) >  Diagnosis Line *** Poor data quality, interpretation may be adversely affected  Atrial fibrillation  Left axis deviation  Left bundle branch block  Abnormal ECG    Confirmed by LINSEY SPENCE ([id number]) on 2021 10:23:07 AM    < end of copied text >      ECHO:< from: TTE Echo Complete w/o Contrast w/ Doppler (21 @ 11:06) >    Summary:   1. Technically limited study due to patient's body habitus.   2. Endocardial visualization was enhanced with intravenous echo contrast.   3. Left ventricular ejection fraction, by visual estimation, is 30 to 35%.   4. Moderately to severely decreased global left ventricular systolic function.   5. Normal right ventricular size and function.   6. Bilateral atria are normal in size.   7. No significant valvular disease is seen. Pulmonic valve was not well visualized.   8. Compared with prior study dated 2020, aortic valve gradients are decreased and calculated PETER is within normal range.    YOSELIN Miller Electronically signed on 2021 at 11:53:15 AM            *** Final ***              FARIBA BRITO   This document has been electronically signed. 2021 11:06AM    < end of copied text >      STRESS TEST:                     [place] [facility] - [facility], [facility]                                   [address] [place] [address]                                                      PHONE: [phone]                                                         FAX: [phone]  [email or web address]  ---------------------------------------------------------------------------------------------------------------------------------    Overnight events/patient complaints: afebrile since admission, diuresing, tele AF with CVR, BP low at times      No Known Drug Allergies  shellfish (Pruritus; Rash)    MEDICATIONS  (STANDING):  ALBUTerol    90 MICROgram(s) HFA Inhaler 2 Puff(s) Inhalation every 6 hours  aMIOdarone    Tablet 200 milliGRAM(s) Oral daily  buMETAnide 1 milliGRAM(s) Oral two times a day  gabapentin 600 milliGRAM(s) Oral three times a day  lisinopril Oral Tab/Cap - Peds 2.5 milliGRAM(s) Oral daily  metoprolol succinate ER 25 milliGRAM(s) Oral daily  pantoprazole    Tablet 40 milliGRAM(s) Oral before breakfast  piperacillin/tazobactam IVPB.. 3.375 Gram(s) IV Intermittent every 8 hours  predniSONE   Tablet 20 milliGRAM(s) Oral daily  rivaroxaban 20 milliGRAM(s) Oral with dinner  vancomycin  IVPB      vancomycin  IVPB 1250 milliGRAM(s) IV Intermittent every 12 hours    MEDICATIONS  (PRN):  acetaminophen   Tablet .. 650 milliGRAM(s) Oral every 6 hours PRN Temp greater or equal to 38C (100.4F), Moderate Pain (4 - 6)      Vital Signs Last 24 Hrs  T(C): 37.1 (17 Aug 2021 07:24), Max: 38 (16 Aug 2021 10:36)  T(F): 98.7 (17 Aug 2021 07:24), Max: 100.4 (16 Aug 2021 10:36)  HR: 84 (17 Aug 2021 07:24) (80 - 100)  BP: 86/45 (17 Aug 2021 07:24) (86/45 - 945/58)  BP(mean): --  RR: 20 (17 Aug 2021 07:24) (18 - 20)  SpO2: 93% (17 Aug 2021 07:24) (90% - 98%)  Daily Height in cm: 170.18 (16 Aug 2021 09:15)    Daily   ICU Vital Signs Last 24 Hrs  MAGGIE ELLIOTT  I&O's Detail    I&O's Summary    Drug Dosing Weight  MAGGIE ELLIOTT      PHYSICAL EXAM:  General: Appears well developed, well nourished alert and cooperative.  HEENT: Head; normocephalic, atraumatic.  Eyes: Pupils reactive, cornea wnl.  Neck: Supple, no nodes adenopathy, no NVD or carotid bruit or thyromegaly.  CARDIOVASCULAR: Normal S1 and S2, No murmur, rub, gallop or lift.   LUNGS: No rales, rhonchi or wheeze. Normal breath sounds bilaterally.  ABDOMEN: Soft, nontender without mass or organomegaly. bowel sounds normoactive.  EXTREMITIES: No clubbing, cyanosis or edema. Distal pulses wnl.   SKIN: warm and dry with normal turgor.  NEURO: Alert/oriented x 3/normal motor exam. No pathologic reflexes.    PSYCH: normal affect.        LABS:                        11.4   25.00 )-----------( 261      ( 16 Aug 2021 10:26 )             35.9     08-16    125<L>  |  89<L>  |  25.1<H>  ----------------------------<  96  4.3   |  22.0  |  1.15    Ca    8.7      16 Aug 2021 10:26    TPro  6.5<L>  /  Alb  3.1<L>  /  TBili  0.7  /  DBili  x   /  AST  29  /  ALT  30  /  AlkPhos  58  08-16    MAGGIE ELLIOTT  CARDIAC MARKERS ( 16 Aug 2021 10:26 )  x     / 0.02 ng/mL / x     / x     / x          PT/INR - ( 16 Aug 2021 10:26 )   PT: 23.8 sec;   INR: 2.13 ratio         PTT - ( 16 Aug 2021 10:26 )  PTT:33.3 sec  Urinalysis Basic - ( 16 Aug 2021 12:45 )    Color: Yellow / Appearance: Clear / S.010 / pH: x  Gluc: x / Ketone: Negative  / Bili: Negative / Urobili: Negative mg/dL   Blood: x / Protein: Negative mg/dL / Nitrite: Negative   Leuk Esterase: Negative / RBC: x / WBC x   Sq Epi: x / Non Sq Epi: x / Bacteria: x        RADIOLOGY & ADDITIONAL STUDIES:    INTERPRETATION OF TELEMETRY (personally reviewed):    ECG:< from: 12 Lead ECG (21 @ 09:12) >  Diagnosis Line *** Poor data quality, interpretation may be adversely affected  Atrial fibrillation  Left axis deviation  Left bundle branch block  Abnormal ECG    Confirmed by LINSEY SPENCE ([id number]) on 2021 10:23:07 AM    < end of copied text >      ECHO:< from: TTE Echo Complete w/o Contrast w/ Doppler (21 @ 11:06) >    Summary:   1. Technically limited study due to patient's body habitus.   2. Endocardial visualization was enhanced with intravenous echo contrast.   3. Left ventricular ejection fraction, by visual estimation, is 30 to 35%.   4. Moderately to severely decreased global left ventricular systolic function.   5. Normal right ventricular size and function.   6. Bilateral atria are normal in size.   7. No significant valvular disease is seen. Pulmonic valve was not well visualized.   8. Compared with prior study dated 2020, aortic valve gradients are decreased and calculated PETER is within normal range.    YOSELIN Miller Electronically signed on 2021 at 11:53:15 AM            *** Final ***              FARIBA BRITO   This document has been electronically signed. 2021 11:06AM    < end of copied text >      STRESS TEST:

## 2021-08-17 NOTE — CONSULT NOTE ADULT - ASSESSMENT
Imp--Pt with asthma/ROCK.  Increased SOB, fever chills.  Has had hypoxemia this AM, some new patchy infiltrates.  Endocarditis remains a consideration, considering recent dental work.    Plan--culture, abx  Stress steroids  O2, nebs.  Noct CPAP 10
 This 62 yo male with PMHs of  HFrEF s/p AICD, afib on Xarelto s/p pacemaker, COPD, ROCK on BiPAP, gastric bypass surgery presenting with SOB and fevers/chills. Per patient, he has worsening SOB for the past couple of day. The fever and chill for the past month or more. Low grade fever which comes and goes. Pt had alot of dental work done recently and since not feeling well.   Reports of intermittent fevers even going back to a few months ago. PMD had blood cultures done, and were negative.   For the past couple of days he also noted increase in his weight despite on being Furosemide.   he checked the scale at home and noted a 4 lb weight gain.   He also reports dizziness and palpitation. He denies headache, chest pain, nausea, vomiting, abdominal pain, change in urinary or bowel habits.    (16 Aug 2021 15:33)    he was treated in 6/2021 with a course of IV antibiotics for bilateral PNA. at that time, he had fluid in his esophagus concerning for aspiration.     CT of the chest on 8/16/21 showed:  Near-complete resolution prior bilateral patchy lung opacities with new patchy opacity superior right lower lobe and posterior right upper lobe. Continued follow-up CT recommended.    Impression:  WBC elevation  acute febrile illness  lung infiltrates  AICD in place      Plan:  - intermittent fevers are of concern in the pt with recent dental work  - blood cx x 3 sets sent on 8/16  will repeat 2 sets on 8/18    continue current antibiotics:  piperacillin/tazobactam IVPB.. 3.375 Gram(s) IV Intermittent every 8 hours  vancomycin  IVPB 1000 milliGRAM(s) IV Intermittent every 12 hours    - follow up all outstanding cultures  - trend temperature and WBC curve  - repeat cultures from blood and all sources if febrile.    WBC elevation is reactive  was 25K on arrival, now down trending  - will follow and trend      if blood cultures are positive, will need TTE at the very least.   
Assessment  Fever/Dyspnea multifactorial, element of PNA ? aspiration  HFrEF currently euvolemic by cardiomems  NICM with severe LV dysfunction  chronic Af on AC  NSVT suppressed on meds  VVI ICD  obesity s/p gastric sleeve  COPD  ROCK on CPAP        Rec:  cont bumex2 mg in am and 1 mg in pm  cont lopressor/low dose ACEI  cont aldactone 25 mg day  cont xarelto  BC X 2 sets  IV AB as per pulmonary  FU echo  if BC positive will perform SARIKA to exclude less likely SBE  will follow

## 2021-08-17 NOTE — PROGRESS NOTE ADULT - SUBJECTIVE AND OBJECTIVE BOX
PULMONARY PROGRESS NOTE      HANH ELLIOTTRN-[id number]    Patient is a 61y old  Male who presents with a chief complaint of SOB and fever (17 Aug 2021 08:13)      INTERVAL HPI/OVERNIGHT EVENTS:Remains hypoxemic. Adeel noct CPAP.  S/P diuresis.    MEDICATIONS  (STANDING):  ALBUTerol    90 MICROgram(s) HFA Inhaler 2 Puff(s) Inhalation every 6 hours  aMIOdarone    Tablet 200 milliGRAM(s) Oral daily  buMETAnide 1 milliGRAM(s) Oral two times a day  gabapentin 600 milliGRAM(s) Oral three times a day  lactobacillus acidophilus 1 Tablet(s) Oral two times a day  metoprolol succinate ER 25 milliGRAM(s) Oral daily  pantoprazole    Tablet 40 milliGRAM(s) Oral before breakfast  piperacillin/tazobactam IVPB.. 3.375 Gram(s) IV Intermittent every 8 hours  predniSONE   Tablet 20 milliGRAM(s) Oral daily  rivaroxaban 20 milliGRAM(s) Oral with dinner  vancomycin  IVPB      vancomycin  IVPB 1250 milliGRAM(s) IV Intermittent every 12 hours      MEDICATIONS  (PRN):  acetaminophen   Tablet .. 650 milliGRAM(s) Oral every 6 hours PRN Temp greater or equal to 38C (100.4F), Moderate Pain (4 - 6)  guaiFENesin Oral Liquid (Sugar-Free) 100 milliGRAM(s) Oral every 6 hours PRN Cough      Allergies    No Known Drug Allergies  shellfish (Pruritus; Rash)    Intolerances        PAST MEDICAL & SURGICAL HISTORY:  COPD (chronic obstructive pulmonary disease)    ROCK (obstructive sleep apnea)    Afib    CHF (congestive heart failure)    Cardiomyopathy, ischemic  wearing life vest  18    Ejection fraction &lt; 50%  last echo 19  31%    Asthma    HFrEF (heart failure with reduced ejection fraction)    AICD (automatic cardioverter/defibrillator) present    History of loop recorder  2017  [facility]    History of incision and drainage  right groin abcess  2019  [facility]    History of cardioversion    H/O cardiac catheterization  5 years ago at [facility].    S/P gastric bypass        SOCIAL HISTORY  Smoking History:       REVIEW OF SYSTEMS:    CONSTITUTIONAL:  No distress    HEENT:  Eyes:  No diplopia or blurred vision. ENT:  No earache, sore throat or runny nose.    CARDIOVASCULAR:  No pressure, squeezing, tightness, heaviness or aching about the chest; no palpitations.    RESPIRATORY:  per hpi    GASTROINTESTINAL:  No nausea, vomiting or diarrhea.    GENITOURINARY:  No dysuria, frequency or urgency.    MUSCULOSKELETAL:  No joint pain    SKIN:  No new lesions.    NEUROLOGIC:  No paresthesias, fasciculations, seizures or weakness.    PSYCHIATRIC:  No disorder of thought or mood.    ENDOCRINE:  No heat or cold intolerance, polyuria or polydipsia.    HEMATOLOGICAL:  No easy bruising or bleeding.     Vital Signs Last 24 Hrs  T(C): 37.1 (17 Aug 2021 07:24), Max: 38 (16 Aug 2021 10:36)  T(F): 98.7 (17 Aug 2021 07:24), Max: 100.4 (16 Aug 2021 10:36)  HR: 84 (17 Aug 2021 07:) (80 - 100)  BP: 86/45 (17 Aug 2021 07:24) (86/45 - 945/58)  BP(mean): --  RR: 20 (17 Aug 2021 07:) (18 - 20)  SpO2: 93% (17 Aug 2021 07:24) (90% - 98%)    PHYSICAL EXAMINATION:    GENERAL: The patient is awake and alert in no apparent distress.     HEENT: Head is normocephalic and atraumatic. Extraocular muscles are intact. Mucous membranes are moist.    NECK: Supple.    LUNGS: largely clear.  Diminshed bs bilat    HEART: Regular rate and rhythm without murmur.    ABDOMEN: Soft, nontender, and nondistended.      EXTREMITIES: Without any cyanosis, clubbing, rash, lesions or edema.    NEUROLOGIC: Grossly intact.    SKIN: No ulceration or induration present.      LABS:                        11.4   25.00 )-----------( 261      ( 16 Aug 2021 10:26 )             35.9     08-16    125<L>  |  89<L>  |  25.1<H>  ----------------------------<  96  4.3   |  22.0  |  1.15    Ca    8.7      16 Aug 2021 10:26    TPro  6.5<L>  /  Alb  3.1<L>  /  TBili  0.7  /  DBili  x   /  AST  29  /  ALT  30  /  AlkPhos  58  08-16    PT/INR - ( 16 Aug 2021 10:26 )   PT: 23.8 sec;   INR: 2.13 ratio         PTT - ( 16 Aug 2021 10:26 )  PTT:33.3 sec  Urinalysis Basic - ( 16 Aug 2021 12:45 )    Color: Yellow / Appearance: Clear / S.010 / pH: x  Gluc: x / Ketone: Negative  / Bili: Negative / Urobili: Negative mg/dL   Blood: x / Protein: Negative mg/dL / Nitrite: Negative   Leuk Esterase: Negative / RBC: x / WBC x   Sq Epi: x / Non Sq Epi: x / Bacteria: x        CARDIAC MARKERS ( 16 Aug 2021 10:26 )  x     / 0.02 ng/mL / x     / x     / x            Serum Pro-Brain Natriuretic Peptide: 3646 pg/mL (21 @ 10:26)          MICROBIOLOGY:    RADIOLOGY & ADDITIONAL STUDIES:< from: Xray Chest 2 Views PA/Lat (21 @ 10:44) >  Moderate right and minimal left perihilar interstitial infiltrates, no lobar consolidation, grossly unchanged      < end of copied text >

## 2021-08-18 LAB
-  AMIKACIN: SIGNIFICANT CHANGE UP
-  AZTREONAM: SIGNIFICANT CHANGE UP
-  CEFEPIME: SIGNIFICANT CHANGE UP
-  CEFTAZIDIME: SIGNIFICANT CHANGE UP
-  CIPROFLOXACIN: SIGNIFICANT CHANGE UP
-  GENTAMICIN: SIGNIFICANT CHANGE UP
-  IMIPENEM: SIGNIFICANT CHANGE UP
-  LEVOFLOXACIN: SIGNIFICANT CHANGE UP
-  MEROPENEM: SIGNIFICANT CHANGE UP
-  PIPERACILLIN/TAZOBACTAM: SIGNIFICANT CHANGE UP
-  TOBRAMYCIN: SIGNIFICANT CHANGE UP
ALBUMIN SERPL ELPH-MCNC: 3.1 G/DL — LOW (ref 3.3–5.2)
ALP SERPL-CCNC: 52 U/L — SIGNIFICANT CHANGE UP (ref 40–120)
ALT FLD-CCNC: 23 U/L — SIGNIFICANT CHANGE UP
ANION GAP SERPL CALC-SCNC: 14 MMOL/L — SIGNIFICANT CHANGE UP (ref 5–17)
AST SERPL-CCNC: 19 U/L — SIGNIFICANT CHANGE UP
BASOPHILS # BLD AUTO: 0.02 K/UL — SIGNIFICANT CHANGE UP (ref 0–0.2)
BASOPHILS NFR BLD AUTO: 0.2 % — SIGNIFICANT CHANGE UP (ref 0–2)
BILIRUB SERPL-MCNC: 0.7 MG/DL — SIGNIFICANT CHANGE UP (ref 0.4–2)
BUN SERPL-MCNC: 16.2 MG/DL — SIGNIFICANT CHANGE UP (ref 8–20)
CALCIUM SERPL-MCNC: 8.7 MG/DL — SIGNIFICANT CHANGE UP (ref 8.6–10.2)
CHLORIDE SERPL-SCNC: 96 MMOL/L — LOW (ref 98–107)
CO2 SERPL-SCNC: 24 MMOL/L — SIGNIFICANT CHANGE UP (ref 22–29)
CREAT SERPL-MCNC: 1.08 MG/DL — SIGNIFICANT CHANGE UP (ref 0.5–1.3)
CULTURE RESULTS: SIGNIFICANT CHANGE UP
EOSINOPHIL # BLD AUTO: 0.27 K/UL — SIGNIFICANT CHANGE UP (ref 0–0.5)
EOSINOPHIL NFR BLD AUTO: 2.1 % — SIGNIFICANT CHANGE UP (ref 0–6)
GLUCOSE SERPL-MCNC: 123 MG/DL — HIGH (ref 70–99)
HCT VFR BLD CALC: 33.1 % — LOW (ref 39–50)
HGB BLD-MCNC: 10.7 G/DL — LOW (ref 13–17)
IMM GRANULOCYTES NFR BLD AUTO: 0.8 % — SIGNIFICANT CHANGE UP (ref 0–1.5)
LACTATE SERPL-SCNC: 2.1 MMOL/L — HIGH (ref 0.5–2)
LYMPHOCYTES # BLD AUTO: 1.6 K/UL — SIGNIFICANT CHANGE UP (ref 1–3.3)
LYMPHOCYTES # BLD AUTO: 12.6 % — LOW (ref 13–44)
MCHC RBC-ENTMCNC: 27.9 PG — SIGNIFICANT CHANGE UP (ref 27–34)
MCHC RBC-ENTMCNC: 32.3 GM/DL — SIGNIFICANT CHANGE UP (ref 32–36)
MCV RBC AUTO: 86.2 FL — SIGNIFICANT CHANGE UP (ref 80–100)
METHOD TYPE: SIGNIFICANT CHANGE UP
MONOCYTES # BLD AUTO: 0.66 K/UL — SIGNIFICANT CHANGE UP (ref 0–0.9)
MONOCYTES NFR BLD AUTO: 5.2 % — SIGNIFICANT CHANGE UP (ref 2–14)
NEUTROPHILS # BLD AUTO: 10.03 K/UL — HIGH (ref 1.8–7.4)
NEUTROPHILS NFR BLD AUTO: 79.1 % — HIGH (ref 43–77)
ORGANISM # SPEC MICROSCOPIC CNT: SIGNIFICANT CHANGE UP
ORGANISM # SPEC MICROSCOPIC CNT: SIGNIFICANT CHANGE UP
PLATELET # BLD AUTO: 256 K/UL — SIGNIFICANT CHANGE UP (ref 150–400)
POTASSIUM SERPL-MCNC: 3.8 MMOL/L — SIGNIFICANT CHANGE UP (ref 3.5–5.3)
POTASSIUM SERPL-SCNC: 3.8 MMOL/L — SIGNIFICANT CHANGE UP (ref 3.5–5.3)
PROT SERPL-MCNC: 6.5 G/DL — LOW (ref 6.6–8.7)
RBC # BLD: 3.84 M/UL — LOW (ref 4.2–5.8)
RBC # FLD: 15.8 % — HIGH (ref 10.3–14.5)
SODIUM SERPL-SCNC: 133 MMOL/L — LOW (ref 135–145)
SPECIMEN SOURCE: SIGNIFICANT CHANGE UP
T3FREE SERPL-MCNC: 1.95 PG/ML — SIGNIFICANT CHANGE UP (ref 1.8–4.6)
T4 FREE SERPL-MCNC: 1.2 NG/DL — SIGNIFICANT CHANGE UP (ref 0.9–1.8)
VANCOMYCIN TROUGH SERPL-MCNC: 10.6 UG/ML — SIGNIFICANT CHANGE UP (ref 10–20)
WBC # BLD: 12.68 K/UL — HIGH (ref 3.8–10.5)
WBC # FLD AUTO: 12.68 K/UL — HIGH (ref 3.8–10.5)

## 2021-08-18 PROCEDURE — 99233 SBSQ HOSP IP/OBS HIGH 50: CPT

## 2021-08-18 PROCEDURE — 99232 SBSQ HOSP IP/OBS MODERATE 35: CPT

## 2021-08-18 RX ADMIN — Medication 100 MILLIGRAM(S): at 21:41

## 2021-08-18 RX ADMIN — PIPERACILLIN AND TAZOBACTAM 25 GRAM(S): 4; .5 INJECTION, POWDER, LYOPHILIZED, FOR SOLUTION INTRAVENOUS at 14:32

## 2021-08-18 RX ADMIN — GABAPENTIN 600 MILLIGRAM(S): 400 CAPSULE ORAL at 14:31

## 2021-08-18 RX ADMIN — RIVAROXABAN 20 MILLIGRAM(S): KIT at 18:58

## 2021-08-18 RX ADMIN — Medication 1 TABLET(S): at 18:58

## 2021-08-18 RX ADMIN — Medication 25 MILLIGRAM(S): at 05:07

## 2021-08-18 RX ADMIN — Medication 1 TABLET(S): at 05:06

## 2021-08-18 RX ADMIN — GABAPENTIN 600 MILLIGRAM(S): 400 CAPSULE ORAL at 21:36

## 2021-08-18 RX ADMIN — ALBUTEROL 2 PUFF(S): 90 AEROSOL, METERED ORAL at 08:35

## 2021-08-18 RX ADMIN — Medication 100 MILLIGRAM(S): at 14:31

## 2021-08-18 RX ADMIN — BUMETANIDE 1 MILLIGRAM(S): 0.25 INJECTION INTRAMUSCULAR; INTRAVENOUS at 05:06

## 2021-08-18 RX ADMIN — PANTOPRAZOLE SODIUM 40 MILLIGRAM(S): 20 TABLET, DELAYED RELEASE ORAL at 05:06

## 2021-08-18 RX ADMIN — Medication 250 MILLIGRAM(S): at 06:49

## 2021-08-18 RX ADMIN — PIPERACILLIN AND TAZOBACTAM 25 GRAM(S): 4; .5 INJECTION, POWDER, LYOPHILIZED, FOR SOLUTION INTRAVENOUS at 21:36

## 2021-08-18 RX ADMIN — Medication 20 MILLIGRAM(S): at 05:06

## 2021-08-18 RX ADMIN — ALBUTEROL 2 PUFF(S): 90 AEROSOL, METERED ORAL at 14:20

## 2021-08-18 RX ADMIN — Medication 1 SPRAY(S): at 18:57

## 2021-08-18 RX ADMIN — BUMETANIDE 1 MILLIGRAM(S): 0.25 INJECTION INTRAMUSCULAR; INTRAVENOUS at 18:58

## 2021-08-18 RX ADMIN — AMIODARONE HYDROCHLORIDE 200 MILLIGRAM(S): 400 TABLET ORAL at 05:05

## 2021-08-18 RX ADMIN — Medication 250 MILLIGRAM(S): at 18:26

## 2021-08-18 RX ADMIN — PIPERACILLIN AND TAZOBACTAM 25 GRAM(S): 4; .5 INJECTION, POWDER, LYOPHILIZED, FOR SOLUTION INTRAVENOUS at 05:05

## 2021-08-18 RX ADMIN — ALBUTEROL 2 PUFF(S): 90 AEROSOL, METERED ORAL at 20:47

## 2021-08-18 RX ADMIN — GABAPENTIN 600 MILLIGRAM(S): 400 CAPSULE ORAL at 05:06

## 2021-08-18 RX ADMIN — Medication 1 SPRAY(S): at 05:07

## 2021-08-18 NOTE — PROGRESS NOTE ADULT - PROBLEM SELECTOR PLAN 3
- advised wife to bring cardiomems pillow in   - bumex  - not in exacerbation   - metoprolol  - cardio consult appreciated
yes
- advised wife to bring cardiomems pillow in   Not in fluid overload   c/w  Bumex and metoprolol   Monitor daily weight and I&O

## 2021-08-18 NOTE — PROGRESS NOTE ADULT - PROBLEM SELECTOR PLAN 8
- check ft3/4 if normal can recheck tsh 6 weeks if low start synthroid 50
- check ft3/4 if normal can recheck tsh 6 weeks if low start synthroid 50

## 2021-08-18 NOTE — PROGRESS NOTE ADULT - ASSESSMENT
This 60 yo male with PMHs of  HFrEF s/p AICD, afib on Xarelto s/p pacemaker, COPD, ROCK on BiPAP, gastric bypass surgery presenting with SOB and fevers/chills. Per patient, he has worsening SOB for the past couple of day. The fever and chill for the past month or more. Low grade fever which comes and goes. Pt had alot of dental work done recently and since not feeling well.   Reports of intermittent fevers even going back to a few months ago. PMD had blood cultures done, and were negative.   For the past couple of days he also noted increase in his weight despite on being Furosemide.   he checked the scale at home and noted a 4 lb weight gain.   He also reports dizziness and palpitation. He denies headache, chest pain, nausea, vomiting, abdominal pain, change in urinary or bowel habits.    (16 Aug 2021 15:33)    he was treated in 6/2021 with a course of IV antibiotics for bilateral PNA. at that time, he had fluid in his esophagus concerning for aspiration.     CT of the chest on 8/16/21 showed:  Near-complete resolution prior bilateral patchy lung opacities with new patchy opacity superior right lower lobe and posterior right upper lobe. Continued follow-up CT recommended.    Impression:  WBC elevation  acute febrile illness  lung infiltrates  AICD in place      Plan:  - intermittent fevers are of concern in the pt with recent dental work  - blood cx x 3 sets sent on 8/16  will repeat 2 sets on 8/18    continue current antibiotics:  piperacillin/tazobactam IVPB.. 3.375 Gram(s) IV Intermittent every 8 hours  vancomycin  IVPB 1000 milliGRAM(s) IV Intermittent every 12 hours    - follow up all outstanding cultures  - trend temperature and WBC curve  - repeat cultures from blood and all sources if febrile.    WBC elevation is reactive  was 25K on arrival, now down trending  down to 12K today  - will follow and trend      if blood cultures are positive, will need TTE at the very least.

## 2021-08-18 NOTE — PROGRESS NOTE ADULT - SUBJECTIVE AND OBJECTIVE BOX
[place] [facility]                                   [address] [place] [address]                                                      PHONE: [phone]                                                         FAX: [phone]  [email or web address]  ---------------------------------------------------------------------------------------------------------------------------------    Overnight events/patient complaints:      PAST MEDICAL & SURGICAL HISTORY:  COPD (chronic obstructive pulmonary disease)    ROCK (obstructive sleep apnea)    Afib    CHF (congestive heart failure)    Cardiomyopathy, ischemic  wearing life vest  19    Ejection fraction &lt; 50%  last echo 19  31%    Asthma    HFrEF (heart failure with reduced ejection fraction)    AICD (automatic cardioverter/defibrillator) present    History of loop recorder    gsh    History of incision and drainage  right groin abcess  2019  [facility]    History of cardioversion    H/O cardiac catheterization  5 years ago at [facility].    S/P gastric bypass        No Known Drug Allergies  shellfish (Pruritus; Rash)    MEDICATIONS  (STANDING):  ALBUTerol    90 MICROgram(s) HFA Inhaler 2 Puff(s) Inhalation every 6 hours  aMIOdarone    Tablet 200 milliGRAM(s) Oral daily  buMETAnide 1 milliGRAM(s) Oral two times a day  fluticasone propionate 50 MICROgram(s)/spray Nasal Spray 1 Spray(s) Both Nostrils two times a day  gabapentin 600 milliGRAM(s) Oral three times a day  lactobacillus acidophilus 1 Tablet(s) Oral two times a day  metoprolol succinate ER 25 milliGRAM(s) Oral daily  pantoprazole    Tablet 40 milliGRAM(s) Oral before breakfast  piperacillin/tazobactam IVPB.. 3.375 Gram(s) IV Intermittent every 8 hours  predniSONE   Tablet 20 milliGRAM(s) Oral daily  rivaroxaban 20 milliGRAM(s) Oral with dinner  vancomycin  IVPB 1000 milliGRAM(s) IV Intermittent every 12 hours    MEDICATIONS  (PRN):  acetaminophen   Tablet .. 650 milliGRAM(s) Oral every 6 hours PRN Temp greater or equal to 38C (100.4F), Moderate Pain (4 - 6)  guaiFENesin Oral Liquid (Sugar-Free) 100 milliGRAM(s) Oral every 6 hours PRN Cough      Vital Signs Last 24 Hrs  T(C): 36.8 (18 Aug 2021 04:48), Max: 37.1 (17 Aug 2021 11:34)  T(F): 98.2 (18 Aug 2021 04:48), Max: 98.8 (17 Aug 2021 11:34)  HR: 78 (18 Aug 2021 08:35) (74 - 90)  BP: 103/60 (18 Aug 2021 04:48) (94/56 - 103/60)  BP(mean): --  RR: 18 (18 Aug 2021 04:48) (18 - 20)  SpO2: 99% (18 Aug 2021 08:35) (92% - 99%)  ICU Vital Signs Last 24 Hrs  MAGGIE ELLIOTT  I&O's Detail    17 Aug 2021 07:01  -  18 Aug 2021 07:00  --------------------------------------------------------  IN:    Oral Fluid: 200 mL  Total IN: 200 mL    OUT:    Voided (mL): 350 mL  Total OUT: 350 mL    Total NET: -150 mL        I&O's Summary    17 Aug 2021 07:01  -  18 Aug 2021 07:00  --------------------------------------------------------  IN: 200 mL / OUT: 350 mL / NET: -150 mL      Drug Dosing Weight  MAGGIE ELLIOTT      PHYSICAL EXAM:  General: Appears well developed, well nourished alert and cooperative.  HEENT: Head; normocephalic, atraumatic.  Eyes: Pupils reactive, cornea wnl.  Neck: Supple, no nodes adenopathy, no NVD or carotid bruit or thyromegaly.  CARDIOVASCULAR: Normal S1 and S2, No murmur, rub, gallop or lift.   LUNGS: No rales, rhonchi or wheeze. Normal breath sounds bilaterally.  ABDOMEN: Soft, nontender without mass or organomegaly. bowel sounds normoactive.  EXTREMITIES: No clubbing, cyanosis or edema. Distal pulses wnl.   SKIN: warm and dry with normal turgor.  NEURO: Alert/oriented x 3/normal motor exam. No pathologic reflexes.    PSYCH: normal affect.        LABS:                        10.7   12.68 )-----------( 256      ( 18 Aug 2021 06:15 )             33.1     08    133<L>  |  96<L>  |  16.2  ----------------------------<  123<H>  3.8   |  24.0  |  1.08    Ca    8.7      18 Aug 2021 06:15    TPro  6.5<L>  /  Alb  3.1<L>  /  TBili  0.7  /  DBili  x   /  AST  19  /  ALT  23  /  AlkPhos  52  18    MAGGIERONY MENDOZAALEX  CARDIAC MARKERS ( 16 Aug 2021 10:26 )  x     / 0.02 ng/mL / x     / x     / x          PT/INR - ( 16 Aug 2021 10:26 )   PT: 23.8 sec;   INR: 2.13 ratio         PTT - ( 16 Aug 2021 10:26 )  PTT:33.3 sec  Urinalysis Basic - ( 16 Aug 2021 12:45 )    Color: Yellow / Appearance: Clear / S.010 / pH: x  Gluc: x / Ketone: Negative  / Bili: Negative / Urobili: Negative mg/dL   Blood: x / Protein: Negative mg/dL / Nitrite: Negative   Leuk Esterase: Negative / RBC: x / WBC x   Sq Epi: x / Non Sq Epi: x / Bacteria: x    ECG:< from: 12 Lead ECG (21 @ 09:12) >  Diagnosis Line *** Poor data quality, interpretation may be adversely affected  Atrial fibrillation  Left axis deviation  Left bundle branch block  Abnormal ECG    Confirmed by LINSEY SPENCE ([id number]) on 2021 10:23:07 AM    < end of copied text >      ECHO:< from: TTE Echo Complete w/o Contrast w/ Doppler (21 @ 11:06) >    Summary:   1. Technically limited study due to patient's body habitus.   2. Endocardial visualization was enhanced with intravenous echo contrast.   3. Left ventricular ejection fraction, by visual estimation, is 30 to 35%.   4. Moderately to severely decreased global left ventricular systolic function.   5. Normal right ventricular size and function.   6. Bilateral atria are normal in size.   7. No significant valvular disease is seen. Pulmonic valve was not well visualized.   8. Compared with prior study dated 2020, aortic valve gradients are decreased and calculated PETER is within normal range.    SHELLEY Miller. Electronically signed on 2021 at 11:53:15 AM           FARIBA BRITO   This document has been electronically signed. 2021 11:06AM    < end of copied text >      ASSESSMENT AND PLAN:  In summary,  MAGGIE ELLIOTT is an 61y Male with obesity, s/p gastric sleeve, COPD ROCK long standing NICM with EF as low as 20%, improved EF on GDMT most recent EF 30-35%, chronic AF, VT s/p ICD, HFrEF with cardiomems monitoring, came to Er c/o fever, chills, dyspnea and weight gain.  Pt had recent GIB and has been evaluated for watchman device recently by Dr Ramos.  Pt came to ER c/o fever, chills, and dyspnea.  Pt has had asp PNA in past post gastric sleeve as well as extensive dental work.  Recent cardiomems #s stable at goal.    Bacteremia  -daily blood cx x2   -echo- EF < 20%, no evidence of endocarditis   -ID following  -if pt blood cx persistently +, or wbc elevated then will consider SARIKA otherwise given pts comorbidities will plan for no SARIKA    Chronic HFrEF  -stable  -continue current meds/dosages    Thank you for allowing [facility] to participate in the care of this patient.  Please feel free to call with any questions or concerns.                  [place] [facility]                                   [address] [place] [address]                                                      PHONE: [phone]                                                         FAX: [phone]  [email or web address]  ---------------------------------------------------------------------------------------------------------------------------------    Overnight events/patient complaints:  no events     PAST MEDICAL & SURGICAL HISTORY:  COPD (chronic obstructive pulmonary disease)    ROCK (obstructive sleep apnea)    Afib    CHF (congestive heart failure)    Cardiomyopathy, ischemic  wearing life vest  18    Ejection fraction &lt; 50%  last echo 19  31%    Asthma    HFrEF (heart failure with reduced ejection fraction)    AICD (automatic cardioverter/defibrillator) present    History of loop recorder    gsh    History of incision and drainage  right groin abcess  2019  [facility]    History of cardioversion    H/O cardiac catheterization  5 years ago at [facility].    S/P gastric bypass        No Known Drug Allergies  shellfish (Pruritus; Rash)    MEDICATIONS  (STANDING):  ALBUTerol    90 MICROgram(s) HFA Inhaler 2 Puff(s) Inhalation every 6 hours  aMIOdarone    Tablet 200 milliGRAM(s) Oral daily  buMETAnide 1 milliGRAM(s) Oral two times a day  fluticasone propionate 50 MICROgram(s)/spray Nasal Spray 1 Spray(s) Both Nostrils two times a day  gabapentin 600 milliGRAM(s) Oral three times a day  lactobacillus acidophilus 1 Tablet(s) Oral two times a day  metoprolol succinate ER 25 milliGRAM(s) Oral daily  pantoprazole    Tablet 40 milliGRAM(s) Oral before breakfast  piperacillin/tazobactam IVPB.. 3.375 Gram(s) IV Intermittent every 8 hours  predniSONE   Tablet 20 milliGRAM(s) Oral daily  rivaroxaban 20 milliGRAM(s) Oral with dinner  vancomycin  IVPB 1000 milliGRAM(s) IV Intermittent every 12 hours    MEDICATIONS  (PRN):  acetaminophen   Tablet .. 650 milliGRAM(s) Oral every 6 hours PRN Temp greater or equal to 38C (100.4F), Moderate Pain (4 - 6)  guaiFENesin Oral Liquid (Sugar-Free) 100 milliGRAM(s) Oral every 6 hours PRN Cough      Vital Signs Last 24 Hrs  T(C): 36.8 (18 Aug 2021 04:48), Max: 37.1 (17 Aug 2021 11:34)  T(F): 98.2 (18 Aug 2021 04:48), Max: 98.8 (17 Aug 2021 11:34)  HR: 78 (18 Aug 2021 08:35) (74 - 90)  BP: 103/60 (18 Aug 2021 04:48) (94/56 - 103/60)  BP(mean): --  RR: 18 (18 Aug 2021 04:48) (18 - 20)  SpO2: 99% (18 Aug 2021 08:35) (92% - 99%)  ICU Vital Signs Last 24 Hrs  MAGGIE ELLIOTT  I&O's Detail    17 Aug 2021 07:01  -  18 Aug 2021 07:00  --------------------------------------------------------  IN:    Oral Fluid: 200 mL  Total IN: 200 mL    OUT:    Voided (mL): 350 mL  Total OUT: 350 mL    Total NET: -150 mL        I&O's Summary    17 Aug 2021 07:01  -  18 Aug 2021 07:00  --------------------------------------------------------  IN: 200 mL / OUT: 350 mL / NET: -150 mL      Drug Dosing Weight  MAGGIE ELLIOTT      PHYSICAL EXAM:  General: Appears well developed, well nourished alert and cooperative.  HEENT: Head; normocephalic, atraumatic.  Eyes: Pupils reactive, cornea wnl.  Neck: Supple, no nodes adenopathy, no NVD or carotid bruit or thyromegaly.  CARDIOVASCULAR: Normal S1 and S2, No murmur, rub, gallop or lift.   LUNGS: No rales, rhonchi or wheeze. Normal breath sounds bilaterally.  ABDOMEN: Soft, nontender without mass or organomegaly. bowel sounds normoactive.  EXTREMITIES: No clubbing, cyanosis or edema. Distal pulses wnl.   SKIN: warm and dry with normal turgor.  NEURO: Alert/oriented x 3/normal motor exam. No pathologic reflexes.    PSYCH: normal affect.        LABS:                        10.7   12.68 )-----------( 256      ( 18 Aug 2021 06:15 )             33.1     08-18    133<L>  |  96<L>  |  16.2  ----------------------------<  123<H>  3.8   |  24.0  |  1.08    Ca    8.7      18 Aug 2021 06:15    TPro  6.5<L>  /  Alb  3.1<L>  /  TBili  0.7  /  DBili  x   /  AST  19  /  ALT  23  /  AlkPhos  52  08-18    MAGGIE KESSLERELIANA  CARDIAC MARKERS ( 16 Aug 2021 10:26 )  x     / 0.02 ng/mL / x     / x     / x          PT/INR - ( 16 Aug 2021 10:26 )   PT: 23.8 sec;   INR: 2.13 ratio         PTT - ( 16 Aug 2021 10:26 )  PTT:33.3 sec  Urinalysis Basic - ( 16 Aug 2021 12:45 )    Color: Yellow / Appearance: Clear / S.010 / pH: x  Gluc: x / Ketone: Negative  / Bili: Negative / Urobili: Negative mg/dL   Blood: x / Protein: Negative mg/dL / Nitrite: Negative   Leuk Esterase: Negative / RBC: x / WBC x   Sq Epi: x / Non Sq Epi: x / Bacteria: x    ECG:< from: 12 Lead ECG (21 @ 09:12) >  Diagnosis Line *** Poor data quality, interpretation may be adversely affected  Atrial fibrillation  Left axis deviation  Left bundle branch block  Abnormal ECG    Confirmed by LINSEY SPENCE ([id number]) on 2021 10:23:07 AM    < end of copied text >      ECHO:< from: TTE Echo Complete w/o Contrast w/ Doppler (21 @ 11:06) >    Summary:   1. Technically limited study due to patient's body habitus.   2. Endocardial visualization was enhanced with intravenous echo contrast.   3. Left ventricular ejection fraction, by visual estimation, is 30 to 35%.   4. Moderately to severely decreased global left ventricular systolic function.   5. Normal right ventricular size and function.   6. Bilateral atria are normal in size.   7. No significant valvular disease is seen. Pulmonic valve was not well visualized.   8. Compared with prior study dated 2020, aortic valve gradients are decreased and calculated PETER is within normal range.    SHELLEY Miller. Electronically signed on 2021 at 11:53:15 AM           FARIBA BRITO   This document has been electronically signed. 2021 11:06AM    < end of copied text >      ASSESSMENT AND PLAN:  In summary,  MAGGIE ELLIOTT is an 61y Male with obesity, s/p gastric sleeve, COPD ROCK long standing NICM with EF as low as 20%, improved EF on GDMT most recent EF 30-35%, chronic AF, VT s/p ICD, HFrEF with cardiomems monitoring, came to Er c/o fever, chills, dyspnea and weight gain.  Pt had recent GIB and has been evaluated for watchman device recently by Dr Ramos.  Pt came to ER c/o fever, chills, and dyspnea.  Pt has had asp PNA in past post gastric sleeve as well as extensive dental work.  Recent cardiomems #s stable at goal.    Bacteremia  -daily blood cx x2   -echo- EF < 20%, no evidence of endocarditis   -ID following  -if pt blood cx persistently +, or wbc elevated then will consider SARIKA otherwise given pts comorbidities will plan for no SARIKA    Chronic HFrEF  -stable  -continue current meds/dosages    Thank you for allowing [facility] to participate in the care of this patient.  Please feel free to call with any questions or concerns.

## 2021-08-18 NOTE — PROGRESS NOTE ADULT - SUBJECTIVE AND OBJECTIVE BOX
PULMONARY PROGRESS NOTE      MAGGIE ELLIOTT  MRN-[id number]    Patient is a 61y old  Male who presents with a chief complaint of SOB and fever (18 Aug 2021 09:30)        INTERVAL HPI/OVERNIGHT EVENTS:  Pt seen and examined at the bedside. He overall feels better. No fevers, no chills. No chest pain.    MEDICATIONS  (STANDING):  ALBUTerol    90 MICROgram(s) HFA Inhaler 2 Puff(s) Inhalation every 6 hours  aMIOdarone    Tablet 200 milliGRAM(s) Oral daily  buMETAnide 1 milliGRAM(s) Oral two times a day  fluticasone propionate 50 MICROgram(s)/spray Nasal Spray 1 Spray(s) Both Nostrils two times a day  gabapentin 600 milliGRAM(s) Oral three times a day  lactobacillus acidophilus 1 Tablet(s) Oral two times a day  metoprolol succinate ER 25 milliGRAM(s) Oral daily  pantoprazole    Tablet 40 milliGRAM(s) Oral before breakfast  piperacillin/tazobactam IVPB.. 3.375 Gram(s) IV Intermittent every 8 hours  predniSONE   Tablet 20 milliGRAM(s) Oral daily  rivaroxaban 20 milliGRAM(s) Oral with dinner  vancomycin  IVPB 1000 milliGRAM(s) IV Intermittent every 12 hours    MEDICATIONS  (PRN):  acetaminophen   Tablet .. 650 milliGRAM(s) Oral every 6 hours PRN Temp greater or equal to 38C (100.4F), Moderate Pain (4 - 6)  guaiFENesin Oral Liquid (Sugar-Free) 100 milliGRAM(s) Oral every 6 hours PRN Cough    Allergies    No Known Drug Allergies  shellfish (Pruritus; Rash)    Intolerances      PAST MEDICAL & SURGICAL HISTORY:  COPD (chronic obstructive pulmonary disease)    ROCK (obstructive sleep apnea)    Afib    CHF (congestive heart failure)    Cardiomyopathy, ischemic  wearing life vest  2-28-19    Ejection fraction &lt; 50%  last echo 2-13-19  31%    Asthma    HFrEF (heart failure with reduced ejection fraction)    AICD (automatic cardioverter/defibrillator) present    History of loop recorder  2017  gsh    History of incision and drainage  right groin abcess  jan 2019  [facility]    History of cardioversion    H/O cardiac catheterization  5 years ago at [facility].    S/P gastric bypass          REVIEW OF SYSTEMS:  Negative except as noted in HPI      Vital Signs Last 24 Hrs  T(C): 36.7 (18 Aug 2021 12:20), Max: 37.1 (18 Aug 2021 02:16)  T(F): 98 (18 Aug 2021 12:20), Max: 98.7 (18 Aug 2021 02:16)  HR: 82 (18 Aug 2021 12:20) (74 - 90)  BP: 96/60 (18 Aug 2021 12:20) (94/56 - 103/60)  BP(mean): --  RR: 18 (18 Aug 2021 12:20) (18 - 20)  SpO2: 95% (18 Aug 2021 12:20) (92% - 99%)      PHYSICAL EXAMINATION:  GEN: In no apparent distress  HEENT: NC/AT  NECK: Supple  CV: +S1, S2  RESPIRATORY: CTA B/L, good inspiratory effort, non-labored breathing  ABDOMEN: Soft, non-tender  EXTREMITIES: B/L LE edema  NEURO: Grossly non-focal  PSYCH: Normal affect      LABS:                        10.7   12.68 )-----------( 256      ( 18 Aug 2021 06:15 )             33.1     08-18    133<L>  |  96<L>  |  16.2  ----------------------------<  123<H>  3.8   |  24.0  |  1.08    Ca    8.7      18 Aug 2021 06:15    TPro  6.5<L>  /  Alb  3.1<L>  /  TBili  0.7  /  DBili  x   /  AST  19  /  ALT  23  /  AlkPhos  52  08-18                Serum Pro-Brain Natriuretic Peptide: 3646 pg/mL (08-16-21 @ 10:26)    Lactate, Blood: 2.1 mmol/L (08-18-21 @ 06:15)        MICROBIOLOGY:  COVID-19 PCR . (08.16.21 @ 15:07)    COVID-19 PCR: NotDetec: You can help in the fight against COVID-19. [facility] may contact  you to see if you are interested in voluntarily participating in one of  our clinical trials.  Testing is performed using polymerase chain reaction (PCR) or  transcription mediated amplification (TMA). This COVID-19 (SARS-CoV-2)  nucleic acid amplification test was validated by [employer] and is  in use under the FDA Emergency Use Authorization (EUA) for clinical labs  CLIA-certified to perform high complexity testing. Test results should be  correlated with clinical presentation, patient history, and epidemiology.        RADIOLOGY & ADDITIONAL STUDIES:  < from: CT Chest No Cont (08.12.21 @ 14:34) >  EXAM:  CT CHEST        PROCEDURE DATE:  08/12/2021           INTERPRETATION:  EXAMINATION: CT CHEST    CLINICAL INDICATION: Aspiration pneumonia.    TECHNIQUE: Noncontrast CT of the chest was obtained.    COMPARISON: 6/22/2021.    FINDINGS:    AIRWAYS AND LUNGS: The central tracheobronchial tree is patent.  Emphysema. Bronchial mucoid impaction. Near-complete resolution prior bilateral patchy lung opacities with new patchy opacity superior right lower lobe and posterior right upper lobe. Scattered clustered nodules with a lower lobe predominance is decreased but not resolved the prior study.    MEDIASTINUM AND PLEURA: There are no enlarged mediastinal, hilar or axillary lymph nodes. The visualized portion of the thyroid gland is unremarkable. There is no pleural effusion. There is no pneumothorax.    HEART AND VESSELS: There is mild cardiomegaly.  There are atherosclerotic calcifications of the aorta and coronary arteries.  There is no pericardial effusion.  Aortic valve calcification. Left-sided defibrillator.    UPPER ABDOMEN: Images of the upper abdomen demonstrate cholecystectomy. Pneumobilia. Esophagus is distended with debris    BONES AND SOFT TISSUES: There are mild degenerative changes of the spine.  The soft tissues are unremarkable.    IMPRESSION:  Near-complete resolution prior bilateral patchy lung opacities with new patchy opacity superior right lower lobe and posterior right upper lobe. Continued follow-up CT recommended.    --- End of Report ---               CANDICE LEVINE MD; Attending Radiologist   This document has been electronically signed. Aug 16 2021 10:42AM    < end of copied text >      ECHO:  < from: TTE Echo Complete w/o Contrast w/ Doppler (08.16.21 @ 21:19) >  Summary:Endocardial visualization was enhanced with intravenous echocontrast.   1. Technically difficult study. Endocardial visualization was enhanced with intravenous echo contrast.   2. Severely enlarged left atrium.   3. Global diffuse akinesis. Left ventricular ejection fraction, by visual estimation, is <20%.   4. The right atrium is normal in size.   5. Normal right ventricular size and function.   6. Mild aortic regurgitation. Mild aortic stenosis.   7. There is no evidence of pericardial effusion.    MD Uyen, RPVI Electronically signed on 8/17/2021 at 4:45:38 PM            *** Final ***              MARTÍN MEEKS MD; Attending Cardiologist  This document has been electronically signed. Aug 16 2021  9:19PM    < end of copied text >

## 2021-08-18 NOTE — PROGRESS NOTE ADULT - SUBJECTIVE AND OBJECTIVE BOX
Patient is a 61y old  Male who presents with a chief complaint of SOB and fever (18 Aug 2021 13:58)      INTERVAL HPI/OVERNIGHT EVENTS: seen and examined. Feeling better. Off supplemental O2. WBC trending down, BC no growth to date     MEDICATIONS  (STANDING):  ALBUTerol    90 MICROgram(s) HFA Inhaler 2 Puff(s) Inhalation every 6 hours  aMIOdarone    Tablet 200 milliGRAM(s) Oral daily  buMETAnide 1 milliGRAM(s) Oral two times a day  fluticasone propionate 50 MICROgram(s)/spray Nasal Spray 1 Spray(s) Both Nostrils two times a day  gabapentin 600 milliGRAM(s) Oral three times a day  lactobacillus acidophilus 1 Tablet(s) Oral two times a day  metoprolol succinate ER 25 milliGRAM(s) Oral daily  pantoprazole    Tablet 40 milliGRAM(s) Oral before breakfast  piperacillin/tazobactam IVPB.. 3.375 Gram(s) IV Intermittent every 8 hours  predniSONE   Tablet 20 milliGRAM(s) Oral daily  rivaroxaban 20 milliGRAM(s) Oral with dinner  vancomycin  IVPB 1000 milliGRAM(s) IV Intermittent every 12 hours    MEDICATIONS  (PRN):  acetaminophen   Tablet .. 650 milliGRAM(s) Oral every 6 hours PRN Temp greater or equal to 38C (100.4F), Moderate Pain (4 - 6)  guaiFENesin Oral Liquid (Sugar-Free) 100 milliGRAM(s) Oral every 6 hours PRN Cough      Allergies    No Known Drug Allergies  shellfish (Pruritus; Rash)    Intolerances        REVIEW OF SYSTEMS:  CONSTITUTIONAL: No fever, weight loss, or fatigue  RESPIRATORY: No cough, wheezing, chills or hemoptysis; No shortness of breath  CARDIOVASCULAR: No chest pain, palpitations, dizziness, or leg swelling  GASTROINTESTINAL: No abdominal or epigastric pain. No nausea, vomiting, or hematemesis; No diarrhea or constipation. No melena or hematochezia.  NEUROLOGICAL: No headaches, memory loss, loss of strength, numbness, or tremors  MUSCULOSKELETAL: No joint pain or swelling; No muscle, back, or extremity pain      Vital Signs Last 24 Hrs  T(C): 36.7 (18 Aug 2021 12:20), Max: 37.1 (18 Aug 2021 02:16)  T(F): 98 (18 Aug 2021 12:20), Max: 98.7 (18 Aug 2021 02:16)  HR: 82 (18 Aug 2021 14:20) (74 - 90)  BP: 96/60 (18 Aug 2021 12:20) (94/56 - 103/60)  BP(mean): --  RR: 18 (18 Aug 2021 12:20) (18 - 20)  SpO2: 93% (18 Aug 2021 14:20) (92% - 99%)    PHYSICAL EXAM:  GENERAL: Obese male, sitting on the chair., NAD, well-groomed, well-developed  HEAD:  Atraumatic, Normocephalic  EYES: EOMI, PERRLA, conjunctiva and sclera clear  NECK: Supple, No JVD, Normal thyroid  NERVOUS SYSTEM:  Alert & Oriented X3, No gross focal deficits  CHEST/LUNG: Clear to percussion bilaterally; No rales, rhonchi, wheezing, or rubs  HEART: Regular rate and rhythm; No murmurs, rubs, or gallops  ABDOMEN: Soft, Nontender, Nondistended; Bowel sounds present  EXTREMITIES:  No clubbing, cyanosis, or edema  SKIN: No rashes or lesions    LABS:                        10.7   12.68 )-----------( 256      ( 18 Aug 2021 06:15 )             33.1     08-18    133<L>  |  96<L>  |  16.2  ----------------------------<  123<H>  3.8   |  24.0  |  1.08    Ca    8.7      18 Aug 2021 06:15    TPro  6.5<L>  /  Alb  3.1<L>  /  TBili  0.7  /  DBili  x   /  AST  19  /  ALT  23  /  AlkPhos  52  08-18        CAPILLARY BLOOD GLUCOSE          RADIOLOGY & ADDITIONAL TESTS:    Imaging Personally Reviewed:  [ ] YES  [ ] NO    Consultant(s) Notes Reviewed:  [x] YES  [ ] NO    Care Discussed with Consultants/Other Providers [x ] YES  [ ] NO    Plan of Care discussed with Housestaff [ ]YES [ ] NO

## 2021-08-18 NOTE — PROGRESS NOTE ADULT - PROBLEM SELECTOR PLAN 1
May be 2/2 pna vs icd infection vs other etiology  Trending down.   BC no growth to date   On Vancomycin and zosyn
- monitor cultures   - may be 2/2 pna vs icd infection vs other etiology  - ID consult pending   - vanco and zosyn  - lactic acidosis- monitor

## 2021-08-18 NOTE — PROGRESS NOTE ADULT - ASSESSMENT
Imp--CM, Asthma, noct CPAP  Poss CHF  New PNA in RUL  Hyponatremia after diuresis.  Bacteremia    Plan--On PO Bumex BID per cardiology. ABx per ID. F/u repeat cultures. C/w CPAP, nebs. C/w Prednisone 20mg. Notably he is on 10mg chronically. OOBTC. PT/OT.     Bassem Bass M.D.  Pulmonary & Critical Care Medicine  [facility]  Pulmonary and Sleep Medicine at [place]  [address]  [place] [address]  T: [phone]  F: [phone]

## 2021-08-19 ENCOUNTER — TRANSCRIPTION ENCOUNTER (OUTPATIENT)
Age: 61
End: 2021-08-19

## 2021-08-19 VITALS — HEART RATE: 69 BPM | OXYGEN SATURATION: 92 %

## 2021-08-19 LAB
ANION GAP SERPL CALC-SCNC: 15 MMOL/L — SIGNIFICANT CHANGE UP (ref 5–17)
BASOPHILS # BLD AUTO: 0.03 K/UL — SIGNIFICANT CHANGE UP (ref 0–0.2)
BASOPHILS NFR BLD AUTO: 0.3 % — SIGNIFICANT CHANGE UP (ref 0–2)
BUN SERPL-MCNC: 18.4 MG/DL — SIGNIFICANT CHANGE UP (ref 8–20)
CALCIUM SERPL-MCNC: 8.8 MG/DL — SIGNIFICANT CHANGE UP (ref 8.6–10.2)
CHLORIDE SERPL-SCNC: 94 MMOL/L — LOW (ref 98–107)
CO2 SERPL-SCNC: 22 MMOL/L — SIGNIFICANT CHANGE UP (ref 22–29)
CREAT SERPL-MCNC: 1.11 MG/DL — SIGNIFICANT CHANGE UP (ref 0.5–1.3)
EOSINOPHIL # BLD AUTO: 0.13 K/UL — SIGNIFICANT CHANGE UP (ref 0–0.5)
EOSINOPHIL NFR BLD AUTO: 1.1 % — SIGNIFICANT CHANGE UP (ref 0–6)
GLUCOSE SERPL-MCNC: 113 MG/DL — HIGH (ref 70–99)
HCT VFR BLD CALC: 34.1 % — LOW (ref 39–50)
HGB BLD-MCNC: 10.9 G/DL — LOW (ref 13–17)
IMM GRANULOCYTES NFR BLD AUTO: 0.8 % — SIGNIFICANT CHANGE UP (ref 0–1.5)
LYMPHOCYTES # BLD AUTO: 0.96 K/UL — LOW (ref 1–3.3)
LYMPHOCYTES # BLD AUTO: 8 % — LOW (ref 13–44)
MAGNESIUM SERPL-MCNC: 2.5 MG/DL — SIGNIFICANT CHANGE UP (ref 1.6–2.6)
MCHC RBC-ENTMCNC: 27.5 PG — SIGNIFICANT CHANGE UP (ref 27–34)
MCHC RBC-ENTMCNC: 32 GM/DL — SIGNIFICANT CHANGE UP (ref 32–36)
MCV RBC AUTO: 85.9 FL — SIGNIFICANT CHANGE UP (ref 80–100)
MONOCYTES # BLD AUTO: 0.46 K/UL — SIGNIFICANT CHANGE UP (ref 0–0.9)
MONOCYTES NFR BLD AUTO: 3.8 % — SIGNIFICANT CHANGE UP (ref 2–14)
NEUTROPHILS # BLD AUTO: 10.32 K/UL — HIGH (ref 1.8–7.4)
NEUTROPHILS NFR BLD AUTO: 86 % — HIGH (ref 43–77)
PLATELET # BLD AUTO: 283 K/UL — SIGNIFICANT CHANGE UP (ref 150–400)
POTASSIUM SERPL-MCNC: 4 MMOL/L — SIGNIFICANT CHANGE UP (ref 3.5–5.3)
POTASSIUM SERPL-SCNC: 4 MMOL/L — SIGNIFICANT CHANGE UP (ref 3.5–5.3)
RBC # BLD: 3.97 M/UL — LOW (ref 4.2–5.8)
RBC # FLD: 15.6 % — HIGH (ref 10.3–14.5)
SODIUM SERPL-SCNC: 131 MMOL/L — LOW (ref 135–145)
VANCOMYCIN TROUGH SERPL-MCNC: 11.9 UG/ML — SIGNIFICANT CHANGE UP (ref 10–20)
WBC # BLD: 12 K/UL — HIGH (ref 3.8–10.5)
WBC # FLD AUTO: 12 K/UL — HIGH (ref 3.8–10.5)

## 2021-08-19 PROCEDURE — 96375 TX/PRO/DX INJ NEW DRUG ADDON: CPT

## 2021-08-19 PROCEDURE — 36415 COLL VENOUS BLD VENIPUNCTURE: CPT

## 2021-08-19 PROCEDURE — 87040 BLOOD CULTURE FOR BACTERIA: CPT

## 2021-08-19 PROCEDURE — 96374 THER/PROPH/DIAG INJ IV PUSH: CPT

## 2021-08-19 PROCEDURE — 86769 SARS-COV-2 COVID-19 ANTIBODY: CPT

## 2021-08-19 PROCEDURE — 85025 COMPLETE CBC W/AUTO DIFF WBC: CPT

## 2021-08-19 PROCEDURE — 83735 ASSAY OF MAGNESIUM: CPT

## 2021-08-19 PROCEDURE — U0003: CPT

## 2021-08-19 PROCEDURE — 80053 COMPREHEN METABOLIC PANEL: CPT

## 2021-08-19 PROCEDURE — 93306 TTE W/DOPPLER COMPLETE: CPT

## 2021-08-19 PROCEDURE — 94640 AIRWAY INHALATION TREATMENT: CPT

## 2021-08-19 PROCEDURE — 87086 URINE CULTURE/COLONY COUNT: CPT

## 2021-08-19 PROCEDURE — 83880 ASSAY OF NATRIURETIC PEPTIDE: CPT

## 2021-08-19 PROCEDURE — 84439 ASSAY OF FREE THYROXINE: CPT

## 2021-08-19 PROCEDURE — 71046 X-RAY EXAM CHEST 2 VIEWS: CPT

## 2021-08-19 PROCEDURE — 81003 URINALYSIS AUTO W/O SCOPE: CPT

## 2021-08-19 PROCEDURE — 93005 ELECTROCARDIOGRAM TRACING: CPT

## 2021-08-19 PROCEDURE — U0005: CPT

## 2021-08-19 PROCEDURE — 80048 BASIC METABOLIC PNL TOTAL CA: CPT

## 2021-08-19 PROCEDURE — 85730 THROMBOPLASTIN TIME PARTIAL: CPT

## 2021-08-19 PROCEDURE — 83935 ASSAY OF URINE OSMOLALITY: CPT

## 2021-08-19 PROCEDURE — 84443 ASSAY THYROID STIM HORMONE: CPT

## 2021-08-19 PROCEDURE — 85610 PROTHROMBIN TIME: CPT

## 2021-08-19 PROCEDURE — 99232 SBSQ HOSP IP/OBS MODERATE 35: CPT

## 2021-08-19 PROCEDURE — 80202 ASSAY OF VANCOMYCIN: CPT

## 2021-08-19 PROCEDURE — 83930 ASSAY OF BLOOD OSMOLALITY: CPT

## 2021-08-19 PROCEDURE — 84484 ASSAY OF TROPONIN QUANT: CPT

## 2021-08-19 PROCEDURE — 94660 CPAP INITIATION&MGMT: CPT

## 2021-08-19 PROCEDURE — 99285 EMERGENCY DEPT VISIT HI MDM: CPT | Mod: 25

## 2021-08-19 PROCEDURE — 83605 ASSAY OF LACTIC ACID: CPT

## 2021-08-19 PROCEDURE — 87186 SC STD MICRODIL/AGAR DIL: CPT

## 2021-08-19 PROCEDURE — 99239 HOSP IP/OBS DSCHRG MGMT >30: CPT

## 2021-08-19 PROCEDURE — 84481 FREE ASSAY (FT-3): CPT

## 2021-08-19 PROCEDURE — 85027 COMPLETE CBC AUTOMATED: CPT

## 2021-08-19 PROCEDURE — 84300 ASSAY OF URINE SODIUM: CPT

## 2021-08-19 RX ORDER — CIPROFLOXACIN LACTATE 400MG/40ML
1 VIAL (ML) INTRAVENOUS
Qty: 5 | Refills: 0
Start: 2021-08-19 | End: 2021-08-23

## 2021-08-19 RX ADMIN — Medication 20 MILLIGRAM(S): at 05:26

## 2021-08-19 RX ADMIN — BUMETANIDE 1 MILLIGRAM(S): 0.25 INJECTION INTRAMUSCULAR; INTRAVENOUS at 05:26

## 2021-08-19 RX ADMIN — ALBUTEROL 2 PUFF(S): 90 AEROSOL, METERED ORAL at 08:40

## 2021-08-19 RX ADMIN — PANTOPRAZOLE SODIUM 40 MILLIGRAM(S): 20 TABLET, DELAYED RELEASE ORAL at 05:27

## 2021-08-19 RX ADMIN — GABAPENTIN 600 MILLIGRAM(S): 400 CAPSULE ORAL at 05:26

## 2021-08-19 RX ADMIN — Medication 1 SPRAY(S): at 05:27

## 2021-08-19 RX ADMIN — Medication 25 MILLIGRAM(S): at 05:27

## 2021-08-19 RX ADMIN — Medication 1 TABLET(S): at 05:26

## 2021-08-19 RX ADMIN — AMIODARONE HYDROCHLORIDE 200 MILLIGRAM(S): 400 TABLET ORAL at 05:26

## 2021-08-19 RX ADMIN — ALBUTEROL 2 PUFF(S): 90 AEROSOL, METERED ORAL at 02:59

## 2021-08-19 RX ADMIN — Medication 100 MILLIGRAM(S): at 11:10

## 2021-08-19 RX ADMIN — PIPERACILLIN AND TAZOBACTAM 25 GRAM(S): 4; .5 INJECTION, POWDER, LYOPHILIZED, FOR SOLUTION INTRAVENOUS at 05:25

## 2021-08-19 RX ADMIN — GABAPENTIN 600 MILLIGRAM(S): 400 CAPSULE ORAL at 13:46

## 2021-08-19 NOTE — DISCHARGE NOTE PROVIDER - CARE PROVIDER_API CALL
Bassem Gonzalez)  Infectious Disease; Internal Medicine  [address] [place] [address]  Phone: [phone]  Fax: [phone]  Follow Up Time:     Elen Deng)  Internal Medicine  [address] [place] [id number]  Phone: [phone]  Fax: [phone]  Follow Up Time:

## 2021-08-19 NOTE — DISCHARGE NOTE NURSING/CASE MANAGEMENT/SOCIAL WORK - PATIENT PORTAL LINK FT
You can access the FollowMyHealth Patient Portal offered by [facility] by registering at the following website: [email or web address]. By joining [facility]’s FollowMyHealth portal, you will also be able to view your health information using other applications (apps) compatible with our system.

## 2021-08-19 NOTE — PROGRESS NOTE ADULT - PROVIDER SPECIALTY LIST ADULT
Cardiology
Pulmonology
Cardiology
Infectious Disease
Infectious Disease
Cardiology
Pulmonology
Hospitalist
Hospitalist

## 2021-08-19 NOTE — PROGRESS NOTE ADULT - ASSESSMENT
This 62 yo male with PMHs of  HFrEF s/p AICD, afib on Xarelto s/p pacemaker, COPD, ROCK on BiPAP, gastric bypass surgery presenting with SOB and fevers/chills. Per patient, he has worsening SOB for the past couple of day. The fever and chill for the past month or more. Low grade fever which comes and goes. Pt had alot of dental work done recently and since not feeling well.   Reports of intermittent fevers even going back to a few months ago. PMD had blood cultures done, and were negative.   For the past couple of days he also noted increase in his weight despite on being Furosemide.   he checked the scale at home and noted a 4 lb weight gain.   He also reports dizziness and palpitation. He denies headache, chest pain, nausea, vomiting, abdominal pain, change in urinary or bowel habits.    (16 Aug 2021 15:33)    he was treated in 6/2021 with a course of IV antibiotics for bilateral PNA. at that time, he had fluid in his esophagus concerning for aspiration.     CT of the chest on 8/16/21 showed:  Near-complete resolution prior bilateral patchy lung opacities with new patchy opacity superior right lower lobe and posterior right upper lobe. Continued follow-up CT recommended.    Impression:  WBC elevation  acute febrile illness  lung infiltrates  AICD in place      Plan:  - intermittent fevers are of concern in the pt with recent dental work  - blood cx x 3 sets sent on 8/16  - remains negative  repeat 2 sets on 8/18 - sent      CHANGE zosyn/ vanco  to LEVAQUIN 750mg Q24H THRU and including 8/23/21    to cover for PNEUMONIA and pseudomonas in urine       WBC elevation is reactive  downtrending      PATIENT  CAN FOLLOW UP WITH ME IN OFFICE IN 14 -21 DAYS.     DEFER TTE/ SARIKA UNLESS CULTURES POSITIVE, THIS CAN BE ARRANGED AS OUTPATIENT.       consider d/c planning.

## 2021-08-19 NOTE — DISCHARGE NOTE NURSING/CASE MANAGEMENT/SOCIAL WORK - NSDCVIVACCINE_GEN_ALL_CORE_FT
influenza, injectable, quadrivalent, preservative free; 14-Jan-2019 09:18; Tirp Ramos (RN); [employer]; [id number] (Exp. Date: 30-Jun-2019); IntraMuscular; Deltoid Right.; 0.5 milliLiter(s); VIS (VIS Published: 07-Aug-2015, VIS Presented: 14-Jan-2019);   influenza, injectable, quadrivalent, preservative free; 03-Nov-2019 12:19; Gwendolyn Morton (RN); [employer]; [id number] (Exp. Date: 30-Jun-2020); IntraMuscular; Deltoid Right.; 0.5 milliLiter(s); VIS (VIS Published: 15-Aug-2019, VIS Presented: 03-Nov-2019);

## 2021-08-19 NOTE — PROGRESS NOTE ADULT - SUBJECTIVE AND OBJECTIVE BOX
[facility]  INFECTIOUS DISEASES  at [place]  =======================================================  Omari Cantrell MD  Diplomates American Board of Internal Medicine and Infectious Diseases  Tel  [phone]  Fax [phone]  =======================================================    [facility]-[id number]  MAGGIE ELLIOTT  follow up: fevers and PNA    breathing better  cultures from blood remains negative    urine cx with low CFU of PSEUDOMONAS    I have personally reviewed the labs and data; pertinent labs and data are listed in this note; please see below.   =======================================================  Past Medical & Surgical Hx:  =====================  PAST MEDICAL & SURGICAL HISTORY:  COPD (chronic obstructive pulmonary disease)  ROCK (obstructive sleep apnea)  Afib  CHF (congestive heart failure)  Cardiomyopathy, ischemic wearing life vest  2-28-19  Ejection fraction &lt; 50% last echo 2-13-19  31%    Asthma    HFrEF (heart failure with reduced ejection fraction)    AICD (automatic cardioverter/defibrillator) present    History of loop recorder  2017  [facility]    History of incision and drainage  right groin abcess  jan 2019  [facility]    History of cardioversion    H/O cardiac catheterization  5 years ago at [facility].    S/P gastric bypass      Problem List:  ==========  HEALTH ISSUES - PROBLEM Dx:  Chronic atrial fibrillation    Chronic systolic congestive heart failure    COPD exacerbation    ROCK on CPAP    DVT prophylaxis    DVT prophylaxis    Hyponatremia    Leukocytosis    Elevated TSH          Social Hx:  =======  no toxic habits currently    FAMILY HISTORY:  Family history of CHF (congestive heart failure)    no significant family history of immunosuppressive disorders in mother or father   =======================================================    REVIEW OF SYSTEMS:  CONSTITUTIONAL: fevers resolved  HEENT:  No diplopia or blurred vision.  No earache, sore throat or runny nose.  CARDIOVASCULAR:  No pressure, squeezing, strangling, tightness, heaviness or aching about the chest, neck, axilla or epigastrium.  RESPIRATORY:  No cough, shortness of breath  GASTROINTESTINAL:  No nausea, vomiting or diarrhea.  GENITOURINARY:  No dysuria, frequency or urgency. No Blood in urine  MUSCULOSKELETAL:  no joint aches, no muscle pain  SKIN:  No change in skin, hair or nails.  NEUROLOGIC:  No Headaches, seizures or weakness.  PSYCHIATRIC:  No disorder of thought or mood.  ENDOCRINE:  No heat or cold intolerance  HEMATOLOGICAL:  No easy bruising or bleeding.    =======================================================  Allergies  No Known Drug Allergies  shellfish (Pruritus; Rash)     ======================================================  Physical Exam:  ============     General:  No acute distress.  Pleasant obese  Eye: Pupils are equal, round and reactive to light, Extraocular movements are intact, Normal conjunctiva.  HENT: Normocephalic, Oral mucosa is moist, No pharyngeal erythema, No sinus tenderness.  Neck: Supple, No lymphadenopathy.  Respiratory: Lungs with diminished sounds at bases.   Cardiovascular: Normal rate, Regular rhythm,  LEFT chest wall implanted device.   Gastrointestinal: Soft, Non-tender, Non-distended, Normal bowel sounds.  Genitourinary: No costovertebral angle tenderness.  Lymphatics: No lymphadenopathy neck,   Musculoskeletal: Normal range of motion, Normal strength.  Integumentary: No rash. NO SPLINTER HEMORRHAGES IN THE NAILS  Neurologic: Alert, Oriented, No focal deficits, Cranial Nerves II-XII are grossly intact.  Psychiatric: Appropriate mood & affect.    =======================================================  Vitals:  ============  T(F): 97 (19 Aug 2021 04:45), Max: 98.8 (18 Aug 2021 20:55)  HR: 92 (19 Aug 2021 08:42)  BP: 105/63 (19 Aug 2021 04:45)  RR: 18 (19 Aug 2021 04:45)  SpO2: 96% (19 Aug 2021 08:42) (93% - 97%)  temp max in last 48H T(F): , Max: 98.8 (08-17-21 @ 11:34)    =======================================================  Current Antibiotics:  levoFLOXacin  Tablet 750 milliGRAM(s) Oral every 24 hours    Other medications:  ALBUTerol    90 MICROgram(s) HFA Inhaler 2 Puff(s) Inhalation every 6 hours  aMIOdarone    Tablet 200 milliGRAM(s) Oral daily  buMETAnide 1 milliGRAM(s) Oral two times a day  fluticasone propionate 50 MICROgram(s)/spray Nasal Spray 1 Spray(s) Both Nostrils two times a day  gabapentin 600 milliGRAM(s) Oral three times a day  lactobacillus acidophilus 1 Tablet(s) Oral two times a day  metoprolol succinate ER 25 milliGRAM(s) Oral daily  pantoprazole    Tablet 40 milliGRAM(s) Oral before breakfast  predniSONE   Tablet 20 milliGRAM(s) Oral daily  rivaroxaban 20 milliGRAM(s) Oral with dinner      =======================================================  Labs:                        10.7   12.68 )-----------( 256      ( 18 Aug 2021 06:15 )             33.1     08-18    133<L>  |  96<L>  |  16.2  ----------------------------<  123<H>  3.8   |  24.0  |  1.08    Ca    8.7      18 Aug 2021 06:15    TPro  6.5<L>  /  Alb  3.1<L>  /  TBili  0.7  /  DBili  x   /  AST  19  /  ALT  23  /  AlkPhos  52  08-18      Culture - Urine (collected 08-16-21 @ 22:19)  Source: Clean Catch Clean Catch (Midstream)  Final Report (08-18-21 @ 14:40):    10,000 - 49,000 CFU/mL Pseudomonas aeruginosa    <10,000 CFU/ml Normal Urogenital lyle present  Organism: Pseudomonas aeruginosa (08-18-21 @ 14:40)  Organism: Pseudomonas aeruginosa (08-18-21 @ 14:40)    Sensitivities:      -  Amikacin: S <=16      -  Aztreonam: S 8      -  Cefepime: S <=2      -  Ceftazidime: S 4      -  Ciprofloxacin: S <=0.25      -  Gentamicin: S <=2      -  Imipenem: S <=1      -  Levofloxacin: S <=0.5      -  Meropenem: S 2      -  Piperacillin/Tazobactam: S <=8      -  Tobramycin: S <=2      Method Type: GAGE    Culture - Blood (collected 08-16-21 @ 11:06)  Source: .Blood Blood    Culture - Blood (collected 08-16-21 @ 11:05)  Source: .Blood Blood    Culture - Blood (collected 08-16-21 @ 11:04)  Source: .Blood Blood       COVID-19 PCR: NotDetec (08-16-21 @ 15:07)      < from: Xray Chest 2 Views PA/Lat (08.16.21 @ 10:44) >  EXAM:  XR CHEST PA LAT 2V                          PROCEDURE DATE:  08/16/2021          INTERPRETATION:  Clinical history: 61-year-old male, shortness of breath.    Three views of the chest are correlated with the chest CT of 8/12/2021 and demonstrate moderate right and minimal left perihilar interstitial pneumonia.    No no gross consolidation, effusion, pneumothorax or acute osseous finding.    Pacemaker with wire tips in the right atrium, right ventricle and coronary sinus, unchanged.    IMPRESSION:  Moderate right and minimal left perihilar interstitial infiltrates, no lobar consolidation, grossly unchanged    --- End of Report ---            SY S ZIGGY PULIDO; Attending Radiologist  This document has been electronically signed. Aug16 2021 10:49AM    < end of copied text >       < from: CT Chest No Cont (08.12.21 @ 14:34) >    EXAM:  CT CHEST        PROCEDURE DATE:  08/12/2021           INTERPRETATION:  EXAMINATION: CT CHEST    CLINICAL INDICATION: Aspiration pneumonia.    TECHNIQUE: Noncontrast CT of the chest was obtained.    COMPARISON: 6/22/2021.    FINDINGS:    AIRWAYS AND LUNGS: The central tracheobronchial tree is patent.  Emphysema. Bronchial mucoid impaction. Near-complete resolution prior bilateral patchy lung opacities with new patchy opacity superior right lower lobe and posterior right upper lobe. Scattered clustered nodules with a lower lobe predominance is decreased but not resolved the prior study.    MEDIASTINUM AND PLEURA: There are no enlarged mediastinal, hilar or axillary lymph nodes. The visualized portion of the thyroid gland is unremarkable. There is no pleural effusion. There is no pneumothorax.    HEART AND VESSELS: There is mild cardiomegaly.  There are atherosclerotic calcifications of the aorta and coronary arteries.  There is no pericardial effusion.  Aortic valve calcification. Left-sided defibrillator.    UPPER ABDOMEN: Images of the upper abdomen demonstrate cholecystectomy. Pneumobilia. Esophagus is distended with debris    BONES AND SOFT TISSUES: There are mild degenerative changes of the spine.  The soft tissues are unremarkable.    IMPRESSION:  Near-complete resolution prior bilateral patchy lung opacities with new patchy opacity superior right lower lobe and posterior right upper lobe. Continued follow-up CT recommended.    --- End of Report ---               CANDICE LEVINE MD; Attending Radiologist   This document has been electronically signed. Aug 16 2021 10:42AM    < end of copied text >

## 2021-08-19 NOTE — DISCHARGE NOTE PROVIDER - NSDCCPCAREPLAN_GEN_ALL_CORE_FT
PRINCIPAL DISCHARGE DIAGNOSIS  Diagnosis: Gram-negative pneumonia  Assessment and Plan of Treatment: Take Levaquin for 5 more days. Follow with Dr. Mike after      SECONDARY DISCHARGE DIAGNOSES  Diagnosis: Chronic systolic congestive heart failure  Assessment and Plan of Treatment: Take your medications as prescribed    Diagnosis: COPD exacerbation  Assessment and Plan of Treatment: Follow with your pulmonologist    Diagnosis: Chronic atrial fibrillation  Assessment and Plan of Treatment: take your medications as prescribed and follow with Dr. Delcid

## 2021-08-19 NOTE — DISCHARGE NOTE PROVIDER - NSDCMRMEDTOKEN_GEN_ALL_CORE_FT
amiodarone 200 mg oral tablet: 1 tab(s) orally 2 times a day  bumetanide 1 mg oral tablet: 1 tab(s) orally 2 times a day  fluticasone/umeclidinium/vilanterol 100 mcg-62.5 mcg-25 mcg/inh inhalation powder: 1 puff(s) inhaled once a day  gabapentin 600 mg oral tablet: 1 tab(s) orally 3 times a day  ipratropium-albuterol 0.5 mg-2.5 mg/3 mLinhalation solution: 3 milliliter(s) inhaled every 6 hours   levoFLOXacin 750 mg oral tablet: 1 tab(s) orally every 24 hours  lisinopril 2.5 mg oral tablet: 1 tab(s) orally once a day  metoprolol succinate 25 mg oral tablet, extended release: 0.5 tab(s) orally once a day  pantoprazole 40 mg oral delayed release tablet: 1 tab(s) orally once a day (before a meal)  predniSONE 10 mg oral tablet: 1 tab(s) orally once a day  Trelegy Ellipta inhalation powder: 1 puff(s) inhaled once a day  Xarelto 20 mg oral tablet: 1 tab(s) orally once a day (in the evening)

## 2021-08-19 NOTE — DISCHARGE NOTE PROVIDER - NSDCFUSCHEDAPPT_GEN_ALL_CORE_FT
MAGGIE ELLIOTT ; 09/07/2021 ; NPP PulmMed [address]  MAGGIE ELLIOTT ; 09/20/2021 ; NPP Orth[facility] [address] [facility]

## 2021-08-19 NOTE — DISCHARGE NOTE PROVIDER - HOSPITAL COURSE
62 yo male presented with SOB, fever   On admission was found to have elevated WBC and low grade fever. CT chest was done and found to have b/l opacities. ID consult was called.   Treated with IV antibiotics for PNA and concerns of bacteremia. BC x3 times sent and no growth to date.   Pt clinically significantly improved. Intially was placed on supplemental O2 and weaned off through hospital stay.   He is stable for discharge on oral antibiotics and to follow with ID -Dr. Gonzalez after 14 days.     ROS at the time discharge: negative  PE: General: obese male sitting on the chair   Head and neck:: normocephalic, no JVD   Cardio: irregular rate and rhythm, no murmur   Pulm: clear breath sounds, no wheezing   GI: obese abdomen, soft   Extr: no edema  Neuro: AOx3, no focal weakness     ICU Vital Signs Last 24 Hrs  T(C): 37.2 (19 Aug 2021 10:28), Max: 37.2 (19 Aug 2021 10:28)  T(F): 99 (19 Aug 2021 10:28), Max: 99 (19 Aug 2021 10:28)  HR: 76 (19 Aug 2021 10:28) (70 - 92)  BP: 94/661 (19 Aug 2021 10:28) (94/661 - 114/67)  BP(mean): --  ABP: --  ABP(mean): --  RR: 18 (19 Aug 2021 10:28) (18 - 18)  SpO2: 97% (19 Aug 2021 10:28) (93% - 97%)      1. Leukocytosis.  Plan: May be 2/2 pna vs icd infection vs other etiology  Trending down.   BC no growth to date   Completed 3 days of Vancomycin and zosyn.   To continue with Levaquin for 5 more days       2. Chronic atrial fibrillation.   Rate controlled on Metoprolol    On Xarelto for anticoagulation.       3. Chronic systolic congestive heart failure.    Not in fluid overload   c/w  Bumex and metoprolol   Monitor daily weight and I&O.     4. COPD exacerbation. - albuterol, prednisone        5.  ROCK on CPAP.  Plan: - CPAP at night.     6.  Hyponatremia.- Likely secondary to PO intake.

## 2021-08-19 NOTE — PROGRESS NOTE ADULT - SUBJECTIVE AND OBJECTIVE BOX
[place] [facility], THE HEART CENTER                                   [address] [place] [address]                                                      PHONE: [phone]                                                         FAX: [phone]  [email or web address]  ---------------------------------------------------------------------------------------------------------------------------------    Overnight events/patient complaints:    no events     PAST MEDICAL & SURGICAL HISTORY:  COPD (chronic obstructive pulmonary disease)    ROCK (obstructive sleep apnea)    Afib    CHF (congestive heart failure)    Cardiomyopathy, ischemic  wearing life vest  2-28-19    Ejection fraction &lt; 50%  last echo 2-13-19  31%    Asthma    HFrEF (heart failure with reduced ejection fraction)    AICD (automatic cardioverter/defibrillator) present    History of loop recorder  2017  [facility]    History of incision and drainage  right groin abcess  jan 2019  [facility]    History of cardioversion    H/O cardiac catheterization  5 years ago at [facility].    S/P gastric bypass        No Known Drug Allergies  shellfish (Pruritus; Rash)    MEDICATIONS  (STANDING):  ALBUTerol    90 MICROgram(s) HFA Inhaler 2 Puff(s) Inhalation every 6 hours  aMIOdarone    Tablet 200 milliGRAM(s) Oral daily  buMETAnide 1 milliGRAM(s) Oral two times a day  fluticasone propionate 50 MICROgram(s)/spray Nasal Spray 1 Spray(s) Both Nostrils two times a day  gabapentin 600 milliGRAM(s) Oral three times a day  lactobacillus acidophilus 1 Tablet(s) Oral two times a day  levoFLOXacin  Tablet 750 milliGRAM(s) Oral every 24 hours  metoprolol succinate ER 25 milliGRAM(s) Oral daily  pantoprazole    Tablet 40 milliGRAM(s) Oral before breakfast  predniSONE   Tablet 20 milliGRAM(s) Oral daily  rivaroxaban 20 milliGRAM(s) Oral with dinner    MEDICATIONS  (PRN):  acetaminophen   Tablet .. 650 milliGRAM(s) Oral every 6 hours PRN Temp greater or equal to 38C (100.4F), Moderate Pain (4 - 6)  guaiFENesin Oral Liquid (Sugar-Free) 100 milliGRAM(s) Oral every 6 hours PRN Cough      Vital Signs Last 24 Hrs  T(C): 36.1 (19 Aug 2021 04:45), Max: 37.1 (18 Aug 2021 20:55)  T(F): 97 (19 Aug 2021 04:45), Max: 98.8 (18 Aug 2021 20:55)  HR: 70 (19 Aug 2021 04:45) (70 - 89)  BP: 105/63 (19 Aug 2021 04:45) (96/60 - 114/67)  BP(mean): --  RR: 18 (19 Aug 2021 04:45) (18 - 18)  SpO2: 95% (19 Aug 2021 04:45) (93% - 97%)  ICU Vital Signs Last 24 Hrs  MAGGIE ELLIOTT  I&O's Detail    18 Aug 2021 07:01  -  19 Aug 2021 07:00  --------------------------------------------------------  IN:    Oral Fluid: 350 mL  Total IN: 350 mL    OUT:    Voided (mL): 2 mL  Total OUT: 2 mL    Total NET: 348 mL        I&O's Summary    18 Aug 2021 07:01  -  19 Aug 2021 07:00  --------------------------------------------------------  IN: 350 mL / OUT: 2 mL / NET: 348 mL      Drug Dosing Weight  MAGGIE ELLIOTT      PHYSICAL EXAM:  General: Appears well developed, well nourished alert and cooperative.  HEENT: Head; normocephalic, atraumatic.  Eyes: Pupils reactive, cornea wnl.  Neck: Supple, no nodes adenopathy, no NVD or carotid bruit or thyromegaly.  CARDIOVASCULAR: Normal S1 and S2, No murmur, rub, gallop or lift.   LUNGS: No rales, rhonchi or wheeze. Normal breath sounds bilaterally.  ABDOMEN: Soft, nontender without mass or organomegaly. bowel sounds normoactive.  EXTREMITIES: No clubbing, cyanosis or edema. Distal pulses wnl.   SKIN: warm and dry with normal turgor.  NEURO: Alert/oriented x 3/normal motor exam. No pathologic reflexes.    PSYCH: normal affect.        LABS:                        10.7   12.68 )-----------( 256      ( 18 Aug 2021 06:15 )             33.1     08-18    133<L>  |  96<L>  |  16.2  ----------------------------<  123<H>  3.8   |  24.0  |  1.08    Ca    8.7      18 Aug 2021 06:15    TPro  6.5<L>  /  Alb  3.1<L>  /  TBili  0.7  /  DBili  x   /  AST  19  /  ALT  23  /  AlkPhos  52  08-18    MAGGIE ELLIOTT        ECG:< from: 12 Lead ECG (08.16.21 @ 09:12) >  Diagnosis Line *** Poor data quality, interpretation may be adversely affected  Atrial fibrillation  Left axis deviation  Left bundle branch block  Abnormal ECG    Confirmed by LINSEY PSENCE ([id number]) on 8/16/2021 10:23:07 AM    < end of copied text >      ECHO:< from: TTE Echo Complete w/o Contrast w/ Doppler (06.25.21 @ 11:06) >    Summary:   1. Technically limited study due to patient's body habitus.   2. Endocardial visualization was enhanced with intravenous echo contrast.   3. Left ventricular ejection fraction, by visual estimation, is 30 to 35%.   4. Moderately to severely decreased global left ventricular systolic function.   5. Normal right ventricular size and function.   6. Bilateral atria are normal in size.   7. No significant valvular disease is seen. Pulmonic valve was not well visualized.   8. Compared with prior study dated 11/2020, aortic valve gradients are decreased and calculated PETER is within normal range.    YOSELIN Miller Electronically signed on 6/25/2021 at 11:53:15 AM           FARIBA BRITO   This document has been electronically signed. Jun 25 2021 11:06AM    < end of copied text >      ASSESSMENT AND PLAN:  In summary,  MAGGIE ELLIOTT is an 61y Male with obesity, s/p gastric sleeve, COPD ROCK long standing NICM with EF as low as 20%, improved EF on GDMT most recent EF 30-35%, chronic AF, VT s/p ICD, HFrEF with cardiomems monitoring, came to Er c/o fever, chills, dyspnea and weight gain.  Pt had recent GIB and has been evaluated for watchman device recently by Dr Ramos.  Pt came to ER c/o fever, chills, and dyspnea.  Pt has had asp PNA in past post gastric sleeve as well as extensive dental work.  Recent cardiomems #s stable at goal.    Bacteremia  -daily blood cx x2   -echo- EF < 20%, no evidence of endocarditis   -ID following  -if pt blood cx persistently +, or wbc elevated then will consider SARIKA otherwise given pts comorbidities will Not  plan for SARIKA    Chronic HFrEF  -stable  -continue current meds/dosages      Thank you for allowing [facility] to participate in the care of this patient.  Please feel free to call with any questions or concerns.                  [place] [facility], THE [facility] CENTER                                   [address] [place] [address]                                                      PHONE: [phone]                                                         FAX: [phone]  [email or web address]  ---------------------------------------------------------------------------------------------------------------------------------    Overnight events/patient complaints:    no events     PAST MEDICAL & SURGICAL HISTORY:  COPD (chronic obstructive pulmonary disease)    ROCK (obstructive sleep apnea)    Afib    CHF (congestive heart failure)    Cardiomyopathy, ischemic  wearing life vest  2-28-19    Ejection fraction &lt; 50%  last echo 2-13-19  31%    Asthma    HFrEF (heart failure with reduced ejection fraction)    AICD (automatic cardioverter/defibrillator) present    History of loop recorder  2017  [facility]    History of incision and drainage  right groin abcess  jan 2019  [facility]    History of cardioversion    H/O cardiac catheterization  5 years ago at [facility].    S/P gastric bypass        No Known Drug Allergies  shellfish (Pruritus; Rash)    MEDICATIONS  (STANDING):  ALBUTerol    90 MICROgram(s) HFA Inhaler 2 Puff(s) Inhalation every 6 hours  aMIOdarone    Tablet 200 milliGRAM(s) Oral daily  buMETAnide 1 milliGRAM(s) Oral two times a day  fluticasone propionate 50 MICROgram(s)/spray Nasal Spray 1 Spray(s) Both Nostrils two times a day  gabapentin 600 milliGRAM(s) Oral three times a day  lactobacillus acidophilus 1 Tablet(s) Oral two times a day  levoFLOXacin  Tablet 750 milliGRAM(s) Oral every 24 hours  metoprolol succinate ER 25 milliGRAM(s) Oral daily  pantoprazole    Tablet 40 milliGRAM(s) Oral before breakfast  predniSONE   Tablet 20 milliGRAM(s) Oral daily  rivaroxaban 20 milliGRAM(s) Oral with dinner    MEDICATIONS  (PRN):  acetaminophen   Tablet .. 650 milliGRAM(s) Oral every 6 hours PRN Temp greater or equal to 38C (100.4F), Moderate Pain (4 - 6)  guaiFENesin Oral Liquid (Sugar-Free) 100 milliGRAM(s) Oral every 6 hours PRN Cough      Vital Signs Last 24 Hrs  T(C): 36.1 (19 Aug 2021 04:45), Max: 37.1 (18 Aug 2021 20:55)  T(F): 97 (19 Aug 2021 04:45), Max: 98.8 (18 Aug 2021 20:55)  HR: 70 (19 Aug 2021 04:45) (70 - 89)  BP: 105/63 (19 Aug 2021 04:45) (96/60 - 114/67)  BP(mean): --  RR: 18 (19 Aug 2021 04:45) (18 - 18)  SpO2: 95% (19 Aug 2021 04:45) (93% - 97%)  ICU Vital Signs Last 24 Hrs  MAGGIE ELLIOTT  I&O's Detail    18 Aug 2021 07:01  -  19 Aug 2021 07:00  --------------------------------------------------------  IN:    Oral Fluid: 350 mL  Total IN: 350 mL    OUT:    Voided (mL): 2 mL  Total OUT: 2 mL    Total NET: 348 mL        I&O's Summary    18 Aug 2021 07:01  -  19 Aug 2021 07:00  --------------------------------------------------------  IN: 350 mL / OUT: 2 mL / NET: 348 mL      Drug Dosing Weight  MAGGIE ELLIOTT      PHYSICAL EXAM:  General: Appears well developed, well nourished alert and cooperative.  HEENT: Head; normocephalic, atraumatic.  Eyes: Pupils reactive, cornea wnl.  Neck: Supple, no nodes adenopathy, no NVD or carotid bruit or thyromegaly.  CARDIOVASCULAR: Normal S1 and S2, No murmur, rub, gallop or lift.   LUNGS: No rales, rhonchi or wheeze. Normal breath sounds bilaterally.  ABDOMEN: Soft, nontender without mass or organomegaly. bowel sounds normoactive.  EXTREMITIES: No clubbing, cyanosis or edema. Distal pulses wnl.   SKIN: warm and dry with normal turgor.  NEURO: Alert/oriented x 3/normal motor exam. No pathologic reflexes.    PSYCH: normal affect.        LABS:                        10.7   12.68 )-----------( 256      ( 18 Aug 2021 06:15 )             33.1     08-18    133<L>  |  96<L>  |  16.2  ----------------------------<  123<H>  3.8   |  24.0  |  1.08    Ca    8.7      18 Aug 2021 06:15    TPro  6.5<L>  /  Alb  3.1<L>  /  TBili  0.7  /  DBili  x   /  AST  19  /  ALT  23  /  AlkPhos  52  08-18    MAGGIE ELLIOTT        ECG:< from: 12 Lead ECG (08.16.21 @ 09:12) >  Diagnosis Line *** Poor data quality, interpretation may be adversely affected  Atrial fibrillation  Left axis deviation  Left bundle branch block  Abnormal ECG    Confirmed by LINSEY SPENCE ([id number]) on 8/16/2021 10:23:07 AM    < end of copied text >      ECHO:< from: TTE Echo Complete w/o Contrast w/ Doppler (06.25.21 @ 11:06) >    Summary:   1. Technically limited study due to patient's body habitus.   2. Endocardial visualization was enhanced with intravenous echo contrast.   3. Left ventricular ejection fraction, by visual estimation, is 30 to 35%.   4. Moderately to severely decreased global left ventricular systolic function.   5. Normal right ventricular size and function.   6. Bilateral atria are normal in size.   7. No significant valvular disease is seen. Pulmonic valve was not well visualized.   8. Compared with prior study dated 11/2020, aortic valve gradients are decreased and calculated PETER is within normal range.    YOSELIN Miller Electronically signed on 6/25/2021 at 11:53:15 AM           FARIBA BRITO   This document has been electronically signed. Jun 25 2021 11:06AM    < end of copied text >      ASSESSMENT AND PLAN:  In summary,  MAGGIE ELLIOTT is an 61y Male with obesity, s/p gastric sleeve, COPD ROCK long standing NICM with EF as low as 20%, improved EF on GDMT most recent EF 30-35%, chronic AF, VT s/p ICD, HFrEF with cardiomems monitoring, came to Er c/o fever, chills, dyspnea and weight gain.  Pt had recent GIB and has been evaluated for watchman device recently by Dr Ramos.  Pt came to ER c/o fever, chills, and dyspnea.  Pt has had asp PNA in past post gastric sleeve as well as extensive dental work.  Recent cardiomems #s stable at goal.    Bacteremia  -daily blood cx x2   -echo- EF < 20%, no evidence of endocarditis   -ID following  -if pt blood cx persistently +, or wbc elevated then will consider SARIKA otherwise given pts comorbidities will Not  plan for SARIKA  -pt currently planned for d/c home today  -pt stable from a cardiac perspective for d/c home today     Chronic HFrEF  -stable  -continue current meds/dosages    Thank you for allowing [facility] to participate in the care of this patient.  Please feel free to call with any questions or concerns.

## 2021-08-21 LAB
CULTURE RESULTS: SIGNIFICANT CHANGE UP
SPECIMEN SOURCE: SIGNIFICANT CHANGE UP

## 2021-09-02 ENCOUNTER — APPOINTMENT (OUTPATIENT)
Dept: INTERNAL MEDICINE | Facility: CLINIC | Age: 61
End: 2021-09-02
Payer: COMMERCIAL

## 2021-09-02 VITALS
RESPIRATION RATE: 16 BRPM | OXYGEN SATURATION: 96 % | HEIGHT: 66 IN | DIASTOLIC BLOOD PRESSURE: 71 MMHG | TEMPERATURE: 97.9 F | HEART RATE: 89 BPM | WEIGHT: 249 LBS | SYSTOLIC BLOOD PRESSURE: 116 MMHG | BODY MASS INDEX: 40.02 KG/M2

## 2021-09-02 DIAGNOSIS — Z00.00 ENCOUNTER FOR GENERAL ADULT MEDICAL EXAMINATION W/OUT ABNORMAL FINDINGS: ICD-10-CM

## 2021-09-02 DIAGNOSIS — N39.0 URINARY TRACT INFECTION, SITE NOT SPECIFIED: ICD-10-CM

## 2021-09-02 DIAGNOSIS — B96.5 URINARY TRACT INFECTION, SITE NOT SPECIFIED: ICD-10-CM

## 2021-09-02 PROCEDURE — 99214 OFFICE O/P EST MOD 30 MIN: CPT

## 2021-09-02 NOTE — HISTORY OF PRESENT ILLNESS
[FreeTextEntry1] : This 62 yo male with PMHs of HFrEF s/p AICD, afib on Xarelto s/p pacemaker, COPD, ROCK on BiPAP, gastric bypass surgery presenting with SOB and fevers/chills. Per patient, he has worsening SOB for the past couple of day. The fever and chill for the past month or more. Low grade fever which comes and goes. Pt had a lot of dental work done recently and since not feeling well. Reports of intermittent fevers even going back to a few months ago. PMD had blood cultures done, and were negative. For the past couple of days he also noted increase in his weight despite on being Furosemide. he checked the scale at home and noted a 4 lb weight gain. He also reports dizziness and palpitation. He denies headache, chest pain, nausea, vomiting, abdominal pain, change in urinary or bowel habits.  he was admitted on  (16 Aug 2021 15:33)\par \par Previously, he was treated in 6/2021 with a course of IV antibiotics for bilateral PNA. at that time, he had fluid in his esophagus concerning for aspiration. CT of the chest on 8/16/21 showed:\par Near-complete resolution prior bilateral patchy lung opacities with new patchy\par opacity superior right lower lobe and posterior right upper lobe. \par \par He was last seen on 8/19/21 for :\par WBC elevation\par acute febrile illness\par lung infiltrates\par AICD in place\par \par Urine culture at that visit also showed pseudomonas in the urine at a moderate CFU.  he had no dysuria.\par \par for his fevers, they were of concern in the pt with recent dental work.\par - blood cx x 3 sets sent on 8/16 - remains negative - NEGATIVE here and now finalized.\par repeat 2 sets on 8/18 - - NEGATIVE here and now finalized.\par \par He was sent home on LEVAQUIN 750mg Q24H THRU and including 8/23/21\par to cover for PNEUMONIA and pseudomonas in urine\par \par He feels generally well\par still with some cough and sputum production\par able to walk\par no SOB at rest\par no fevers\par no dysuria\par \par He has a follow up with his GI surgeon, Dr. Martinez, who did his gastric sleeve in the past. \par \par \par \par =============\par \par \par Past Medical & Surgical Hx:\par =====================\par PAST MEDICAL & SURGICAL HISTORY:\par COPD (chronic obstructive pulmonary disease)\par ROCK (obstructive sleep apnea)\par Afib\par CHF (congestive heart failure)\par Cardiomyopathy, ischemic wearing life vest 2-28-19 Ejection fraction < 50% last echo 2-13-19 31% \par Asthma\par HFrEF (heart failure with reduced ejection fraction)\par AICD (automatic cardioverter/defibrillator) present\par History of loop recorder  2017 [facility]\par History of incision and drainage  right groin abcess jan 2019 [facility]\par History of cardioversion\par H/O cardiac catheterization  5 years ago at [facility].\par S/P gastric bypass\par \par \par Problem List:\par ==========\par Chronic atrial fibrillation\par Chronic systolic congestive heart failure\par COPD exacerbation\par ROCK on CPAP\par DVT prophylaxis\par Hyponatremia\par Leukocytosis\par Elevated TSH\par \par \par \par \par Social Hx:\par =======\par no toxic habits currently\par \par FAMILY HISTORY:\par Family history of CHF (congestive heart failure)\par \par no significant family history of immunosuppressive disorders in mother or\par father \par  \par =======================================================\par Allergies\par No Known Drug Allergies\par shellfish (Pruritus; Rash)\par

## 2021-09-02 NOTE — PHYSICAL EXAM
[General Appearance - Alert] : alert [General Appearance - In No Acute Distress] : in no acute distress [General Appearance - Well Nourished] : well nourished [Sclera] : the sclera and conjunctiva were normal [PERRL With Normal Accommodation] : pupils were equal in size, round, reactive to light [Extraocular Movements] : extraocular movements were intact [Outer Ear] : the ears and nose were normal in appearance [Oropharynx] : the oropharynx was normal with no thrush [Neck Cervical Mass (___cm)] : no neck mass was observed [Neck Appearance] : the appearance of the neck was normal [Jugular Venous Distention Increased] : there was no jugular-venous distention [Thyroid Diffuse Enlargement] : the thyroid was not enlarged [Exaggerated Use Of Accessory Muscles For Inspiration] : no accessory muscle use [Auscultation Breath Sounds / Voice Sounds] : lungs were clear to auscultation bilaterally [FreeTextEntry1] : no rales [Heart Rate And Rhythm] : heart rate was normal and rhythm regular [Heart Sounds] : normal S1 and S2 [Heart Sounds Pericardial Friction Rub] : no pericardial rub [Full Pulse] : the pedal pulses are present [Edema] : there was no peripheral edema [Bowel Sounds] : normal bowel sounds [Abdomen Soft] : soft [Abdomen Tenderness] : non-tender [Abdomen Mass (___ Cm)] : no abdominal mass palpated [Costovertebral Angle Tenderness] : no CVA tenderness [No Palpable Adenopathy] : no palpable adenopathy [Skin Color & Pigmentation] : normal skin color and pigmentation [] : no rash [Cranial Nerves] : cranial nerves 2-12 were intact [Sensation] : the sensory exam was normal to light touch and pinprick [No Focal Deficits] : no focal deficits [Affect] : the affect was normal [Oriented To Time, Place, And Person] : oriented to person, place, and time

## 2021-09-02 NOTE — ASSESSMENT
[FreeTextEntry1] : This 61-year-old man was seen in the hospital for fevers, he was found with lung infiltrates in August, he also had lung infiltrates in June.\par \par \par   Initially there was concern for possible endocarditis, but as blood cultures repeatedly were negative even after being held in the lab for extended time.\par \par \par He had been on Zosyn and vancomycin in the hospital then changed over to Levaquin to cover for the pneumonia.  His urine cultures at that time also has Pseudomonas and low colony-forming units.  But was covered by the Levaquin.\par \par \par Since leaving the hospital, he had resolution of fevers.  He still has some cough with bringing up of some green sputum.\par \par His current exam, he has no evidence of ongoing infection.  Therefore will defer antibiotics.\par \par \par He is to follow-up with his gastric surgery who had done his gastric sleeve in the past.  I have given him a note explaining his pneumonia, and concern for possible gastric reflux with aspiration.  I hope that he will get evaluated for this.\par \par \par \par

## 2021-09-02 NOTE — DATA REVIEWED
[FreeTextEntry1] : \par Current Antibiotics:\par levoFLOXacin Tablet 750 milliGRAM(s) Oral every 24 hours\par \par Other medications:\par ALBUTerol 90 MICROgram(s) HFA Inhaler 2 Puff(s) Inhalation every 6 hours\par aMIOdarone Tablet 200 milliGRAM(s) Oral daily\par buMETAnide 1 milliGRAM(s) Oral two times a day\par fluticasone propionate 50 MICROgram(s)/spray Nasal Spray 1 Spray(s) Both\par Nostrils two times a day\par gabapentin 600 milliGRAM(s) Oral three times a day\par lactobacillus acidophilus 1 Tablet(s) Oral two times a day\par metoprolol succinate ER 25 milliGRAM(s) Oral daily\par pantoprazole Tablet 40 milliGRAM(s) Oral before breakfast\par predniSONE Tablet 20 milliGRAM(s) Oral daily\par rivaroxaban 20 milliGRAM(s) Oral with dinner\par \par \par =======================================================\par Labs:\par \par  10.7\par 12.68 )-----------( 256 ( 18 Aug 2021 06:15 )\par  33.1\par \par 08-18\par \par 133<L> | 96<L> | 16.2\par ----------------------------< 123<H>\par 3.8 | 24.0 | 1.08\par \par Ca 8.7 18 Aug 2021 06:15\par \par TPro 6.5<L> / Alb 3.1<L> / TBili 0.7 / DBili x / AST 19 / ALT\par 23 / AlkPhos 52 08-18\par \par \par Culture - Urine (collected 08-16-21 @ 22:19)\par Source: Clean Catch Clean Catch (Midstream)\par Final Report (08-18-21 @ 14:40):\par  10,000 - 49,000 CFU/mL Pseudomonas aeruginosa\par  <10,000 CFU/ml Normal Urogenital lyle present\par Organism: Pseudomonas aeruginosa (08-18-21 @ 14:40)\par Organism: Pseudomonas aeruginosa (08-18-21 @ 14:40)\par  Sensitivities:\par  - Amikacin: S <=16\par  - Aztreonam: S 8\par  - Cefepime: S <=2\par  - Ceftazidime: S 4\par  - Ciprofloxacin: S <=0.25\par  - Gentamicin: S <=2\par  - Imipenem: S <=1\par  - Levofloxacin: S <=0.5\par  - Meropenem: S 2\par  - Piperacillin/Tazobactam: S <=8\par  - Tobramycin: S <=2\par  Method Type: GAGE\par \par Culture - Blood (collected 08-16-21 @ 11:06)\par Source: .Blood Blood\par \par Culture - Blood (collected 08-16-21 @ 11:05)\par Source: .Blood Blood\par \par Culture - Blood (collected 08-16-21 @ 11:04)\par Source: .Blood Blood\par \par \par COVID-19 PCR: NotDetec (08-16-21 @ 15:07)\par \par \par < from: Xray Chest 2 Views PA/Lat (08.16.21 @ 10:44) >\par EXAM: XR CHEST PA LAT 2V\par \par PROCEDURE DATE: 08/16/2021\par \par \par \par INTERPRETATION: Clinical history: 61-year-old male, shortness of breath.\par \par Three views of the chest are correlated with the chest CT of 8/12/2021 and\par demonstrate moderate right and minimal left perihilar interstitial pneumonia.\par \par No no gross consolidation, effusion, pneumothorax or acute osseous finding.\par \par Pacemaker with wire tips in the right atrium, right ventricle and coronary\par sinus, unchanged.\par \par IMPRESSION:\par Moderate right and minimal left perihilar interstitial infiltrates, no lobar\par consolidation, grossly unchanged\par \par --- End of Report ---\par \par \par \par \par \par SY S HERSKOVITS DO; Attending Radiologist\par This document has been electronically signed. Aug16 2021 10:49AM\par \par < end of copied text >\par \par \par < from: CT Chest No Cont (08.12.21 @ 14:34) >\par \par EXAM: CT CHEST\par \par \par PROCEDURE DATE: 08/12/2021\par \par \par \par INTERPRETATION: EXAMINATION: CT CHEST\par \par CLINICAL INDICATION: Aspiration pneumonia.\par \par TECHNIQUE: Noncontrast CT of the chest was obtained.\par \par COMPARISON: 6/22/2021.\par \par FINDINGS:\par \par AIRWAYS AND LUNGS: The central tracheobronchial tree is patent. Emphysema.\par Bronchial mucoid impaction. Near-complete resolution prior bilateral patchy\par lung opacities with new patchy opacity superior right lower lobe and posterior\par right upper lobe. Scattered clustered nodules with a lower lobe predominance is\par decreased but not resolved the prior study.\par \par MEDIASTINUM AND PLEURA: There are no enlarged mediastinal, hilar or axillary\par lymph nodes. The visualized portion of the thyroid gland is unremarkable. There\par is no pleural effusion. There is no pneumothorax.\par \par HEART AND VESSELS: There is mild cardiomegaly. There are atherosclerotic\par calcifications of the aorta and coronary arteries. There is no pericardial\par effusion. Aortic valve calcification. Left-sided defibrillator.\par \par UPPER ABDOMEN: Images of the upper abdomen demonstrate cholecystectomy.\par Pneumobilia. Esophagus is distended with debris\par \par BONES AND SOFT TISSUES: There are mild degenerative changes of the spine. The\par soft tissues are unremarkable.\par \par IMPRESSION:\par Near-complete resolution prior bilateral patchy lung opacities with new patchy\par opacity superior right lower lobe and posterior right upper lobe. Continued\par follow-up CT recommended.\par \par --- End of Report ---\par \par  \par \par \par CANDICE LEVINE MD; Attending Radiologist\par This document has been electronically signed. Aug 16 2021 10:42AM\par \par < end of copied text >

## 2021-09-10 ENCOUNTER — APPOINTMENT (OUTPATIENT)
Dept: PULMONOLOGY | Facility: CLINIC | Age: 61
End: 2021-09-10
Payer: COMMERCIAL

## 2021-09-10 VITALS
OXYGEN SATURATION: 97 % | SYSTOLIC BLOOD PRESSURE: 106 MMHG | WEIGHT: 250.6 LBS | DIASTOLIC BLOOD PRESSURE: 68 MMHG | BODY MASS INDEX: 40.45 KG/M2 | HEART RATE: 106 BPM

## 2021-09-10 DIAGNOSIS — J18.9 PNEUMONIA, UNSPECIFIED ORGANISM: ICD-10-CM

## 2021-09-10 DIAGNOSIS — L03.119 CELLULITIS OF UNSPECIFIED PART OF LIMB: ICD-10-CM

## 2021-09-10 PROCEDURE — 99214 OFFICE O/P EST MOD 30 MIN: CPT

## 2021-09-10 RX ORDER — ALBUTEROL SULFATE 90 UG/1
108 (90 BASE) INHALANT RESPIRATORY (INHALATION)
Qty: 1 | Refills: 4 | Status: ACTIVE | COMMUNITY
Start: 2021-05-04 | End: 1900-01-01

## 2021-09-10 RX ORDER — FLUTICASONE FUROATE, UMECLIDINIUM BROMIDE AND VILANTEROL TRIFENATATE 100; 62.5; 25 UG/1; UG/1; UG/1
100-62.5-25 POWDER RESPIRATORY (INHALATION) DAILY
Qty: 3 | Refills: 1 | Status: ACTIVE | COMMUNITY
Start: 2018-03-21 | End: 1900-01-01

## 2021-09-10 NOTE — ASSESSMENT
[FreeTextEntry1] : Patient with complicated medical history. Currently appears stable from a respiratory point of view. CPAP and Trelegy will be continued. Followup next regular appointment.

## 2021-09-10 NOTE — HISTORY OF PRESENT ILLNESS
[TextBox_4] : Patient was hospitalized approximately 3 weeks ago with fevers and shortness of breath. He had bilateral infiltrates it was also felt that he was at risk for endocarditis. Cultures were all negative. He followed up with ID and a repeat CT of the chest showed significant resolution of the infiltrates. Patient has been complaining of increasing edema. His CardioMEMS readings have been high and he is adjusting his medications accordingly. Some mild dyspnea on exertion is present. Compliance remains excellent as far as his CPAP goes.\par \par The patient is felt to have significant reflux. He may be going back for revision of bariatric surgery in the future, namely gastric bypass.

## 2021-09-10 NOTE — PHYSICAL EXAM
[Clear to Auscultation Bilaterally] : clear to auscultation bilaterally [TextBox_68] : Diminished bs

## 2021-09-16 ENCOUNTER — APPOINTMENT (OUTPATIENT)
Dept: ORTHOPEDIC SURGERY | Facility: HOSPITAL | Age: 61
End: 2021-09-16

## 2021-09-20 ENCOUNTER — APPOINTMENT (OUTPATIENT)
Dept: ORTHOPEDIC SURGERY | Facility: CLINIC | Age: 61
End: 2021-09-20

## 2021-10-06 ENCOUNTER — APPOINTMENT (OUTPATIENT)
Dept: ORTHOPEDIC SURGERY | Facility: CLINIC | Age: 61
End: 2021-10-06

## 2021-10-14 ENCOUNTER — NON-APPOINTMENT (OUTPATIENT)
Age: 61
End: 2021-10-14

## 2021-10-14 ENCOUNTER — APPOINTMENT (OUTPATIENT)
Dept: ELECTROPHYSIOLOGY | Facility: CLINIC | Age: 61
End: 2021-10-14
Payer: COMMERCIAL

## 2021-10-14 VITALS
BODY MASS INDEX: 40.66 KG/M2 | DIASTOLIC BLOOD PRESSURE: 60 MMHG | OXYGEN SATURATION: 99 % | WEIGHT: 253 LBS | HEIGHT: 66 IN | HEART RATE: 84 BPM | TEMPERATURE: 98 F | SYSTOLIC BLOOD PRESSURE: 92 MMHG

## 2021-10-14 VITALS — SYSTOLIC BLOOD PRESSURE: 90 MMHG | DIASTOLIC BLOOD PRESSURE: 58 MMHG

## 2021-10-14 DIAGNOSIS — R52 PAIN, UNSPECIFIED: ICD-10-CM

## 2021-10-14 DIAGNOSIS — I50.20 UNSPECIFIED SYSTOLIC (CONGESTIVE) HEART FAILURE: ICD-10-CM

## 2021-10-14 DIAGNOSIS — Z95.810 PRESENCE OF AUTOMATIC (IMPLANTABLE) CARDIAC DEFIBRILLATOR: ICD-10-CM

## 2021-10-14 DIAGNOSIS — Z86.79 PERSONAL HISTORY OF OTHER DISEASES OF THE CIRCULATORY SYSTEM: ICD-10-CM

## 2021-10-14 DIAGNOSIS — Z98.890 OTHER SPECIFIED POSTPROCEDURAL STATES: ICD-10-CM

## 2021-10-14 PROCEDURE — 99204 OFFICE O/P NEW MOD 45 MIN: CPT

## 2021-10-14 PROCEDURE — 99214 OFFICE O/P EST MOD 30 MIN: CPT

## 2021-10-14 PROCEDURE — 93000 ELECTROCARDIOGRAM COMPLETE: CPT | Mod: 59

## 2021-10-14 PROCEDURE — 93282 PRGRMG EVAL IMPLANTABLE DFB: CPT

## 2021-10-14 RX ORDER — HYDROCODONE BITARTRATE AND ACETAMINOPHEN 5; 325 MG/1; MG/1
5-325 TABLET ORAL
Qty: 12 | Refills: 0 | Status: ACTIVE | COMMUNITY
Start: 2021-06-18

## 2021-10-14 RX ORDER — AMIODARONE HYDROCHLORIDE 200 MG/1
200 TABLET ORAL
Qty: 180 | Refills: 0 | Status: ACTIVE | COMMUNITY
Start: 2020-12-15

## 2021-10-14 RX ORDER — RIVAROXABAN 20 MG/1
20 TABLET, FILM COATED ORAL
Qty: 90 | Refills: 1 | Status: ACTIVE | COMMUNITY

## 2021-10-14 RX ORDER — LEVOCETIRIZINE DIHYDROCHLORIDE 5 MG/1
5 TABLET ORAL
Refills: 0 | Status: DISCONTINUED | COMMUNITY
End: 2021-10-14

## 2021-10-14 RX ORDER — GABAPENTIN 600 MG/1
600 TABLET, COATED ORAL 3 TIMES DAILY
Refills: 0 | Status: ACTIVE | COMMUNITY
Start: 2020-11-12

## 2021-10-14 RX ORDER — PANTOPRAZOLE 40 MG/1
40 TABLET, DELAYED RELEASE ORAL TWICE DAILY
Refills: 0 | Status: ACTIVE | COMMUNITY
Start: 2021-10-14

## 2021-10-14 RX ORDER — PREDNISONE 10 MG/1
10 TABLET ORAL
Qty: 30 | Refills: 5 | Status: DISCONTINUED | COMMUNITY
Start: 2020-07-02 | End: 2021-10-14

## 2021-10-14 RX ORDER — BUMETANIDE 2 MG/1
2 TABLET ORAL
Refills: 0 | Status: DISCONTINUED | COMMUNITY
End: 2021-10-14

## 2021-10-14 RX ORDER — L. ACIDOPHILUS/PECTIN, CITRUS 25MM-100MG
TABLET ORAL
Qty: 8 | Refills: 0 | Status: ACTIVE | COMMUNITY
Start: 2021-06-26

## 2021-10-14 RX ORDER — METOPROLOL TARTRATE 25 MG/1
25 TABLET, FILM COATED ORAL DAILY
Refills: 0 | Status: ACTIVE | COMMUNITY
Start: 2021-10-14

## 2021-10-14 RX ORDER — MUPIROCIN 20 MG/G
2 OINTMENT TOPICAL
Refills: 0 | Status: ACTIVE | COMMUNITY
Start: 2021-01-05

## 2021-10-14 RX ORDER — DIGOXIN 250 UG/1
250 TABLET ORAL
Qty: 90 | Refills: 0 | Status: DISCONTINUED | COMMUNITY
Start: 2020-04-24 | End: 2021-10-14

## 2021-10-14 RX ORDER — POTASSIUM CHLORIDE 1500 MG/1
20 TABLET, EXTENDED RELEASE ORAL 3 TIMES DAILY
Refills: 0 | Status: ACTIVE | COMMUNITY
Start: 2020-11-24

## 2021-10-14 RX ORDER — METOPROLOL TARTRATE 100 MG/1
100 TABLET, FILM COATED ORAL
Refills: 0 | Status: DISCONTINUED | COMMUNITY
End: 2021-10-14

## 2021-10-14 RX ORDER — METOCLOPRAMIDE 5 MG/1
5 TABLET ORAL
Qty: 120 | Refills: 0 | Status: DISCONTINUED | COMMUNITY
Start: 2021-01-29 | End: 2021-10-14

## 2021-10-14 RX ORDER — BUMETANIDE 2 MG/1
2 TABLET ORAL TWICE DAILY
Refills: 0 | Status: ACTIVE | COMMUNITY
Start: 2021-10-14

## 2021-10-14 RX ORDER — IPRATROPIUM BROMIDE AND ALBUTEROL 20; 100 UG/1; UG/1
20-100 SPRAY, METERED RESPIRATORY (INHALATION)
Refills: 0 | Status: ACTIVE | COMMUNITY
Start: 2021-07-21

## 2021-10-14 RX ORDER — DULAGLUTIDE 0.75 MG/.5ML
0.75 INJECTION, SOLUTION SUBCUTANEOUS
Qty: 2 | Refills: 0 | Status: DISCONTINUED | COMMUNITY
Start: 2021-04-19 | End: 2021-10-14

## 2021-10-14 RX ORDER — ERGOCALCIFEROL 1.25 MG/1
1.25 MG CAPSULE, LIQUID FILLED ORAL WEEKLY
Refills: 0 | Status: ACTIVE | COMMUNITY

## 2021-10-14 RX ORDER — SPIRONOLACTONE 25 MG/1
25 TABLET ORAL TWICE DAILY
Refills: 0 | Status: ACTIVE | COMMUNITY
Start: 2019-01-22

## 2021-10-14 RX ORDER — AZELASTINE HYDROCHLORIDE AND FLUTICASONE PROPIONATE 137; 50 UG/1; UG/1
137-50 SPRAY, METERED NASAL
Qty: 23 | Refills: 0 | Status: DISCONTINUED | COMMUNITY
Start: 2021-02-04 | End: 2021-10-14

## 2021-10-14 RX ORDER — SUCRALFATE 1 G/10ML
1 SUSPENSION ORAL
Qty: 840 | Refills: 0 | Status: DISCONTINUED | COMMUNITY
Start: 2021-02-19 | End: 2021-10-14

## 2021-10-14 NOTE — DISCUSSION/SUMMARY
[FreeTextEntry1] : 61 year old gentleman with history of Nonischemic cardiomyopathy and HFrEF (LVEF 35%), s/p cardiomems, s/p ICD (Medtronic), permanent atrial fibrillation, COPD, ROCK, PAD, obesity s/p gastric bypass, and GI bleed, presenting for consideration of left atrial appendage occlusion. He has had persistent AF for many years, and his AF has recently been considered permanent. In this setting I do believe he is at s a high thromboembolic risk which is not completely reflected by his CHADSVASc score (which is current 2-3 given CHF, PAD, prior HTN now improved s/p weight loss and medical therapy). He certainly warrants anticoagulation, but has had increased bleeding risks related to bothersome minor bleeding and bruising in the past which he finds highly bothersome, as well as recent severe GI bleed and progressive medical illnesses in the past. He has discussed options with his cardiologists in detail and does want to proceed with the Watchman procedure as an alternative to long-term anticoagulation. We discussed the risks and benefits of left atrial appendage occlusion in detail, including procedure related risks such as bleeding, vascular injury, cardiac perforation and stroke. We discussed that LAAO is not superior to anticoagulation for thromboembolic protection, but does likely reduce the risk of bleeding complications by coming off long-term anticoagulation. He expressed understanding, and after a shared decision making process involving himself, his family, Dr Deng, DR Ramos and myself he wants to proceed.  \par \par In addition, he has CHF with severe LV dysfunction, which is likely exacerbated both by his AF and IVCD. I do think he may ultimately benefit from a trial of sinus rhythm, though rhythm control may be very difficult to achieve given his long-standing arrhythmia. A DCCV may be considered at the time of LAAO to evaluate for efficacy, particularly now that he has been on amiodarone for some time. Ultimately an ablation could also be considered.  \par \par He would also benefit from CRT, but despite two procedures he has not had adequate LV pacing. This could be addressed in a future procedure as well.  \par \par -CT to evaluate cardiac anatomy \par \par -Watchman procedure after above \par \par -t/c DCCV at time of Watchman procedure.  \par \par -after Watchman implant will continue anticoagulation x 6 weeks, followed by SARIKA. Then transition to dual antiplatelet therapy until 6 mo post procedure, and then ASA indefinitely.

## 2021-10-14 NOTE — HISTORY OF PRESENT ILLNESS
[FreeTextEntry1] : 61 year old gentleman with history of Nonischemic cardiomyopathy and HFrEF (LVEF 35%), s/p cardiomems, s/p ICD (Medtronic), permanent atrial fibrillation, PAD, HTN, COPD, ROCK, obesity s/p gastric bypass, and GI bleed, presenting for consideration of left atrial appendage occlusion.  \par \par He has a long history of AF since around 2014 which has been considered permanent, as he had recurrence after DCCV in the past and believes he is generally asymptomatic from AF. He does have intermittent right ventricular pacing but generally controlled HRs in AF. He has been on anticoagulation with Xarelto for several years. His primary concern is regarding frequent bothersome bruising and  minor bleeding with cuts and minor injuries, that he finds highly bothersome. In addition, he recently had an episode of GI bleeding which occurred after an endooscopy gall bladder surgery, and he presented with loss of conciousness and severe acute blood loss anemia  for which he received blood transfusions.  He had a repeat endoscopy and no further intervention was performed, and he denies recurrent GI bleeding. Due to concern about bleeding complications on anticoagulation, he saw Dr Ramos in consultation for potential Watchman implantation, which was then initially planned at [facility], but this was not covered by his insurance.  \par \par His MDT CRT-D device was implanted in 5/2019 by Dr Ramos. There was LV lead malfunction and the LV lead was replaced 9/24/19, however despite this the LV lead is no longer capturing, and the ICD is programmed in VVI mode. Interrogation of the ICD today reveals normal function in VVI 60 mode with 7.2% ventricular pacing.  \par \par ECG reveals atrial fibrillation with intermittent V pacing and underlying IVCD and LAD with  ms.  \par \par He does also have a history of polymorphic ventricular arrhythmia in 11/2020 with loss of consciousness and appropriate ICD discharge. He has remained on amiodarone 200mg bid and beta blockade.  \par  \par \par In addition, he presented with recurrent chills and fevers for the last month, and saw Dr. Bassem Gonzalez (ID). He has had fevers for a few months and prior blood cultures were negative. In 6/2021 he did have pneumonia and was treated for bilateral PNA. He had suspected aspiration at that time. A follow-up CT 8/16/21 revealed near complete resolution of the prior lung opacities, with a new patchy opacity in the right upper and right lower lobes. At that time urine cultures revealed pseudomonas, and repeat blood cultures x 3 on 8/16/21 and x2 on 8/18/21 were all negative. He was previously treated with zosyn and vancomycin while in the hospital,  treated with Levaquin through 8/23/21. He does also have a history of right groin abscess requiring drainage 2019. \par \par  At this time he does feel generally well and has been much better since he has lost >70 pounds after bariatric surgery. HE does light exercise and denies significant dyspnea or edema.   \par \par  \par \par Current meds include Xarelto, amiodarone 200mg bid, metoprolol 25 mg qd, aldactone, lisinopril 2.5mg, prednisone \par \par

## 2021-10-14 NOTE — PHYSICAL EXAM
[Well Developed] : well developed [Well Nourished] : well nourished [No Acute Distress] : no acute distress [Obese] : obese [Normal Venous Pressure] : normal venous pressure [Normal S1, S2] : normal S1, S2 [Soft] : abdomen soft [Normal Gait] : normal gait [Normal] : moves all extremities, no focal deficits, normal speech [Moves all extremities] : moves all extremities [No Focal Deficits] : no focal deficits [Alert and Oriented] : alert and oriented [de-identified] : irregularly irregular [de-identified] : trace LE edema

## 2021-10-14 NOTE — REVIEW OF SYSTEMS
[SOB] : no shortness of breath [Dyspnea on exertion] : dyspnea during exertion [Chest Discomfort] : no chest discomfort [Leg Claudication] : no intermittent leg claudication [Palpitations] : no palpitations [Orthopnea] : no orthopnea [Syncope] : no syncope [Dizziness] : no dizziness [Easy Bleeding] : a tendency for easy bleeding [Easy Bruising] : a tendency for easy bruising [Negative] : Musculoskeletal

## 2021-10-14 NOTE — CARDIOLOGY SUMMARY
[de-identified] : 10/14/21 atrial fibrillation with intermittent ventricular pacing, underlying IVCD (QRS 140ms, LB-like) [de-identified] :  TTE 9/22/21 LVEF 29-33%, RWMA with akinesis of inferior and inferoseptal walls, mildly decreased RV function, mild MR, mild-mod AS, mild AI, mild-mod TR \par \par TTE 8/17/21 LVEF <20% severe LA enlargement, mild AI, mild AS

## 2021-11-02 ENCOUNTER — APPOINTMENT (OUTPATIENT)
Dept: ORTHOPEDIC SURGERY | Facility: CLINIC | Age: 61
End: 2021-11-02
Payer: COMMERCIAL

## 2021-11-02 VITALS
WEIGHT: 248 LBS | DIASTOLIC BLOOD PRESSURE: 61 MMHG | HEART RATE: 98 BPM | SYSTOLIC BLOOD PRESSURE: 99 MMHG | HEIGHT: 67 IN | BODY MASS INDEX: 38.92 KG/M2

## 2021-11-02 DIAGNOSIS — M17.0 BILATERAL PRIMARY OSTEOARTHRITIS OF KNEE: ICD-10-CM

## 2021-11-02 PROCEDURE — 20610 DRAIN/INJ JOINT/BURSA W/O US: CPT | Mod: 50

## 2021-11-02 PROCEDURE — 99213 OFFICE O/P EST LOW 20 MIN: CPT | Mod: 25

## 2021-11-02 NOTE — HISTORY OF PRESENT ILLNESS
[Pain Location] : pain [5] : a current pain level of 5/10 [7] : a maximum pain level of 7/10 [de-identified] : 61 year old male presents for follow up of bilateral knee pain, left worse than right, secondary to end-stage OA. \par pt report an incidence of severe pain in the left knee while walking at [facility]. he could not walk and troubled to walk back to car.\par he has been getting good pain relief with cortisone injection.\par He has a history of CHF with exacerbation resulting in hospitalizations at [facility] in the past. He has an oxygen tank at home but does not use it. \par he recently had PPM insertion. seeing cardiologist. pt is on Xarelto. pt is working\par he is interested in TKA but has not cleared by cardiology for TKA yet

## 2021-11-02 NOTE — PHYSICAL EXAM
[Antalgic] : antalgic [de-identified] : GENERAL APPEARANCE:   Well nourished and hydrated, pleasant, alert, and oriented x 3.  Appears their stated age.  \par Respiratory: No wheeezing, breath sounds clear. No cyanosis, no us of accesory musculature \par CARDIOVASCULAR:   No apparent abnormalities.  No lower leg edema.  No varicosities.  Pedal pulses are palpable.\par NEUROLOGIC:   Sensation is normal, no muscle weakness in the upper or lower extremities.\par DERMATOLOGIC:   No apparent skin lesions, moist, warm, no rash.\par SPINE:   Cervical spine appears normal and moves freely; thoracic spine appears normal and moves freely; lumbosacral spine appears normal and moves freely, normal, nontender.\par MUSCULOSKELETAL:   Hands, wrists, and elbows are normal and move freely, shoulders are normal and move freely. \par  [de-identified] : Both knees have marked valgus deformity he has an antalgic gait his range of motion is preserved 0-115

## 2021-11-02 NOTE — DISCUSSION/SUMMARY
[de-identified] : Medication risks reviewed. Surgical risks reviewed. 61 year old male presents for follow up of bilateral knee pain, left worse than right, secondary to end-stage osteoarthritis. He is a candidate for staged bilateral total knee arthroplasty when he fails conservative therapies. His pain fluctuates. He had a lot of pain several weeks ago, but has minimal pain today. He is interested in pursuing the left TKA in the Fall of 2021. He is aware and understands the risk of infection and developing a foot drop post-op due to his valgus deformity. He has a history of CHF with exacerbation resulting in hospitalizations at [facility] in the past. He has an oxygen tank at home, but does not use it. He now has PPM and is seeing a cardiologist. Pt is on Xarelto. He states that he needs more work cardiac clearance. Pt opted to receive a cortisone injection in the b/l knee today and tolerated it well. He understands that he must wait at least three months after receiving a left knee cortisone injection to have the left TKA. F/u with us in three months for repeat cortisone injections if needed. He will schedule the left TKA today.\par \par \par The patient is a 61 year old individual with end stage arthritis of their bilateral knee joint. Based upon the patient's continued symptoms and failure to respond to conservative treatment I have recommended a staged bilateral total knee arthroplasty for this patient. A long discussion took place with the patient describing what a total joint replacement is and what the procedure would entail. A total knee arthroplasty model, similar to the implant that will be used during the operation, was utilized to demonstrate and to discuss the various bearing surfaces of the implants. The hospitalization and post-operative care and rehabilitation were also discussed. The use of perioperative antibiotics and DVT prophylaxis were discussed. The risk, benefits and alternatives to a surgical intervention were discussed at length with the patient.  The patient was also advised of risks related to the medical comorbidities, elevated body mass index (BMI),  and smoking where applicable.  We discussed how to reduce modifiable risk factors and encouraged smoking cessation were applicable.. A lengthy discussion took place to review the most common complications including but not limited to: deep vein thrombosis, pulmonary embolus, heart attack, stroke, infection, wound breakdown, numbness, damage to nerves, tendon, muscles, arteries or other blood vessels, death and other possible complications from anesthesia. The patient was told that we will take steps to minimize these risks by using sterile technique, antibiotics and DVT prophylaxis when appropriate and follow the patient postoperatively in the office setting to monitor progress. The possibility of recurrent pain, no improvement in pain and actual worsening of pain were also discussed with the patient.\par The discharge plan of care focused on the patient going home following surgery. The patient was encouraged to make the necessary arrangements to have someone stay with them when they are discharged home. Following discharge, a home care nurse will visit the patient. The home care nurse will open your home care case and request home physical therapy services. Home physical therapy will commence following discharge provided it is appropriate and covered by the health insurance benefit plan. \par The benefits of surgery were discussed with the patient including the potential for improving his/her current clinical condition through operative intervention. Alternatives to surgical intervention including continued conservative management were also discussed in detail. All questions were answered to the satisfaction of the patient. The treatment plan of care, as well as a model of a total knee arthroplasty equivalent to the one that will be used for their total joint replacement, was shared with the patient. The patient agreed to the plan of care as well as the use of implants in their total joint replacement. \par \par \par \par

## 2021-11-02 NOTE — PROCEDURE
[de-identified] : pt received b/l knee cortisone injection \par \par  I discussed at length with the patient the planned steroid and lidocaine injection for primary osteoarthritis. The risks, benefits, convalescence and alternatives were reviewed and pt consented for injection. The possible side effects discussed included but were not limited to: pain, swelling, heat, bleeding, and redness. Symptoms are generally mild but if they are extensive then contact the office. Giving pain relievers by mouth such as NSAIDs or Tylenol can generally treat the reactions to steroid and lidocaine. Rare cases of infection have been noted. Rash, hives and itching may occur post injection. If you have muscle pain or cramps, flushing and or swelling of the face, rapid heart beat, nausea, dizziness, fever, chills, headache, difficulty breathing, swelling in the arms or legs, or have a prickly feeling of your skin, contact a health care provider immediately. Following this discussion, the knee was prepped with Alcohol and under sterile condition the 80 mg Depo-Medrol and 6 cc Lidocaine injection was performed with a 20 gauge needle through a superolateral injection site. The needle was introduced into the joint, aspiration was performed to ensure intra-articular placement and the medication was injected. Upon withdrawal of the needle the site was cleaned with alcohol and a band aid applied. The patient tolerated the injection well and there were no adverse effects. Post injection instructions included no strenuous activity for 24 hours, cryotherapy and if there are any adverse effects to contact the office.\par  \par

## 2021-11-08 RX ORDER — PREDNISONE 10 MG/1
10 TABLET ORAL
Qty: 30 | Refills: 1 | Status: ACTIVE | COMMUNITY
Start: 2019-10-15 | End: 1900-01-01

## 2021-11-09 ENCOUNTER — APPOINTMENT (OUTPATIENT)
Dept: ORTHOPEDIC SURGERY | Facility: CLINIC | Age: 61
End: 2021-11-09

## 2021-11-16 ENCOUNTER — APPOINTMENT (OUTPATIENT)
Dept: PULMONOLOGY | Facility: CLINIC | Age: 61
End: 2021-11-16
Payer: COMMERCIAL

## 2021-11-16 VITALS — RESPIRATION RATE: 16 BRPM

## 2021-11-16 VITALS
DIASTOLIC BLOOD PRESSURE: 58 MMHG | HEART RATE: 80 BPM | OXYGEN SATURATION: 90 % | SYSTOLIC BLOOD PRESSURE: 106 MMHG | WEIGHT: 255 LBS | BODY MASS INDEX: 39.94 KG/M2

## 2021-11-16 DIAGNOSIS — Z86.79 PERSONAL HISTORY OF OTHER DISEASES OF THE CIRCULATORY SYSTEM: ICD-10-CM

## 2021-11-16 DIAGNOSIS — I48.11 LONGSTANDING PERSISTENT ATRIAL FI: ICD-10-CM

## 2021-11-16 PROCEDURE — 99214 OFFICE O/P EST MOD 30 MIN: CPT

## 2021-11-16 RX ORDER — ALBUTEROL SULFATE 90 UG/1
108 (90 BASE) AEROSOL, METERED RESPIRATORY (INHALATION)
Qty: 1 | Refills: 5 | Status: ACTIVE | COMMUNITY
Start: 2021-11-16 | End: 1900-01-01

## 2021-11-16 RX ORDER — PREDNISONE 10 MG/1
10 TABLET ORAL
Qty: 30 | Refills: 1 | Status: ACTIVE | COMMUNITY
Start: 2021-11-16 | End: 1900-01-01

## 2021-11-16 NOTE — HISTORY OF PRESENT ILLNESS
[TextBox_4] : He has been complaining of increased shortness of breath over the past 2 weeks. We gave him a course of prednisone which helped temporarily but he got worse. He saw a cardiologist last week. He was not in heart failure with a normal BNP, and a chest x-ray was unremarkable as well. He has been faithful with Trelegy and using his rescue inhaler a little more frequently.\par \par The patient is going for revision of his gastric sleeve at [facility] January 11. This will be replaced with gastric bypass. He's been having significant issues with reflux and has had several aspiration pneumonias since the bariatric surgery.

## 2021-11-16 NOTE — CONSULT LETTER
[Dear  ___] : Dear  [unfilled], [Courtesy Letter:] : I had the pleasure of seeing your patient, [unfilled], in my office today. [Please see my note below.] : Please see my note below. [Consult Closing:] : Thank you very much for allowing me to participate in the care of this patient.  If you have any questions, please do not hesitate to contact me. [Sincerely,] : Sincerely, [FreeTextEntry3] : Willow Tamayo MD FCCP\par D-ABSM\par ABIM board certified in  Pulmonary diseases, Sleep medicine\par Internal medicine\par  [DrJunior  ___] : Dr. BRICE [DrJunior ___] : Dr. BRICE

## 2021-11-16 NOTE — ASSESSMENT
[FreeTextEntry1] : This could represent exacerbation of asthma/COPD. No overt evidence for aspiration pneumonia at this time. No overt heart failure at this time. We will give another course of prednisone. His medications were renewed. I will see the patient back in about 6 weeks and will get spirometry prior to his surgery.

## 2021-11-29 NOTE — REVIEW OF SYSTEMS
[Joint Pain] : joint pain [Negative] : Heme/Lymph [FreeTextEntry9] : b/l knee History of foot fracture

## 2021-12-08 ENCOUNTER — APPOINTMENT (OUTPATIENT)
Dept: ORTHOPEDIC SURGERY | Facility: CLINIC | Age: 61
End: 2021-12-08

## 2021-12-08 ENCOUNTER — APPOINTMENT (OUTPATIENT)
Dept: PULMONOLOGY | Facility: CLINIC | Age: 61
End: 2021-12-08
Payer: COMMERCIAL

## 2021-12-08 VITALS — WEIGHT: 259 LBS | BODY MASS INDEX: 40.57 KG/M2

## 2021-12-08 VITALS
SYSTOLIC BLOOD PRESSURE: 115 MMHG | OXYGEN SATURATION: 95 % | HEART RATE: 102 BPM | RESPIRATION RATE: 16 BRPM | DIASTOLIC BLOOD PRESSURE: 68 MMHG

## 2021-12-08 DIAGNOSIS — T17.928S FOOD IN RESPIRATORY TRACT, PART UNSPECIFIED CAUSING OTHER INJURY, SEQUELA: ICD-10-CM

## 2021-12-08 DIAGNOSIS — I50.30 UNSPECIFIED DIASTOLIC (CONGESTIVE) HEART FAILURE: ICD-10-CM

## 2021-12-08 PROCEDURE — 99214 OFFICE O/P EST MOD 30 MIN: CPT

## 2021-12-08 NOTE — HISTORY OF PRESENT ILLNESS
[TextBox_4] : Patient her other episode of reflux with aspiration 10 days ago. He was she with antibiotics by his primary physician. He developed decompensation of diastolic heart failure with increased edema treated with diuretics. This improved but his breathing remained poor so he increased his prednisone to 20 mg daily in the last 2 days and is feeling better with that. He is scheduled for pulmonary function studies at the beginning of January and revision of his bariatric surgery on January 11.

## 2021-12-08 NOTE — ASSESSMENT
[FreeTextEntry1] : Patient with some worsening of respiratory status do to recurrent aspiration and diastolic heart failure. I told him he can stay on prednisone 20 mg for the next 5 days. Then back to 5 mg daily. I will see him back for the preoperative spirometry.

## 2021-12-15 ENCOUNTER — RX RENEWAL (OUTPATIENT)
Age: 61
End: 2021-12-15

## 2021-12-19 ENCOUNTER — FORM ENCOUNTER (OUTPATIENT)
Age: 61
End: 2021-12-19

## 2021-12-22 ENCOUNTER — FORM ENCOUNTER (OUTPATIENT)
Age: 61
End: 2021-12-22

## 2021-12-23 PROBLEM — Z00.00 ENCOUNTER FOR PREVENTIVE HEALTH EXAMINATION: Status: ACTIVE | Noted: 2021-12-23

## 2021-12-30 ENCOUNTER — NON-APPOINTMENT (OUTPATIENT)
Age: 61
End: 2021-12-30

## 2021-12-30 ENCOUNTER — APPOINTMENT (OUTPATIENT)
Dept: INFECTIOUS DISEASE | Facility: CLINIC | Age: 61
End: 2021-12-30
Payer: COMMERCIAL

## 2021-12-30 VITALS
HEIGHT: 67 IN | TEMPERATURE: 98.5 F | OXYGEN SATURATION: 96 % | WEIGHT: 256 LBS | BODY MASS INDEX: 40.18 KG/M2 | DIASTOLIC BLOOD PRESSURE: 54 MMHG | SYSTOLIC BLOOD PRESSURE: 92 MMHG | HEART RATE: 95 BPM | RESPIRATION RATE: 15 BRPM

## 2021-12-30 DIAGNOSIS — B95.2 BACTEREMIA: ICD-10-CM

## 2021-12-30 DIAGNOSIS — J96.01 ACUTE RESPIRATORY FAILURE WITH HYPOXIA: ICD-10-CM

## 2021-12-30 DIAGNOSIS — J18.9 PNEUMONIA, UNSPECIFIED ORGANISM: ICD-10-CM

## 2021-12-30 DIAGNOSIS — J44.9 CHRONIC OBSTRUCTIVE PULMONARY DISEASE, UNSPECIFIED: ICD-10-CM

## 2021-12-30 DIAGNOSIS — E66.01 MORBID (SEVERE) OBESITY DUE TO EXCESS CALORIES: ICD-10-CM

## 2021-12-30 DIAGNOSIS — R78.81 BACTEREMIA: ICD-10-CM

## 2021-12-30 DIAGNOSIS — I48.20 CHRONIC ATRIAL FIBRILLATION, UNSP: ICD-10-CM

## 2021-12-30 DIAGNOSIS — I50.9 HEART FAILURE, UNSPECIFIED: ICD-10-CM

## 2021-12-30 PROCEDURE — 99214 OFFICE O/P EST MOD 30 MIN: CPT

## 2021-12-30 RX ORDER — METOPROLOL TARTRATE 25 MG/1
25 TABLET, FILM COATED ORAL
Refills: 0 | Status: ACTIVE | COMMUNITY

## 2021-12-30 RX ORDER — PANTOPRAZOLE 40 MG/1
40 TABLET, DELAYED RELEASE ORAL
Refills: 0 | Status: ACTIVE | COMMUNITY

## 2021-12-30 RX ORDER — GABAPENTIN 600 MG/1
600 TABLET, COATED ORAL
Refills: 0 | Status: ACTIVE | COMMUNITY

## 2021-12-30 RX ORDER — AMIODARONE HYDROCHLORIDE 200 MG/1
200 TABLET ORAL
Refills: 0 | Status: ACTIVE | COMMUNITY

## 2021-12-30 RX ORDER — SACUBITRIL AND VALSARTAN 24; 26 MG/1; MG/1
24-26 TABLET, FILM COATED ORAL
Refills: 0 | Status: ACTIVE | COMMUNITY

## 2021-12-30 RX ORDER — MONTELUKAST SODIUM 10 MG/1
10 TABLET, FILM COATED ORAL
Refills: 0 | Status: ACTIVE | COMMUNITY

## 2021-12-30 RX ORDER — ALBUTEROL 90 MCG
90 AEROSOL (GRAM) INHALATION
Refills: 0 | Status: ACTIVE | COMMUNITY

## 2021-12-30 RX ORDER — RIVAROXABAN 2.5 MG/1
TABLET, FILM COATED ORAL
Refills: 0 | Status: ACTIVE | COMMUNITY

## 2021-12-30 RX ORDER — SPIRONOLACTONE 50 MG/1
TABLET ORAL
Refills: 0 | Status: ACTIVE | COMMUNITY

## 2021-12-30 RX ORDER — IPRATROPIUM BROMIDE AND ALBUTEROL 20; 100 UG/1; UG/1
20-100 SPRAY, METERED RESPIRATORY (INHALATION)
Refills: 0 | Status: ACTIVE | COMMUNITY

## 2021-12-30 RX ORDER — EMPAGLIFLOZIN 25 MG/1
TABLET, FILM COATED ORAL
Refills: 0 | Status: ACTIVE | COMMUNITY

## 2021-12-30 RX ORDER — LISINOPRIL 2.5 MG/1
2.5 TABLET ORAL
Refills: 0 | Status: ACTIVE | COMMUNITY

## 2021-12-30 RX ORDER — SUCRALFATE 1 G/1
TABLET ORAL
Refills: 0 | Status: ACTIVE | COMMUNITY

## 2021-12-31 LAB
ALBUMIN SERPL ELPH-MCNC: 3.6 G/DL
ALP BLD-CCNC: 50 U/L
ALT SERPL-CCNC: 26 U/L
ANION GAP SERPL CALC-SCNC: 15 MMOL/L
AST SERPL-CCNC: 22 U/L
BASOPHILS # BLD AUTO: 0.04 K/UL
BASOPHILS NFR BLD AUTO: 0.3 %
BILIRUB SERPL-MCNC: 0.6 MG/DL
BUN SERPL-MCNC: 25 MG/DL
CALCIUM SERPL-MCNC: 8.8 MG/DL
CHLORIDE SERPL-SCNC: 96 MMOL/L
CO2 SERPL-SCNC: 21 MMOL/L
CREAT SERPL-MCNC: 1.44 MG/DL
CRP SERPL-MCNC: 10 MG/L
EOSINOPHIL # BLD AUTO: 0.06 K/UL
EOSINOPHIL NFR BLD AUTO: 0.5 %
ERYTHROCYTE [SEDIMENTATION RATE] IN BLOOD BY WESTERGREN METHOD: 63 MM/HR
GLUCOSE SERPL-MCNC: 80 MG/DL
HCT VFR BLD CALC: 35.1 %
HGB BLD-MCNC: 10.8 G/DL
IMM GRANULOCYTES NFR BLD AUTO: 0.2 %
LYMPHOCYTES # BLD AUTO: 1.72 K/UL
LYMPHOCYTES NFR BLD AUTO: 14.3 %
MAN DIFF?: NORMAL
MCHC RBC-ENTMCNC: 27.3 PG
MCHC RBC-ENTMCNC: 30.8 GM/DL
MCV RBC AUTO: 88.9 FL
MONOCYTES # BLD AUTO: 0.62 K/UL
MONOCYTES NFR BLD AUTO: 5.1 %
NEUTROPHILS # BLD AUTO: 9.57 K/UL
NEUTROPHILS NFR BLD AUTO: 79.6 %
PLATELET # BLD AUTO: 314 K/UL
POTASSIUM SERPL-SCNC: 4.4 MMOL/L
PROT SERPL-MCNC: 6.2 G/DL
RBC # BLD: 3.95 M/UL
RBC # FLD: 16.4 %
SODIUM SERPL-SCNC: 132 MMOL/L
WBC # FLD AUTO: 12.04 K/UL

## 2022-01-03 ENCOUNTER — APPOINTMENT (OUTPATIENT)
Dept: PULMONOLOGY | Facility: CLINIC | Age: 62
End: 2022-01-03
Payer: COMMERCIAL

## 2022-01-03 VITALS — WEIGHT: 254 LBS | HEIGHT: 65 IN | BODY MASS INDEX: 42.32 KG/M2

## 2022-01-03 VITALS
RESPIRATION RATE: 16 BRPM | DIASTOLIC BLOOD PRESSURE: 64 MMHG | SYSTOLIC BLOOD PRESSURE: 98 MMHG | OXYGEN SATURATION: 98 % | HEART RATE: 102 BPM

## 2022-01-03 DIAGNOSIS — Z01.811 ENCOUNTER FOR PREPROCEDURAL RESPIRATORY EXAMINATION: ICD-10-CM

## 2022-01-03 DIAGNOSIS — Z99.89 OBSTRUCTIVE SLEEP APNEA (ADULT) (PEDIATRIC): ICD-10-CM

## 2022-01-03 DIAGNOSIS — G47.33 OBSTRUCTIVE SLEEP APNEA (ADULT) (PEDIATRIC): ICD-10-CM

## 2022-01-03 DIAGNOSIS — J44.9 CHRONIC OBSTRUCTIVE PULMONARY DISEASE, UNSPECIFIED: ICD-10-CM

## 2022-01-03 PROCEDURE — 99214 OFFICE O/P EST MOD 30 MIN: CPT | Mod: 25

## 2022-01-03 PROCEDURE — 94010 BREATHING CAPACITY TEST: CPT

## 2022-01-03 RX ORDER — SACUBITRIL AND VALSARTAN 24; 26 MG/1; MG/1
24-26 TABLET, FILM COATED ORAL
Refills: 0 | Status: ACTIVE | COMMUNITY

## 2022-01-03 RX ORDER — LISINOPRIL 2.5 MG/1
2.5 TABLET ORAL
Refills: 0 | Status: DISCONTINUED | COMMUNITY
End: 2022-01-03

## 2022-01-03 RX ORDER — PREDNISONE 5 MG/1
5 TABLET ORAL
Qty: 30 | Refills: 2 | Status: DISCONTINUED | COMMUNITY
Start: 2020-11-03 | End: 2022-01-03

## 2022-01-03 RX ORDER — EMPAGLIFLOZIN 10 MG/1
10 TABLET, FILM COATED ORAL
Refills: 0 | Status: ACTIVE | COMMUNITY

## 2022-01-03 NOTE — HISTORY OF PRESENT ILLNESS
[TextBox_4] : The patient was hospitalized about 10 days ago with decompensated heart failure, and shortness of breath. This was at [facility]. He had enterococcal bacteremia. He was treated with antibiotics with improvement. Some shortness of breath over baseline this morning for which he took prednisone 20 mg. He remains compliant with CPAP. He is going for revision of his bariatric surgery next week at [facility]. Patient states that Joice is aware of his recent developments. He has been cleared by cardiology for the surgery.

## 2022-01-03 NOTE — PROCEDURE
[FreeTextEntry1] : Spirometry reveals flows and volumes in the normal range although slightly diminished from what was seen previously.

## 2022-01-03 NOTE — ASSESSMENT
[FreeTextEntry1] : Patient will go for his bariatric surgery next week. I told him to take prednisone 20 mg the morning of the surgery and continue his outpatient medications. CPAP will be used nocturnally while in hospital as well. Followup here next regular visit

## 2022-01-26 ENCOUNTER — RX RENEWAL (OUTPATIENT)
Age: 62
End: 2022-01-26

## 2022-01-26 RX ORDER — PREDNISONE 20 MG/1
20 TABLET ORAL
Qty: 30 | Refills: 1 | Status: ACTIVE | COMMUNITY
Start: 2021-12-08 | End: 1900-01-01

## 2022-02-15 ENCOUNTER — APPOINTMENT (OUTPATIENT)
Dept: ORTHOPEDIC SURGERY | Facility: CLINIC | Age: 62
End: 2022-02-15

## 2022-02-23 ENCOUNTER — APPOINTMENT (OUTPATIENT)
Dept: PULMONOLOGY | Facility: CLINIC | Age: 62
End: 2022-02-23

## 2022-06-13 NOTE — ED ADULT NURSE NOTE - SUICIDE SCREENING QUESTION 1
DATE: 6/13/2022  PATIENT: Nasir Avendaño    Attending Physician: Hao Green [phone]       No

## 2022-09-06 NOTE — PROGRESS NOTE ADULT - SUBJECTIVE AND OBJECTIVE BOX
RUE elevated on a pillow, covered with dry wash cloth and cold packs applied   Patient seen and examined . C/O R thigh pain otherwise no other complaints     CC : R thigh pain     HPI:  59 y/o morbidly obese male discharged from [facility] on 1/2/19 after being admitted for dyspnea, copd,  and also had rt. groin area cellulitis, was seen by surgery team and rt. groin area I & d was done , had packing which was removed before diacharge, pt. was followed by ID group and was sent home on bactrim and levaquin. On the day of ER visit pt. noted rt. upper thigh area pain more over lateral aspect , moderate, on and  off , also noted that Pus which was red colored and had an odor was coming from his rt. groin area where he had packing. Pt. called ID office and was instructed to come to ER. pt , reports no fever. no cp. no sob. no abd. pain. no n/v/d. (08 Jan 2019 01:34)      PAST MEDICAL & SURGICAL HISTORY:  Afib  ROCK (obstructive sleep apnea)  COPD (chronic obstructive pulmonary disease)  Morbid obesity   No significant past surgical history      MEDICATIONS  (STANDING):  ALBUTerol    90 MICROgram(s) HFA Inhaler 1 Puff(s) Inhalation every 4 hours  ALBUTerol/ipratropium for Nebulization 3 milliLiter(s) Nebulizer every 6 hours  buDESOnide   0.5 milliGRAM(s) Respule 0.5 milliGRAM(s) Inhalation every 12 hours  carvedilol 25 milliGRAM(s) Oral every 12 hours  DAPTOmycin IVPB 700 milliGRAM(s) IV Intermittent every 24 hours  digoxin     Tablet 0.125 milliGRAM(s) Oral daily  ertapenem  IVPB 1000 milliGRAM(s) IV Intermittent every 24 hours  furosemide    Tablet 40 milliGRAM(s) Oral daily  gabapentin 300 milliGRAM(s) Oral three times a day  influenza   Vaccine 0.5 milliLiter(s) IntraMuscular once  lactobacillus acidophilus 1 Tablet(s) Oral three times a day with meals  nicotine  Polacrilex Gum 4 milliGRAM(s) Oral three times a day with meals  nystatin Powder 1 Application(s) Topical three times a day  predniSONE   Tablet 10 milliGRAM(s) Oral daily  rivaroxaban 20 milliGRAM(s) Oral every 24 hours  spironolactone 12.5 milliGRAM(s) Oral daily  tiotropium 18 MICROgram(s) Capsule 1 Capsule(s) Inhalation daily    MEDICATIONS  (PRN):  acetaminophen   Tablet .. 650 milliGRAM(s) Oral every 6 hours PRN Temp greater or equal to 38C (100.4F), Mild Pain (1 - 3)  ALBUTerol    0.083% 2.5 milliGRAM(s) Nebulizer every 2 hours PRN Shortness of Breath and/or Wheezing  metoprolol tartrate Injectable 5 milliGRAM(s) IV Push every 6 hours PRN for heart rate above 110 bpm  morphine  - Injectable 4 milliGRAM(s) IV Push every 3 hours PRN Severe Pain (7 - 10)  nicotine  Polacrilex Gum 4 milliGRAM(s) Oral every 6 hours PRN nicotine withdrawl  ondansetron Injectable 4 milliGRAM(s) IV Push every 6 hours PRN Nausea and/or Vomiting  oxyCODONE    IR 5 milliGRAM(s) Oral every 6 hours PRN Moderate Pain (4 - 6)      LABS:                          13.3   18.2  )-----------( 250      ( 11 Jan 2019 13:15 )             41.1     01-11    138  |  98  |  16.0  ----------------------------<  87  4.2   |  26.0  |  0.72    Ca    8.2<L>      11 Jan 2019 13:15    RADIOLOGY & ADDITIONAL TESTS:  < from: CT Hip w/ IV Cont, Right (01.10.19 @ 10:54) >     EXAM:  CT HIP ONLY IC RT                          PROCEDURE DATE:  01/10/2019          INTERPRETATION:  HISTORY: Right groin abscess. History of irrigation and   drainage on January 2, 2019.    Helical CT imaging of the right hip was performed after the intravenous   administration of contrast. 94 cc of Omnipaque 350 was administered   intravenously. Sagittal and coronal reformats were provided.    Findings:    Within the subcutaneous fat along the anteromedial aspect of the right   thigh there is extensive skin thickening and reticulation of the   underlying subcutaneous fat. These findings are consistent with   cellulitis. Areas of confluence are noted within the subcutaneous fat in   this region without a definite large fluid collection. Foci of air are   noted along the cutaneous surface of the anteromedial right thigh at the   level of the proximal/mid shaft of the femur with additional foci of air   are noted within the subcutaneous fat more laterally. This may be related   to the residua of prior irrigation and drainage or recent intervention,   correlate clinically to exclude any underlying infection with a   gas-forming organism. There is also cellulitis along the lateral   subcutaneous fat at the level of the mid to distal shaft of the femur. No   definite drainable fluid collection is noted in this region.    Prominent subcutaneous varices noted throughout the right thigh, most   prominent within the anterior subcutaneous fat adjacent to the area of   cellulitis and within the subcutaneous surface along the lateral aspect   of the thigh. Edema appears confined to the subcutaneous fat without   definite reticulation or stranding of the deep myofascial planes.    There is no acute fracture or osteonecrosis. There is no osseous erosion,   destruction, or periosteal reaction to suggest osteomyelitis. There is   mild to moderate right hip arthrosis. There is mild pubic symphysis   arthrosis.    Atherosclerotic disease is noted.    Impression:    Cellulitis along the anteromedial aspect of the right proximal thigh with   areas of confluence. No definite drainable collection is demonstrated.   There is subcutaneous air noted along the anteromedial cutaneous surface   and within the more anterior subcutaneous fatwhich may be related to   sequela of recent irrigation and drainage or be secondary to recent   intervention. Correlate clinically to exclude any underlying infection   with gas-forming organism. Additional areas of subcutaneous edema are   noted along the lateral subcutaneous fat. Prominent subcutaneous varices   are noted as above. No CT evidence of osteomyelitis. Findings discussed   with Dr. Qureshi.        JESS APODACA M.D., ATTENDING RADIOLOGIST  This document has been electronically signed. Durga 10 2019 11:54AM    < end of copied text >  < from: US Extremity Nonvasc Limited, Right (01.08.19 @ 17:42) >     EXAM:  US NONVASC EXT LTD RT                          PROCEDURE DATE:  01/08/2019          INTERPRETATION:   soft tissue ultrasound evaluation of the right groin     CPT 45642      CLINICAL INFORMATION:   Abscess.  Soft tissue mass.    TECHNIQUE: Transcutaneous ultrasound was performed.  Evaluation was   directed at the right groin    FINDINGS:   No prior examinations are available for review.    Poorly defined fluid collection is seen in the subcutaneous fat measuring   approximately 5.3 x 1.2 x 4.5 cm.      IMPRESSION:  Poorly defined fluid collection in the right groin.        FARIBA FRAIRE M.D., ATTENDING RADIOLOGIST  This document has been electronically signed. Jan 8 2019  5:55PM        < end of copied text >    < from: US Duplex Venous Lower Ext Ltd, Right (01.07.19 @ 20:26) >     EXAM:  US DPLX LWR EXT VEINS LTD RT                          PROCEDURE DATE:  01/07/2019          INTERPRETATION:  Ultrasound of the lower extremity deep venous system      CLINICAL INFORMATION:  RIGHT lower extremity pain and swelling., evaluate   for deep venous thrombosis.    TECHNIQUE:   Duplex ultrasonography with color and spectral doppler of   the right lower extremity deep venous system was performed.      FINDINGS:    No previous examinations are  available for review.    The right lower extremity deep venous system demonstrated no abnormality.    The veins were patent with intact Doppler flow.  The flow varied with   respiration and augmented with distal calf compression.  The veins were   directly compressible by the ultrasound transducer.       The veins evaluated included the common femoral vein, the inflow of the   greater saphenous vein, the inflow of the deep femoral vein, the   superficial femoral vein, the popliteal vein and posterior tibial veins.        IMPRESSION: Unremarkable ultrasound of the right lower extremity deep   venous system.                    EDDY SENA M.D., ATTENDING RADIOLOGIST  This document has been electronically signed. Jan 7 2019  8:28PM    < end of copied text >            REVIEW OF SYSTEMS:    CONSTITUTIONAL: No fever, weight loss, or fatigue  EYES: No eye pain, visual disturbances, or discharge  ENMT:  No difficulty hearing, tinnitus, vertigo; No sinus or throat pain  NECK: No pain or stiffness  RESPIRATORY: No cough, wheezing, chills or hemoptysis; No shortness of breath  CARDIOVASCULAR: No chest pain, palpitations, dizziness, or leg swelling  GASTROINTESTINAL: No abdominal or epigastric pain. No nausea, vomiting, or hematemesis; No diarrhea or constipation. No melena or hematochezia.  GENITOURINARY: No dysuria, frequency, hematuria, or incontinence  NEUROLOGICAL: No headaches, memory loss, loss of strength, numbness, or tremors  SKIN: No itching, burning, rashes, or lesions   LYMPH NODES: No enlarged glands  ENDOCRINE: No heat or cold intolerance; No hair loss  MUSCULOSKELETAL:   PSYCHIATRIC: No depression, anxiety, mood swings, or difficulty sleeping  HEME/LYMPH: No easy bruising, or bleeding gums  ALLERGY AND IMMUNOLOGIC: No hives or eczema    Vital Signs Last 24 Hrs  T(C): 36.7 (12 Jan 2019 16:08), Max: 36.8 (12 Jan 2019 11:59)  T(F): 98 (12 Jan 2019 16:08), Max: 98.3 (12 Jan 2019 11:59)  HR: 70 (12 Jan 2019 16:08) (70 - 94)  BP: 115/74 (12 Jan 2019 16:08) (97/65 - 115/74)  BP(mean): --  RR: 18 (12 Jan 2019 11:59) (18 - 20)  SpO2: 93% (12 Jan 2019 14:23) (93% - 99%)  PHYSICAL EXAM:    GENERAL: NAD, well-groomed, well-developed  HEAD:  Atraumatic, Normocephalic  EYES: EOMI, PERRLA, conjunctiva and sclera clear  NECK: Supple, No JVD, Normal thyroid  NERVOUS SYSTEM:  Alert & Oriented X3, no focal deficit  CHEST/LUNG: CTA b/l ,  no  rales, rhonchi, wheezing, or rubs  HEART: Regular rate and rhythm; No murmurs, rubs, or gallops  ABDOMEN: Soft, Nontender, Nondistended; Bowel sounds present  EXTREMITIES:  2+ Peripheral Pulses, No clubbing, cyanosis, or edema  LYMPH: No lymphadenopathy noted  SKIN: No rashes or lesions Patient seen and examined . C/O R thigh pain otherwise no other complaints     CC : R thigh pain     HPI:  57 y/o morbidly obese male discharged from [facility] on 1/2/19 after being admitted for dyspnea, copd,  and also had rt. groin area cellulitis, was seen by surgery team and rt. groin area I & d was done , had packing which was removed before diacharge, pt. was followed by ID group and was sent home on bactrim and levaquin. On the day of ER visit pt. noted rt. upper thigh area pain more over lateral aspect , moderate, on and  off , also noted that Pus which was red colored and had an odor was coming from his rt. groin area where he had packing. Pt. called ID office and was instructed to come to ER. pt , reports no fever. no cp. no sob. no abd. pain. no n/v/d. (08 Jan 2019 01:34)      PAST MEDICAL & SURGICAL HISTORY:  Afib  ROCK (obstructive sleep apnea)  COPD (chronic obstructive pulmonary disease)  Morbid obesity   No significant past surgical history      MEDICATIONS  (STANDING):  ALBUTerol    90 MICROgram(s) HFA Inhaler 1 Puff(s) Inhalation every 4 hours  ALBUTerol/ipratropium for Nebulization 3 milliLiter(s) Nebulizer every 6 hours  buDESOnide   0.5 milliGRAM(s) Respule 0.5 milliGRAM(s) Inhalation every 12 hours  carvedilol 25 milliGRAM(s) Oral every 12 hours  DAPTOmycin IVPB 700 milliGRAM(s) IV Intermittent every 24 hours  digoxin     Tablet 0.125 milliGRAM(s) Oral daily  ertapenem  IVPB 1000 milliGRAM(s) IV Intermittent every 24 hours  furosemide    Tablet 40 milliGRAM(s) Oral daily  gabapentin 300 milliGRAM(s) Oral three times a day  influenza   Vaccine 0.5 milliLiter(s) IntraMuscular once  lactobacillus acidophilus 1 Tablet(s) Oral three times a day with meals  nicotine  Polacrilex Gum 4 milliGRAM(s) Oral three times a day with meals  nystatin Powder 1 Application(s) Topical three times a day  predniSONE   Tablet 10 milliGRAM(s) Oral daily  rivaroxaban 20 milliGRAM(s) Oral every 24 hours  spironolactone 12.5 milliGRAM(s) Oral daily  tiotropium 18 MICROgram(s) Capsule 1 Capsule(s) Inhalation daily    MEDICATIONS  (PRN):  acetaminophen   Tablet .. 650 milliGRAM(s) Oral every 6 hours PRN Temp greater or equal to 38C (100.4F), Mild Pain (1 - 3)  ALBUTerol    0.083% 2.5 milliGRAM(s) Nebulizer every 2 hours PRN Shortness of Breath and/or Wheezing  metoprolol tartrate Injectable 5 milliGRAM(s) IV Push every 6 hours PRN for heart rate above 110 bpm  morphine  - Injectable 4 milliGRAM(s) IV Push every 3 hours PRN Severe Pain (7 - 10)  nicotine  Polacrilex Gum 4 milliGRAM(s) Oral every 6 hours PRN nicotine withdrawl  ondansetron Injectable 4 milliGRAM(s) IV Push every 6 hours PRN Nausea and/or Vomiting  oxyCODONE    IR 5 milliGRAM(s) Oral every 6 hours PRN Moderate Pain (4 - 6)      LABS:                          13.3   18.2  )-----------( 250      ( 11 Jan 2019 13:15 )             41.1     01-11    138  |  98  |  16.0  ----------------------------<  87  4.2   |  26.0  |  0.72    Ca    8.2<L>      11 Jan 2019 13:15    RADIOLOGY & ADDITIONAL TESTS:  < from: CT Hip w/ IV Cont, Right (01.10.19 @ 10:54) >     EXAM:  CT HIP ONLY IC RT                          PROCEDURE DATE:  01/10/2019          INTERPRETATION:  HISTORY: Right groin abscess. History of irrigation and   drainage on January 2, 2019.    Helical CT imaging of the right hip was performed after the intravenous   administration of contrast. 94 cc of Omnipaque 350 was administered   intravenously. Sagittal and coronal reformats were provided.    Findings:    Within the subcutaneous fat along the anteromedial aspect of the right   thigh there is extensive skin thickening and reticulation of the   underlying subcutaneous fat. These findings are consistent with   cellulitis. Areas of confluence are noted within the subcutaneous fat in   this region without a definite large fluid collection. Foci of air are   noted along the cutaneous surface of the anteromedial right thigh at the   level of the proximal/mid shaft of the femur with additional foci of air   are noted within the subcutaneous fat more laterally. This may be related   to the residua of prior irrigation and drainage or recent intervention,   correlate clinically to exclude any underlying infection with a   gas-forming organism. There is also cellulitis along the lateral   subcutaneous fat at the level of the mid to distal shaft of the femur. No   definite drainable fluid collection is noted in this region.    Prominent subcutaneous varices noted throughout the right thigh, most   prominent within the anterior subcutaneous fat adjacent to the area of   cellulitis and within the subcutaneous surface along the lateral aspect   of the thigh. Edema appears confined to the subcutaneous fat without   definite reticulation or stranding of the deep myofascial planes.    There is no acute fracture or osteonecrosis. There is no osseous erosion,   destruction, or periosteal reaction to suggest osteomyelitis. There is   mild to moderate right hip arthrosis. There is mild pubic symphysis   arthrosis.    Atherosclerotic disease is noted.    Impression:    Cellulitis along the anteromedial aspect of the right proximal thigh with   areas of confluence. No definite drainable collection is demonstrated.   There is subcutaneous air noted along the anteromedial cutaneous surface   and within the more anterior subcutaneous fatwhich may be related to   sequela of recent irrigation and drainage or be secondary to recent   intervention. Correlate clinically to exclude any underlying infection   with gas-forming organism. Additional areas of subcutaneous edema are   noted along the lateral subcutaneous fat. Prominent subcutaneous varices   are noted as above. No CT evidence of osteomyelitis. Findings discussed   with Dr. Qureshi.        JESS APODACA M.D., ATTENDING RADIOLOGIST  This document has been electronically signed. Durga 10 2019 11:54AM    < end of copied text >  < from: US Extremity Nonvasc Limited, Right (01.08.19 @ 17:42) >     EXAM:  US NONVASC EXT LTD RT                          PROCEDURE DATE:  01/08/2019          INTERPRETATION:   soft tissue ultrasound evaluation of the right groin     CPT 79635      CLINICAL INFORMATION:   Abscess.  Soft tissue mass.    TECHNIQUE: Transcutaneous ultrasound was performed.  Evaluation was   directed at the right groin    FINDINGS:   No prior examinations are available for review.    Poorly defined fluid collection is seen in the subcutaneous fat measuring   approximately 5.3 x 1.2 x 4.5 cm.      IMPRESSION:  Poorly defined fluid collection in the right groin.        FARIBA FRAIRE M.D., ATTENDING RADIOLOGIST  This document has been electronically signed. Jan 8 2019  5:55PM        < end of copied text >    < from: US Duplex Venous Lower Ext Ltd, Right (01.07.19 @ 20:26) >     EXAM:  US DPLX LWR EXT VEINS LTD RT                          PROCEDURE DATE:  01/07/2019          INTERPRETATION:  Ultrasound of the lower extremity deep venous system      CLINICAL INFORMATION:  RIGHT lower extremity pain and swelling., evaluate   for deep venous thrombosis.    TECHNIQUE:   Duplex ultrasonography with color and spectral doppler of   the right lower extremity deep venous system was performed.      FINDINGS:    No previous examinations are  available for review.    The right lower extremity deep venous system demonstrated no abnormality.    The veins were patent with intact Doppler flow.  The flow varied with   respiration and augmented with distal calf compression.  The veins were   directly compressible by the ultrasound transducer.       The veins evaluated included the common femoral vein, the inflow of the   greater saphenous vein, the inflow of the deep femoral vein, the   superficial femoral vein, the popliteal vein and posterior tibial veins.        IMPRESSION: Unremarkable ultrasound of the right lower extremity deep   venous system.                    EDDY SENA M.D., ATTENDING RADIOLOGIST  This document has been electronically signed. Jan 7 2019  8:28PM    < end of copied text >            REVIEW OF SYSTEMS:    R thigh pain , all other systems reviewed and are negative     Vital Signs Last 24 Hrs  T(C): 36.7 (12 Jan 2019 16:08), Max: 36.8 (12 Jan 2019 11:59)  T(F): 98 (12 Jan 2019 16:08), Max: 98.3 (12 Jan 2019 11:59)  HR: 70 (12 Jan 2019 16:08) (70 - 94)  BP: 115/74 (12 Jan 2019 16:08) (97/65 - 115/74)  BP(mean): --  RR: 18 (12 Jan 2019 11:59) (18 - 20)  SpO2: 93% (12 Jan 2019 14:23) (93% - 99%)    PHYSICAL EXAM:    GENERAL: NAD, well-groomed, morbidly obese   HEAD:  Atraumatic, Normocephalic  EYES: EOMI, PERRLA, conjunctiva and sclera clear  NECK: Supple, No JVD, Normal thyroid  NERVOUS SYSTEM:  Alert & Oriented X3, no focal deficit  CHEST/LUNG: CTA b/l ,  no  rales, rhonchi, wheezing, or rubs  HEART: Regular rate and rhythm; No murmurs, rubs, or gallops  ABDOMEN: Soft, Nontender, Nondistended; Bowel sounds present , obese   EXTREMITIES:  2+ Peripheral Pulses, No clubbing, cyanosis, b/l LE edema 2 + , R thigh swelling + , incision 2 inch+ , minor amount of fibrinous exudate. No active drainage.  LYMPH: No lymphadenopathy noted  SKIN: No rashes or lesions

## 2022-10-19 NOTE — PROGRESS NOTE ADULT - PROBLEM SELECTOR PLAN 4
I contacted [address] this morning and received confirmation that the clearance letter has been received. - albuterol, prednisone   - pulm consult noted

## 2023-04-12 NOTE — HISTORY OF PRESENT ILLNESS
[Pain Location] : pain [Worsening] : worsening [___ yrs] : [unfilled] year(s) ago [0] : a minimum pain level of 0/10 [4] : a current pain level of 4/10 [8] : a maximum pain level of 8/10 [Standing] : standing [Intermit.] : ~He/She~ states the symptoms seem to be intermittent [Bending] : worsened by bending [Walking] : worsened by walking [Recumbency] : relieved by recumbency [Rest] : relieved by rest [de-identified] : 59 year old male presents for follow-up evaluation of bilateral knee pain, left worse than right, secondary to end-stage OA. He reports 3 M relief with cortisone injection at his last visit in November 2019. He has a history of CHF with exacerbation resulting in hospitalizations at [facility] in the past. He has an oxygen tank at home but does not use it. He recently had PPM insertion. seeing cardiologist. pt is on Xarelto. pt is working. He is planing to gastric bypass sx. [de-identified] : cortisone injection  No

## 2023-06-16 NOTE — CONSULT NOTE ADULT - SUBJECTIVE AND OBJECTIVE BOX
[facility]  INFECTIOUS DISEASES  at [place]  =======================================================  Omari Cantrell MD  Diplomates American Board of Internal Medicine and Infectious Diseases  Tel  [phone]  Fax [phone]  =======================================================    [id number]  MAGGIE ELLIOTT   HPI:  This 62 yo male with PMHs of  HFrEF s/p AICD, afib on Xarelto s/p pacemaker, COPD, ROCK on BiPAP, gastric bypass surgery presenting with SOB and fevers/chills. Per patient, he has worsening SOB for the past couple of day. The fever and chill for the past month or more. Low grade fever which comes and goes. Pt had alot of dental work done recently and since not feeling well.   Reports of intermittent fevers even going back to a few months ago. PMD had blood cultures done, and were negative.   For the past couple of days he also noted increase in his weight despite on being Furosemide.   he checked the scale at home and noted a 4 lb weight gain.   He also reports dizziness and palpitation. He denies headache, chest pain, nausea, vomiting, abdominal pain, change in urinary or bowel habits.    (16 Aug 2021 15:33)    he was treated in 6/2021 with a course of IV antibiotics for bilateral PNA. at that time, he had fluid in his esophagus concerning for aspiration.     CT of the chest on 8/16/21 showed:  Near-complete resolution prior bilateral patchy lung opacities with new patchy opacity superior right lower lobe and posterior right upper lobe. Continued follow-up CT recommended.        I have personally reviewed the labs and data; pertinent labs and data are listed in this note; please see below.   =======================================================  Past Medical & Surgical Hx:  =====================  PAST MEDICAL & SURGICAL HISTORY:  COPD (chronic obstructive pulmonary disease)    ROCK (obstructive sleep apnea)    Afib    CHF (congestive heart failure)    Cardiomyopathy, ischemic  wearing life vest  2-28-19    Ejection fraction &lt; 50%  last echo 2-13-19  31%    Asthma    HFrEF (heart failure with reduced ejection fraction)    AICD (automatic cardioverter/defibrillator) present    History of loop recorder  2017  [facility]    History of incision and drainage  right groin abcess  jan 2019  [facility]    History of cardioversion    H/O cardiac catheterization  5 years ago at [facility].    S/P gastric bypass      Problem List:  ==========  HEALTH ISSUES - PROBLEM Dx:  Chronic atrial fibrillation    Chronic systolic congestive heart failure    COPD exacerbation    ROCK on CPAP    DVT prophylaxis    DVT prophylaxis    Hyponatremia    Leukocytosis    Elevated TSH          Social Hx:  =======  no toxic habits currently    FAMILY HISTORY:  Family history of CHF (congestive heart failure)    no significant family history of immunosuppressive disorders in mother or father   =======================================================    REVIEW OF SYSTEMS:  CONSTITUTIONAL: INTERMITTENT fevers  HEENT:  No diplopia or blurred vision.  No earache, sore throat or runny nose.  CARDIOVASCULAR:  No pressure, squeezing, strangling, tightness, heaviness or aching about the chest, neck, axilla or epigastrium.  RESPIRATORY:  No cough, shortness of breath  GASTROINTESTINAL:  No nausea, vomiting or diarrhea.  GENITOURINARY:  No dysuria, frequency or urgency. No Blood in urine  MUSCULOSKELETAL:  no joint aches, no muscle pain  SKIN:  No change in skin, hair or nails.  NEUROLOGIC:  No Headaches, seizures or weakness.  PSYCHIATRIC:  No disorder of thought or mood.  ENDOCRINE:  No heat or cold intolerance  HEMATOLOGICAL:  No easy bruising or bleeding.    =======================================================  Allergies    No Known Drug Allergies  shellfish (Pruritus; Rash)    Intolerances    Antibiotics:  piperacillin/tazobactam IVPB.. 3.375 Gram(s) IV Intermittent every 8 hours   vancomycin  IVPB 1250 milliGRAM(s) IV Intermittent every 12 hours    Other medications:  ALBUTerol    90 MICROgram(s) HFA Inhaler 2 Puff(s) Inhalation every 6 hours  aMIOdarone    Tablet 200 milliGRAM(s) Oral daily  buMETAnide 1 milliGRAM(s) Oral two times a day  fluticasone propionate 50 MICROgram(s)/spray Nasal Spray 1 Spray(s) Both Nostrils two times a day  gabapentin 600 milliGRAM(s) Oral three times a day  lactobacillus acidophilus 1 Tablet(s) Oral two times a day  metoprolol succinate ER 25 milliGRAM(s) Oral daily  pantoprazole    Tablet 40 milliGRAM(s) Oral before breakfast  predniSONE   Tablet 20 milliGRAM(s) Oral daily  rivaroxaban 20 milliGRAM(s) Oral with dinner        piperacillin/tazobactam IVPB..   25 mL/Hr IV Intermittent (08-16-21 @ 18:40)   25 mL/Hr IV Intermittent (08-17-21 @ 07:33)   25 mL/Hr IV Intermittent (08-17-21 @ 13:21)  200 mL/Hr IV Intermittent (08-16-21 @ 11:02)    vancomycin  IVPB   166.67 mL/Hr IV Intermittent (08-17-21 @ 05:19)    250 mL/Hr IV Intermittent (08-16-21 @ 12:00)      ======================================================  Physical Exam:  ============  T(F): 97.6 (17 Aug 2021 15:47), Max: 98.8 (17 Aug 2021 11:34)  HR: 90 (17 Aug 2021 15:47)  BP: 9/64 (17 Aug 2021 15:47)  RR: 20 (17 Aug 2021 15:47)  SpO2: 95% (17 Aug 2021 15:47) (92% - 98%)  temp max in last 48H T(F): , Max: 100.4 (08-16-21 @ 10:36)    General:  No acute distress.  Pleasant obese  Eye: Pupils are equal, round and reactive to light, Extraocular movements are intact, Normal conjunctiva.  HENT: Normocephalic, Oral mucosa is moist, No pharyngeal erythema, No sinus tenderness.  Neck: Supple, No lymphadenopathy.  Respiratory: Lungs with diminished sounds at bases.   Cardiovascular: Normal rate, Regular rhythm,  LEFT chest wall implanted device.   Gastrointestinal: Soft, Non-tender, Non-distended, Normal bowel sounds.  Genitourinary: No costovertebral angle tenderness.  Lymphatics: No lymphadenopathy neck,   Musculoskeletal: Normal range of motion, Normal strength.  Integumentary: No rash. NO SPLINTER HEMORRHAGES IN THE NAILS  Neurologic: Alert, Oriented, No focal deficits, Cranial Nerves II-XII are grossly intact.  Psychiatric: Appropriate mood & affect.    =======================================================  Labs:                        10.9   14.00 )-----------( 275      ( 17 Aug 2021 15:26 )             34.3     WBC Count: 14.00 K/uL (08-17-21 @ 15:26)  WBC Count: 25.00 K/uL (08-16-21 @ 10:26)      08-17    127<L>  |  93<L>  |  18.6  ----------------------------<  135<H>  4.7   |  22.0  |  1.09    Ca    8.4<L>      17 Aug 2021 15:26    TPro  6.5<L>  /  Alb  3.1<L>  /  TBili  0.7  /  DBili  x   /  AST  29  /  ALT  30  /  AlkPhos  58  08-16      Creatinine, Serum: 1.09 mg/dL (08-17-21 @ 15:26)  Creatinine, Serum: 1.15 mg/dL (08-16-21 @ 10:26)       COVID-19 PCR: NotDetec (08-16-21 @ 15:07)      < from: Xray Chest 2 Views PA/Lat (08.16.21 @ 10:44) >  EXAM:  XR CHEST PA LAT 2V                          PROCEDURE DATE:  08/16/2021          INTERPRETATION:  Clinical history: 61-year-old male, shortness of breath.    Three views of the chest are correlated with the chest CT of 8/12/2021 and demonstrate moderate right and minimal left perihilar interstitial pneumonia.    No no gross consolidation, effusion, pneumothorax or acute osseous finding.    Pacemaker with wire tips in the right atrium, right ventricle and coronary sinus, unchanged.    IMPRESSION:  Moderate right and minimal left perihilar interstitial infiltrates, no lobar consolidation, grossly unchanged    --- End of Report ---            SY TRINH DO; Attending Radiologist  This document has been electronically signed. Aug16 2021 10:49AM    < end of copied text >       < from: CT Chest No Cont (08.12.21 @ 14:34) >    EXAM:  CT CHEST        PROCEDURE DATE:  08/12/2021           INTERPRETATION:  EXAMINATION: CT CHEST    CLINICAL INDICATION: Aspiration pneumonia.    TECHNIQUE: Noncontrast CT of the chest was obtained.    COMPARISON: 6/22/2021.    FINDINGS:    AIRWAYS AND LUNGS: The central tracheobronchial tree is patent.  Emphysema. Bronchial mucoid impaction. Near-complete resolution prior bilateral patchy lung opacities with new patchy opacity superior right lower lobe and posterior right upper lobe. Scattered clustered nodules with a lower lobe predominance is decreased but not resolved the prior study.    MEDIASTINUM AND PLEURA: There are no enlarged mediastinal, hilar or axillary lymph nodes. The visualized portion of the thyroid gland is unremarkable. There is no pleural effusion. There is no pneumothorax.    HEART AND VESSELS: There is mild cardiomegaly.  There are atherosclerotic calcifications of the aorta and coronary arteries.  There is no pericardial effusion.  Aortic valve calcification. Left-sided defibrillator.    UPPER ABDOMEN: Images of the upper abdomen demonstrate cholecystectomy. Pneumobilia. Esophagus is distended with debris    BONES AND SOFT TISSUES: There are mild degenerative changes of the spine.  The soft tissues are unremarkable.    IMPRESSION:  Near-complete resolution prior bilateral patchy lung opacities with new patchy opacity superior right lower lobe and posterior right upper lobe. Continued follow-up CT recommended.    --- End of Report ---               CANDICE LEVINE MD; Attending Radiologist   This document has been electronically signed. Aug 16 2021 10:42AM    < end of copied text >   Bilobed Flap Text: The defect edges were debeveled with a #15c scalpel blade.  Given the location of the defect and the proximity to free margins a bilobe flap was deemed most appropriate.  Using a sterile surgical marker, an appropriate bilobe flap drawn around the defect.    The area thus outlined was incised deep to adipose tissue with a #15 scalpel blade.  The skin margins were undermined to an appropriate distance in all directions utilizing iris scissors.

## 2023-09-08 NOTE — DISCHARGE NOTE ADULT - NSFTFSTATUSABRD_GEN_ALL_CORE
[ X] 1 or more chronic illnesses with exacerbation, progression or side effects of treatment  [ ] 2 or more stable, chronic illnesses  [ ] 1 undiagnosed new problem with uncertain prognosis  [X ] 1 acute illness with systemic symptoms  [ ] 1 acute complicated injury    [ X] I reviewed prior external notes  [X ] I reviewed test results  [X ] I ordered test  [ X] I interpreted lab/ imaging   [ ] I discussed management or test interpretation with the following physicians:     [ ] prescription drug management  [ ] decision regarding minor surgery  [ ] diagnosis or treatment significantly limited by social determinants of health PATIENT NEEDS ASSISTANCE TO LEAVE RESIDENCE:

## 2024-03-15 NOTE — PROCEDURE
Discharge summary  This patient Carolina Bradford was admitted to the hospital on 3/14/2024  after undergoing Procedure(s) (LRB):  TOTAL SHOULDER REPLACEMENT - REVERSE, BEACH CHAIR, INTERSCALENE BLOCK, BACITRACIN/POLYMYXIN IRRIGATION, 23HR OBS/BEDDED OP, DIABETIC (Left) without complications that morning.    During the postoperative period,while in hospital, patient was medically managed by the hospitalist. Please see medial notes and H&P for patients additional diagnoses.  Ortho agrees with all medical diagnoses and treatments while patient in hospital.  No significant or unexpected findings or abnormal ortho imaging were noted during the hospital stay    Hospital course      Patient tolerated surgical procedure well and there was no complications. Patient progressed adequately through their recovery during hospital stay including PT and rehabilitation.    Patient was then D/C on  to home  in stable condition.  Patient was instructed on the use of pain medications, the signs and symptoms of infection, and was given our number to call should they have any questions or concerns following discharge.    Based on my clinical judgment, the patient was provided with a 7-day prescription for opioid medication at 30 MED, indicated for treatment of acute pain in the setting of recent surgery. OARRS report was run and has demonstrated an appropriate time course.  The patient has been provided with counseling pertaining to safe use of opioid medication.      Patient is NWB to operative extremity   Mepilex dressing to be removed POD#7 and incision left open to air  Follow up with surgeon in 2 weeks    [de-identified] : Received bilateral knee cortisone injections.\par \par I discussed at length with the patient the planned steroid and lidocaine injection. The risks, benefits, convalescence and alternatives were reviewed. The possible side effects discussed included but were not limited to: pain, swelling, heat, bleeding, and redness. Symptoms are generally mild but if they are extensive then contact the office. Giving pain relievers by mouth such as NSAIDs or Tylenol can generally treat the reactions to steroid and lidocaine. Rare cases of infection have been noted. Rash, hives and itching may occur post injection. If you have muscle pain or cramps, flushing and or swelling of the face, rapid heart beat, nausea, dizziness, fever, chills, headache, difficulty breathing, swelling in the arms or legs, or have a prickly feeling of your skin, contact a health care provider immediately. Following this discussion, the knee was prepped with Alcohol and under sterile condition the 80 mg Depo-Medrol and 6 cc Lidocaine injection was performed with a 20 gauge needle through a superolateral injection site. The needle was introduced into the joint, aspiration was performed to ensure intra-articular placement and the medication was injected. Upon withdrawal of the needle the site was cleaned with alcohol and a band aid applied. The patient tolerated the injection well and there were no adverse effects. Post injection instructions included no strenuous activity for 24 hours, cryotherapy and if there are any adverse effects to contact the office.

## 2024-03-28 NOTE — ED ADULT TRIAGE NOTE - STATUS:
Patient Contact    Attempt # 1    Was call answered?  No.  Unable to leave message. No voice mail.     Zaina Austin RN on 3/28/2024 at 11:11 AM             [employer]

## 2024-04-08 NOTE — DISCHARGE NOTE ADULT - DATE:
HISTORY OF PRESENT ILLNESS  Jennifer Rob is a 79 y.o. female.    HPI  NEW (former) patient consult referred by Dr. Loco for RIGHT breast lump. Noticed a hard lump in the RIGHT breast a few weeks ago. Denies pain or other changes to the breast.     1992: LEFT lumpectomy and ALND for \"walnut sized tumor.\" 0/32 LN positive. S/P radiation and chemotherapy (for possibly 6 months). Tamoxifen for 2 years.    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 0/2 LN involved. ER+100%/CA+30% Her2-. Clear margins.     03/24/2015 - 04/24/2015: S/p XRT  04/2015 - 04/19/2016: Anastrozole (stopped due to joint pain)  05/01/2016 - 06/13/2016: Restarted anastrozole (stopped due to joint pain)  06/27/2016 - 07/18/206: Letrozole   08/01/2016 - : Tamoxifen    Family History:        Breast imaging-   Mammogram Result (most recent):  [facility] MADI DIGITAL SCREEN BILATERAL 11/02/2023     Narrative  STUDY: Bilateral digital screening mammogram with 3-D tomosynthesis     INDICATION:  Screening.     COMPARISON: Prior mammograms dating back to 2016     BREAST COMPOSITION: There are scattered areas of fibroglandular density.     FINDINGS: Bilateral digital screening mammography was performed and is  interpreted in conjunction with a computer assisted detection (CAD) system.  Additionally, tomosynthesis of both breasts in the CC and MLO projections was  performed. There are chronic lumpectomy changes of both breasts. The RIGHT  breast upper outer quadrant fat necrosis/seroma is unchanged. No new masses or  suspicious calcifications are identified. There has been no significant change.     Impression  BI-RADS Category 2 - Benign findings.  No mammographic evidence of malignancy.     RECOMMENDATIONS:  Next screening mammogram is recommended in one year.     The patient will be notified of these results.    Past Medical History:   Diagnosis Date    Breast CA (HCC) 1992    Left Breast    Breast CA (HCC) 2015     1/2/18

## 2024-05-11 NOTE — DISCHARGE NOTE PROVIDER - REASON FOR ADMISSION
SOB and fever - At OSH Hgb stable, thought to be anemia of chronic disease.   - Continue to trend CBC

## 2024-06-10 NOTE — PATIENT PROFILE ADULT - PRIMARY ROLES/RESPONSIBILITIES
OB Initial Visit    CC- Here for care of current pregnancy, first visit    Subjective:  23 y.o.  presenting for her first obstetrical visit.    LMP: Patient's last menstrual period was 04/15/2024.     Pt complains of  nausea    Happy for pregnancy    Reviewed and updated:  OBHx, GYNHx (STDs), PMHx, Medications, Allergies, PSHx, Social Hx, Preventative Hx (PAP), Hx of abuse/safe environment, Vaccine Hx including hx of chickenpox or vaccine, Genetic Hx (pt, FOB, both families).        Objective:  /82   Wt 97.1 kg (214 lb)   LMP 04/15/2024   BMI 35.61 kg/m²      Urine pregnancy test is positive     General- NAD, alert and oriented, appropriate  Psych- Normal mood, good memory  Neck- No masses, no thyroid enlargement  CV- Regular rhythm, no murnurs  Resp- CTA to bases, no wheezes  Abdomen- Soft, non distended, non tender, no masses    Breast left- deferred  Breast right- deferred    External genitalia- Normal, no lesions  Urethra- Normal, no masses, non tender  Vagina- Normal, no discharge  Bladder- Normal, no masses, non tender  Cvx- Normal, no lesions, no discharge, no CMT  Uterus- Normal shape and consistency, non tender, Consistent with dates, Bedside US consistent with dates.  -160..   Adnexa- Normal, no mass, non tender    Lymphatic- No palpable neck, axillary, or groin nodes  Ext- No edema, no cyanosis    Skin- No lesions, no rashes, no acanthosis nigricans    Assessment and Plan:  8w0d  Diagnoses and all orders for this visit:    1. Supervision of other normal pregnancy, antepartum (Primary)  Overview:  ELISEO finalized: 25 per LMP, dating scan ordered    Optional testing NIPS,CF/SMA,AFP:  Discussed all, patient is considering    COVID: Fully vaccinated  Tdap:  RSV:  Flu:    Rhogam:  28-32 weeks repeat RPR:  ? Desires Sterilization:    Anatomy US:  FU US:    PROBLEM LIST/PLAN:   Maternal obesity - plan early 1hGTT    N/V - B6 and doxylamine        Orders:  -     POC Urinalysis  Dipstick  -     IGP,CtNgTv,Apt HPV,rfx 16 / 18,45  -     OB Panel With HIV  -     Urine Culture - Urine, Urine, Clean Catch  -     Urine Drug Screen - Urine, Clean Catch  -     Hemoglobinopathy Fractionation [place]  -     POC Pregnancy, Urine  -     US Ob < 14 Weeks Single or First Gestation; Future    2. Obesity affecting pregnancy, antepartum, unspecified obesity type  Overview:  Plan early 1hGTT      3. Nausea and vomiting during pregnancy  Overview:  Will try B6 and doxylamine    Orders:  -     pyridoxine (VITAMIN B-6) 25 MG tablet; Take 1 tablet by mouth Every 8 (Eight) Hours.  Dispense: 90 tablet; Refill: 2  -     doxylamine (UNISOM) 25 MG tablet; Take 1 tablet by mouth Every 8 (Eight) Hours As Needed for Sleep or Nausea.  Dispense: 30 tablet; Refill: 1        Genetic Screening:   Considering   CF  NIPS  AFP only    Vaccines:   s/p COVID vaccine    Counseling:   Nutrition discussed, calories, activity/exercise in pregnancy  Discussed dietary restrictions/safety food preparation in pregnancy  Reviewed what to expect prenatal visits, office providers (female and male) and covering [facility] Hospitalists/Dr. Kan  Appropriate trimester precautions provided, N/V, vag bleeding, cramping  Questions answered    Labs:   Prenatal labs, cultures, and PAP performed (prn)    Return in about 4 weeks (around 7/8/2024) for OB follow up, [facility] Office, early 1hGTT.      Shyam Saleem, APRN  06/10/2024    [facility] [place]  [facility]  [address] [place] [address]  Dept: [phone]  Dept Fax: [phone]  Loc: [phone]    wage earner, full time

## 2025-01-18 NOTE — DISCHARGE NOTE PROVIDER - NPI NUMBER (FOR SYSADMIN USE ONLY) :
[[id number]]
pt seen supine in bed with IV line, trach, denied any complaint of pain. pt is A&Ox2 and was able to cooperative during the evaluation

## 2025-02-24 NOTE — DIETITIAN INITIAL EVALUATION ADULT. - OTHER INFO
Proceed with last chemotherapy  Coordinate with radiation oncology at [facility] to start concurrent chemo RT which will be in the form of cisplatin  Let me know when radiation will start and we will submit cisplatin  
Pt admitted for rt groin and thigh cellulitis. Pt tolerating diet- 100% PO intake. RD to remain available for further diet education prn.

## 2025-05-08 NOTE — ED PROVIDER NOTE - NS ED MD DISPO DIVISION
Urgent Care Clinic Visit     Chief Complaint   Patient presents with    Urgent Care     Asthma flare up- pain with deep breath and  SOB, wheezing, feels like fluid in lungs, nausea x last week Sunday - seen for similar sx as 2/20 at  ED                5/8/2025     3:27 PM   Additional Questions   Roomed by Hailee         5/8/2025     3:27 PM   Patient Reported Additional Medications   Patient reports taking the following new medications tylenol              [facility]

## 2025-06-13 NOTE — SWALLOW BEDSIDE ASSESSMENT ADULT - CONSISTENCIES ADMINISTERED
CARDIAC ELECTROPHYSIOLOGY         Patient: Frida Smith Date of Service: 2025    : 1948 MRN: [id number]       Referring:  Michael Ca MD      Subjective     HISTORY OF PRESENT ILLNESS:   Ms. Frida Smith is a 76 year old female with past medical history significant for HTN, hypothyroidism, and paroxysmal atrial fibrillation s/p cryoablation (21) s/p redo ablation (22) presents for EP follow up.     EP Summary  Ms. Smith underwent 2 prior ablation procedures with Dr. Mcintyre.  She last followed-up for EP evaluation on 2024. She reported that her episodes post-ablation were more frequent, but shorter in time. They would occur daily and last around 20 minutes.   Ms. Smith underwent watchman procedure on 10/30/24  2/9/25 - started on flecainide   3/21/25- started jardiance       HPI obtained from [employer]   25  The patient presents for evaluation of atrial fibrillation, low blood pressure, and sleep apnea. She is accompanied by a .    She was initially hospitalized in 2025 due to palpitations, started on flecainide for atrial fibrillation. During her stay, she experienced hypotension. A subsequent hospitalization in 2025 resulted in a similar episode of hypotension, during which she was prescribed Jardiance. Since then, she has been experiencing episodes of low blood pressure at home, with readings as low as 90/50s. These episodes are accompanied by fatigue and insomnia, particularly when her blood pressure is low. She continues to take her antihypertensive medication, which she finds beneficial. These episodes occur 2 to 3 times per week. She discontinued Jardiance a week ago and has not taken it since 2025. Apple watch alerts for atrial fibrillation but upon further review ECG's show sinus rhythm. Her symptoms are most pronounced at night, typically between 3:00 and 4:00 AM. She is currently on metoprolol and flecainide.    She has undergone a  sleep apnea study and was provided with a mask, which she found uncomfortable. She was then given retainers, which she does not believe are effective as she continues to experience sleep apnea.     She also reports occasional daytime hypotension, which she manages with Tylenol and increased salt intake. Her last episode of low blood pressure occurred on Saturday, with a reading of 114/67, during which she felt fatigued.      12/11/24  The patient presents for a follow-up  1 jessica s/p  Watchman procedure. She is accompanied by a .    She reports an improvement in her condition, with no current complaints of cough, shortness of breath, or chest pain. She experiences palpitations, which are typically associated with fatigue, but notes a recent decrease in these episodes. She does not experience any pain at the groin site. She has been advised to wear compression stockings by her primary care physician. She has been informed that she requires another colonoscopy and is seeking advice on the necessary clearance procedures. Her current medication regimen includes Eliquis and aspirin.    She also reports persistent leg swelling, which is more pronounced during the day and lessens in the morning.    MEDICATIONS  Eliquis, aspirin      Patient is Arabic speaking. Patient requested for daughter to interpret today's visit.       REVIEW OF SYSTEMS:  Review of Systems   Constitutional:  Positive for fatigue.   HENT:  Negative for nosebleeds.    Respiratory:  Negative for cough, shortness of breath and wheezing.    Cardiovascular:  Positive for palpitations. Negative for chest pain and leg swelling.   Gastrointestinal:  Negative for blood in stool.   Genitourinary:  Negative for hematuria.   Neurological:  Negative for dizziness, syncope and light-headedness.   Hematological:  Does not bruise/bleed easily.        PAST MEDICAL HISTORY:   Past Medical History:   Diagnosis Date   • AFIB on OAC s/p PVI ablation 9/23/2021     • Arthritis    • CAD    • Cataract    • Depression    • Gastritis    • Genitourinary syndrome of menopause    • Glaucoma    • HLD    • HTN    • Hypothyroid    • Inguinal hernia    • PALMA    • Osteoporosis    • Presence of Watchman left atrial appendage closure device    • Urinary incontinence, mixed        PAST SURGICAL HISTORY:   Past Surgical History:   Procedure Laterality Date   • Ablation - atrial fibrillation     • Cardiac surgery      WATCHMEN DEVICE   • Cholecystectomy     • Ercp  01/2021    biliary sphincterotomy   • Eye surgery     • Hernia repair     • Hysterectomy     • Nasal sinus surgery     • Resection vaginal mesh performed in office  2009   • Thyroidectomy      partial   • Total abdominal hysterectomy w/ bilateral salpingoophorectomy  2005    with sacrocolpoplexy   • Total knee arthroplasty Right 2016   • Total knee arthroplasty Left 2017   • Urethral sling  09/01/2021       FAMILY HISTORY:   Family History   Problem Relation Age of Onset   • Stroke/TIA Father         SOCIAL HISTORY:   Social History     Tobacco Use   • Smoking status: Never   • Smokeless tobacco: Never   Substance Use Topics   • Alcohol use: Not Currently       ALLERGIES:   ALLERGIES:  Lisinopril and Seasonal    MEDICATIONS:   Current Outpatient Medications   Medication Sig Dispense Refill   • semaglutide-Weight Management (Wegovy) 2.4 MG/0.75ML injection Inject 2.4 mg into the skin every 7 days.     • acetaminophen (TYLENOL) 500 MG tablet Take 2 tablets by mouth every 8 hours. 90 tablet 0   • aspirin (ECOTRIN) 81 MG EC tablet Take 1 tablet by mouth every 12 hours. After 30 days, you may resume your previous dose of one tablet by mouth daily 60 tablet 0   • traMADol (ULTRAM) 50 MG tablet Take 1 tablet by mouth every 8 hours. (Patient not taking: Reported on 6/16/2025) 21 tablet 0   • empagliflozin (JARDIANCE) 10 MG tablet Take 1 tablet by mouth daily (before breakfast). Begin taking on March 31, 2025. 30 tablet 1   • chlorhexidine  gluconate (PERIDEX) 0.12 % solution RINSE AND SPIT 15 ML BY MOUTH TWICE DAILY FOR 15 DAYS (Patient not taking: Reported on 6/16/2025)     • estradiol (ESTRACE) 0.1 MG/GM vaginal cream Place 1 g vaginally 2 days a week. 42.5 g 3   • furosemide (LASIX) 20 MG tablet Take 1 tablet by mouth daily. 90 tablet 1   • flecainide (TAMBOCOR) 50 MG tablet Take 1 tablet by mouth 2 times daily. Start tonight 180 tablet 1   • losartan (COZAAR) 50 MG tablet Take 1 tablet by mouth 2 times daily.     • fluticasone (FLONASE) 50 MCG/ACT nasal spray Spray 1 spray in each nostril as needed.     • Atelvia 35 MG Tablet Enteric Coated TAKE 1 TABLET BY MOUTH 1 TIME WEEKLY     • metoPROLOL succinate (TOPROL-XL) 50 MG 24 hr tablet Take 1 tablet by mouth 2 times daily. 180 tablet 3   • Multiple Vitamins-Minerals (MULTIVITAMIN ADULTS PO) Take 1 tablet by mouth daily. Begin taking on March 28, 2025.     • ascorbic acid (VITAMIN C) 500 MG tablet Take 250 mg by mouth 2 times daily. Begin taking on March 28, 2025.     • calcium carbonate-cholecalciferol (OYSCO 500 + D) 500-200 MG-UNIT tablet Take 1 tablet by mouth daily. Begin taking on March 29, 2025. (Patient not taking: Reported on 6/16/2025)     • omega-3 acid ethyl esters (LOVAZA) 1 g capsule Take 2 g by mouth 2 times daily. Begin taking on March 28, 2025.     • rosuvastatin (CRESTOR) 20 MG tablet Take 20 mg by mouth daily. Begin taking on March 29, 2025.     • sertraline (ZOLOFT) 50 MG tablet Take 50 mg by mouth daily. Begin taking on March 29, 2025.     • levothyroxine 125 MCG tablet Take 125 mcg by mouth daily. Begin taking on March 28, 2025.       No current facility-administered medications for this visit.       Objective     Visit Vitals  /79 (BP Location: LUE - Left upper extremity, Patient Position: Sitting, Cuff Size: Regular)   Pulse 64   Ht 5' 0.75\" (1.543 m)   Wt 68.9 kg (151 lb 14.4 oz)   LMP  (LMP Unknown)   SpO2 94%   BMI 28.94 kg/m²       Physical Exam          Cardiac  Diagnostic Testing  The following diagnostics were reviewed.    - ECG (2025) - personally reviewed tracing:  Encounter Date: 25   Electrocardiogram 12-Lead   Result Value    Ventricular Rate EKG/Min (BPM) 62    Atrial Rate (BPM) 62    IL-Interval (MSEC) 180    QRS-Interval (MSEC) 84    QT-Interval (MSEC) 414    QTc 420    P Axis (Degrees) 38    R Axis (Degrees) 42    T Axis (Degrees) 21    REPORT TEXT      Normal sinus rhythm  Nonspecific T wave abnormality  Confirmed by ANDREI GIL ([id number]) on 2025 12:12:17 PM               Results for orders placed in visit on 23    Transthoracic Echo (TTE) Complete W/ W/O Imaging Agent    Impression  *[facility]*  [address] [place] [id number] [phone]  Transthoracic Echocardiogram (TTE)    Patient: Frida Smith     Study Date/Time:       Mar 7 2023 8:34AM  MRN:     [id number]              FIN#:                  [id number]  :     1948           Ht/Wt:                 154.9cm 68kg  Age:     74                   BSA/BMI:               1.73m^2 28.3kg/m^2  Gender:  F                    Baseline BP:           143 / 89  Ordering Physician:    Juan Manuel Solis MD    Referring Physician:   Juan Manuel Solis    Performing Physician:  Juan Manuel Solis MD  Diagnostic Physician:  Juan Manuel Solis MD  Sonographer:           CISCO Parnell    ------------------------------------------------------------------------------  INDICATIONS:  HX: Ascending Aorta Dilation, Hypertension, Tricuspid Valve  Regurgitation.    ------------------------------------------------------------------------------  STUDY CONCLUSIONS  SUMMARY:    1. Left ventricle: The cavity size is normal. Wall thickness is mildly to  moderately increased. There is focal basal hypertrophy. Systolic function  is vigorous. The ejection fraction was measured by biplane method of disks.  Doppler parameters are consistent with abnormal left ventricular  relaxation  and increased filling pressure (grade 2 diastolic dysfunction). The  ejection fraction is 65%. The average E/e' ratio is 14.  2. Ascending aorta: The vessel is mildly dilated at 3.8 cm.  3. Right ventricle: The cavity size is normal. Systolic function is normal by  visual assessment. Systolic pressure is mildly increased. The estimated  peak pressure is 36mm Hg.  4. Tricuspid valve: There is moderate regurgitation.  5. Inferior vena cava: The IVC is normal-sized.  IMPRESSIONS:   The study is unchanged since the study of 07/15/2021.    ------------------------------------------------------------------------------  STUDY DATA:  [place]  Procedure:  A transthoracic echocardiogram was  performed. Image quality was good.  M-mode, complete 2D, complete spectral  Doppler, color Doppler, and strain imaging.  Study status:  Routine.  Study  completion:  There were no complications.    FINDINGS    LEFT VENTRICLE:  The cavity size is normal. Wall thickness is mildly to  moderately increased. There is focal basal hypertrophy. Systolic function is  vigorous. Wall motion is normal; there are no regional wall motion  abnormalities. The average 2D speckle global longitudinal strain is normal.  The global longitudinal strain value is -19.1%.    The ejection fraction was  measured by biplane method of disks. The ejection fraction is 65%. The tissue  Doppler parameters are abnormal. Doppler parameters are consistent with  abnormal left ventricular relaxation and increased filling pressure (grade 2  diastolic dysfunction). Doppler parameters are consistent with high  ventricular filling pressure.    AORTIC VALVE:   The valve is trileaflet. The leaflets are mildly thickened.  Cusp separation is normal. Mobility is not restricted.  There is no stenosis.  There is trivial regurgitation. The mean systolic gradient is 5mm Hg. The  peak systolic gradient is 10mm Hg. The LVOT to aortic valve VTI ratio is 0.74.  The valve area  is 2.3cm^2. The valve area index is 1.34cm^2/m^2. The ratio of  LVOT to aortic valve peak velocity is 0.67. The valve area is 2.1cm^2. The  valve area index is 1.21cm^2/m^2.    AORTA:  Aortic root: The root is normal-sized and mildly calcified.  Ascending aorta: The vessel is mildly dilated at 3.8 cm.  Descending aorta: The vessel is normal-sized.    MITRAL VALVE:  The annulus is normal. The leaflets are mildly thickened.  Leaflet separation is normal. Mobility is not restricted. No evidence for  prolapse. Inflow velocity is within the normal range. There is no evidence for  stenosis. There is mild regurgitation. The peak diastolic gradient is 4mm Hg.  The valve area by pressure half-time is 2.4cm^2. The valve area index by  pressure half-time is 1.4cm^2/m^2.    ATRIAL SEPTUM:  Well visualized. The septum is normal.  Color doppler shows no  obvious shunt.    LEFT ATRIUM:  The atrium is at the upper limits of normal in size.    RIGHT VENTRICLE:  The cavity size is normal. Systolic function is normal by  visual assessment. The TAPSE is normal, suggestive of normal RV systolic  function.  Systolic pressure is mildly increased. The estimated peak pressure  is 36mm Hg.       The RV pressure during systole is 36mm Hg.    VENTRICULAR SEPTUM:   Thickness is increased. There is no diastolic flattening  and no systolic flattening.    PULMONIC VALVE:   Not well visualized. Velocity is within the normal range.  There is trivial regurgitation. The peak systolic gradient is 4mm Hg.    TRICUSPID VALVE:  The valve is structurally normal. Leaflet separation is  normal. There is moderate regurgitation.    RIGHT ATRIUM:  The atrium is normal in size.       The estimated central  venous pressure is 3mm Hg.    PERICARDIUM:  The pericardium is normal in appearance.  There is no  pericardial effusion.    SYSTEMIC VEINS:  Inferior vena cava: The IVC is normal-sized.  Respirophasic diameter changes  are in the normal range (>=  50%).    ------------------------------------------------------------------------------  Measurements    Left ventricle           Value          Ref       09/23/2021  Aortic valve             Value          Ref       09/23/2021  MATTHEW, LAX chord       (N) 4.8   cm       3.8 - 5.2 ----------  Leaflet sep, MM          2.2   cm       --------- ----------  ESD, LAX chord       (N) 3.2   cm       2.2 - 3.5 ----------  Peak v, S                1.6   m/sec    --------- ----------  MATTHEW/bsa, LAX chord   (N) 2.8   cm/m^2   2.3 - 3.1 ----------  Mean v, S                1.01  m/sec    --------- ----------  ESD/bsa, LAX chord   (N) 1.9   cm/m^2   1.3 - 2.1 ----------  Mean grad, S             5     mm Hg    --------- ----------  PW, ED, LAX          (N) 0.9   cm       0.6 - 0.9 ----------  Peak grad, S             10    mm Hg    --------- ----------  IVS, ED              (H) 1.3   cm       0.6 - 0.9 ----------  LVOT/AV, VTI ratio       0.74           --------- ----------  PW, ED               (N) 0.9   cm       0.6 - 0.9 ----------  SUNG, VTI                 2.3   cm^2     --------- ----------  IVS/PW, ED               1.46           --------- ----------  SUNG/bsa, VTI             1.34  cm^2/m^2 --------- ----------  EDV                  (H) 109   ml       46 - 106  ----------  LVOT/AV, Vpeak ratio     0.67           --------- ----------  ESV                  (N) 33    ml       14 - 42   ----------  SUNG, Vmax                2.1   cm^2     --------- ----------  EF                   (N) 65    %        54 - 74   60          SUNG/bsa, Vmax            1.21  cm^2/m^2 --------- ----------  SV                       65    ml       --------- ----------  AR ED v                  3.3   m/s      --------- ----------  EDV/bsa              (H) 63    ml/m^2   29 - 61   ----------  AR decel                 97.9  cm/s^2   --------- ----------  ESV/bsa              (N) 19    ml/m^2   8 - 24    ----------  AR PHT                   986   ms        --------- ----------  SV/bsa                   37    ml/m^2   --------- ----------  AR ED grad               44    mm Hg    --------- ----------  E', lat kwabena, TDI     (L) 7.4   cm/sec   >=10.0    ----------  E/e', lat kwabena, TDI   (N) 13             <=13      ----------  Mitral valve             Value          Ref       09/23/2021  E', med kwabena, TDI     (N) 7.3   cm/sec   >=7.0     ----------  Peak E                   1     m/sec    --------- ----------  E/e', med kwabena, TDI       14             --------- ----------  Peak A                   0.82  m/sec    --------- ----------  E', avg, TDI             7.375 cm/sec   --------- ----------  Decel time               320   ms       --------- ----------  E/e', avg, TDI       (N) 14             <=14      ----------  PHT                      87    ms       --------- ----------  Peak grad, D             4     mm Hg    --------- ----------  LVOT                     Value          Ref       09/23/2021  Peak E/A ratio           1.2            --------- ----------  Diam, S                  2.0   cm       --------- ----------  MVA, PHT                 2.4   cm^2     --------- ----------  Area                     3.1   cm^2     --------- ----------  MVA/bsa, PHT             1.4   cm^2/m^2 --------- ----------  Peak ghislaine, S              1.05  m/sec    --------- ----------  MR peak v                4.01  m/sec    --------- ----------  Peak grad, S             4     mm Hg    --------- ----------  Peak LV-LA grad S        64    mm Hg    --------- ----------  Max MR v                 4.43  m/sec    --------- ----------  Right ventricle          Value          Ref       09/23/2021  Peak LV-LA grad S        78    mm Hg    --------- ----------  TAPSE, MM            (N) 2.4   cm       >=1.7     ----------  Pressure, S              36    mm Hg    --------- ----------  Pulmonic valve           Value          Ref       09/23/2021  Peak v, S                1     m/sec    --------- ----------  RVOT                      Value          Ref       09/23/2021  Peak grad, S             4     mm Hg    --------- ----------  Peak v, S                0.78  m/sec    --------- ----------  WI v, ED                 0.81  m/sec    --------- ----------    Left atrium              Value          Ref       09/23/2021  Tricuspid valve          Value          Ref       09/23/2021  AP dim, ES           (N) 3.7   cm       2.7 - 3.8 ----------  TR peak v            (H) 2.9   m/sec    <=2.8     ----------  AP dim index, ES     (N) 2.1   cm/m^2   1.5 - 2.3 ----------  Peak RV-RA grad, S       33    mm Hg    --------- ----------  SI dim, A4C              3.9   cm       --------- ----------  Area ES, A4C         (N) 17    cm^2     <=20      ----------  Aortic root              Value          Ref       09/23/2021  Area/bsa ES, A4C         9.52  cm^2/m^2 --------- ----------  Root diam, ED        (N) 3.3   cm       2.5 - 3.9 ----------  Area ES, A2C             21    cm^2     --------- ----------  Area/bsa ES, A2C         12.12 cm^2/m^2 --------- ----------  Ascending aorta          Value          Ref       09/23/2021  Vol, S               (H) 57    ml       22 - 52   ----------  AAo AP diam, ED      (H) 3.8   cm       1.9 - 3.5 ----------  Vol/bsa, S           (N) 33    ml/m^2   16 - 34   ----------  AAo AP diam/bsa, ED  (N) 2.2   cm/m^2   1.0 - 2.2 ----------  Vol, ES, 1-p A4C     (N) 44    ml       22 - 52   ----------  Vol/bsa, ES, 1-p A4C (N) 25    ml/m^2   11 - 40   ----------  Pulmonary artery         Value          Ref       09/23/2021  Vol, ES, 1-p A2C     (H) 65    ml       22 - 52   ----------  Pressure, S              32    mm Hg    --------- ----------  Vol/bsa, ES, 1-p A2C (N) 37    ml/m^2   13 - 40   ----------  Pressure ED              6     mm Hg    --------- ----------  Vol, ES, 2-p             56    ml       --------- ----------  Vol/bsa, ES, 2-p     (N) 32    ml/m^2   16 - 34   ----------  Systemic veins           Value           Ref       09/23/2021  AP dim, ES MM        (H) 3.9   cm       2.7 - 3.8 ----------  Estimated CVP            3     mm Hg    --------- ----------  AP dim index, ES MM  (N) 2.3   cm/m^2   1.5 - 2.3 ----------  LA/Ao root ratio, MM     1.18           --------- ----------  Legend:  (L)  and  (H)  ty values outside specified reference range.    (N)  marks values inside specified reference range.    Prepared and electronically signed by  Ty Solis MD  03/07/2023 11:43    No results found for this or any previous visit.    Results for orders placed in visit on 05/28/14    Stress Test with Myocardial Perfusion    Results for orders placed during the hospital encounter of 12/20/23    CTA CORONARY ARTERIES    Impression  1. Minimal coronary artery disease. No coronary anomalies.    2. Coronary artery calcium score of 59, which represents the 50-75 th  percentile adjusted for age, gender and ethnicity.    3. CAD RADS 1 - preventative therapies should be implemented. Consider  noncardiac causes of chest pain    4. Mildly dilated ascending aorta. 4.3 x 4.25 cm.    The protocol was designed for imaging cardiovascular structures and is  suboptimal for imaging of other structures and organs. The lungs,  mediastinum and other chest structures would be reported on separately by  radiology.    Electronically Signed by: TY SOLIS M.D.  Signed on: 12/20/2023 5:06 PM  Workstation ID: [id number]    Lab Results   Component Value Date    POTASSIUM 4.2 04/12/2025    SODIUM 139 04/12/2025    CO2 25 04/12/2025    CHLORIDE 109 04/12/2025    BUN 27 (H) 04/12/2025    CREATININE 0.70 04/12/2025    GLUCOSE 111 (H) 04/12/2025    CALCIUM 9.2 04/12/2025    NTPROB 160 04/12/2025    ALBUMIN 3.6 02/09/2025    AST 22 02/09/2025    GPT 28 02/09/2025    ALKPT 108 02/09/2025    BILIRUBIN 0.4 02/09/2025    MG 2.2 03/22/2025     Lab Results   Component Value Date    WBC 7.8 03/27/2025    HCT 25.6 (L) 03/27/2025    HGB 8.4 (L) 03/27/2025      03/27/2025     Lab Results   Component Value Date    PT 10.9 03/21/2025    PTT 26 03/21/2025    INR 1.0 03/21/2025     Lab Results   Component Value Date    CREATININE 0.70 04/12/2025    CREATININE 0.60 03/27/2025    CREATININE 0.57 03/22/2025         Assessment :   1. Atrial fibrillation, unspecified type  (CMD)    2. PAF (paroxysmal atrial fibrillation)  (CMD)    3. Presence of Watchman left atrial appendage closure device    4. S/P cryoablation of arrhythmia    5. High risk medication use            IMPRESSION/RECOMMENDATIONS:     Paroxysmal atrial fibrillation status post ablation     - Underwent CBA with PVI and linear ablations for posterior wall on ablation on 09/23/21 by Dr. Mcintyre       - Underwent redo AF ablation on 4/14/22 with reisolation of right-sided pulmonary veins by Dr. Mcintyre     - Holter monitor (February 2023) - personally reviewed tracings:  Notable for no AF     - TTE (03/07/23) showed LVEF of 65% and normal LA size.     - Holter monitor (June 2023) - personally reviewed tracings:  Patient monitored for 27 days and 6 hours. The presenting rhythm was sinus with an average heart rate of 62 bpm range 1  beats minute. Frequent bouts of a narrow complex tachycardia without visible P waves that was somewhat irregular most consistent with atrial fibrillation although cannot rule out an atrial tachycardia.     - ECG (07/17/23) - personally reviewed tracing:  SR 65 BPM. Septal infarct, age undetermined.      - ECG (01/16/24) - personally reviewed tracing:  SR, 79 bpm.   Nonspecific ST changes.     - Holter monitor (January 2024) - personally reviewed tracings:  SR baseline. 2.6% AF burden. 0.7% SVE burden with 252 episodes of nonsustained AT, longest 13 beats. Patient triggered events correlated with AF, AT, SR, SVE(s)     - ECG (07/18/24) - personally reviewed tracing:  SR 75 BPM    - ECG (6/16/25) - personally reviewed tracing:  SR. Nonspecific T wave abnormality. 62bpm (qrsd=84ms)    -  Currently on rate & rhythm control strategy     - Currently on metoprolol succinate and flecainide      - Reports fatigue and hypotension. Reports taking metoprolol helps       - We discussed the pathophysiology and natural history of atrial fibrillation  - We discussed clinical sequelae of atrial fibrillation     - We discussed management considerations including:     - rate control medications    - antiarrhythmic medications    - catheter ablation    - AV junction ablation and pacemaker placement     - We discussed the risks and benefits of various antiarrhythmics including flecainide  - We also discussed catheter ablation in detail.       - As Ms. Smith  would like to correlate recent symptoms of palpitations and fatigue to arrhythmia. Apple watch readings show no evidence of atrial fibrillation.  Discussed possibility of breath through episodes of atrial fibrillation while taking flecainide for rhythm control- obtaining monitor to assess.       - May continue on metoprolol succinate and flecainide     -Obtain Holter monitoring to screen for atrial fibrillation.     -Follow-up with sleep medicine for PALMA treatment, noting its potential link to daytime fatigue.     -Follow up with cardiology regarding hypotension concerns. Today's blood pressure 139/79 and home BP readings WNL. Discussed that only symptomatic low blood pressure (less than 90/60) warrants concern.        Status post Left Atrial Appendage Occlusion with Watchman    - With respect to stroke prophylaxis, the patient's [id number] score is = 6  [id number] score summary    Element Patient Score   Congestive Heart Failure 1   High blood pressure 1   Age 2    Diabetes 0   Stroke/TIA/Clot 0   Vascular disease 1   Sex 1       - Underwent Watchman procedure on 10/30/24 d/t bleeding concerns (rectal sheath hematoma)  ICE images obtained at the end of the procedure showed no change in the trivial pericardial effusion that had been seen on baseline images  at the beginning of the procedure.     - TTE (10/30/24) - personally reviewed images:  Showed trivial pericardial effusionThere is no evidence of hemodynamic compromise. Successful left atrial appendage occlusion with Watchman Flex  Pro 24mm. No evidence of residual flow around the left aortic arch occluder device. This procedure was also done with ICE.    - CT scan (12/17/24) showed  Left atrial appendage occlusion device is present, with no measurable peridevice leak or adherent thrombus. Endothelialization is not yet complete.    -continue ASA 81 mg indefinitely.      - Follow up in 1 year from insert of Left Atrial Appendage Occlusion with Watchman- around 10/30/25     High risk medication use    - Currently on flecainide 50 mg BID    - ECG (6/16/25) - personally reviewed tracing:  SR. Nonspecific T wave abnormality. 62bpm (qrsd=84ms)    - Consider stopping flecainide or decrease dosage if QRS duration if >20 ms increase in QRS duration     - Consider stopping flecainide or decrease dosage if QRS duration is >120 ms    - Review medication list to ensure not on:  Ritonavir    - Monitor liver and renal function while on flecainide; dosage reduction may be considered and/or drug levels obtained for renal dysfunction or liver dysfunction    - Creatinine, Est Creatinine Clearance, Est GFR, AST, and ALT (2/9/25) : within range    - Check CMP every 6 months while on flecainide    - May continue on flecainide    If continues to do well from an arrhythmia standpoint in the future, will consider tapering down/discontinuation of antiarrhythmic therapy.            Recommendations   1) Continue with metoprolol succinate 50mg for heart rate control   2) Continue with Aspirin  81mg + watchman device   3) Continue with flecainide 50mg twice a day for heart rhythm control   4) Obtain cardiac monitor to assess for palpations   4) Follow up with EP Provider in September- 1 year juan nealde, discuss monitor results    5)  Please call  [phone] with any questions, concerns, or new or worsening symptoms including SOB, palpitations & CP or near syncope/syncope or present to ED for evaluation.         Instructions provided as documented in the AVS.    The patient indicated understanding of the diagnosis and agreed with the plan of care.    - Time was spent on pre-charting, full patient visit and history obtained, physical exam obtained, counseling provided, note written and coordination of care. At least 40 minutes were spent on counseling and coordination of care face to face.       YENNY Fung   Cardiac Electrophysiology   puree mech soft thin liquid
